# Patient Record
Sex: FEMALE | Race: WHITE | NOT HISPANIC OR LATINO | Employment: OTHER | URBAN - METROPOLITAN AREA
[De-identification: names, ages, dates, MRNs, and addresses within clinical notes are randomized per-mention and may not be internally consistent; named-entity substitution may affect disease eponyms.]

---

## 2018-03-15 ENCOUNTER — LAB REQUISITION (OUTPATIENT)
Dept: LAB | Facility: HOSPITAL | Age: 63
End: 2018-03-15
Attending: INTERNAL MEDICINE
Payer: COMMERCIAL

## 2018-03-15 ENCOUNTER — TRANSCRIBE ORDERS (OUTPATIENT)
Dept: SCHEDULING | Age: 63
End: 2018-03-15

## 2018-03-15 DIAGNOSIS — D50.8 OTHER IRON DEFICIENCY ANEMIAS (CODE): ICD-10-CM

## 2018-03-15 DIAGNOSIS — D50.8 IRON DEFICIENCY ANEMIA SECONDARY TO INADEQUATE DIETARY IRON INTAKE: Primary | ICD-10-CM

## 2018-03-15 LAB
BASOPHILS # BLD: 0.03 K/UL (ref 0.01–0.1)
BASOPHILS NFR BLD: 0.6 %
EOSINOPHIL # BLD: 0.2 K/UL (ref 0.04–0.36)
EOSINOPHIL NFR BLD: 3.8 %
ERYTHROCYTE [DISTWIDTH] IN BLOOD BY AUTOMATED COUNT: 18.7 % (ref 11.7–14.4)
HCT VFR BLDCO AUTO: 38 % (ref 35–45)
HGB BLD-MCNC: 12.4 G/DL (ref 11.8–15.7)
IMM GRANULOCYTES # BLD AUTO: 0.03 K/UL (ref 0–0.08)
IMM GRANULOCYTES NFR BLD AUTO: 0.6 %
LYMPHOCYTES # BLD: 1.14 K/UL (ref 1.2–3.5)
LYMPHOCYTES NFR BLD: 21.7 %
MCH RBC QN AUTO: 25 PG (ref 28–33.2)
MCHC RBC AUTO-ENTMCNC: 32.6 G/DL (ref 32.2–35.5)
MCV RBC AUTO: 76.6 FL (ref 83–98)
MONOCYTES # BLD: 0.41 K/UL (ref 0.28–0.8)
MONOCYTES NFR BLD: 7.8 %
NEUTROPHILS # BLD: 3.45 K/UL (ref 1.7–7)
NEUTS SEG NFR BLD: 65.5 %
NRBC BLD-RTO: 0 %
PDW BLD AUTO: 9.7 FL (ref 9.4–12.3)
PLATELET # BLD AUTO: 316 K/UL (ref 150–369)
RBC # BLD AUTO: 4.96 M/UL (ref 3.93–5.22)
WBC # BLD AUTO: 5.26 K/UL (ref 3.8–10.5)

## 2018-03-15 PROCEDURE — 85025 COMPLETE CBC W/AUTO DIFF WBC: CPT | Performed by: INTERNAL MEDICINE

## 2018-03-20 ENCOUNTER — HOSPITAL ENCOUNTER (OUTPATIENT)
Dept: RADIOLOGY | Age: 63
Discharge: HOME | End: 2018-03-20
Attending: INTERNAL MEDICINE
Payer: COMMERCIAL

## 2018-03-20 DIAGNOSIS — D50.8 IRON DEFICIENCY ANEMIA SECONDARY TO INADEQUATE DIETARY IRON INTAKE: ICD-10-CM

## 2018-03-20 PROCEDURE — 76700 US EXAM ABDOM COMPLETE: CPT

## 2018-05-25 ENCOUNTER — LAB REQUISITION (OUTPATIENT)
Dept: LAB | Facility: HOSPITAL | Age: 63
End: 2018-05-25
Attending: INTERNAL MEDICINE
Payer: COMMERCIAL

## 2018-05-25 DIAGNOSIS — D50.9 IRON DEFICIENCY ANEMIA: ICD-10-CM

## 2018-05-25 LAB
BASOPHILS # BLD: 0.03 K/UL (ref 0.01–0.1)
BASOPHILS NFR BLD: 0.4 %
DIFFERENTIAL METHOD BLD: ABNORMAL
EOSINOPHIL # BLD: 0.25 K/UL (ref 0.04–0.36)
EOSINOPHIL NFR BLD: 3.5 %
ERYTHROCYTE [DISTWIDTH] IN BLOOD BY AUTOMATED COUNT: 14.6 % (ref 11.7–14.4)
HCT VFR BLDCO AUTO: 39.3 % (ref 35–45)
HGB BLD-MCNC: 13.1 G/DL (ref 11.8–15.7)
IMM GRANULOCYTES # BLD AUTO: 0.05 K/UL (ref 0–0.08)
IMM GRANULOCYTES NFR BLD AUTO: 0.7 %
LYMPHOCYTES # BLD: 1.07 K/UL (ref 1.2–3.5)
LYMPHOCYTES NFR BLD: 15 %
MCH RBC QN AUTO: 25.5 PG (ref 28–33.2)
MCHC RBC AUTO-ENTMCNC: 33.3 G/DL (ref 32.2–35.5)
MCV RBC AUTO: 76.6 FL (ref 83–98)
MONOCYTES # BLD: 0.46 K/UL (ref 0.28–0.8)
MONOCYTES NFR BLD: 6.5 %
NEUTROPHILS # BLD: 5.26 K/UL (ref 1.7–7)
NEUTS SEG NFR BLD: 73.9 %
NRBC BLD-RTO: 0 %
PDW BLD AUTO: 9.5 FL (ref 9.4–12.3)
PLATELET # BLD AUTO: 388 K/UL (ref 150–369)
RBC # BLD AUTO: 5.13 M/UL (ref 3.93–5.22)
WBC # BLD AUTO: 7.12 K/UL (ref 3.8–10.5)

## 2018-05-25 PROCEDURE — 85025 COMPLETE CBC W/AUTO DIFF WBC: CPT | Performed by: INTERNAL MEDICINE

## 2018-06-20 ENCOUNTER — HOSPITAL ENCOUNTER (EMERGENCY)
Facility: HOSPITAL | Age: 63
Discharge: HOME | End: 2018-06-20
Attending: EMERGENCY MEDICINE
Payer: COMMERCIAL

## 2018-06-20 VITALS
DIASTOLIC BLOOD PRESSURE: 86 MMHG | OXYGEN SATURATION: 98 % | SYSTOLIC BLOOD PRESSURE: 117 MMHG | RESPIRATION RATE: 16 BRPM | HEART RATE: 58 BPM | TEMPERATURE: 98.5 F

## 2018-06-20 DIAGNOSIS — S05.02XA ABRASION OF LEFT CORNEA, INITIAL ENCOUNTER: Primary | ICD-10-CM

## 2018-06-20 PROCEDURE — 63700000 HC SELF-ADMINISTRABLE DRUG: Performed by: PHYSICIAN ASSISTANT

## 2018-06-20 PROCEDURE — 99281 EMR DPT VST MAYX REQ PHY/QHP: CPT

## 2018-06-20 RX ORDER — POLYMYXIN B SULFATE AND TRIMETHOPRIM 1; 10000 MG/ML; [USP'U]/ML
1 SOLUTION OPHTHALMIC
Qty: 10 ML | Refills: 0 | Status: SHIPPED | OUTPATIENT
Start: 2018-06-20 | End: 2019-06-25

## 2018-06-20 RX ORDER — POTASSIUM CHLORIDE 20 MEQ/1
20 TABLET, EXTENDED RELEASE ORAL
COMMUNITY
Start: 2017-11-24 | End: 2019-03-28

## 2018-06-20 RX ORDER — VIT C/E/ZN/COPPR/LUTEIN/ZEAXAN 250MG-90MG
1000 CAPSULE ORAL DAILY
COMMUNITY
End: 2025-02-25

## 2018-06-20 RX ORDER — POLYMYXIN B SULFATE AND TRIMETHOPRIM 1; 10000 MG/ML; [USP'U]/ML
1 SOLUTION OPHTHALMIC ONCE
Status: DISCONTINUED | OUTPATIENT
Start: 2018-06-20 | End: 2018-06-20 | Stop reason: HOSPADM

## 2018-06-20 RX ORDER — PROPARACAINE HYDROCHLORIDE 5 MG/ML
1 SOLUTION/ DROPS OPHTHALMIC ONCE
Status: COMPLETED | OUTPATIENT
Start: 2018-06-20 | End: 2018-06-20

## 2018-06-20 RX ORDER — AMLODIPINE BESYLATE 5 MG/1
5 TABLET ORAL DAILY
COMMUNITY
Start: 2017-03-01 | End: 2021-05-11 | Stop reason: HOSPADM

## 2018-06-20 RX ORDER — HYDROCHLOROTHIAZIDE 25 MG/1
25 TABLET ORAL DAILY
Refills: 3 | COMMUNITY
Start: 2018-05-24 | End: 2022-02-22 | Stop reason: SINTOL

## 2018-06-20 RX ADMIN — PROPARACAINE HYDROCHLORIDE 1 DROP: 5 SOLUTION/ DROPS OPHTHALMIC at 17:50

## 2018-06-20 ASSESSMENT — ENCOUNTER SYMPTOMS
DOUBLE VISION: 0
EYE DISCHARGE: 0
BLURRED VISION: 1
PERI-ORBITAL EDEMA: 0
EYE ITCHING: 0
PHOTOPHOBIA: 1
HEADACHES: 0
CRUSTING: 0
EYE REDNESS: 1

## 2018-06-20 NOTE — ED PROVIDER NOTES
HPI     Chief Complaint   Patient presents with   • Eye Pain       61yo female presenting for evaluation with left eye pain after waking up from colonoscopy earlier today.  Patient reports she was lying on her left side for the colonoscopy when she was put under anesthesia.  She has multiple bracelets on her left wrist. When she awoke, she felt her left eye was incredibly irritable and painful as well as red.  Patient reports that she had a corneal abrasion years ago when she used to wear contacts and the symptoms are identical.  She has noticed increased tearing and had a little bit of blurred vision, which has improved.  She also noticed that in the right eye, she had some redness as well.  She believes she may have scratched her left eye with the jewelry on her wrist.        History provided by:  Patient   used: No    Eye Problem   Location:  Left eye  Quality:  Burning and tearing  Severity:  Moderate  Onset quality:  Sudden  Timing:  Constant  Chronicity:  New  Context: scratch (presumed)    Relieved by:  None tried  Worsened by:  Bright light  Ineffective treatments:  Closing eye  Associated symptoms: blurred vision (now resolved), photophobia and redness    Associated symptoms: no crusting, no decreased vision, no discharge, no double vision, no headaches, no itching and no swelling    Risk factors: no recent herpes zoster         Patient History     Past Medical History:   Diagnosis Date   • Hypertension    • Systemic mastocytosis        Past Surgical History:   Procedure Laterality Date   • COLONOSCOPY     • REDUCTION MAMMAPLASTY         History reviewed. No pertinent family history.    Social History   Substance Use Topics   • Smoking status: Former Smoker   • Smokeless tobacco: Never Used   • Alcohol use No       Systems Reviewed from Nursing Triage:          Review of Systems     Review of Systems   Eyes: Positive for blurred vision (now resolved), photophobia and redness. Negative  for double vision, discharge and itching.   Neurological: Negative for headaches.        Physical Exam     ED Triage Vitals [06/20/18 1518]   Temp Heart Rate Resp BP SpO2   36.9 °C (98.5 °F) (!) 58 16 117/86 98 %      Temp Source Heart Rate Source Patient Position BP Location FiO2 (%) (Set)   Tympanic -- -- -- --                     Patient Vitals for the past 24 hrs:   BP Temp Temp src Pulse Resp SpO2   06/20/18 1518 117/86 36.9 °C (98.5 °F) Tympanic (!) 58 16 98 %           Physical Exam   Constitutional: She is oriented to person, place, and time. She appears well-developed and well-nourished.   Eyes: EOM and lids are normal. Pupils are equal, round, and reactive to light. Lids are everted and swept, no foreign bodies found. Right conjunctiva is not injected. Left conjunctiva is injected.   Slit lamp exam:       The right eye shows no corneal abrasion and no fluorescein uptake.        The left eye shows corneal abrasion and fluorescein uptake.       Pt with photophobia and redness of left eye   Neurological: She is alert and oriented to person, place, and time.   Skin: Skin is warm and dry.   Psychiatric: She has a normal mood and affect.   Nursing note and vitals reviewed.           Procedures    ED Course & MDM     Labs Reviewed - No data to display    No orders to display           MDM  Number of Diagnoses or Management Options  Abrasion of left cornea, initial encounter:      Amount and/or Complexity of Data Reviewed  Discuss the patient with other providers: yes    Patient Progress  Patient progress: improved           ED Course as of Jun 20 1937 Wed Jun 20, 2018   1727 Pt seen and examined. Will give proparacaine and do slit lamp exam.   [KL]   1747 Pt reports feeling relief in left eye after application of proparacaine drop. Pt + corneal abrasion to right eye. ED Attending to evaluate.   [KL]   1811 Pt with allergy to clarithromycin ointment so will prescribe polymixin/trimethoprim drops. Pt stable for  d/c home with Ozzy f/u.   [KL]      ED Course User Index  [KL] DOYLE Kapadia         Clinical Impressions as of Jun 20 1937   Abrasion of left cornea, initial encounter     Disposition:  Discharge     DOYLE Kapadia  06/20/18 1937

## 2018-06-20 NOTE — ED ATTESTATION NOTE
I have personally seen and examined the patient.  I reviewed and agree with physician assistant / nurse practitioner’s assessment and plan of care, with the following exceptions: None  My examination, assessment, and plan of care of Ritu Ramirez is as follows:     Exam:  I examined the pt with the slit lamp.  Left eye: corneal abrasion to central part of cornea.       Mark Moser MD  06/20/18 1810

## 2018-06-20 NOTE — DISCHARGE INSTRUCTIONS
Apply the antibiotic drop as directed and follow up with Dr. Preciado. Return for any worsening or worrisome symptoms.     Return to the ED for worsening of symptoms or any problems or concerns.  It is very important to follow up with your healthcare provider for re-evaluation.

## 2018-06-21 ENCOUNTER — TELEPHONE (OUTPATIENT)
Dept: INTERNAL MEDICINE | Facility: CLINIC | Age: 63
End: 2018-06-21

## 2018-06-22 ENCOUNTER — TELEPHONE (OUTPATIENT)
Dept: INTERNAL MEDICINE | Facility: CLINIC | Age: 63
End: 2018-06-22

## 2018-06-22 NOTE — TELEPHONE ENCOUNTER
I called the patient back to tell her she can have a TDAP per Dr. Grimm and the patient stated she will do this at a [facility].  I also informed the patient that Dr. Grimm would like to see her in six months from her last appt which was January 2018 so I scheduled the patient for a visit in July.

## 2018-06-22 NOTE — TELEPHONE ENCOUNTER
The patient called today asking if she can get a TDAP injection today as she has a new grandchild and was told she needs to have this. Can she have this and if so, can you please place an order for this today so she can come in this morning?  Also, the patient said she doesn't have anything pressing right now but was last here 1/23/18 and wanted to know when you want to see her again so she can make an appt ahead of time.

## 2018-06-25 ENCOUNTER — LAB REQUISITION (OUTPATIENT)
Dept: LAB | Facility: HOSPITAL | Age: 63
End: 2018-06-25
Attending: INTERNAL MEDICINE
Payer: COMMERCIAL

## 2018-06-25 DIAGNOSIS — D50.8 OTHER IRON DEFICIENCY ANEMIAS (CODE): ICD-10-CM

## 2018-06-25 LAB
BASOPHILS # BLD: 0.03 K/UL (ref 0.01–0.1)
BASOPHILS NFR BLD: 0.4 %
DIFFERENTIAL METHOD BLD: NORMAL
EOSINOPHIL # BLD: 0.22 K/UL (ref 0.04–0.36)
EOSINOPHIL NFR BLD: 2.8 %
ERYTHROCYTE [DISTWIDTH] IN BLOOD BY AUTOMATED COUNT: 14.7 % (ref 11.7–14.4)
HCT VFR BLDCO AUTO: 42.4 % (ref 35–45)
HGB BLD-MCNC: 14.3 G/DL (ref 11.8–15.7)
IMM GRANULOCYTES # BLD AUTO: 0.04 K/UL (ref 0–0.08)
IMM GRANULOCYTES NFR BLD AUTO: 0.5 %
LYMPHOCYTES # BLD: 1.45 K/UL (ref 1.2–3.5)
LYMPHOCYTES NFR BLD: 18.8 %
MCH RBC QN AUTO: 25.9 PG (ref 28–33.2)
MCHC RBC AUTO-ENTMCNC: 33.7 G/DL (ref 32.2–35.5)
MCV RBC AUTO: 76.7 FL (ref 83–98)
MONOCYTES # BLD: 0.69 K/UL (ref 0.28–0.8)
MONOCYTES NFR BLD: 8.9 %
NEUTROPHILS # BLD: 5.3 K/UL (ref 1.7–7)
NEUTS SEG NFR BLD: 68.6 %
NRBC BLD-RTO: 0 %
PDW BLD AUTO: 9.7 FL (ref 9.4–12.3)
PLATELET # BLD AUTO: 327 K/UL (ref 150–369)
RBC # BLD AUTO: 5.53 M/UL (ref 3.93–5.22)
WBC # BLD AUTO: 7.73 K/UL (ref 3.8–10.5)

## 2018-06-25 PROCEDURE — 85025 COMPLETE CBC W/AUTO DIFF WBC: CPT | Performed by: INTERNAL MEDICINE

## 2018-06-25 PROCEDURE — 36415 COLL VENOUS BLD VENIPUNCTURE: CPT | Performed by: INTERNAL MEDICINE

## 2018-08-01 ENCOUNTER — LAB REQUISITION (OUTPATIENT)
Dept: LAB | Facility: HOSPITAL | Age: 63
End: 2018-08-01
Attending: INTERNAL MEDICINE
Payer: COMMERCIAL

## 2018-08-01 DIAGNOSIS — D50.8 OTHER IRON DEFICIENCY ANEMIAS (CODE): ICD-10-CM

## 2018-08-01 LAB
BASOPHILS # BLD: 0.03 K/UL (ref 0.01–0.1)
BASOPHILS NFR BLD: 0.4 %
DIFFERENTIAL METHOD BLD: NORMAL
EOSINOPHIL # BLD: 0.27 K/UL (ref 0.04–0.36)
EOSINOPHIL NFR BLD: 3.3 %
ERYTHROCYTE [DISTWIDTH] IN BLOOD BY AUTOMATED COUNT: 15 % (ref 11.7–14.4)
HCT VFR BLDCO AUTO: 36.3 % (ref 35–45)
HGB BLD-MCNC: 11.9 G/DL (ref 11.8–15.7)
IMM GRANULOCYTES # BLD AUTO: 0.08 K/UL (ref 0–0.08)
IMM GRANULOCYTES NFR BLD AUTO: 1 %
LYMPHOCYTES # BLD: 1.3 K/UL (ref 1.2–3.5)
LYMPHOCYTES NFR BLD: 16 %
MCH RBC QN AUTO: 26.3 PG (ref 28–33.2)
MCHC RBC AUTO-ENTMCNC: 32.8 G/DL (ref 32.2–35.5)
MCV RBC AUTO: 80.3 FL (ref 83–98)
MONOCYTES # BLD: 0.61 K/UL (ref 0.28–0.8)
MONOCYTES NFR BLD: 7.5 %
NEUTROPHILS # BLD: 5.85 K/UL (ref 1.7–7)
NEUTS SEG NFR BLD: 71.8 %
NRBC BLD-RTO: 0 %
PDW BLD AUTO: 9.2 FL (ref 9.4–12.3)
PLATELET # BLD AUTO: 355 K/UL (ref 150–369)
RBC # BLD AUTO: 4.52 M/UL (ref 3.93–5.22)
WBC # BLD AUTO: 8.14 K/UL (ref 3.8–10.5)

## 2018-08-01 PROCEDURE — 36415 COLL VENOUS BLD VENIPUNCTURE: CPT | Performed by: INTERNAL MEDICINE

## 2018-08-01 PROCEDURE — 85025 COMPLETE CBC W/AUTO DIFF WBC: CPT | Performed by: INTERNAL MEDICINE

## 2018-09-27 RX ORDER — SERTRALINE HYDROCHLORIDE 25 MG/1
TABLET, FILM COATED ORAL
Qty: 90 TABLET | Refills: 1 | Status: SHIPPED | OUTPATIENT
Start: 2018-09-27 | End: 2019-02-12 | Stop reason: SDUPTHER

## 2018-10-22 ENCOUNTER — LAB REQUISITION (OUTPATIENT)
Dept: LAB | Facility: HOSPITAL | Age: 63
End: 2018-10-22
Payer: COMMERCIAL

## 2018-10-22 ENCOUNTER — LAB REQUISITION (OUTPATIENT)
Dept: LAB | Facility: HOSPITAL | Age: 63
End: 2018-10-22
Attending: INTERNAL MEDICINE
Payer: COMMERCIAL

## 2018-10-22 DIAGNOSIS — D50.8 OTHER IRON DEFICIENCY ANEMIAS: ICD-10-CM

## 2018-10-22 LAB
BASOPHILS # BLD: 0.03 K/UL (ref 0.01–0.1)
BASOPHILS NFR BLD: 0.4 %
DIFFERENTIAL METHOD BLD: ABNORMAL
EOSINOPHIL # BLD: 0.34 K/UL (ref 0.04–0.36)
EOSINOPHIL NFR BLD: 4.6 %
ERYTHROCYTE [DISTWIDTH] IN BLOOD BY AUTOMATED COUNT: 13.2 % (ref 11.7–14.4)
HCT VFR BLDCO AUTO: 36.2 % (ref 35–45)
HGB BLD-MCNC: 11.4 G/DL (ref 11.8–15.7)
IMM GRANULOCYTES # BLD AUTO: 0.03 K/UL (ref 0–0.08)
IMM GRANULOCYTES NFR BLD AUTO: 0.4 %
LYMPHOCYTES # BLD: 0.94 K/UL (ref 1.2–3.5)
LYMPHOCYTES NFR BLD: 12.7 %
MCH RBC QN AUTO: 24.2 PG (ref 28–33.2)
MCHC RBC AUTO-ENTMCNC: 31.5 G/DL (ref 32.2–35.5)
MCV RBC AUTO: 76.9 FL (ref 83–98)
MONOCYTES # BLD: 0.55 K/UL (ref 0.28–0.8)
MONOCYTES NFR BLD: 7.5 %
NEUTROPHILS # BLD: 5.49 K/UL (ref 1.7–7)
NEUTS SEG NFR BLD: 74.4 %
NRBC BLD-RTO: 0 %
PDW BLD AUTO: 9.7 FL (ref 9.4–12.3)
PLATELET # BLD AUTO: 384 K/UL (ref 150–369)
RBC # BLD AUTO: 4.71 M/UL (ref 3.93–5.22)
WBC # BLD AUTO: 7.38 K/UL (ref 3.8–10.5)

## 2018-10-22 PROCEDURE — 36415 COLL VENOUS BLD VENIPUNCTURE: CPT | Performed by: INTERNAL MEDICINE

## 2018-10-22 PROCEDURE — 30099997 SPECIMEN PROCESSING: Performed by: INTERNAL MEDICINE

## 2018-10-22 PROCEDURE — 85025 COMPLETE CBC W/AUTO DIFF WBC: CPT | Performed by: INTERNAL MEDICINE

## 2018-10-30 ENCOUNTER — LAB REQUISITION (OUTPATIENT)
Dept: LAB | Facility: HOSPITAL | Age: 63
End: 2018-10-30
Payer: COMMERCIAL

## 2018-10-30 DIAGNOSIS — D50.9 IRON DEFICIENCY ANEMIA: ICD-10-CM

## 2018-10-30 LAB
BASOPHILS # BLD: 0.03 K/UL (ref 0.01–0.1)
BASOPHILS NFR BLD: 0.4 %
DIFFERENTIAL METHOD BLD: NORMAL
EOSINOPHIL # BLD: 0.36 K/UL (ref 0.04–0.36)
EOSINOPHIL NFR BLD: 5.1 %
ERYTHROCYTE [DISTWIDTH] IN BLOOD BY AUTOMATED COUNT: 13.5 % (ref 11.7–14.4)
HCT VFR BLDCO AUTO: 37.3 % (ref 35–45)
HGB BLD-MCNC: 11.8 G/DL (ref 11.8–15.7)
IMM GRANULOCYTES # BLD AUTO: 0.04 K/UL (ref 0–0.08)
IMM GRANULOCYTES NFR BLD AUTO: 0.6 %
LYMPHOCYTES # BLD: 1.2 K/UL (ref 1.2–3.5)
LYMPHOCYTES NFR BLD: 17.1 %
MCH RBC QN AUTO: 23.8 PG (ref 28–33.2)
MCHC RBC AUTO-ENTMCNC: 31.6 G/DL (ref 32.2–35.5)
MCV RBC AUTO: 75.4 FL (ref 83–98)
MONOCYTES # BLD: 0.53 K/UL (ref 0.28–0.8)
MONOCYTES NFR BLD: 7.5 %
NEUTROPHILS # BLD: 4.87 K/UL (ref 1.7–7)
NEUTS SEG NFR BLD: 69.3 %
NRBC BLD-RTO: 0 %
PDW BLD AUTO: 9.5 FL (ref 9.4–12.3)
PLATELET # BLD AUTO: 459 K/UL (ref 150–369)
RBC # BLD AUTO: 4.95 M/UL (ref 3.93–5.22)
WBC # BLD AUTO: 7.03 K/UL (ref 3.8–10.5)

## 2018-10-30 PROCEDURE — 85025 COMPLETE CBC W/AUTO DIFF WBC: CPT | Performed by: INTERNAL MEDICINE

## 2018-10-30 PROCEDURE — 30099997 SPECIMEN PROCESSING: Performed by: INTERNAL MEDICINE

## 2018-11-20 ENCOUNTER — APPOINTMENT (RX ONLY)
Dept: URBAN - METROPOLITAN AREA CLINIC 26 | Facility: CLINIC | Age: 63
Setting detail: DERMATOLOGY
End: 2018-11-20

## 2018-11-20 DIAGNOSIS — D18.0 HEMANGIOMA: ICD-10-CM

## 2018-11-20 DIAGNOSIS — D22 MELANOCYTIC NEVI: ICD-10-CM

## 2018-11-20 DIAGNOSIS — L85.3 XEROSIS CUTIS: ICD-10-CM

## 2018-11-20 DIAGNOSIS — Z85.828 PERSONAL HISTORY OF OTHER MALIGNANT NEOPLASM OF SKIN: ICD-10-CM

## 2018-11-20 DIAGNOSIS — L81.4 OTHER MELANIN HYPERPIGMENTATION: ICD-10-CM

## 2018-11-20 PROBLEM — D56.9 THALASSEMIA, UNSPECIFIED: Status: ACTIVE | Noted: 2018-11-20

## 2018-11-20 PROBLEM — D22.72 MELANOCYTIC NEVI OF LEFT LOWER LIMB, INCLUDING HIP: Status: ACTIVE | Noted: 2018-11-20

## 2018-11-20 PROBLEM — D22.5 MELANOCYTIC NEVI OF TRUNK: Status: ACTIVE | Noted: 2018-11-20

## 2018-11-20 PROBLEM — D48.5 NEOPLASM OF UNCERTAIN BEHAVIOR OF SKIN: Status: ACTIVE | Noted: 2018-11-20

## 2018-11-20 PROBLEM — D18.01 HEMANGIOMA OF SKIN AND SUBCUTANEOUS TISSUE: Status: ACTIVE | Noted: 2018-11-20

## 2018-11-20 PROBLEM — D89.9 DISORDER INVOLVING THE IMMUNE MECHANISM, UNSPECIFIED: Status: ACTIVE | Noted: 2018-11-20

## 2018-11-20 PROBLEM — T41.0X5S ADVERSE EFFECT OF INHALED ANESTHETICS, SEQUELA: Status: ACTIVE | Noted: 2018-11-20

## 2018-11-20 PROBLEM — I10 ESSENTIAL (PRIMARY) HYPERTENSION: Status: ACTIVE | Noted: 2018-11-20

## 2018-11-20 PROBLEM — L23.7 ALLERGIC CONTACT DERMATITIS DUE TO PLANTS, EXCEPT FOOD: Status: ACTIVE | Noted: 2018-11-20

## 2018-11-20 PROBLEM — D47.02 SYSTEMIC MASTOCYTOSIS: Status: ACTIVE | Noted: 2018-11-20

## 2018-11-20 PROCEDURE — 99203 OFFICE O/P NEW LOW 30 MIN: CPT

## 2018-11-20 PROCEDURE — ? ADDITIONAL NOTES

## 2018-11-20 PROCEDURE — ? OBSERVATION AND MEASURE

## 2018-11-20 PROCEDURE — ? PATIENT SPECIFIC COUNSELING

## 2018-11-20 PROCEDURE — ? DEFER

## 2018-11-20 PROCEDURE — ? COUNSELING

## 2018-11-20 PROCEDURE — ? SUNSCREEN RECOMMENDATIONS

## 2018-11-20 PROCEDURE — ? OBSERVATION

## 2018-11-20 ASSESSMENT — LOCATION DETAILED DESCRIPTION DERM
LOCATION DETAILED: LEFT MEDIAL KNEE
LOCATION DETAILED: LEFT DISTAL POSTERIOR THIGH
LOCATION DETAILED: PERIUMBILICAL SKIN
LOCATION DETAILED: RIGHT ANTERIOR PROXIMAL UPPER ARM
LOCATION DETAILED: EPIGASTRIC SKIN
LOCATION DETAILED: LEFT BUTTOCK

## 2018-11-20 ASSESSMENT — LOCATION ZONE DERM
LOCATION ZONE: ARM
LOCATION ZONE: LEG
LOCATION ZONE: TRUNK

## 2018-11-20 ASSESSMENT — LOCATION SIMPLE DESCRIPTION DERM
LOCATION SIMPLE: LEFT BUTTOCK
LOCATION SIMPLE: ABDOMEN
LOCATION SIMPLE: LEFT KNEE
LOCATION SIMPLE: LEFT POSTERIOR THIGH
LOCATION SIMPLE: RIGHT UPPER ARM

## 2018-11-20 NOTE — PROCEDURE: PATIENT SPECIFIC COUNSELING
Advised patient she should always carry an EpiPen. Patient reports she has (2) EpiPens on her at all times
Detail Level: Zone

## 2018-11-20 NOTE — PROCEDURE: OBSERVATION
Detail Level: Detailed
Size Of Lesion: 4mm
Size Of Lesion In Cm (Optional): 0.3
X Size Of Lesion In Cm (Optional): 0
Size Of Lesion In Cm (Optional): 0.4
Detail Level: Zone

## 2018-11-20 NOTE — PROCEDURE: DEFER
Instructions (Optional): Procedure to be re-evaluated for biopsy at next visit
Procedure To Be Performed At Next Visit: Biopsy by shave method
Detail Level: Detailed

## 2018-11-20 NOTE — PROCEDURE: ADDITIONAL NOTES
Additional Notes: Patient reports diagnosis confirmed by bone marrow biopsy, and she is closely monitored by Dr.Hirsh Simental ([facility]) \\nFax: [phone]\\nAddress: [address] [address]\\nPatient denies taking medications for this condition at this time Additional Notes: Patient reports diagnosis confirmed by bone marrow biopsy, and she is closely monitored by Dr.Hirsh Simental ([facility]) \\nFax: [phone]\\nAddress: [address] [address]\\nPatient denies taking medications for this condition at this time

## 2018-11-29 ENCOUNTER — TELEPHONE (OUTPATIENT)
Dept: INTERNAL MEDICINE | Facility: CLINIC | Age: 63
End: 2018-11-29

## 2018-11-29 ENCOUNTER — OFFICE VISIT (OUTPATIENT)
Dept: INTERNAL MEDICINE | Facility: CLINIC | Age: 63
End: 2018-11-29
Payer: COMMERCIAL

## 2018-11-29 VITALS
DIASTOLIC BLOOD PRESSURE: 68 MMHG | HEART RATE: 66 BPM | HEIGHT: 60 IN | RESPIRATION RATE: 18 BRPM | SYSTOLIC BLOOD PRESSURE: 110 MMHG | WEIGHT: 122.2 LBS | TEMPERATURE: 98.3 F | OXYGEN SATURATION: 99 % | BODY MASS INDEX: 23.99 KG/M2

## 2018-11-29 DIAGNOSIS — E87.6 HYPOKALEMIA: Primary | ICD-10-CM

## 2018-11-29 DIAGNOSIS — Z12.31 SCREENING MAMMOGRAM, ENCOUNTER FOR: ICD-10-CM

## 2018-11-29 DIAGNOSIS — I10 ESSENTIAL HYPERTENSION: ICD-10-CM

## 2018-11-29 DIAGNOSIS — L98.9 SKIN LESION: ICD-10-CM

## 2018-11-29 DIAGNOSIS — D47.02 SYSTEMIC MASTOCYTOSIS: ICD-10-CM

## 2018-11-29 DIAGNOSIS — M47.816 LUMBAR SPONDYLOSIS: ICD-10-CM

## 2018-11-29 PROCEDURE — 99214 OFFICE O/P EST MOD 30 MIN: CPT | Performed by: INTERNAL MEDICINE

## 2018-11-29 RX ORDER — LORAZEPAM 0.5 MG/1
0.5 TABLET ORAL EVERY 6 HOURS PRN
Qty: 90 TABLET | Refills: 0 | Status: SHIPPED | OUTPATIENT
Start: 2018-11-29 | End: 2019-03-28 | Stop reason: ALTCHOICE

## 2018-11-29 ASSESSMENT — ENCOUNTER SYMPTOMS
PALPITATIONS: 0
CHEST TIGHTNESS: 0
SHORTNESS OF BREATH: 0
ABDOMINAL PAIN: 0
BACK PAIN: 1
FATIGUE: 0
DIZZINESS: 0
COUGH: 0
HEADACHES: 0

## 2018-11-29 NOTE — TELEPHONE ENCOUNTER
Can you please call Dr. Nato Rhodes office at [facility] for the most recent office note and mRI.

## 2018-11-29 NOTE — PROGRESS NOTES
Subjective      Patient ID: Ritu Ramirez is a 63 y.o. female.  1955      She is here for follow up of her mastocytosis and hypertension - went to [facility] in October - Has had recurring low back pain secondary to spondylolithesis and DJD see Dr. Nato Rhodes and has a medical [employer] card - has not seen much benefit yet. Also will be seeing Dr. Eric Arias to consder epidural or PT - Will be enrolling in a trial of interleukin 6  For her systemic mastocytosis..  Also has had a recent skin evaluation with dermatology and has a lesion on her left buttock which the dermatologist feels may be melanoma and she will have this removed.        The following have been reviewed and updated as appropriate in this visit:  Problems       Review of Systems   Constitutional: Negative for fatigue.   Respiratory: Negative for cough, chest tightness and shortness of breath.    Cardiovascular: Negative for chest pain, palpitations and leg swelling.   Gastrointestinal: Negative for abdominal pain.   Endocrine: Negative for cold intolerance and heat intolerance.   Musculoskeletal: Positive for back pain.   Neurological: Negative for dizziness and headaches.       Objective     Vitals:    11/29/18 1422   BP: 110/68   BP Location: Left upper arm   Patient Position: Sitting   Pulse: 66   Resp: 18   Temp: 36.8 °C (98.3 °F)   SpO2: 99%   Weight: 55.4 kg (122 lb 3.2 oz)   Height: 1.524 m (5')     Body mass index is 23.87 kg/m².    Physical Exam   Constitutional: She is oriented to person, place, and time. She appears well-developed and well-nourished.   HENT:   Head: Normocephalic and atraumatic.   Neck: Normal range of motion. Neck supple. No thyromegaly present.   Cardiovascular: Normal rate, regular rhythm and normal heart sounds.    No murmur heard.  Pulmonary/Chest: Effort normal and breath sounds normal.   Abdominal: Soft. Bowel sounds are normal. She exhibits no mass. There is no tenderness.   Musculoskeletal: She exhibits no edema.    Neg SLR  No weakness or sensory deficit   Neurological: She is alert and oriented to person, place, and time.   Skin: Skin is warm and dry.   Round 0.5 cm brown macule with variation in color and irregular borders on her left buttock   Psychiatric: She has a normal mood and affect. Her behavior is normal. Judgment and thought content normal.   Nursing note and vitals reviewed.      Assessment/Plan   Problem List Items Addressed This Visit     Systemic mastocytosis     No recent flares - will review her work up at the [facility] and she will let me know if she is enrolled in a study         Hypokalemia - Primary     Will recheck today         Relevant Orders    Basic metabolic panel    Essential hypertension     Remains stable on amlodipine and HCTZ           Lumbar spondylosis     She cannot take NSAIDS - will continue with medical marijuana and follow with Dr. Eric Arias         Skin lesion     This is suspicious looking - will proceed with removal           Other Visit Diagnoses     Screening mammogram, encounter for        Relevant Orders    BI SCREENING MAMMOGRAM BILATERAL

## 2018-11-30 NOTE — ASSESSMENT & PLAN NOTE
No recent flares - will review her work up at the [facility] and she will let me know if she is enrolled in a study

## 2018-12-12 ENCOUNTER — TRANSCRIBE ORDERS (OUTPATIENT)
Dept: SCHEDULING | Age: 63
End: 2018-12-12

## 2018-12-12 DIAGNOSIS — Z13.820 ENCOUNTER FOR SCREENING FOR OSTEOPOROSIS: Primary | ICD-10-CM

## 2018-12-18 ENCOUNTER — APPOINTMENT (OUTPATIENT)
Dept: LAB | Facility: HOSPITAL | Age: 63
End: 2018-12-18
Attending: INTERNAL MEDICINE
Payer: COMMERCIAL

## 2018-12-18 ENCOUNTER — LAB REQUISITION (OUTPATIENT)
Dept: LAB | Facility: HOSPITAL | Age: 63
End: 2018-12-18
Payer: COMMERCIAL

## 2018-12-18 ENCOUNTER — TRANSCRIBE ORDERS (OUTPATIENT)
Dept: LAB | Facility: HOSPITAL | Age: 63
End: 2018-12-18

## 2018-12-18 DIAGNOSIS — D50.8 OTHER IRON DEFICIENCY ANEMIAS: ICD-10-CM

## 2018-12-18 DIAGNOSIS — D50.8 OTHER IRON DEFICIENCY ANEMIAS: Primary | ICD-10-CM

## 2018-12-18 LAB
BASOPHILS # BLD: 0.02 K/UL (ref 0.01–0.1)
BASOPHILS NFR BLD: 0.3 %
DIFFERENTIAL METHOD BLD: ABNORMAL
EOSINOPHIL # BLD: 0.26 K/UL (ref 0.04–0.36)
EOSINOPHIL NFR BLD: 4 %
ERYTHROCYTE [DISTWIDTH] IN BLOOD BY AUTOMATED COUNT: 17.5 % (ref 11.7–14.4)
HCT VFR BLDCO AUTO: 37.6 %
HGB BLD-MCNC: 11.9 G/DL
IMM GRANULOCYTES # BLD AUTO: 0.04 K/UL (ref 0–0.08)
IMM GRANULOCYTES NFR BLD AUTO: 0.6 %
LYMPHOCYTES # BLD: 1 K/UL (ref 1.2–3.5)
LYMPHOCYTES NFR BLD: 15.4 %
MCH RBC QN AUTO: 24.9 PG (ref 28–33.2)
MCHC RBC AUTO-ENTMCNC: 31.6 G/DL (ref 32.2–35.5)
MCV RBC AUTO: 78.7 FL (ref 83–98)
MONOCYTES # BLD: 0.55 K/UL (ref 0.28–0.8)
MONOCYTES NFR BLD: 8.5 %
NEUTROPHILS # BLD: 4.63 K/UL (ref 1.7–7)
NEUTS SEG NFR BLD: 71.2 %
NRBC BLD-RTO: 0 %
PDW BLD AUTO: 9.8 FL (ref 9.4–12.3)
PLATELET # BLD AUTO: 363 K/UL
RBC # BLD AUTO: 4.78 M/UL (ref 3.93–5.22)
WBC # BLD AUTO: 6.5 K/UL

## 2018-12-18 PROCEDURE — 36415 COLL VENOUS BLD VENIPUNCTURE: CPT

## 2018-12-18 PROCEDURE — 85025 COMPLETE CBC W/AUTO DIFF WBC: CPT

## 2018-12-26 ENCOUNTER — HOSPITAL ENCOUNTER (OUTPATIENT)
Dept: RADIOLOGY | Age: 63
Discharge: HOME | End: 2018-12-26
Attending: INTERNAL MEDICINE
Payer: COMMERCIAL

## 2018-12-26 DIAGNOSIS — Z12.31 SCREENING MAMMOGRAM, ENCOUNTER FOR: ICD-10-CM

## 2018-12-26 PROCEDURE — 77067 SCR MAMMO BI INCL CAD: CPT

## 2019-02-12 RX ORDER — SERTRALINE HYDROCHLORIDE 25 MG/1
25 TABLET, FILM COATED ORAL
Qty: 90 TABLET | Refills: 1 | Status: SHIPPED | OUTPATIENT
Start: 2019-02-12 | End: 2019-02-15 | Stop reason: SDUPTHER

## 2019-02-15 ENCOUNTER — TELEPHONE (OUTPATIENT)
Dept: INTERNAL MEDICINE | Facility: CLINIC | Age: 64
End: 2019-02-15

## 2019-02-15 RX ORDER — SERTRALINE HYDROCHLORIDE 25 MG/1
TABLET, FILM COATED ORAL
Qty: 135 TABLET | Refills: 3 | Status: SHIPPED | OUTPATIENT
Start: 2019-02-15 | End: 2019-03-28

## 2019-02-15 NOTE — TELEPHONE ENCOUNTER
Prescription for sertraline 25 mg was sent to the pharmacy on 2/12 ;  Pt states the instructions are incorrect;  Pt states she was increased on this medication to 1 & 1/2 pills daily;  Please change and resent to pharmacy if this is what you want pt to have;

## 2019-03-07 ENCOUNTER — APPOINTMENT (OUTPATIENT)
Dept: LAB | Facility: HOSPITAL | Age: 64
End: 2019-03-07
Attending: INTERNAL MEDICINE
Payer: COMMERCIAL

## 2019-03-07 ENCOUNTER — TRANSCRIBE ORDERS (OUTPATIENT)
Dept: LAB | Facility: HOSPITAL | Age: 64
End: 2019-03-07

## 2019-03-07 DIAGNOSIS — D50.8 OTHER IRON DEFICIENCY ANEMIAS: Primary | ICD-10-CM

## 2019-03-07 DIAGNOSIS — D50.8 OTHER IRON DEFICIENCY ANEMIAS: ICD-10-CM

## 2019-03-07 LAB
ALBUMIN SERPL-MCNC: 4.3 G/DL (ref 3.4–5)
ALP SERPL-CCNC: 100 IU/L (ref 35–126)
ALT SERPL-CCNC: 20 IU/L (ref 11–54)
ANION GAP SERPL CALC-SCNC: 15 MEQ/L (ref 3–15)
AST SERPL-CCNC: 20 IU/L (ref 15–41)
BASOPHILS # BLD: 0.03 K/UL (ref 0.01–0.1)
BASOPHILS NFR BLD: 0.4 %
BILIRUB SERPL-MCNC: 0.6 MG/DL (ref 0.3–1.2)
BUN SERPL-MCNC: 16 MG/DL (ref 8–20)
CALCIUM SERPL-MCNC: 9.8 MG/DL (ref 8.9–10.3)
CHLORIDE SERPL-SCNC: 96 MEQ/L (ref 98–109)
CO2 SERPL-SCNC: 28 MEQ/L (ref 22–32)
CREAT SERPL-MCNC: 0.6 MG/DL
DIFFERENTIAL METHOD BLD: NORMAL
EOSINOPHIL # BLD: 0.31 K/UL (ref 0.04–0.36)
EOSINOPHIL NFR BLD: 4.5 %
ERYTHROCYTE [DISTWIDTH] IN BLOOD BY AUTOMATED COUNT: 14.1 % (ref 11.7–14.4)
GFR SERPL CREATININE-BSD FRML MDRD: >60 ML/MIN/1.73M*2
GLUCOSE SERPL-MCNC: 132 MG/DL (ref 70–99)
HCT VFR BLDCO AUTO: 38.4 %
HGB BLD-MCNC: 11.8 G/DL
IMM GRANULOCYTES # BLD AUTO: 0.05 K/UL (ref 0–0.08)
IMM GRANULOCYTES NFR BLD AUTO: 0.7 %
LYMPHOCYTES # BLD: 1.37 K/UL (ref 1.2–3.5)
LYMPHOCYTES NFR BLD: 19.9 %
MCH RBC QN AUTO: 23 PG (ref 28–33.2)
MCHC RBC AUTO-ENTMCNC: 30.7 G/DL (ref 32.2–35.5)
MCV RBC AUTO: 74.9 FL (ref 83–98)
MONOCYTES # BLD: 0.52 K/UL (ref 0.28–0.8)
MONOCYTES NFR BLD: 7.5 %
NEUTROPHILS # BLD: 4.62 K/UL (ref 1.7–7)
NEUTS SEG NFR BLD: 67 %
NRBC BLD-RTO: 0 %
PDW BLD AUTO: 9.6 FL (ref 9.4–12.3)
PLATELET # BLD AUTO: 394 K/UL
POTASSIUM SERPL-SCNC: 2.8 MEQ/L (ref 3.6–5.1)
PROT SERPL-MCNC: 6.4 G/DL (ref 6–8.2)
RBC # BLD AUTO: 5.13 M/UL (ref 3.93–5.22)
SODIUM SERPL-SCNC: 139 MEQ/L (ref 136–144)
WBC # BLD AUTO: 6.9 K/UL

## 2019-03-07 PROCEDURE — 36415 COLL VENOUS BLD VENIPUNCTURE: CPT

## 2019-03-07 PROCEDURE — 85025 COMPLETE CBC W/AUTO DIFF WBC: CPT

## 2019-03-07 PROCEDURE — 80053 COMPREHEN METABOLIC PANEL: CPT

## 2019-03-14 ENCOUNTER — LAB REQUISITION (OUTPATIENT)
Dept: LAB | Facility: HOSPITAL | Age: 64
End: 2019-03-14
Payer: COMMERCIAL

## 2019-03-14 DIAGNOSIS — D50.8 OTHER IRON DEFICIENCY ANEMIAS: ICD-10-CM

## 2019-03-14 LAB
BASOPHILS # BLD: 0.03 K/UL (ref 0.01–0.1)
BASOPHILS NFR BLD: 0.4 %
DIFFERENTIAL METHOD BLD: NORMAL
EOSINOPHIL # BLD: 0.32 K/UL (ref 0.04–0.36)
EOSINOPHIL NFR BLD: 3.9 %
ERYTHROCYTE [DISTWIDTH] IN BLOOD BY AUTOMATED COUNT: 14.4 % (ref 11.7–14.4)
HCT VFR BLDCO AUTO: 35.6 %
HGB BLD-MCNC: 10.9 G/DL
IMM GRANULOCYTES # BLD AUTO: 0.05 K/UL (ref 0–0.08)
IMM GRANULOCYTES NFR BLD AUTO: 0.6 %
LYMPHOCYTES # BLD: 1.42 K/UL (ref 1.2–3.5)
LYMPHOCYTES NFR BLD: 17.4 %
MCH RBC QN AUTO: 22.8 PG (ref 28–33.2)
MCHC RBC AUTO-ENTMCNC: 30.6 G/DL (ref 32.2–35.5)
MCV RBC AUTO: 74.3 FL (ref 83–98)
MONOCYTES # BLD: 0.59 K/UL (ref 0.28–0.8)
MONOCYTES NFR BLD: 7.2 %
NEUTROPHILS # BLD: 5.75 K/UL (ref 1.7–7)
NEUTS SEG NFR BLD: 70.5 %
NRBC BLD-RTO: 0 %
PDW BLD AUTO: 9.3 FL (ref 9.4–12.3)
PLATELET # BLD AUTO: 378 K/UL
RBC # BLD AUTO: 4.79 M/UL (ref 3.93–5.22)
WBC # BLD AUTO: 8.16 K/UL

## 2019-03-14 PROCEDURE — 85025 COMPLETE CBC W/AUTO DIFF WBC: CPT | Performed by: INTERNAL MEDICINE

## 2019-03-28 ENCOUNTER — TELEPHONE (OUTPATIENT)
Dept: INTERNAL MEDICINE | Facility: CLINIC | Age: 64
End: 2019-03-28

## 2019-03-28 ENCOUNTER — OFFICE VISIT (OUTPATIENT)
Dept: INTERNAL MEDICINE | Facility: CLINIC | Age: 64
End: 2019-03-28
Payer: COMMERCIAL

## 2019-03-28 VITALS
HEIGHT: 60 IN | HEART RATE: 67 BPM | BODY MASS INDEX: 23.16 KG/M2 | WEIGHT: 118 LBS | OXYGEN SATURATION: 98 % | DIASTOLIC BLOOD PRESSURE: 78 MMHG | TEMPERATURE: 98.5 F | SYSTOLIC BLOOD PRESSURE: 118 MMHG

## 2019-03-28 DIAGNOSIS — I10 ESSENTIAL HYPERTENSION: Primary | ICD-10-CM

## 2019-03-28 DIAGNOSIS — R79.0 LOW FERRITIN: ICD-10-CM

## 2019-03-28 DIAGNOSIS — M81.8 AGE-RELATED OSTEOPOROSIS WITHOUT FRACTURE: ICD-10-CM

## 2019-03-28 DIAGNOSIS — E78.5 HYPERLIPIDEMIA, UNSPECIFIED HYPERLIPIDEMIA TYPE: ICD-10-CM

## 2019-03-28 DIAGNOSIS — F41.9 ANXIETY: ICD-10-CM

## 2019-03-28 DIAGNOSIS — D47.09 MASTOCYTOSIS: ICD-10-CM

## 2019-03-28 PROBLEM — D12.3 ADENOMATOUS POLYP OF TRANSVERSE COLON: Status: ACTIVE | Noted: 2019-03-28

## 2019-03-28 PROCEDURE — 99214 OFFICE O/P EST MOD 30 MIN: CPT | Performed by: INTERNAL MEDICINE

## 2019-03-28 RX ORDER — SERTRALINE HYDROCHLORIDE 50 MG/1
50 TABLET, FILM COATED ORAL DAILY
Qty: 90 TABLET | Refills: 3 | Status: SHIPPED | OUTPATIENT
Start: 2019-03-28 | End: 2019-12-19

## 2019-03-28 RX ORDER — ALPRAZOLAM 0.25 MG/1
0.25 TABLET ORAL NIGHTLY PRN
Qty: 30 TABLET | Refills: 1 | Status: SHIPPED | OUTPATIENT
Start: 2019-03-28 | End: 2019-06-10 | Stop reason: SDUPTHER

## 2019-03-28 RX ORDER — POTASSIUM CHLORIDE 600 MG/1
8 TABLET, FILM COATED, EXTENDED RELEASE ORAL 2 TIMES DAILY
Qty: 180 TABLET | Refills: 3 | Status: SHIPPED | OUTPATIENT
Start: 2019-03-28 | End: 2019-06-25

## 2019-03-28 ASSESSMENT — ENCOUNTER SYMPTOMS
CARDIOVASCULAR NEGATIVE: 1
APPETITE CHANGE: 0
SHORTNESS OF BREATH: 0
ENDOCRINE NEGATIVE: 1
DIZZINESS: 0
HEMATOLOGIC/LYMPHATIC NEGATIVE: 1
BACK PAIN: 0
JOINT SWELLING: 0
FATIGUE: 0
HEADACHES: 0
DIARRHEA: 0
MYALGIAS: 0
CONSTIPATION: 0
CHEST TIGHTNESS: 0
SLEEP DISTURBANCE: 0
UNEXPECTED WEIGHT CHANGE: 0
ABDOMINAL PAIN: 0
NERVOUS/ANXIOUS: 0
NECK PAIN: 0
VOICE CHANGE: 0
BLOOD IN STOOL: 0
WEAKNESS: 0
ARTHRALGIAS: 0
WHEEZING: 0

## 2019-03-28 NOTE — ASSESSMENT & PLAN NOTE
Be due for bone density this year.  I have given her a requisition for this.  She is debating whether to do this at the [facility] or in the [place] area.  I have asked her to discuss with the [facility] possible treatment with bisphosphonates depending upon her bone density results.  Advised vitamin D3 2000 thousand units daily.

## 2019-03-28 NOTE — ASSESSMENT & PLAN NOTE
Pain stable overall.  Will await input from the [facility] regarding genetic testing and further inclusion in any studies

## 2019-03-28 NOTE — ASSESSMENT & PLAN NOTE
Pressure remains stable on amlodipine and hydrochlorothiazide.  Potassium recently has been low.  She has trouble taking Klor-Con 20 mEq.  We will change this to Slow-K 8 mEq 2 daily.  Hold on low-sodium diet.

## 2019-03-28 NOTE — ASSESSMENT & PLAN NOTE
We discussed her sertraline.  She is deriving benefit from this.  Will increase sertraline to 50 mg daily.  Have given her a small dose nasal amp to use on a as needed basis only.  The PDMP queried.  No issues of abuse.

## 2019-03-28 NOTE — TELEPHONE ENCOUNTER
Please let Ms Ramirez know that I would like her to have her cholesterol and thyroid blood studies check  Forgot to give her those slips yesterday - I have put the orders in - we can mail to her

## 2019-03-28 NOTE — PROGRESS NOTES
Subjective      Patient ID: Ritu Ramirez is a 63 y.o. female.    She is here for follow up - has not here for follow up of her mastocytosis - is awaiting gene testing the [facility] and is considering a trial of placebo versus IL-6. Is feeling well overall.  No chest pain , palpitations, shortness of breath or lightheadedness.Blood pressure has been well controlled.  10 used to follow with Dr. Griffith's Aneta for her chronic iron deficiency anemia.  Recently received IV iron infusion.  Counts have been stable.  The iron deficiency was felt secondary to hemorrhoidal bleeding or possibly AVMs.  She follows with Dr. Wolfe of gastroenterology -colonoscopy in June 2018 with a transverse colon polyp.  Denies any melena or hematochezia.  She is concerned about any cardiovascular risk as it relates to her systemic mastocytosis.  She has found some literature relating this continues into increased coronary artery disease.  Although she is asymptomatic she would like to have cardiac evaluation and I agree.  Previous stress echo in 2017 was negative.  Has been very stressed recently dealing with her mother who lives in [place] with multiple medical issues.  Has been using Zoloft 25 mg daily and did increase to 37.5 which did help.  She requests a small dose of alprazolam to use on a as needed basis.        The following have been reviewed and updated as appropriate in this visit:       Past Medical History:   Diagnosis Date   • Hypertension    • Systemic mastocytosis      Past Surgical History:   Procedure Laterality Date   • COLONOSCOPY     • REDUCTION MAMMAPLASTY       History reviewed. No pertinent family history.  Social History     Social History   • Marital status:      Spouse name: N/A   • Number of children: N/A   • Years of education: N/A     Occupational History   • Not on file.     Social History Main Topics   • Smoking status: Former Smoker   • Smokeless tobacco: Never Used   • Alcohol use No   • Drug use: No   •  Sexual activity: Not on file     Other Topics Concern   • Not on file     Social History Narrative   • No narrative on file       Review of Systems   Constitutional: Negative for appetite change, fatigue and unexpected weight change.   HENT: Negative for hearing loss and voice change.    Eyes: Negative for visual disturbance.   Respiratory: Negative for chest tightness, shortness of breath and wheezing.    Cardiovascular: Negative.    Gastrointestinal: Negative for abdominal pain, blood in stool, constipation and diarrhea.   Endocrine: Negative.    Genitourinary: Negative for vaginal pain.   Musculoskeletal: Negative for arthralgias, back pain, gait problem, joint swelling, myalgias and neck pain.   Neurological: Negative for dizziness, weakness and headaches.   Hematological: Negative.    Psychiatric/Behavioral: Negative for behavioral problems and sleep disturbance. The patient is not nervous/anxious.        Allergies   Allergen Reactions   • Anesthetics - Amide Type      Other reaction(s): Anaphylaxis   • Clarithromycin      Other reaction(s): Rash   • Erythromycin Base      Other reaction(s): Anaphylaxis   • Iodinated Contrast- Oral And Iv Dye      Other reaction(s): Anaphylaxis   • Nsaids (Non-Steroidal Anti-Inflammatory Drug)      Other reaction(s): Anaphylaxis   • Penicillins      Current Outpatient Prescriptions   Medication Sig Dispense Refill   • amLODIPine (NORVASC) 5 mg tablet 5 mg.     • cholecalciferol, vitamin D3, 1,000 unit capsule take 1 capsule by oral route  every day     • hydrochlorothiazide (HYDRODIURIL) 25 mg tablet Take 25 mg by mouth once daily.  3   • ALPRAZolam (XANAX) 0.25 mg tablet Take 1 tablet (0.25 mg total) by mouth nightly as needed for anxiety. 30 tablet 1   • potassium chloride (KLOR-CON) 8 mEq CR tablet Take 1 tablet (8 mEq total) by mouth 2 (two) times a day. 180 tablet 3   • sertraline (ZOLOFT) 50 mg tablet Take 1 tablet (50 mg total) by mouth daily. 90 tablet 3     No current  facility-administered medications for this visit.        Objective   Vitals:    03/28/19 1421   BP: 118/78   Pulse: 67   Temp: 36.9 °C (98.5 °F)   SpO2: 98%   Weight: 53.5 kg (118 lb)   Height: 1.524 m (5')       Physical Exam   Constitutional: She is oriented to person, place, and time. She appears well-developed and well-nourished.   HENT:   Head: Normocephalic and atraumatic.   Neck: Normal range of motion. Neck supple. No thyromegaly present.   Cardiovascular: Normal rate, regular rhythm and normal heart sounds.    No murmur heard.  Pulmonary/Chest: Effort normal and breath sounds normal.   Abdominal: Soft. Bowel sounds are normal. She exhibits no mass. There is no tenderness.   Neurological: She is alert and oriented to person, place, and time.   Skin: Skin is warm and dry.   Tattered erythematous to violaceous small skin lesions most prominent on her trunk secondary to her systemic mastocytosis   Psychiatric: She has a normal mood and affect. Her behavior is normal. Judgment and thought content normal.   Nursing note and vitals reviewed.      Assessment/Plan   Problem List Items Addressed This Visit     Mastocytosis     Pain stable overall.  Will await input from the [facility] regarding genetic testing and further inclusion in any studies         Essential hypertension - Primary     Pressure remains stable on amlodipine and hydrochlorothiazide.  Potassium recently has been low.  She has trouble taking Klor-Con 20 mEq.  We will change this to Slow-K 8 mEq 2 daily.  Hold on low-sodium diet.         Low ferritin     Iron and hemoglobin are improved following iron infusion.  She is to follow otology next week.  Colonoscopy is current.         Age-related osteoporosis without fracture     Be due for bone density this year.  I have given her a requisition for this.  She is debating whether to do this at the [facility] or in the [place] area.  I have asked her to discuss with the [facility] possible treatment with bisphosphonates  depending upon her bone density results.  Advised vitamin D3 2000 thousand units daily.         Relevant Orders    DEXA BONE DENSITY    Anxiety     We discussed her sertraline.  She is deriving benefit from this.  Will increase sertraline to 50 mg daily.  Have given her a small dose nasal amp to use on a as needed basis only.  The PDMP queried.  No issues of abuse.           Other Visit Diagnoses     Hyperlipidemia, unspecified hyperlipidemia type        Relevant Orders    Lipid panel    TSH 3rd Generation          Ashley Omalley MD    3/28/2019

## 2019-03-28 NOTE — ASSESSMENT & PLAN NOTE
Iron and hemoglobin are improved following iron infusion.  She is to follow otology next week.  Colonoscopy is current.

## 2019-04-03 ENCOUNTER — LAB REQUISITION (OUTPATIENT)
Dept: LAB | Facility: HOSPITAL | Age: 64
End: 2019-04-03
Payer: COMMERCIAL

## 2019-04-03 DIAGNOSIS — D50.8 OTHER IRON DEFICIENCY ANEMIAS: ICD-10-CM

## 2019-04-03 LAB
BASOPHILS # BLD: 0.03 K/UL (ref 0.01–0.1)
BASOPHILS NFR BLD: 0.4 %
DIFFERENTIAL METHOD BLD: ABNORMAL
EOSINOPHIL # BLD: 0.4 K/UL (ref 0.04–0.36)
EOSINOPHIL NFR BLD: 4.8 %
ERYTHROCYTE [DISTWIDTH] IN BLOOD BY AUTOMATED COUNT: 19 % (ref 11.7–14.4)
HCT VFR BLDCO AUTO: 38.1 %
HGB BLD-MCNC: 12 G/DL
IMM GRANULOCYTES # BLD AUTO: 0.05 K/UL (ref 0–0.08)
IMM GRANULOCYTES NFR BLD AUTO: 0.6 %
LYMPHOCYTES # BLD: 1.58 K/UL (ref 1.2–3.5)
LYMPHOCYTES NFR BLD: 18.8 %
MCH RBC QN AUTO: 24 PG (ref 28–33.2)
MCHC RBC AUTO-ENTMCNC: 31.5 G/DL (ref 32.2–35.5)
MCV RBC AUTO: 76.2 FL (ref 83–98)
MONOCYTES # BLD: 0.79 K/UL (ref 0.28–0.8)
MONOCYTES NFR BLD: 9.4 %
NEUTROPHILS # BLD: 5.57 K/UL (ref 1.7–7)
NEUTS SEG NFR BLD: 66 %
NRBC BLD-RTO: 0 %
PDW BLD AUTO: 9.6 FL (ref 9.4–12.3)
PLATELET # BLD AUTO: 438 K/UL
RBC # BLD AUTO: 5 M/UL (ref 3.93–5.22)
WBC # BLD AUTO: 8.42 K/UL

## 2019-04-03 PROCEDURE — 85025 COMPLETE CBC W/AUTO DIFF WBC: CPT | Performed by: INTERNAL MEDICINE

## 2019-05-06 NOTE — TELEPHONE ENCOUNTER
I LVM for patient to perform ED telephone f/u call.  The patient presented to the ED at [facility] for eye pain and was diagnosed with abrasion to left cornea.  I asked the patient to call our office to give update on status and to let us know if she needs anything from us at this time.   
The patient called back and stated that she had a colonoscopy and when she was in recovery after the procedure scratched her eye with the [facility].  The patient stated she followed up with the ophthalmologist as was recommended and she does not need any f/u with Dr. Grimm at this time.    
Non family member

## 2019-05-21 ENCOUNTER — TELEPHONE (OUTPATIENT)
Dept: SCHEDULING | Facility: CLINIC | Age: 64
End: 2019-05-21

## 2019-05-21 NOTE — TELEPHONE ENCOUNTER
Pt requesting NP ov with Dr. Selby. Referred by Dr. Sirisha Ellis (in chart) for Mastocytosis.    No recent hosp/or recent cardiologist.  There are no avail appts.    Pt can be reached at [phone]

## 2019-06-07 ENCOUNTER — LAB REQUISITION (OUTPATIENT)
Dept: LAB | Facility: HOSPITAL | Age: 64
End: 2019-06-07
Payer: COMMERCIAL

## 2019-06-07 DIAGNOSIS — D50.8 OTHER IRON DEFICIENCY ANEMIAS: ICD-10-CM

## 2019-06-07 LAB
BASOPHILS # BLD: 0.03 K/UL (ref 0.01–0.1)
BASOPHILS NFR BLD: 0.4 %
DIFFERENTIAL METHOD BLD: ABNORMAL
EOSINOPHIL # BLD: 0.36 K/UL (ref 0.04–0.36)
EOSINOPHIL NFR BLD: 5 %
ERYTHROCYTE [DISTWIDTH] IN BLOOD BY AUTOMATED COUNT: 15.2 % (ref 11.7–14.4)
HCT VFR BLDCO AUTO: 38.9 %
HGB BLD-MCNC: 12.4 G/DL
IMM GRANULOCYTES # BLD AUTO: 0.04 K/UL (ref 0–0.08)
IMM GRANULOCYTES NFR BLD AUTO: 0.6 %
LYMPHOCYTES # BLD: 1.17 K/UL (ref 1.2–3.5)
LYMPHOCYTES NFR BLD: 16.3 %
MCH RBC QN AUTO: 25.6 PG (ref 28–33.2)
MCHC RBC AUTO-ENTMCNC: 31.9 G/DL (ref 32.2–35.5)
MCV RBC AUTO: 80.2 FL (ref 83–98)
MONOCYTES # BLD: 0.59 K/UL (ref 0.28–0.8)
MONOCYTES NFR BLD: 8.2 %
NEUTROPHILS # BLD: 5.01 K/UL (ref 1.7–7)
NEUTS SEG NFR BLD: 69.5 %
NRBC BLD-RTO: 0 %
PDW BLD AUTO: 9.8 FL (ref 9.4–12.3)
PLATELET # BLD AUTO: 389 K/UL
RBC # BLD AUTO: 4.85 M/UL (ref 3.93–5.22)
WBC # BLD AUTO: 7.2 K/UL

## 2019-06-07 PROCEDURE — 85025 COMPLETE CBC W/AUTO DIFF WBC: CPT | Performed by: INTERNAL MEDICINE

## 2019-06-10 RX ORDER — ALPRAZOLAM 0.25 MG/1
0.25 TABLET ORAL NIGHTLY PRN
Qty: 30 TABLET | Refills: 1 | Status: SHIPPED | OUTPATIENT
Start: 2019-06-10 | End: 2019-06-25

## 2019-06-10 NOTE — TELEPHONE ENCOUNTER
Medicine Refill Request    Last Office Visit: 3/28/2019  Next Office Visit: 6/26/2019        Current Outpatient Prescriptions:   •  amLODIPine (NORVASC) 5 mg tablet, 5 mg., Disp: , Rfl:   •  cholecalciferol, vitamin D3, 1,000 unit capsule, take 1 capsule by oral route  every day, Disp: , Rfl:   •  hydrochlorothiazide (HYDRODIURIL) 25 mg tablet, Take 25 mg by mouth once daily., Disp: , Rfl: 3  •  potassium chloride (KLOR-CON) 8 mEq CR tablet, Take 1 tablet (8 mEq total) by mouth 2 (two) times a day., Disp: 180 tablet, Rfl: 3  •  sertraline (ZOLOFT) 50 mg tablet, Take 1 tablet (50 mg total) by mouth daily., Disp: 90 tablet, Rfl: 3      BP Readings from Last 3 Encounters:   03/28/19 118/78   11/29/18 110/68   06/20/18 117/86       Recent Lab results:  Lab Results   Component Value Date    CHOL 252 (H) 07/17/2017   ,   Lab Results   Component Value Date    HDL 77 07/17/2017   ,   Lab Results   Component Value Date    LDLCALC 144 (H) 07/17/2017   ,   Lab Results   Component Value Date    TRIG 153 (H) 07/17/2017        Lab Results   Component Value Date    GLUCOSE 132 (H) 03/07/2019   ,   Lab Results   Component Value Date    HGBA1C 5.0 06/05/2015         Lab Results   Component Value Date    CREATININE 0.6 03/07/2019       Lab Results   Component Value Date    TSH 2.17 02/08/2017

## 2019-06-25 ENCOUNTER — OFFICE VISIT (OUTPATIENT)
Dept: CARDIOLOGY | Facility: CLINIC | Age: 64
End: 2019-06-25
Payer: COMMERCIAL

## 2019-06-25 VITALS
HEART RATE: 50 BPM | BODY MASS INDEX: 23.36 KG/M2 | HEIGHT: 60 IN | DIASTOLIC BLOOD PRESSURE: 70 MMHG | WEIGHT: 119 LBS | SYSTOLIC BLOOD PRESSURE: 100 MMHG | RESPIRATION RATE: 16 BRPM

## 2019-06-25 DIAGNOSIS — R00.1 SINUS BRADYCARDIA: ICD-10-CM

## 2019-06-25 DIAGNOSIS — D47.09 MASTOCYTOSIS: ICD-10-CM

## 2019-06-25 DIAGNOSIS — E78.5 DYSLIPIDEMIA: ICD-10-CM

## 2019-06-25 DIAGNOSIS — E87.6 HYPOKALEMIA: ICD-10-CM

## 2019-06-25 DIAGNOSIS — I10 ESSENTIAL HYPERTENSION: Primary | ICD-10-CM

## 2019-06-25 PROCEDURE — 93000 ELECTROCARDIOGRAM COMPLETE: CPT | Performed by: INTERNAL MEDICINE

## 2019-06-25 PROCEDURE — 99244 OFF/OP CNSLTJ NEW/EST MOD 40: CPT | Performed by: INTERNAL MEDICINE

## 2019-06-25 RX ORDER — POTASSIUM CHLORIDE 20 MEQ/1
20 TABLET, EXTENDED RELEASE ORAL DAILY
COMMUNITY
End: 2020-11-13 | Stop reason: ENTERED-IN-ERROR

## 2019-06-25 ASSESSMENT — ENCOUNTER SYMPTOMS
ORTHOPNEA: 0
ALTERED MENTAL STATUS: 0
SYNCOPE: 0
COLOR CHANGE: 0
SHORTNESS OF BREATH: 0
BLURRED VISION: 0
COUGH: 0
HEARTBURN: 0
MUSCLE CRAMPS: 0
WEIGHT GAIN: 0
DIAPHORESIS: 0
PALPITATIONS: 0
CLAUDICATION: 0
ADENOPATHY: 0
WEIGHT LOSS: 0
DYSPNEA ON EXERTION: 0
LIGHT-HEADEDNESS: 0

## 2019-06-25 NOTE — ASSESSMENT & PLAN NOTE
Her blood pressure appears to be under reasonable control on amlodipine and hydrochlorothiazide.  I will recheck her potassium.  If her potassium remains low I will likely change her hydrochlorothiazide to Spironolactone.

## 2019-06-25 NOTE — ASSESSMENT & PLAN NOTE
I will recheck her potassium.  If her potassium remains low I will likely stop her hydrochlorothiazide and consider substituting this with Spironolactone.  I will contact her with the results of her blood work.

## 2019-06-25 NOTE — PROGRESS NOTES
Cardiology Consult     Reason for visit:   Chief Complaint   Patient presents with   • Initial Evaluation       HPI     Ritu Ramirez is a 63 y.o. female who presents for cardiovascular evaluation.    Patient has a medical history of systemic mastocytosis.  This is been manifest by anemia and some skin changes.  She is also had 2 episodes of anaphylactic shock.  One occurred after IV contrast the other after some surgery.  She is followed at the [facility] for this.    She has history of heart treated hypertension.  She has a dyslipidemia but not has not been treated before as her lipid profile 2017 is notable for total cholesterol 252, triglycerides 153, HDL 77 and .  She has no history of diabetes mellitus.  She has no significant family history of coronary disease as her mother had mitral valve surgery and her father may have had aortic stenosis.  Neither had coronary disease.  She quit cigarettes in her 30s.    At this time she is very active.  She works at 5 times a week.  She does have occasional dyspnea.  She has chest pain in the last seconds.  This is unrelated to exertion because when she works out she does fine.  She has had intermittent pain behind her left knee.  She has had no lower extremity edema or orthopnea.  Her diet is reasonable.  Her weight has been stable.    She went a cardiovascular evaluation.  She did have an echocardiogram 2017 with a normal structural heart.    She did have a friend who has mastocytosis and apparently developed a myocardial infarction during allergic reaction which is known as KOUNIS event.  This is presumably due to coronary vasospasm from a profound allergic reaction.    Past Medical History:   Diagnosis Date   • Hypertension    • Systemic mastocytosis      Past Surgical History:   Procedure Laterality Date   • COLONOSCOPY     • REDUCTION MAMMAPLASTY       Anesthetics - amide type; Clarithromycin; Erythromycin base; Iodinated contrast- oral and iv dye; Nsaids  (non-steroidal anti-inflammatory drug); and Penicillins  Current Outpatient Prescriptions   Medication Sig Dispense Refill   • amLODIPine (NORVASC) 5 mg tablet Take 5 mg by mouth daily.       • cholecalciferol, vitamin D3, 1,000 unit capsule take 1 capsule by oral route  every day     • hydrochlorothiazide (HYDRODIURIL) 25 mg tablet Take 25 mg by mouth once daily.  3   • potassium chloride (KLOR-CON) 20 mEq CR tablet Take 20 mEq by mouth daily.     • sertraline (ZOLOFT) 50 mg tablet Take 1 tablet (50 mg total) by mouth daily. 90 tablet 3     No current facility-administered medications for this visit.      Social History     Social History   • Marital status:      Spouse name: N/A   • Number of children: N/A   • Years of education: N/A     Social History Main Topics   • Smoking status: Former Smoker     Packs/day: 0.25     Quit date: 1990   • Smokeless tobacco: Never Used   • Alcohol use Yes      Comment: Social   • Drug use: No   • Sexual activity: Not Asked     Other Topics Concern   • None     Social History Narrative   • None     History reviewed. No pertinent family history.     Review of Systems   Constitution: Negative for diaphoresis, malaise/fatigue, weight gain and weight loss.   HENT: Negative for nosebleeds.    Eyes: Negative for blurred vision.   Cardiovascular: Positive for chest pain. Negative for claudication, dyspnea on exertion, leg swelling, orthopnea, palpitations and syncope.   Respiratory: Negative for cough and shortness of breath.    Hematologic/Lymphatic: Negative for adenopathy.   Skin: Negative for color change.   Musculoskeletal: Negative for muscle cramps.        Behind the knee pain   Gastrointestinal: Negative for heartburn.   Genitourinary: Negative for nocturia.   Neurological: Negative for light-headedness.   Psychiatric/Behavioral: Negative for altered mental status.      Objective   Vitals:    06/25/19 1520   BP: 100/70   BP Location: Left upper arm   Patient Position:  Sitting   Pulse: (!) 50   Resp: 16   Weight: 54 kg (119 lb)   Height: 1.524 m (5')      Weight: 54 kg (119 lb)     Physical Exam   General Appearance:  Alert, no distress   Head:  Normocephalic, without obvious abnormality, atraumatic   Eyes:  Conjunctiva/corneas clear, EOM's intact   Neck: No thyroid enlargement. No JVD. No bruits    Lungs:   Clear to auscultation bilaterally, respirations unlabored, no rales, no wheezing   Heart:  Regular rhythm, S1 and S2 normal, no murmur, rub or gallop   Abdomen:   Soft, non-tender, no masses, no organomegaly   Vascular: Pulses 2+ and symmetric all extremities, no carotid bruit or jugular vein distention   Musculoskeletal:  Skin: No injury or deformity  Skin color, texture, turgor normal, no rashes or lesions, no cyanosis or edema   Extremities: Extremities normal, atraumatic, pulses normal   Behavior/Emotional: Appropriate, cooperative         Labs   Lab Results   Component Value Date    WBC 7.20 06/07/2019    HGB 12.4 06/07/2019    HCT 38.9 06/07/2019     (H) 06/07/2019    CHOL 252 (H) 07/17/2017    TRIG 153 (H) 07/17/2017    HDL 77 07/17/2017    LDLCALC 144 (H) 07/17/2017    ALT 20 03/07/2019    AST 20 03/07/2019     03/07/2019    K 2.8 (LL) 03/07/2019    CL 96 (L) 03/07/2019    CREATININE 0.6 03/07/2019    BUN 16 03/07/2019    CO2 28 03/07/2019    TSH 2.17 02/08/2017    INR 0.9 11/02/2017    HGBA1C 5.0 06/05/2015     ECG   sinus bradycardia  LAFB     ASSESSMENT/PLAN    Problem List Items Addressed This Visit        High    Essential hypertension - Primary    Overview     2017 echocardiogram: Normal structural heart         Current Assessment & Plan     Her blood pressure appears to be under reasonable control on amlodipine and hydrochlorothiazide.  I will recheck her potassium.  If her potassium remains low I will likely change her hydrochlorothiazide to Spironolactone.         Relevant Orders    ECG 12 lead (Completed)       Medium    Mastocytosis    Current  Assessment & Plan     She follows up at [email or web address] with Dr. Calhoun.         Hypokalemia    Current Assessment & Plan     I will recheck her potassium.  If her potassium remains low I will likely stop her hydrochlorothiazide and consider substituting this with Spironolactone.  I will contact her with the results of her blood work.         Relevant Orders    Basic metabolic panel    Dyslipidemia    Current Assessment & Plan     I will check a lipid profile.  I will check a CT scan for coronary calcium.  I will contact her with those results and then make further decisions about whether or not statin therapy would be recommended.         Relevant Orders    Lipid panel    CT HEART CORONARY CALCIUM SCORE       Low    Sinus bradycardia    Current Assessment & Plan     Her heart rate may be low because she is in good physiologic shape.  We will avoid negative chronotropic agents.  We will follow her clinically.                Return in about 3 months (around 9/25/2019).      Bright Selby MD  6/25/2019

## 2019-06-25 NOTE — ASSESSMENT & PLAN NOTE
Her heart rate may be low because she is in good physiologic shape.  We will avoid negative chronotropic agents.  We will follow her clinically.

## 2019-06-25 NOTE — ASSESSMENT & PLAN NOTE
I will check a lipid profile.  I will check a CT scan for coronary calcium.  I will contact her with those results and then make further decisions about whether or not statin therapy would be recommended.

## 2019-06-25 NOTE — LETTER
June 25, 2019     Ashley Omalley MD  [address] [facility] [address]    Patient: Ritu Ramirez  YOB: 1955  Date of Visit: 6/25/2019      Dear Dr. Sirisha Omalley:    Thank you for referring Ritu Ramirez to me for evaluation. Below are my notes for this consultation.    If you have questions, please do not hesitate to call me. I look forward to following your patient along with you.         Sincerely,        Bright Selby MD        CC: No Recipients  Bright Selby MD  6/25/2019  4:36 PM  Sign at close encounter      Cardiology Consult     Reason for visit:   Chief Complaint   Patient presents with   • Initial Evaluation       HPI     Ritu Ramirez is a 63 y.o. female who presents for cardiovascular evaluation.    Patient has a medical history of systemic mastocytosis.  This is been manifest by anemia and some skin changes.  She is also had 2 episodes of anaphylactic shock.  One occurred after IV contrast the other after some surgery.  She is followed at the [facility] for this.    She has history of heart treated hypertension.  She has a dyslipidemia but not has not been treated before as her lipid profile 2017 is notable for total cholesterol 252, triglycerides 153, HDL 77 and .  She has no history of diabetes mellitus.  She has no significant family history of coronary disease as her mother had mitral valve surgery and her father may have had aortic stenosis.  Neither had coronary disease.  She quit cigarettes in her 30s.    At this time she is very active.  She works at 5 times a week.  She does have occasional dyspnea.  She has chest pain in the last seconds.  This is unrelated to exertion because when she works out she does fine.  She has had intermittent pain behind her left knee.  She has had no lower extremity edema or orthopnea.  Her diet is reasonable.  Her weight has been stable.    She went a cardiovascular evaluation.  She did have an echocardiogram 2017 with a  normal structural heart.    She did have a friend who has mastocytosis and apparently developed a myocardial infarction during allergic reaction which is known as KOUNIS event.  This is presumably due to coronary vasospasm from a profound allergic reaction.    Past Medical History:   Diagnosis Date   • Hypertension    • Systemic mastocytosis      Past Surgical History:   Procedure Laterality Date   • COLONOSCOPY     • REDUCTION MAMMAPLASTY       Anesthetics - amide type; Clarithromycin; Erythromycin base; Iodinated contrast- oral and iv dye; Nsaids (non-steroidal anti-inflammatory drug); and Penicillins  Current Outpatient Prescriptions   Medication Sig Dispense Refill   • amLODIPine (NORVASC) 5 mg tablet Take 5 mg by mouth daily.       • cholecalciferol, vitamin D3, 1,000 unit capsule take 1 capsule by oral route  every day     • hydrochlorothiazide (HYDRODIURIL) 25 mg tablet Take 25 mg by mouth once daily.  3   • potassium chloride (KLOR-CON) 20 mEq CR tablet Take 20 mEq by mouth daily.     • sertraline (ZOLOFT) 50 mg tablet Take 1 tablet (50 mg total) by mouth daily. 90 tablet 3     No current facility-administered medications for this visit.      Social History     Social History   • Marital status:      Spouse name: N/A   • Number of children: N/A   • Years of education: N/A     Social History Main Topics   • Smoking status: Former Smoker     Packs/day: 0.25     Quit date: 1990   • Smokeless tobacco: Never Used   • Alcohol use Yes      Comment: Social   • Drug use: No   • Sexual activity: Not Asked     Other Topics Concern   • None     Social History Narrative   • None     History reviewed. No pertinent family history.     Review of Systems   Constitution: Negative for diaphoresis, malaise/fatigue, weight gain and weight loss.   HENT: Negative for nosebleeds.    Eyes: Negative for blurred vision.   Cardiovascular: Positive for chest pain. Negative for claudication, dyspnea on exertion, leg swelling,  orthopnea, palpitations and syncope.   Respiratory: Negative for cough and shortness of breath.    Hematologic/Lymphatic: Negative for adenopathy.   Skin: Negative for color change.   Musculoskeletal: Negative for muscle cramps.        Behind the knee pain   Gastrointestinal: Negative for heartburn.   Genitourinary: Negative for nocturia.   Neurological: Negative for light-headedness.   Psychiatric/Behavioral: Negative for altered mental status.      Objective   Vitals:    06/25/19 1520   BP: 100/70   BP Location: Left upper arm   Patient Position: Sitting   Pulse: (!) 50   Resp: 16   Weight: 54 kg (119 lb)   Height: 1.524 m (5')      Weight: 54 kg (119 lb)     Physical Exam   General Appearance:  Alert, no distress   Head:  Normocephalic, without obvious abnormality, atraumatic   Eyes:  Conjunctiva/corneas clear, EOM's intact   Neck: No thyroid enlargement. No JVD. No bruits    Lungs:   Clear to auscultation bilaterally, respirations unlabored, no rales, no wheezing   Heart:  Regular rhythm, S1 and S2 normal, no murmur, rub or gallop   Abdomen:   Soft, non-tender, no masses, no organomegaly   Vascular: Pulses 2+ and symmetric all extremities, no carotid bruit or jugular vein distention   Musculoskeletal:  Skin: No injury or deformity  Skin color, texture, turgor normal, no rashes or lesions, no cyanosis or edema   Extremities: Extremities normal, atraumatic, pulses normal   Behavior/Emotional: Appropriate, cooperative         Labs   Lab Results   Component Value Date    WBC 7.20 06/07/2019    HGB 12.4 06/07/2019    HCT 38.9 06/07/2019     (H) 06/07/2019    CHOL 252 (H) 07/17/2017    TRIG 153 (H) 07/17/2017    HDL 77 07/17/2017    LDLCALC 144 (H) 07/17/2017    ALT 20 03/07/2019    AST 20 03/07/2019     03/07/2019    K 2.8 (LL) 03/07/2019    CL 96 (L) 03/07/2019    CREATININE 0.6 03/07/2019    BUN 16 03/07/2019    CO2 28 03/07/2019    TSH 2.17 02/08/2017    INR 0.9 11/02/2017    HGBA1C 5.0 06/05/2015      ECG   sinus bradycardia  LAFB     ASSESSMENT/PLAN    Problem List Items Addressed This Visit        High    Essential hypertension - Primary    Overview     2017 echocardiogram: Normal structural heart         Current Assessment & Plan     Her blood pressure appears to be under reasonable control on amlodipine and hydrochlorothiazide.  I will recheck her potassium.  If her potassium remains low I will likely change her hydrochlorothiazide to Spironolactone.         Relevant Orders    ECG 12 lead (Completed)       Medium    Mastocytosis    Current Assessment & Plan     She follows up at [facility] with Dr. Calhoun.         Hypokalemia    Current Assessment & Plan     I will recheck her potassium.  If her potassium remains low I will likely stop her hydrochlorothiazide and consider substituting this with Spironolactone.  I will contact her with the results of her blood work.         Relevant Orders    Basic metabolic panel    Dyslipidemia    Current Assessment & Plan     I will check a lipid profile.  I will check a CT scan for coronary calcium.  I will contact her with those results and then make further decisions about whether or not statin therapy would be recommended.         Relevant Orders    Lipid panel    CT HEART CORONARY CALCIUM SCORE       Low    Sinus bradycardia    Current Assessment & Plan     Her heart rate may be low because she is in good physiologic shape.  We will avoid negative chronotropic agents.  We will follow her clinically.                Return in about 3 months (around 9/25/2019).      Bright Selby MD  6/25/2019

## 2019-06-26 ENCOUNTER — OFFICE VISIT (OUTPATIENT)
Dept: INTERNAL MEDICINE | Facility: CLINIC | Age: 64
End: 2019-06-26
Payer: COMMERCIAL

## 2019-06-26 VITALS
OXYGEN SATURATION: 98 % | TEMPERATURE: 98.8 F | HEART RATE: 70 BPM | DIASTOLIC BLOOD PRESSURE: 74 MMHG | BODY MASS INDEX: 23.56 KG/M2 | SYSTOLIC BLOOD PRESSURE: 122 MMHG | HEIGHT: 60 IN | WEIGHT: 120 LBS

## 2019-06-26 DIAGNOSIS — I63.9 ACUTE CEREBROVASCULAR ACCIDENT (CVA) (CMS/HCC): ICD-10-CM

## 2019-06-26 DIAGNOSIS — I10 ESSENTIAL HYPERTENSION: ICD-10-CM

## 2019-06-26 DIAGNOSIS — R41.3 MEMORY LOSS: Primary | ICD-10-CM

## 2019-06-26 DIAGNOSIS — D47.09 MASTOCYTOSIS: ICD-10-CM

## 2019-06-26 DIAGNOSIS — E53.8 VITAMIN B12 DEFICIENCY: ICD-10-CM

## 2019-06-26 LAB
BUN SERPL-MCNC: 12 MG/DL (ref 8–27)
BUN/CREAT SERPL: 18 (ref 12–28)
CALCIUM SERPL-MCNC: 9.7 MG/DL (ref 8.7–10.3)
CHLORIDE SERPL-SCNC: 99 MMOL/L (ref 96–106)
CHOLEST SERPL-MCNC: 206 MG/DL (ref 100–199)
CO2 SERPL-SCNC: 26 MMOL/L (ref 20–29)
CREAT SERPL-MCNC: 0.65 MG/DL (ref 0.57–1)
GLUCOSE SERPL-MCNC: 87 MG/DL (ref 65–99)
HDLC SERPL-MCNC: 73 MG/DL
LAB CORP EGFR IF AFRICN AM: 109 ML/MIN/1.73
LAB CORP EGFR IF NONAFRICN AM: 95 ML/MIN/1.73
LDLC SERPL CALC-MCNC: 103 MG/DL (ref 0–99)
POTASSIUM SERPL-SCNC: 3.4 MMOL/L (ref 3.5–5.2)
SODIUM SERPL-SCNC: 141 MMOL/L (ref 134–144)
TRIGL SERPL-MCNC: 151 MG/DL (ref 0–149)
VLDLC SERPL CALC-MCNC: 30 MG/DL (ref 5–40)

## 2019-06-26 PROCEDURE — 99214 OFFICE O/P EST MOD 30 MIN: CPT | Performed by: INTERNAL MEDICINE

## 2019-06-26 ASSESSMENT — ENCOUNTER SYMPTOMS
WEAKNESS: 0
COUGH: 0
LIGHT-HEADEDNESS: 0
HEADACHES: 0
SEIZURES: 0
DIZZINESS: 0
SHORTNESS OF BREATH: 0
ABDOMINAL PAIN: 0
TREMORS: 0
FACIAL ASYMMETRY: 0
CHEST TIGHTNESS: 0
NUMBNESS: 0
PALPITATIONS: 0
SPEECH DIFFICULTY: 0

## 2019-06-26 NOTE — ASSESSMENT & PLAN NOTE
Of concern given her recent behavioral changes as well.  May be related to stress and lack of sleep however will check B12 folate YOANNA RPR and sed rate.  Also check MRI of the head and have her pursue neurologic evaluation. Referred her to Dr. Marko Copeland of neurology.  Consider sleep evaluation as well.

## 2019-06-26 NOTE — PROGRESS NOTES
Subjective      Patient ID: iRtu Ramirez is a 63 y.o. female.    She has been having episodes of forgetfulness and temper outbursts - left her car running, put salad in the freezer - denies headaches, visual changes , weakness , numbness - has been under more stress recently. Mother has dementia at age 88.  No hx of head trauma recently - hit her head on ice a year ago.  Has not had any changes in her medication for increasing sertraline from 25 to 50 mg.  Noticed that she has not been sleeping well is up several times a night due to stressors.  Her mastocytosis has not been flaring much recently.  She is followed closely at the [facility].        The following have been reviewed and updated as appropriate in this visit:  Problems       Past Medical History:   Diagnosis Date   • Hypertension    • Systemic mastocytosis      Past Surgical History:   Procedure Laterality Date   • COLONOSCOPY     • REDUCTION MAMMAPLASTY       History reviewed. No pertinent family history.  Social History     Social History   • Marital status:      Spouse name: N/A   • Number of children: N/A   • Years of education: N/A     Occupational History   • Not on file.     Social History Main Topics   • Smoking status: Former Smoker     Packs/day: 0.25     Quit date: 1990   • Smokeless tobacco: Never Used   • Alcohol use Yes      Comment: Social   • Drug use: No   • Sexual activity: Not on file     Other Topics Concern   • Not on file     Social History Narrative   • No narrative on file       Review of Systems   Eyes: Negative for visual disturbance.   Respiratory: Negative for cough, chest tightness and shortness of breath.    Cardiovascular: Negative for chest pain, palpitations and leg swelling.   Gastrointestinal: Negative for abdominal pain.   Endocrine: Negative for cold intolerance and heat intolerance.   Neurological: Negative for dizziness, tremors, seizures, syncope, facial asymmetry, speech difficulty, weakness, light-headedness,  numbness and headaches.       Allergies   Allergen Reactions   • Anesthetics - Amide Type      Other reaction(s): Anaphylaxis   • Clarithromycin      Other reaction(s): Rash   • Erythromycin Base      Other reaction(s): Anaphylaxis   • Iodinated Contrast- Oral And Iv Dye      Other reaction(s): Anaphylaxis   • Nsaids (Non-Steroidal Anti-Inflammatory Drug)      Other reaction(s): Anaphylaxis   • Penicillins      Current Outpatient Prescriptions   Medication Sig Dispense Refill   • amLODIPine (NORVASC) 5 mg tablet Take 5 mg by mouth daily.       • cholecalciferol, vitamin D3, 1,000 unit capsule take 1 capsule by oral route  every day     • hydrochlorothiazide (HYDRODIURIL) 25 mg tablet Take 25 mg by mouth once daily.  3   • potassium chloride (KLOR-CON) 20 mEq CR tablet Take 20 mEq by mouth daily.     • sertraline (ZOLOFT) 50 mg tablet Take 1 tablet (50 mg total) by mouth daily. 90 tablet 3     No current facility-administered medications for this visit.        Objective   Vitals:    06/26/19 1342 06/26/19 1435   BP: 108/72 122/74   Pulse: 70    Temp: 37.1 °C (98.8 °F)    SpO2: 98%    Weight: 54.4 kg (120 lb)    Height: 1.524 m (5')        Physical Exam   Constitutional: She is oriented to person, place, and time. She appears well-developed and well-nourished.   HENT:   Head: Normocephalic and atraumatic.   Eyes: Pupils are equal, round, and reactive to light. EOM are normal.   Disks are flat   Neck: Normal range of motion. Neck supple. No thyromegaly present.   Cardiovascular: Normal rate, regular rhythm and normal heart sounds.    No murmur heard.  Pulmonary/Chest: Effort normal and breath sounds normal.   Abdominal: Soft. Bowel sounds are normal. She exhibits no mass. There is no tenderness.   Musculoskeletal: She exhibits no edema.   Neurological: She is alert and oriented to person, place, and time. She displays normal reflexes. No cranial nerve deficit or sensory deficit. She exhibits normal muscle tone.  Coordination normal.   Skin: Skin is warm and dry.   Psychiatric: She has a normal mood and affect. Her behavior is normal. Judgment and thought content normal.   Nursing note and vitals reviewed.      Assessment/Plan   Problem List Items Addressed This Visit     Mastocytosis     Stable at present  Followed at [facility] and with Dr. Ani Calhoun         Essential hypertension     Blood pressures under good control with amlodipine and hydrochlorothiazide.  Counseled on low-sodium diet.  Is due for repeat potassium.  Dr. Vazquez yesterday.  Potassium is low we will consider spironolactone         Memory loss - Primary     Of concern given her recent behavioral changes as well.  May be related to stress and lack of sleep however will check B12 folate YOANNA RPR and sed rate.  Also check MRI of the head and have her pursue neurologic evaluation. Referred her to Dr. Marko Copeland of neurology.  Consider sleep evaluation as well.         Relevant Orders    Comprehensive metabolic panel    Vitamin B12    TSH 3rd Generation    Lyme EIA reflex WB    Sedimentation rate, automated    YOANNA    RPR    MRI BRAIN WITHOUT CONTRAST      Other Visit Diagnoses     Vitamin B12 deficiency        Relevant Orders    Comprehensive metabolic panel    Acute cerebrovascular accident (CVA) (CMS/HCC)        Relevant Orders    MRI BRAIN WITHOUT CONTRAST          Ashley Omalley MD    6/26/2019

## 2019-06-26 NOTE — ASSESSMENT & PLAN NOTE
Blood pressures under good control with amlodipine and hydrochlorothiazide.  Counseled on low-sodium diet.  Is due for repeat potassium.  Dr. Vazquez yesterday.  Potassium is low we will consider spironolactone

## 2019-06-26 NOTE — PATIENT INSTRUCTIONS
follow with Dr. Marko Copeland of neurology  Obtain blood work and MRI - we will call a pre cert numbness

## 2019-06-29 ENCOUNTER — TELEPHONE (OUTPATIENT)
Dept: CARDIOLOGY | Facility: HOSPITAL | Age: 64
End: 2019-06-29

## 2019-06-29 NOTE — TELEPHONE ENCOUNTER
I spoke with her about her lab results. I will speak with her after her CT calcium scan which is scheduled on 7/2.

## 2019-07-02 ENCOUNTER — TELEPHONE (OUTPATIENT)
Dept: CARDIOLOGY | Facility: HOSPITAL | Age: 64
End: 2019-07-02

## 2019-07-02 ENCOUNTER — TELEPHONE (OUTPATIENT)
Dept: INTERNAL MEDICINE | Facility: CLINIC | Age: 64
End: 2019-07-02

## 2019-07-02 ENCOUNTER — HOSPITAL ENCOUNTER (OUTPATIENT)
Dept: RADIOLOGY | Facility: HOSPITAL | Age: 64
Discharge: HOME | End: 2019-07-02
Attending: INTERNAL MEDICINE

## 2019-07-02 DIAGNOSIS — E78.5 DYSLIPIDEMIA: ICD-10-CM

## 2019-07-02 DIAGNOSIS — E78.5 DYSLIPIDEMIA: Primary | ICD-10-CM

## 2019-07-02 PROBLEM — R93.1 ELEVATED CORONARY ARTERY CALCIUM SCORE: Status: ACTIVE | Noted: 2019-07-02

## 2019-07-02 PROCEDURE — 75571 CT HRT W/O DYE W/CA TEST: CPT

## 2019-07-02 RX ORDER — ROSUVASTATIN CALCIUM 10 MG/1
10 TABLET, COATED ORAL DAILY
Qty: 90 TABLET | Refills: 3 | Status: SHIPPED | OUTPATIENT
Start: 2019-07-02 | End: 2019-12-19

## 2019-07-02 NOTE — TELEPHONE ENCOUNTER
Pt was sent to dr leslie and had a coronary calcium score    Dr leslie call pt with results which showed she had blockages  Pt would like you to call her to discuss

## 2019-07-02 NOTE — TELEPHONE ENCOUNTER
Talk with the patient I agree with Dr. Selby's recommendation of beginning rosuvastatin for her coronary calcium score of 128 which puts her in the 90th to the 95th percentile.  She is asymptomatic.  Advised if she has any symptoms of chest pain or shortness of breath she is to call myself or Dr. Selby .

## 2019-07-02 NOTE — TELEPHONE ENCOUNTER
I spoke with the patient about her coronary calcium score.  She is in the 90th percentile.  I recommended she start rosuvastatin 10 mg daily.  I called in a prescription.  I will send her lab slips to have blood work done in 2 months.  I alerted her to the possible side effects of statin therapy.  
Thank you  
sent  
[phone]

## 2019-07-11 LAB
ALBUMIN SERPL-MCNC: 4.6 G/DL (ref 3.6–4.8)
ALBUMIN/GLOB SERPL: 2.6 {RATIO} (ref 1.2–2.2)
ALP SERPL-CCNC: 85 IU/L (ref 39–117)
ALT SERPL-CCNC: 24 IU/L (ref 0–32)
ANA TITR SER IF: NEGATIVE {TITER}
AST SERPL-CCNC: 21 IU/L (ref 0–40)
B BURGDOR IGG+IGM SER-ACNC: <0.91 ISR (ref 0–0.9)
B BURGDOR IGM SER IA-ACNC: <0.8 INDEX (ref 0–0.79)
BILIRUB SERPL-MCNC: <0.2 MG/DL (ref 0–1.2)
BUN SERPL-MCNC: 24 MG/DL (ref 8–27)
BUN/CREAT SERPL: 29 (ref 12–28)
CALCIUM SERPL-MCNC: 9.3 MG/DL (ref 8.7–10.3)
CHLORIDE SERPL-SCNC: 101 MMOL/L (ref 96–106)
CO2 SERPL-SCNC: 22 MMOL/L (ref 20–29)
CREAT SERPL-MCNC: 0.83 MG/DL (ref 0.57–1)
ERYTHROCYTE [SEDIMENTATION RATE] IN BLOOD BY WESTERGREN METHOD: 2 MM/HR (ref 0–40)
GLOBULIN SER CALC-MCNC: 1.8 G/DL (ref 1.5–4.5)
GLUCOSE SERPL-MCNC: 95 MG/DL (ref 65–99)
LAB CORP EGFR IF AFRICN AM: 87 ML/MIN/1.73
LAB CORP EGFR IF NONAFRICN AM: 75 ML/MIN/1.73
LAB CORP PLEASE NOTE:: NORMAL
POTASSIUM SERPL-SCNC: 3.3 MMOL/L (ref 3.5–5.2)
PROT SERPL-MCNC: 6.4 G/DL (ref 6–8.5)
RPR SER QL: NON REACTIVE
SODIUM SERPL-SCNC: 141 MMOL/L (ref 134–144)
TSH SERPL DL<=0.005 MIU/L-ACNC: 3.32 UIU/ML (ref 0.45–4.5)
VIT B12 SERPL-MCNC: 1723 PG/ML (ref 232–1245)

## 2019-07-13 ENCOUNTER — HOSPITAL ENCOUNTER (OUTPATIENT)
Dept: RADIOLOGY | Age: 64
Discharge: HOME | End: 2019-07-13
Attending: INTERNAL MEDICINE
Payer: COMMERCIAL

## 2019-07-13 DIAGNOSIS — R41.3 MEMORY LOSS: ICD-10-CM

## 2019-07-13 DIAGNOSIS — I63.9 ACUTE CEREBROVASCULAR ACCIDENT (CVA) (CMS/HCC): ICD-10-CM

## 2019-07-16 ENCOUNTER — LAB REQUISITION (OUTPATIENT)
Dept: LAB | Facility: HOSPITAL | Age: 64
End: 2019-07-16
Payer: COMMERCIAL

## 2019-07-16 DIAGNOSIS — D50.8 OTHER IRON DEFICIENCY ANEMIAS: ICD-10-CM

## 2019-07-16 LAB
BASOPHILS # BLD: 0.02 K/UL (ref 0.01–0.1)
BASOPHILS NFR BLD: 0.2 %
DIFFERENTIAL METHOD BLD: ABNORMAL
EOSINOPHIL # BLD: 0.27 K/UL (ref 0.04–0.36)
EOSINOPHIL NFR BLD: 3.2 %
ERYTHROCYTE [DISTWIDTH] IN BLOOD BY AUTOMATED COUNT: 13.8 % (ref 11.7–14.4)
HCT VFR BLDCO AUTO: 34.9 %
HGB BLD-MCNC: 10.9 G/DL
IMM GRANULOCYTES # BLD AUTO: 0.04 K/UL (ref 0–0.08)
IMM GRANULOCYTES NFR BLD AUTO: 0.5 %
LYMPHOCYTES # BLD: 1.11 K/UL (ref 1.2–3.5)
LYMPHOCYTES NFR BLD: 13.2 %
MCH RBC QN AUTO: 25.2 PG (ref 28–33.2)
MCHC RBC AUTO-ENTMCNC: 31.2 G/DL (ref 32.2–35.5)
MCV RBC AUTO: 80.8 FL (ref 83–98)
MONOCYTES # BLD: 0.51 K/UL (ref 0.28–0.8)
MONOCYTES NFR BLD: 6.1 %
NEUTROPHILS # BLD: 6.44 K/UL (ref 1.7–7)
NEUTS SEG NFR BLD: 76.8 %
NRBC BLD-RTO: 0 %
PDW BLD AUTO: 9.5 FL (ref 9.4–12.3)
PLATELET # BLD AUTO: 390 K/UL
RBC # BLD AUTO: 4.32 M/UL (ref 3.93–5.22)
WBC # BLD AUTO: 8.39 K/UL

## 2019-07-16 PROCEDURE — 85025 COMPLETE CBC W/AUTO DIFF WBC: CPT | Performed by: INTERNAL MEDICINE

## 2019-08-12 ENCOUNTER — LAB REQUISITION (OUTPATIENT)
Dept: LAB | Facility: HOSPITAL | Age: 64
End: 2019-08-12
Payer: COMMERCIAL

## 2019-08-12 DIAGNOSIS — D50.8 OTHER IRON DEFICIENCY ANEMIAS: ICD-10-CM

## 2019-08-12 LAB
BASOPHILS # BLD: 0.01 K/UL (ref 0.01–0.1)
BASOPHILS NFR BLD: 0.2 %
DIFFERENTIAL METHOD BLD: ABNORMAL
EOSINOPHIL # BLD: 0.29 K/UL (ref 0.04–0.36)
EOSINOPHIL NFR BLD: 5.1 %
ERYTHROCYTE [DISTWIDTH] IN BLOOD BY AUTOMATED COUNT: 14.1 % (ref 11.7–14.4)
HCT VFR BLDCO AUTO: 31.2 %
HGB BLD-MCNC: 9.5 G/DL
IMM GRANULOCYTES # BLD AUTO: 0.03 K/UL (ref 0–0.08)
IMM GRANULOCYTES NFR BLD AUTO: 0.5 %
LYMPHOCYTES # BLD: 1.16 K/UL (ref 1.2–3.5)
LYMPHOCYTES NFR BLD: 20.4 %
MCH RBC QN AUTO: 24.1 PG (ref 28–33.2)
MCHC RBC AUTO-ENTMCNC: 30.4 G/DL (ref 32.2–35.5)
MCV RBC AUTO: 79.2 FL (ref 83–98)
MONOCYTES # BLD: 0.5 K/UL (ref 0.28–0.8)
MONOCYTES NFR BLD: 8.8 %
NEUTROPHILS # BLD: 3.69 K/UL (ref 1.7–7)
NEUTS SEG NFR BLD: 65 %
NRBC BLD-RTO: 0 %
PDW BLD AUTO: 9.6 FL (ref 9.4–12.3)
PLATELET # BLD AUTO: 354 K/UL
RBC # BLD AUTO: 3.94 M/UL (ref 3.93–5.22)
WBC # BLD AUTO: 5.68 K/UL

## 2019-08-12 PROCEDURE — 85025 COMPLETE CBC W/AUTO DIFF WBC: CPT | Performed by: INTERNAL MEDICINE

## 2019-08-13 ENCOUNTER — TELEPHONE (OUTPATIENT)
Dept: INTERNAL MEDICINE | Facility: CLINIC | Age: 64
End: 2019-08-13

## 2019-08-13 NOTE — TELEPHONE ENCOUNTER
It really would be better - if she can't I will try to call her when I have more time in the early morning or firday - let me know what is best for her

## 2019-08-13 NOTE — TELEPHONE ENCOUNTER
Pt requesting a call back ; [phone]  Pt states she would like to discuss test results  Cardiac; lab work; neurology  This is tests she had over the last 2 months  Do you want her to make an appointment?

## 2019-08-27 ENCOUNTER — OFFICE VISIT (OUTPATIENT)
Dept: INTERNAL MEDICINE | Facility: CLINIC | Age: 64
End: 2019-08-27
Payer: COMMERCIAL

## 2019-08-27 VITALS
BODY MASS INDEX: 23.95 KG/M2 | WEIGHT: 122 LBS | HEART RATE: 54 BPM | DIASTOLIC BLOOD PRESSURE: 76 MMHG | OXYGEN SATURATION: 98 % | HEIGHT: 60 IN | TEMPERATURE: 98.8 F | SYSTOLIC BLOOD PRESSURE: 118 MMHG

## 2019-08-27 DIAGNOSIS — E78.5 DYSLIPIDEMIA: ICD-10-CM

## 2019-08-27 DIAGNOSIS — E55.9 VITAMIN D DEFICIENCY: ICD-10-CM

## 2019-08-27 DIAGNOSIS — R41.840 DIFFICULTY CONCENTRATING: ICD-10-CM

## 2019-08-27 DIAGNOSIS — I10 ESSENTIAL HYPERTENSION: ICD-10-CM

## 2019-08-27 DIAGNOSIS — D47.09 MASTOCYTOSIS: ICD-10-CM

## 2019-08-27 DIAGNOSIS — E78.5 HYPERLIPIDEMIA, UNSPECIFIED HYPERLIPIDEMIA TYPE: Primary | ICD-10-CM

## 2019-08-27 PROCEDURE — 99213 OFFICE O/P EST LOW 20 MIN: CPT | Performed by: INTERNAL MEDICINE

## 2019-08-27 RX ORDER — ESCITALOPRAM OXALATE 10 MG/1
10 TABLET ORAL DAILY
Qty: 90 TABLET | Refills: 3 | Status: SHIPPED | OUTPATIENT
Start: 2019-08-27 | End: 2020-07-24

## 2019-08-27 RX ORDER — EPINEPHRINE 0.3 MG/.3ML
1 INJECTION SUBCUTANEOUS AS NEEDED
Qty: 1 EACH | Refills: 1 | Status: SHIPPED | OUTPATIENT
Start: 2019-08-27 | End: 2020-01-30

## 2019-08-27 ASSESSMENT — ENCOUNTER SYMPTOMS
CHEST TIGHTNESS: 0
SHORTNESS OF BREATH: 0
PALPITATIONS: 0
HEADACHES: 0
WHEEZING: 0
DIZZINESS: 0
FATIGUE: 0
DECREASED CONCENTRATION: 1
ABDOMINAL PAIN: 0

## 2019-08-27 NOTE — PROGRESS NOTES
Subjective      Patient ID: Ritu Ramirez is a 63 y.o. female.    Here to discuss her recent blood studies and MRI of the brain.  MRI shows microvascular disease but no acute changes.  Blood studies are unremarkable.  She still feels that she has difficulty with short-term memory.  Along with this she notices lack of motivation of feeling of want to to stay in her house most of the time.  Has been taking Zoloft for anxiety and depression but is not really sure if this is helping her.  She has concerns due to her mother having been diagnosed with Alzheimer's disease who is now early 90s.  Had any recent flares of her systemic mastocytosis.  Continues compliant with her blood pressure medication.  Chest pain palpitation shortness of breath or lightheadedness.        The following have been reviewed and updated as appropriate in this visit:       Past Medical History:   Diagnosis Date   • Hypertension    • Systemic mastocytosis      Past Surgical History:   Procedure Laterality Date   • COLONOSCOPY     • REDUCTION MAMMAPLASTY       History reviewed. No pertinent family history.  Social History     Social History   • Marital status:      Spouse name: N/A   • Number of children: N/A   • Years of education: N/A     Occupational History   • Not on file.     Social History Main Topics   • Smoking status: Former Smoker     Packs/day: 0.25     Quit date: 1990   • Smokeless tobacco: Never Used   • Alcohol use Yes      Comment: Social   • Drug use: No   • Sexual activity: Not on file     Other Topics Concern   • Not on file     Social History Narrative   • No narrative on file       Review of Systems   Constitutional: Negative for fatigue.   Respiratory: Negative for chest tightness, shortness of breath and wheezing.    Cardiovascular: Negative for chest pain, palpitations and leg swelling.   Gastrointestinal: Negative for abdominal pain.   Endocrine: Negative for cold intolerance and heat intolerance.   Neurological:  Negative for dizziness and headaches.   Psychiatric/Behavioral: Positive for decreased concentration.       Allergies   Allergen Reactions   • Anesthetics - Amide Type      Other reaction(s): Anaphylaxis   • Clarithromycin      Other reaction(s): Rash   • Erythromycin Base      Other reaction(s): Anaphylaxis   • Iodinated Contrast Media      Other reaction(s): Anaphylaxis   • Nsaids (Non-Steroidal Anti-Inflammatory Drug)      Other reaction(s): Anaphylaxis   • Penicillins      Current Outpatient Prescriptions   Medication Sig Dispense Refill   • amLODIPine (NORVASC) 5 mg tablet Take 5 mg by mouth daily.       • cholecalciferol, vitamin D3, 1,000 unit capsule take 1 capsule by oral route  every day     • hydrochlorothiazide (HYDRODIURIL) 25 mg tablet Take 25 mg by mouth once daily.  3   • potassium chloride (KLOR-CON) 20 mEq CR tablet Take 20 mEq by mouth daily.     • rosuvastatin (CRESTOR) 10 mg tablet Take 1 tablet (10 mg total) by mouth daily. 90 tablet 3   • sertraline (ZOLOFT) 50 mg tablet Take 1 tablet (50 mg total) by mouth daily. 90 tablet 3   • EPINEPHrine (EPIPEN 2-ZARINA) 0.3 mg/0.3 mL injection syringe Inject 0.3 mL (0.3 mg total) into the thigh as needed for anaphylaxis. 1 each 1   • escitalopram (LEXAPRO) 10 mg tablet Take 1 tablet (10 mg total) by mouth daily. 90 tablet 3     No current facility-administered medications for this visit.        Objective   Vitals:    08/27/19 1011   BP: 118/76   Pulse: (!) 54   Temp: 37.1 °C (98.8 °F)   SpO2: 98%   Weight: 55.3 kg (122 lb)   Height: 1.524 m (5')       Physical Exam   Constitutional: She is oriented to person, place, and time. She appears well-developed and well-nourished.   HENT:   Head: Normocephalic and atraumatic.   Neck: Normal range of motion. Neck supple. No thyromegaly present.   Cardiovascular: Normal rate, regular rhythm, normal heart sounds and intact distal pulses.    No murmur heard.  Pulmonary/Chest: Effort normal and breath sounds normal. She  has no wheezes. She has no rales.   Abdominal: Soft. Bowel sounds are normal. She exhibits no mass. There is no tenderness.   Musculoskeletal: She exhibits no edema.   Neurological: She is alert and oriented to person, place, and time.   Skin: Skin is warm and dry.   Psychiatric: She has a normal mood and affect. Her behavior is normal. Judgment and thought content normal.   Nursing note and vitals reviewed.      Assessment/Plan   Problem List Items Addressed This Visit     Mastocytosis     He is to follow with Dr. Lloyd all and periodically at [facility]         Essential hypertension     Blood pressure is well controlled today -new amlodipine and hydrochlorothiazide.         Dyslipidemia    Difficulty concentrating     Discussion regarding this.  Basic blood studies and MRI are unremarkable.  May well be related to ongoing anxiety depression.  Will wean off Zoloft and try Lexapro.  Could consider further neurologic evaluation and neuropsychologic testing if symptoms do not resolve.  Let me know how she feels in the next 6 to 8 weeks thyroid function is normal.           Other Visit Diagnoses     Hyperlipidemia, unspecified hyperlipidemia type    -  Primary    Relevant Orders    Lipid panel    Vitamin D deficiency        Relevant Orders    Vitamin D 25 hydroxy          Ashley Omalley MD    8/27/2019

## 2019-08-27 NOTE — ASSESSMENT & PLAN NOTE
Discussion regarding this.  Basic blood studies and MRI are unremarkable.  May well be related to ongoing anxiety depression.  Will wean off Zoloft and try Lexapro.  Could consider further neurologic evaluation and neuropsychologic testing if symptoms do not resolve.  Let me know how she feels in the next 6 to 8 weeks thyroid function is normal.

## 2019-08-27 NOTE — ASSESSMENT & PLAN NOTE
He is to follow with Dr. Lloyd all and periodically at [facility]

## 2019-09-04 ENCOUNTER — TELEPHONE (OUTPATIENT)
Dept: SCHEDULING | Facility: CLINIC | Age: 64
End: 2019-09-04

## 2019-09-05 ENCOUNTER — LAB REQUISITION (OUTPATIENT)
Dept: LAB | Facility: HOSPITAL | Age: 64
End: 2019-09-05
Payer: COMMERCIAL

## 2019-09-05 DIAGNOSIS — D50.8 OTHER IRON DEFICIENCY ANEMIAS: ICD-10-CM

## 2019-09-05 LAB
BASOPHILS # BLD: 0.04 K/UL (ref 0.01–0.1)
BASOPHILS NFR BLD: 0.5 %
DIFFERENTIAL METHOD BLD: ABNORMAL
EOSINOPHIL # BLD: 0.49 K/UL (ref 0.04–0.36)
EOSINOPHIL NFR BLD: 5.8 %
ERYTHROCYTE [DISTWIDTH] IN BLOOD BY AUTOMATED COUNT: 15 % (ref 11.7–14.4)
HCT VFR BLDCO AUTO: 35.9 %
HGB BLD-MCNC: 10.6 G/DL
IMM GRANULOCYTES # BLD AUTO: 0.04 K/UL (ref 0–0.08)
IMM GRANULOCYTES NFR BLD AUTO: 0.5 %
LYMPHOCYTES # BLD: 1.29 K/UL (ref 1.2–3.5)
LYMPHOCYTES NFR BLD: 15.3 %
MCH RBC QN AUTO: 22.2 PG (ref 28–33.2)
MCHC RBC AUTO-ENTMCNC: 29.5 G/DL (ref 32.2–35.5)
MCV RBC AUTO: 75.1 FL (ref 83–98)
MONOCYTES # BLD: 0.73 K/UL (ref 0.28–0.8)
MONOCYTES NFR BLD: 8.7 %
NEUTROPHILS # BLD: 5.84 K/UL (ref 1.7–7)
NEUTS SEG NFR BLD: 69.2 %
NRBC BLD-RTO: 0 %
PDW BLD AUTO: 9.5 FL (ref 9.4–12.3)
PLATELET # BLD AUTO: 428 K/UL
RBC # BLD AUTO: 4.78 M/UL (ref 3.93–5.22)
WBC # BLD AUTO: 8.43 K/UL

## 2019-09-05 PROCEDURE — 85025 COMPLETE CBC W/AUTO DIFF WBC: CPT | Performed by: INTERNAL MEDICINE

## 2019-09-19 ENCOUNTER — LAB REQUISITION (OUTPATIENT)
Dept: LAB | Facility: HOSPITAL | Age: 64
End: 2019-09-19
Payer: COMMERCIAL

## 2019-09-19 DIAGNOSIS — D50.8 OTHER IRON DEFICIENCY ANEMIAS: ICD-10-CM

## 2019-09-19 LAB
BASOPHILS # BLD: 0.03 K/UL (ref 0.01–0.1)
BASOPHILS NFR BLD: 0.4 %
DIFFERENTIAL METHOD BLD: ABNORMAL
EOSINOPHIL # BLD: 0.38 K/UL (ref 0.04–0.36)
EOSINOPHIL NFR BLD: 4.5 %
ERYTHROCYTE [DISTWIDTH] IN BLOOD BY AUTOMATED COUNT: 19.7 % (ref 11.7–14.4)
HCT VFR BLDCO AUTO: 34.8 %
HGB BLD-MCNC: 10.9 G/DL
IMM GRANULOCYTES # BLD AUTO: 0.06 K/UL (ref 0–0.08)
IMM GRANULOCYTES NFR BLD AUTO: 0.7 %
LYMPHOCYTES # BLD: 1.3 K/UL (ref 1.2–3.5)
LYMPHOCYTES NFR BLD: 15.3 %
MCH RBC QN AUTO: 23.7 PG (ref 28–33.2)
MCHC RBC AUTO-ENTMCNC: 31.3 G/DL (ref 32.2–35.5)
MCV RBC AUTO: 75.7 FL (ref 83–98)
MONOCYTES # BLD: 0.64 K/UL (ref 0.28–0.8)
MONOCYTES NFR BLD: 7.5 %
NEUTROPHILS # BLD: 6.1 K/UL (ref 1.7–7)
NEUTS SEG NFR BLD: 71.6 %
NRBC BLD-RTO: 0 %
PDW BLD AUTO: 10.1 FL (ref 9.4–12.3)
PLATELET # BLD AUTO: 335 K/UL
RBC # BLD AUTO: 4.6 M/UL (ref 3.93–5.22)
WBC # BLD AUTO: 8.51 K/UL

## 2019-09-19 PROCEDURE — 85025 COMPLETE CBC W/AUTO DIFF WBC: CPT | Performed by: INTERNAL MEDICINE

## 2019-09-20 RX ORDER — ALPRAZOLAM 0.25 MG/1
TABLET ORAL
Qty: 30 TABLET | Refills: 1 | Status: SHIPPED | OUTPATIENT
Start: 2019-09-20 | End: 2019-12-19

## 2019-09-20 NOTE — TELEPHONE ENCOUNTER
Medicine Refill Request    Last Office Visit: 8/27/2019  Next Office Visit: Visit date not found        Current Outpatient Prescriptions:   •  amLODIPine (NORVASC) 5 mg tablet, Take 5 mg by mouth daily.  , Disp: , Rfl:   •  cholecalciferol, vitamin D3, 1,000 unit capsule, take 1 capsule by oral route  every day, Disp: , Rfl:   •  EPINEPHrine (EPIPEN 2-ZARINA) 0.3 mg/0.3 mL injection syringe, Inject 0.3 mL (0.3 mg total) into the thigh as needed for anaphylaxis., Disp: 1 each, Rfl: 1  •  escitalopram (LEXAPRO) 10 mg tablet, Take 1 tablet (10 mg total) by mouth daily., Disp: 90 tablet, Rfl: 3  •  hydrochlorothiazide (HYDRODIURIL) 25 mg tablet, Take 25 mg by mouth once daily., Disp: , Rfl: 3  •  potassium chloride (KLOR-CON) 20 mEq CR tablet, Take 20 mEq by mouth daily., Disp: , Rfl:   •  rosuvastatin (CRESTOR) 10 mg tablet, Take 1 tablet (10 mg total) by mouth daily., Disp: 90 tablet, Rfl: 3  •  sertraline (ZOLOFT) 50 mg tablet, Take 1 tablet (50 mg total) by mouth daily., Disp: 90 tablet, Rfl: 3      BP Readings from Last 3 Encounters:   08/27/19 118/76   06/26/19 122/74   06/25/19 100/70       Recent Lab results:  Lab Results   Component Value Date    CHOL 206 (H) 06/25/2019   ,   Lab Results   Component Value Date    HDL 73 06/25/2019   ,   Lab Results   Component Value Date    LDLCALC 103 (H) 06/25/2019   ,   Lab Results   Component Value Date    TRIG 151 (H) 06/25/2019        Lab Results   Component Value Date    GLUCOSE 95 07/10/2019   ,   Lab Results   Component Value Date    HGBA1C 5.0 06/05/2015         Lab Results   Component Value Date    CREATININE 0.83 07/10/2019       Lab Results   Component Value Date    TSH 3.320 07/10/2019

## 2019-10-11 ENCOUNTER — TRANSCRIBE ORDERS (OUTPATIENT)
Dept: SCHEDULING | Age: 64
End: 2019-10-11

## 2019-10-11 DIAGNOSIS — M85.9 DISORDER OF BONE DENSITY AND STRUCTURE, UNSPECIFIED: Primary | ICD-10-CM

## 2019-10-22 ENCOUNTER — LAB REQUISITION (OUTPATIENT)
Dept: LAB | Facility: HOSPITAL | Age: 64
End: 2019-10-22
Payer: COMMERCIAL

## 2019-10-22 DIAGNOSIS — D50.8 OTHER IRON DEFICIENCY ANEMIAS: ICD-10-CM

## 2019-10-22 LAB
BASOPHILS # BLD: 0.04 K/UL (ref 0.01–0.1)
BASOPHILS NFR BLD: 0.6 %
DIFFERENTIAL METHOD BLD: NORMAL
EOSINOPHIL # BLD: 0.32 K/UL (ref 0.04–0.36)
EOSINOPHIL NFR BLD: 5 %
ERYTHROCYTE [DISTWIDTH] IN BLOOD BY AUTOMATED COUNT: 21.3 % (ref 11.7–14.4)
HCT VFR BLDCO AUTO: 39.2 %
HGB BLD-MCNC: 12.4 G/DL (ref 11.8–15.7)
IMM GRANULOCYTES # BLD AUTO: 0.04 K/UL (ref 0–0.08)
IMM GRANULOCYTES NFR BLD AUTO: 0.6 %
LYMPHOCYTES # BLD: 1.32 K/UL (ref 1.2–3.5)
LYMPHOCYTES NFR BLD: 20.7 %
MCH RBC QN AUTO: 24.7 PG (ref 28–33.2)
MCHC RBC AUTO-ENTMCNC: 31.6 G/DL (ref 32.2–35.5)
MCV RBC AUTO: 78.1 FL (ref 83–98)
MONOCYTES # BLD: 0.54 K/UL (ref 0.28–0.8)
MONOCYTES NFR BLD: 8.5 %
NEUTROPHILS # BLD: 4.13 K/UL (ref 1.7–7)
NEUTS SEG NFR BLD: 64.6 %
NRBC BLD-RTO: 0 %
PDW BLD AUTO: 9.5 FL (ref 9.4–12.3)
PLATELET # BLD AUTO: 382 K/UL
RBC # BLD AUTO: 5.02 M/UL (ref 3.93–5.22)
WBC # BLD AUTO: 6.39 K/UL

## 2019-10-22 PROCEDURE — 85025 COMPLETE CBC W/AUTO DIFF WBC: CPT | Performed by: INTERNAL MEDICINE

## 2019-10-25 ENCOUNTER — HOSPITAL ENCOUNTER (OUTPATIENT)
Dept: RADIOLOGY | Facility: HOSPITAL | Age: 64
Setting detail: NUCLEAR MEDICINE
Discharge: HOME | End: 2019-10-25
Attending: INTERNAL MEDICINE
Payer: COMMERCIAL

## 2019-10-25 DIAGNOSIS — M85.9 DISORDER OF BONE DENSITY AND STRUCTURE, UNSPECIFIED: ICD-10-CM

## 2019-10-25 PROCEDURE — A9503 TC99M MEDRONATE: HCPCS | Performed by: INTERNAL MEDICINE

## 2019-10-25 PROCEDURE — 78306 BONE IMAGING WHOLE BODY: CPT

## 2019-10-25 RX ADMIN — TECHNETIUM TC 99M MEDRONATE 24 MILLICURIE: 25 INJECTION, POWDER, FOR SOLUTION INTRAVENOUS at 11:42

## 2019-11-20 ENCOUNTER — TELEPHONE (OUTPATIENT)
Dept: INTERNAL MEDICINE | Facility: CLINIC | Age: 64
End: 2019-11-20

## 2019-11-20 NOTE — TELEPHONE ENCOUNTER
Ms James is scheduled in Dr Grimm's earliest apt (Jan 30,20) but would like to be seen sooner if the Dr can fit her in.  She needs to review recent labs and imaging results.

## 2019-12-08 LAB
CHOLEST SERPL-MCNC: 223 MG/DL (ref 100–199)
HDLC SERPL-MCNC: 88 MG/DL
LDLC SERPL CALC-MCNC: 116 MG/DL (ref 0–99)
TRIGL SERPL-MCNC: 95 MG/DL (ref 0–149)
VLDLC SERPL CALC-MCNC: 19 MG/DL (ref 5–40)

## 2019-12-09 LAB — 25(OH)D3+25(OH)D2 SERPL-MCNC: 29.5 NG/ML (ref 30–100)

## 2019-12-17 ENCOUNTER — TRANSCRIBE ORDERS (OUTPATIENT)
Dept: SCHEDULING | Age: 64
End: 2019-12-17

## 2019-12-17 DIAGNOSIS — Z12.31 ENCOUNTER FOR SCREENING MAMMOGRAM FOR MALIGNANT NEOPLASM OF BREAST: Primary | ICD-10-CM

## 2019-12-17 DIAGNOSIS — M85.80 OTHER SPECIFIED DISORDERS OF BONE DENSITY AND STRUCTURE, UNSPECIFIED SITE: ICD-10-CM

## 2019-12-19 ENCOUNTER — OFFICE VISIT (OUTPATIENT)
Dept: CARDIOLOGY | Facility: CLINIC | Age: 64
End: 2019-12-19
Payer: COMMERCIAL

## 2019-12-19 VITALS
WEIGHT: 122 LBS | SYSTOLIC BLOOD PRESSURE: 112 MMHG | HEIGHT: 60 IN | DIASTOLIC BLOOD PRESSURE: 64 MMHG | BODY MASS INDEX: 23.95 KG/M2

## 2019-12-19 DIAGNOSIS — R93.1 ELEVATED CORONARY ARTERY CALCIUM SCORE: Primary | ICD-10-CM

## 2019-12-19 DIAGNOSIS — D47.09 MASTOCYTOSIS: ICD-10-CM

## 2019-12-19 DIAGNOSIS — I10 ESSENTIAL HYPERTENSION: ICD-10-CM

## 2019-12-19 DIAGNOSIS — E78.5 DYSLIPIDEMIA: ICD-10-CM

## 2019-12-19 DIAGNOSIS — E87.6 HYPOKALEMIA: ICD-10-CM

## 2019-12-19 DIAGNOSIS — R00.1 SINUS BRADYCARDIA: ICD-10-CM

## 2019-12-19 PROCEDURE — 99214 OFFICE O/P EST MOD 30 MIN: CPT | Performed by: INTERNAL MEDICINE

## 2019-12-19 PROCEDURE — 93000 ELECTROCARDIOGRAM COMPLETE: CPT | Performed by: INTERNAL MEDICINE

## 2019-12-19 RX ORDER — ATORVASTATIN CALCIUM 20 MG/1
20 TABLET, FILM COATED ORAL DAILY
Qty: 90 TABLET | Refills: 3 | Status: SHIPPED | OUTPATIENT
Start: 2019-12-19 | End: 2020-06-25

## 2019-12-19 ASSESSMENT — ENCOUNTER SYMPTOMS
DIAPHORESIS: 0
HEARTBURN: 0
LIGHT-HEADEDNESS: 0
ADENOPATHY: 0
COUGH: 0
PALPITATIONS: 0
BLURRED VISION: 0
ALTERED MENTAL STATUS: 0
CLAUDICATION: 0
WEIGHT LOSS: 0
SHORTNESS OF BREATH: 0
COLOR CHANGE: 0
MYALGIAS: 1
DYSPNEA ON EXERTION: 0
SYNCOPE: 0
WEIGHT GAIN: 0
MUSCLE CRAMPS: 0
ORTHOPNEA: 0

## 2019-12-19 NOTE — ASSESSMENT & PLAN NOTE
Her blood pressure is excellent on her current medications.    She does run a low potassium.  I told her at some point we can consider substituting Spironolactone for hydrochlorothiazide.  This may help her stay off potassium pills and still control her blood pressure.  I have elected not to do that today.

## 2019-12-19 NOTE — LETTER
December 19, 2019     Ashley Omalley MD  [address] [facility] [address]    Patient: Ritu Ramirez  YOB: 1955  Date of Visit: 12/19/2019      Dear Dr. Sirisha Omalley:    Thank you for referring Ritu Ramirez to me for evaluation. Below are my notes for this consultation.    If you have questions, please do not hesitate to call me. I look forward to following your patient along with you.         Sincerely,        Bright Selby MD        CC: No Recipients  Bright Selby MD  12/19/2019  1:18 PM  Signed     Cardiology Note       Reason for visit:   Chief Complaint   Patient presents with   • Hypertension      HPI   Ritu Ramirez is a 64 y.o. female who presents for scheduled cardiovascular follow-up in the office.    I last saw her in June.  She has had no significant changes in her health.  She exercises regularly at a gym class.  She denies cardiovascular symptoms such as chest pain, shortness breath, palpitations, lightheadedness or syncope.    She did develop some leg discomfort and stopped her rosuvastatin.  Her leg discomfort ultimately did improved and resolved but she is not sure whether this was truly caused by the rosuvastatin.    She tries to eat a heart healthy diet.  Her weight is stable.      Past Medical History:   Diagnosis Date   • Hypertension    • Systemic mastocytosis      Past Surgical History:   Procedure Laterality Date   • COLONOSCOPY     • REDUCTION MAMMAPLASTY       Social History     Socioeconomic History   • Marital status:      Spouse name: None   • Number of children: None   • Years of education: None   • Highest education level: None   Occupational History   • None   Social Needs   • Financial resource strain: None   • Food insecurity:     Worry: None     Inability: None   • Transportation needs:     Medical: None     Non-medical: None   Tobacco Use   • Smoking status: Former Smoker     Packs/day: 0.25     Last attempt to quit: 1990      Years since quittin.9   • Smokeless tobacco: Never Used   Substance and Sexual Activity   • Alcohol use: Yes     Comment: Social   • Drug use: No   • Sexual activity: Defer   Lifestyle   • Physical activity:     Days per week: None     Minutes per session: None   • Stress: None   Relationships   • Social connections:     Talks on phone: None     Gets together: None     Attends Zoroastrianism service: None     Active member of club or organization: None     Attends meetings of clubs or organizations: None     Relationship status: None   • Intimate partner violence:     Fear of current or ex partner: None     Emotionally abused: None     Physically abused: None     Forced sexual activity: None   Other Topics Concern   • None   Social History Narrative   • None     History reviewed. No pertinent family history.  Anesthetics - amide type; Clarithromycin; Erythromycin base; Iodinated contrast media; Nsaids (non-steroidal anti-inflammatory drug); and Penicillins  Current Outpatient Medications   Medication Sig Dispense Refill   • amLODIPine (NORVASC) 5 mg tablet Take 5 mg by mouth daily.       • cholecalciferol, vitamin D3, 1,000 unit capsule take 1 capsule by oral route  every day     • escitalopram (LEXAPRO) 10 mg tablet Take 1 tablet (10 mg total) by mouth daily. 90 tablet 3   • hydrochlorothiazide (HYDRODIURIL) 25 mg tablet Take 25 mg by mouth once daily.  3   • potassium chloride (KLOR-CON) 20 mEq CR tablet Take 20 mEq by mouth daily.       No current facility-administered medications for this visit.        Review of Systems   Constitution: Negative for diaphoresis, malaise/fatigue, weight gain and weight loss.   HENT: Positive for nosebleeds.    Eyes: Negative for blurred vision.   Cardiovascular: Negative for chest pain, claudication, dyspnea on exertion, leg swelling, orthopnea, palpitations and syncope.   Respiratory: Negative for cough and shortness of breath.    Hematologic/Lymphatic: Negative for adenopathy.    Skin: Negative for color change.   Musculoskeletal: Positive for myalgias. Negative for muscle cramps.   Gastrointestinal: Negative for heartburn.   Genitourinary: Negative for nocturia.   Neurological: Negative for light-headedness.   Psychiatric/Behavioral: Negative for altered mental status.     Objective   Vitals:    12/19/19 1244   BP: 112/64   BP Location: Left upper arm   Patient Position: Sitting   Weight: 55.3 kg (122 lb)   Height: 1.524 m (5')     Weight: 55.3 kg (122 lb)     Physical Exam:    General Appearance:  Alert, no distress   Head:  Normocephalic, without obvious abnormality, atraumatic   Eyes:  Conjunctiva/corneas clear, EOM's intact   Neck: No thyroid enlargement. No JVD. No bruits    Lungs:   Clear to auscultation bilaterally, respirations unlabored, no rales, no wheezing   Heart:  Regular rhythm, S1 and S2 normal, no murmur, rub or gallop   Abdomen:   Soft, non-tender, no masses, no organomegaly   Vascular: Pulses 2+ and symmetric all extremities, no carotid bruit or jugular vein distention   Musculoskeletal:  Skin: No injury or deformity  Skin color, texture, turgor normal, no rashes or lesions, no cyanosis or edema   Extremities: Extremities normal, atraumatic, pulses normal   Behavior/Emotional: Appropriate, cooperative       Lab Results   Component Value Date    WBC 6.39 10/22/2019    HGB 12.4 10/22/2019     (H) 10/22/2019    CHOL 223 (H) 12/07/2019    TRIG 95 12/07/2019    HDL 88 12/07/2019    LDLCALC 116 (H) 12/07/2019    ALT 24 07/10/2019    AST 21 07/10/2019     07/10/2019    K 3.3 (L) 07/10/2019    CREATININE 0.83 07/10/2019    TSH 3.320 07/10/2019    INR 0.9 11/02/2017    HGBA1C 5.0 06/05/2015          ECG   sinus bradycardia, occasional premature atrial beats  LAFB with RV conduction delay     ASSESSMENT/PLAN    Problem List Items Addressed This Visit        High    Essential hypertension    Overview     2017 echocardiogram: Normal structural heart         Current  Assessment & Plan     Her blood pressure is excellent on her current medications.    She does run a low potassium.  I told her at some point we can consider substituting Spironolactone for hydrochlorothiazide.  This may help her stay off potassium pills and still control her blood pressure.  I have elected not to do that today.         Relevant Orders    ECG 12 LEAD-OFFICE PERFORMED (Completed)    Elevated coronary artery calcium score - Primary    Overview     6/2019 score: 128 (90th%)         Current Assessment & Plan     She is active and without angina.  She is not on aspirin as she has epistaxis.  She will contact me if she has any symptoms of concern.         Relevant Medications    atorvastatin (LIPITOR) 20 mg tablet    Other Relevant Orders    ECG 12 LEAD-OFFICE PERFORMED (Completed)       Medium    Mastocytosis    Relevant Orders    ECG 12 LEAD-OFFICE PERFORMED (Completed)    Hypokalemia    Relevant Orders    ECG 12 LEAD-OFFICE PERFORMED (Completed)    Dyslipidemia    Overview     Rosuvastatin may have caused muscle aches         Current Assessment & Plan     Her lipid profile was reviewed and is suboptimal given her elevated coronary calcium score.  She will try atorvastatin 20 mg daily.  She will try this with coenzyme Q 10 200 mg daily.  If she can tolerate this agent we can uptitrated as needed.  If she cannot we can consider a PCSK9 inhibitor.         Relevant Orders    ECG 12 LEAD-OFFICE PERFORMED (Completed)       Low    Sinus bradycardia    Current Assessment & Plan     She has good exercise tolerance and therefore is asymptomatic.  She is not on any beta-blockers.         Relevant Medications    atorvastatin (LIPITOR) 20 mg tablet    Other Relevant Orders    ECG 12 LEAD-OFFICE PERFORMED (Completed)           Return in about 6 months (around 6/19/2020).    Bright Selby MD  12/19/2019

## 2019-12-19 NOTE — ASSESSMENT & PLAN NOTE
She is active and without angina.  She is not on aspirin as she has epistaxis.  She will contact me if she has any symptoms of concern.

## 2019-12-19 NOTE — PROGRESS NOTES
Cardiology Note       Reason for visit:   Chief Complaint   Patient presents with   • Hypertension      HPI   Ritu Ramirez is a 64 y.o. female who presents for scheduled cardiovascular follow-up in the office.    I last saw her in .  She has had no significant changes in her health.  She exercises regularly at a gym class.  She denies cardiovascular symptoms such as chest pain, shortness breath, palpitations, lightheadedness or syncope.    She did develop some leg discomfort and stopped her rosuvastatin.  Her leg discomfort ultimately did improved and resolved but she is not sure whether this was truly caused by the rosuvastatin.    She tries to eat a heart healthy diet.  Her weight is stable.      Past Medical History:   Diagnosis Date   • Hypertension    • Systemic mastocytosis      Past Surgical History:   Procedure Laterality Date   • COLONOSCOPY     • REDUCTION MAMMAPLASTY       Social History     Socioeconomic History   • Marital status:      Spouse name: None   • Number of children: None   • Years of education: None   • Highest education level: None   Occupational History   • None   Social Needs   • Financial resource strain: None   • Food insecurity:     Worry: None     Inability: None   • Transportation needs:     Medical: None     Non-medical: None   Tobacco Use   • Smoking status: Former Smoker     Packs/day: 0.25     Last attempt to quit:      Years since quittin.9   • Smokeless tobacco: Never Used   Substance and Sexual Activity   • Alcohol use: Yes     Comment: Social   • Drug use: No   • Sexual activity: Defer   Lifestyle   • Physical activity:     Days per week: None     Minutes per session: None   • Stress: None   Relationships   • Social connections:     Talks on phone: None     Gets together: None     Attends Druze service: None     Active member of club or organization: None     Attends meetings of clubs or organizations: None     Relationship status: None   • Intimate  partner violence:     Fear of current or ex partner: None     Emotionally abused: None     Physically abused: None     Forced sexual activity: None   Other Topics Concern   • None   Social History Narrative   • None     History reviewed. No pertinent family history.  Anesthetics - amide type; Clarithromycin; Erythromycin base; Iodinated contrast media; Nsaids (non-steroidal anti-inflammatory drug); and Penicillins  Current Outpatient Medications   Medication Sig Dispense Refill   • amLODIPine (NORVASC) 5 mg tablet Take 5 mg by mouth daily.       • cholecalciferol, vitamin D3, 1,000 unit capsule take 1 capsule by oral route  every day     • escitalopram (LEXAPRO) 10 mg tablet Take 1 tablet (10 mg total) by mouth daily. 90 tablet 3   • hydrochlorothiazide (HYDRODIURIL) 25 mg tablet Take 25 mg by mouth once daily.  3   • potassium chloride (KLOR-CON) 20 mEq CR tablet Take 20 mEq by mouth daily.       No current facility-administered medications for this visit.        Review of Systems   Constitution: Negative for diaphoresis, malaise/fatigue, weight gain and weight loss.   HENT: Positive for nosebleeds.    Eyes: Negative for blurred vision.   Cardiovascular: Negative for chest pain, claudication, dyspnea on exertion, leg swelling, orthopnea, palpitations and syncope.   Respiratory: Negative for cough and shortness of breath.    Hematologic/Lymphatic: Negative for adenopathy.   Skin: Negative for color change.   Musculoskeletal: Positive for myalgias. Negative for muscle cramps.   Gastrointestinal: Negative for heartburn.   Genitourinary: Negative for nocturia.   Neurological: Negative for light-headedness.   Psychiatric/Behavioral: Negative for altered mental status.     Objective   Vitals:    12/19/19 1244   BP: 112/64   BP Location: Left upper arm   Patient Position: Sitting   Weight: 55.3 kg (122 lb)   Height: 1.524 m (5')     Weight: 55.3 kg (122 lb)     Physical Exam:    General Appearance:  Alert, no distress    Head:  Normocephalic, without obvious abnormality, atraumatic   Eyes:  Conjunctiva/corneas clear, EOM's intact   Neck: No thyroid enlargement. No JVD. No bruits    Lungs:   Clear to auscultation bilaterally, respirations unlabored, no rales, no wheezing   Heart:  Regular rhythm, S1 and S2 normal, no murmur, rub or gallop   Abdomen:   Soft, non-tender, no masses, no organomegaly   Vascular: Pulses 2+ and symmetric all extremities, no carotid bruit or jugular vein distention   Musculoskeletal:  Skin: No injury or deformity  Skin color, texture, turgor normal, no rashes or lesions, no cyanosis or edema   Extremities: Extremities normal, atraumatic, pulses normal   Behavior/Emotional: Appropriate, cooperative       Lab Results   Component Value Date    WBC 6.39 10/22/2019    HGB 12.4 10/22/2019     (H) 10/22/2019    CHOL 223 (H) 12/07/2019    TRIG 95 12/07/2019    HDL 88 12/07/2019    LDLCALC 116 (H) 12/07/2019    ALT 24 07/10/2019    AST 21 07/10/2019     07/10/2019    K 3.3 (L) 07/10/2019    CREATININE 0.83 07/10/2019    TSH 3.320 07/10/2019    INR 0.9 11/02/2017    HGBA1C 5.0 06/05/2015          ECG   sinus bradycardia, occasional premature atrial beats  LAFB with RV conduction delay     ASSESSMENT/PLAN    Problem List Items Addressed This Visit        High    Essential hypertension    Overview     2017 echocardiogram: Normal structural heart         Current Assessment & Plan     Her blood pressure is excellent on her current medications.    She does run a low potassium.  I told her at some point we can consider substituting Spironolactone for hydrochlorothiazide.  This may help her stay off potassium pills and still control her blood pressure.  I have elected not to do that today.         Relevant Orders    ECG 12 LEAD-OFFICE PERFORMED (Completed)    Elevated coronary artery calcium score - Primary    Overview     6/2019 score: 128 (90th%)         Current Assessment & Plan     She is active and  without angina.  She is not on aspirin as she has epistaxis.  She will contact me if she has any symptoms of concern.         Relevant Medications    atorvastatin (LIPITOR) 20 mg tablet    Other Relevant Orders    ECG 12 LEAD-OFFICE PERFORMED (Completed)       Medium    Mastocytosis    Relevant Orders    ECG 12 LEAD-OFFICE PERFORMED (Completed)    Hypokalemia    Relevant Orders    ECG 12 LEAD-OFFICE PERFORMED (Completed)    Dyslipidemia    Overview     Rosuvastatin may have caused muscle aches         Current Assessment & Plan     Her lipid profile was reviewed and is suboptimal given her elevated coronary calcium score.  She will try atorvastatin 20 mg daily.  She will try this with coenzyme Q 10 200 mg daily.  If she can tolerate this agent we can uptitrated as needed.  If she cannot we can consider a PCSK9 inhibitor.         Relevant Orders    ECG 12 LEAD-OFFICE PERFORMED (Completed)       Low    Sinus bradycardia    Current Assessment & Plan     She has good exercise tolerance and therefore is asymptomatic.  She is not on any beta-blockers.         Relevant Medications    atorvastatin (LIPITOR) 20 mg tablet    Other Relevant Orders    ECG 12 LEAD-OFFICE PERFORMED (Completed)           Return in about 6 months (around 6/19/2020).    Bright Selby MD  12/19/2019

## 2019-12-19 NOTE — ASSESSMENT & PLAN NOTE
Her lipid profile was reviewed and is suboptimal given her elevated coronary calcium score.  She will try atorvastatin 20 mg daily.  She will try this with coenzyme Q 10 200 mg daily.  If she can tolerate this agent we can uptitrated as needed.  If she cannot we can consider a PCSK9 inhibitor.

## 2019-12-20 ENCOUNTER — LAB REQUISITION (OUTPATIENT)
Dept: LAB | Facility: HOSPITAL | Age: 64
End: 2019-12-20
Payer: COMMERCIAL

## 2019-12-20 DIAGNOSIS — D50.8 OTHER IRON DEFICIENCY ANEMIAS: ICD-10-CM

## 2019-12-20 LAB
BASOPHILS # BLD: 0.03 K/UL (ref 0.01–0.1)
BASOPHILS NFR BLD: 0.5 %
DIFFERENTIAL METHOD BLD: ABNORMAL
EOSINOPHIL # BLD: 0.32 K/UL (ref 0.04–0.36)
EOSINOPHIL NFR BLD: 5.2 %
ERYTHROCYTE [DISTWIDTH] IN BLOOD BY AUTOMATED COUNT: 13.7 % (ref 11.7–14.4)
HCT VFR BLDCO AUTO: 38.3 %
HGB BLD-MCNC: 12 G/DL (ref 11.8–15.7)
IMM GRANULOCYTES # BLD AUTO: 0.04 K/UL (ref 0–0.08)
IMM GRANULOCYTES NFR BLD AUTO: 0.6 %
LYMPHOCYTES # BLD: 1.18 K/UL (ref 1.2–3.5)
LYMPHOCYTES NFR BLD: 19.1 %
MCH RBC QN AUTO: 25.6 PG (ref 28–33.2)
MCHC RBC AUTO-ENTMCNC: 31.3 G/DL (ref 32.2–35.5)
MCV RBC AUTO: 81.7 FL (ref 83–98)
MONOCYTES # BLD: 0.54 K/UL (ref 0.28–0.8)
MONOCYTES NFR BLD: 8.7 %
NEUTROPHILS # BLD: 4.07 K/UL (ref 1.7–7)
NEUTS SEG NFR BLD: 65.9 %
NRBC BLD-RTO: 0 %
PDW BLD AUTO: 9.5 FL (ref 9.4–12.3)
PLATELET # BLD AUTO: 369 K/UL
RBC # BLD AUTO: 4.69 M/UL (ref 3.93–5.22)
WBC # BLD AUTO: 6.18 K/UL

## 2019-12-20 PROCEDURE — 85025 COMPLETE CBC W/AUTO DIFF WBC: CPT | Performed by: INTERNAL MEDICINE

## 2020-01-08 ENCOUNTER — HOSPITAL ENCOUNTER (OUTPATIENT)
Dept: RADIOLOGY | Age: 65
Discharge: HOME | End: 2020-01-08
Attending: OBSTETRICS & GYNECOLOGY
Payer: COMMERCIAL

## 2020-01-08 DIAGNOSIS — M85.80 OTHER SPECIFIED DISORDERS OF BONE DENSITY AND STRUCTURE, UNSPECIFIED SITE: ICD-10-CM

## 2020-01-08 DIAGNOSIS — Z12.31 ENCOUNTER FOR SCREENING MAMMOGRAM FOR MALIGNANT NEOPLASM OF BREAST: ICD-10-CM

## 2020-01-08 PROCEDURE — 77080 DXA BONE DENSITY AXIAL: CPT

## 2020-01-08 PROCEDURE — 77067 SCR MAMMO BI INCL CAD: CPT

## 2020-01-15 ENCOUNTER — TRANSCRIBE ORDERS (OUTPATIENT)
Dept: LAB | Facility: HOSPITAL | Age: 65
End: 2020-01-15

## 2020-01-15 ENCOUNTER — APPOINTMENT (OUTPATIENT)
Dept: LAB | Facility: HOSPITAL | Age: 65
End: 2020-01-15
Attending: INTERNAL MEDICINE
Payer: COMMERCIAL

## 2020-01-15 DIAGNOSIS — D50.8 OTHER IRON DEFICIENCY ANEMIAS: Primary | ICD-10-CM

## 2020-01-15 DIAGNOSIS — B50.8: Primary | ICD-10-CM

## 2020-01-15 DIAGNOSIS — D50.8 OTHER IRON DEFICIENCY ANEMIAS: ICD-10-CM

## 2020-01-15 LAB
BASOPHILS # BLD: 0.02 K/UL (ref 0.01–0.1)
BASOPHILS NFR BLD: 0.3 %
DIFFERENTIAL METHOD BLD: ABNORMAL
EOSINOPHIL # BLD: 0.3 K/UL (ref 0.04–0.36)
EOSINOPHIL NFR BLD: 4.5 %
ERYTHROCYTE [DISTWIDTH] IN BLOOD BY AUTOMATED COUNT: 14.1 % (ref 11.7–14.4)
HCT VFR BLDCO AUTO: 31.4 %
HGB BLD-MCNC: 9.7 G/DL (ref 11.8–15.7)
IMM GRANULOCYTES # BLD AUTO: 0.05 K/UL (ref 0–0.08)
IMM GRANULOCYTES NFR BLD AUTO: 0.8 %
LYMPHOCYTES # BLD: 0.97 K/UL (ref 1.2–3.5)
LYMPHOCYTES NFR BLD: 14.7 %
MCH RBC QN AUTO: 25.1 PG (ref 28–33.2)
MCHC RBC AUTO-ENTMCNC: 30.9 G/DL (ref 32.2–35.5)
MCV RBC AUTO: 81.3 FL (ref 83–98)
MONOCYTES # BLD: 0.53 K/UL (ref 0.28–0.8)
MONOCYTES NFR BLD: 8 %
NEUTROPHILS # BLD: 4.73 K/UL (ref 1.7–7)
NEUTS SEG NFR BLD: 71.7 %
NRBC BLD-RTO: 0 %
PDW BLD AUTO: 9.3 FL (ref 9.4–12.3)
PLATELET # BLD AUTO: 338 K/UL
RBC # BLD AUTO: 3.86 M/UL (ref 3.93–5.22)
WBC # BLD AUTO: 6.6 K/UL

## 2020-01-15 PROCEDURE — 36415 COLL VENOUS BLD VENIPUNCTURE: CPT

## 2020-01-15 PROCEDURE — 85025 COMPLETE CBC W/AUTO DIFF WBC: CPT

## 2020-01-28 ENCOUNTER — LAB REQUISITION (OUTPATIENT)
Dept: LAB | Facility: HOSPITAL | Age: 65
End: 2020-01-28
Payer: COMMERCIAL

## 2020-01-28 DIAGNOSIS — D50.8 OTHER IRON DEFICIENCY ANEMIAS: ICD-10-CM

## 2020-01-28 LAB
BASOPHILS # BLD: 0.03 K/UL (ref 0.01–0.1)
BASOPHILS NFR BLD: 0.5 %
DIFFERENTIAL METHOD BLD: ABNORMAL
EOSINOPHIL # BLD: 0.4 K/UL (ref 0.04–0.36)
EOSINOPHIL NFR BLD: 6.4 %
ERYTHROCYTE [DISTWIDTH] IN BLOOD BY AUTOMATED COUNT: 13.9 % (ref 11.7–14.4)
HCT VFR BLDCO AUTO: 34.4 %
HGB BLD-MCNC: 10.5 G/DL (ref 11.8–15.7)
IMM GRANULOCYTES # BLD AUTO: 0.02 K/UL (ref 0–0.08)
IMM GRANULOCYTES NFR BLD AUTO: 0.3 %
LYMPHOCYTES # BLD: 1.09 K/UL (ref 1.2–3.5)
LYMPHOCYTES NFR BLD: 17.3 %
MCH RBC QN AUTO: 24.2 PG (ref 28–33.2)
MCHC RBC AUTO-ENTMCNC: 30.5 G/DL (ref 32.2–35.5)
MCV RBC AUTO: 79.4 FL (ref 83–98)
MONOCYTES # BLD: 0.56 K/UL (ref 0.28–0.8)
MONOCYTES NFR BLD: 8.9 %
NEUTROPHILS # BLD: 4.19 K/UL (ref 1.7–7)
NEUTS SEG NFR BLD: 66.6 %
NRBC BLD-RTO: 0 %
PDW BLD AUTO: 9.4 FL (ref 9.4–12.3)
PLATELET # BLD AUTO: 435 K/UL
RBC # BLD AUTO: 4.33 M/UL (ref 3.93–5.22)
WBC # BLD AUTO: 6.29 K/UL

## 2020-01-28 PROCEDURE — 85025 COMPLETE CBC W/AUTO DIFF WBC: CPT | Performed by: INTERNAL MEDICINE

## 2020-01-30 ENCOUNTER — OFFICE VISIT (OUTPATIENT)
Dept: INTERNAL MEDICINE | Facility: CLINIC | Age: 65
End: 2020-01-30
Payer: COMMERCIAL

## 2020-01-30 VITALS
HEIGHT: 60 IN | HEART RATE: 61 BPM | TEMPERATURE: 98 F | DIASTOLIC BLOOD PRESSURE: 72 MMHG | SYSTOLIC BLOOD PRESSURE: 124 MMHG | WEIGHT: 120 LBS | OXYGEN SATURATION: 97 % | BODY MASS INDEX: 23.56 KG/M2

## 2020-01-30 DIAGNOSIS — D47.02 SYSTEMIC MASTOCYTOSIS: ICD-10-CM

## 2020-01-30 DIAGNOSIS — R06.00 DYSPNEA, UNSPECIFIED TYPE: ICD-10-CM

## 2020-01-30 DIAGNOSIS — E78.5 HYPERLIPIDEMIA, UNSPECIFIED HYPERLIPIDEMIA TYPE: ICD-10-CM

## 2020-01-30 DIAGNOSIS — I10 ESSENTIAL HYPERTENSION: Primary | ICD-10-CM

## 2020-01-30 PROCEDURE — 99213 OFFICE O/P EST LOW 20 MIN: CPT | Performed by: INTERNAL MEDICINE

## 2020-01-30 RX ORDER — ALPRAZOLAM 0.25 MG/1
0.25 TABLET ORAL DAILY PRN
Qty: 30 TABLET | Refills: 1 | Status: SHIPPED | OUTPATIENT
Start: 2020-01-30 | End: 2020-04-14

## 2020-01-30 ASSESSMENT — ENCOUNTER SYMPTOMS
ABDOMINAL PAIN: 0
BLOOD IN STOOL: 0
DIZZINESS: 0
FREQUENCY: 0
WEAKNESS: 0
ENDOCRINE NEGATIVE: 1
POLYPHAGIA: 0
CHEST TIGHTNESS: 0
DIARRHEA: 0
POLYDIPSIA: 0
VOICE CHANGE: 0
WHEEZING: 0
ARTHRALGIAS: 0
APPETITE CHANGE: 0
DYSURIA: 0
HEMATOLOGIC/LYMPHATIC NEGATIVE: 1
HEADACHES: 0
NERVOUS/ANXIOUS: 0
NECK PAIN: 0
PALPITATIONS: 0
SHORTNESS OF BREATH: 1
CONSTIPATION: 0
JOINT SWELLING: 0
FATIGUE: 0
UNEXPECTED WEIGHT CHANGE: 0
COLOR CHANGE: 0
SLEEP DISTURBANCE: 0
MYALGIAS: 0
BACK PAIN: 0
CARDIOVASCULAR NEGATIVE: 1

## 2020-01-30 NOTE — PROGRESS NOTES
Subjective      Patient ID: Ritu Ramirez is a 64 y.o. female.    HPI  She is here for follow up of her HTN. She continues on amlodipine. She has not had skin flares or leg pain. She follows with [facility] for her systemic mastocytosis. Reviewed her 2019 lab work. Cholesterol 223. . She is now on Lipitor. Her coronary calcium in 2019 was 128 in RCA. She continues to exercise, some SOB with exertion. No CP. Continues on Lexapro.     No HA. Due for flu shot.     The following have been reviewed and updated as appropriate in this visit:    Allergies  Meds  Problems         Past Medical History:   Diagnosis Date   • Hypertension    • Systemic mastocytosis      Past Surgical History:   Procedure Laterality Date   • COLONOSCOPY     • REDUCTION MAMMAPLASTY       History reviewed. No pertinent family history.  Social History     Socioeconomic History   • Marital status:      Spouse name: None   • Number of children: None   • Years of education: None   • Highest education level: None   Occupational History   • None   Social Needs   • Financial resource strain: None   • Food insecurity:     Worry: None     Inability: None   • Transportation needs:     Medical: None     Non-medical: None   Tobacco Use   • Smoking status: Former Smoker     Packs/day: 0.25     Last attempt to quit:      Years since quittin.0   • Smokeless tobacco: Never Used   Substance and Sexual Activity   • Alcohol use: Yes     Comment: Social   • Drug use: No   • Sexual activity: Defer   Lifestyle   • Physical activity:     Days per week: None     Minutes per session: None   • Stress: None   Relationships   • Social connections:     Talks on phone: None     Gets together: None     Attends Yazidi service: None     Active member of club or organization: None     Attends meetings of clubs or organizations: None     Relationship status: None   • Intimate partner violence:     Fear of current or ex partner: None     Emotionally abused:  None     Physically abused: None     Forced sexual activity: None   Other Topics Concern   • None   Social History Narrative   • None       Review of Systems   Constitutional: Negative for appetite change, fatigue and unexpected weight change.   HENT: Negative for hearing loss and voice change.    Eyes: Negative for visual disturbance.   Respiratory: Positive for shortness of breath (with exertion). Negative for chest tightness and wheezing.    Cardiovascular: Negative.  Negative for chest pain, palpitations and leg swelling.   Gastrointestinal: Negative for abdominal pain, blood in stool, constipation and diarrhea.   Endocrine: Negative.  Negative for cold intolerance, heat intolerance, polydipsia, polyphagia and polyuria.   Genitourinary: Negative for dysuria, frequency, pelvic pain, urgency and vaginal pain.   Musculoskeletal: Negative for arthralgias, back pain, gait problem, joint swelling, myalgias and neck pain.   Skin: Negative for color change, pallor and rash.   Neurological: Negative for dizziness, weakness and headaches.   Hematological: Negative.    Psychiatric/Behavioral: Negative for behavioral problems and sleep disturbance. The patient is not nervous/anxious.        Allergies   Allergen Reactions   • Anesthetics - Amide Type      Other reaction(s): Anaphylaxis   • Clarithromycin      Other reaction(s): Rash   • Erythromycin Base      Other reaction(s): Anaphylaxis   • Iodinated Contrast Media      Other reaction(s): Anaphylaxis   • Nsaids (Non-Steroidal Anti-Inflammatory Drug)      Other reaction(s): Anaphylaxis   • Penicillins      Current Outpatient Medications   Medication Sig Dispense Refill   • amLODIPine (NORVASC) 5 mg tablet Take 5 mg by mouth daily.       • atorvastatin (LIPITOR) 20 mg tablet Take 1 tablet (20 mg total) by mouth daily. 90 tablet 3   • cholecalciferol, vitamin D3, 1,000 unit capsule take 1 capsule by oral route  every day     • EPINEPHrine (EPIPEN 2-ZARINA) 0.3 mg/0.3 mL  injection syringe Inject 0.3 mL (0.3 mg total) into the thigh as needed for anaphylaxis. 1 each 1   • escitalopram (LEXAPRO) 10 mg tablet Take 1 tablet (10 mg total) by mouth daily. 90 tablet 3   • hydrochlorothiazide (HYDRODIURIL) 25 mg tablet Take 25 mg by mouth once daily.  3   • potassium chloride (KLOR-CON) 20 mEq CR tablet Take 20 mEq by mouth daily.     • ALPRAZolam (XANAX) 0.25 mg tablet Take 1 tablet (0.25 mg total) by mouth daily as needed for anxiety. 30 tablet 1     No current facility-administered medications for this visit.        Objective   Vitals:    01/30/20 1526 01/30/20 1604   BP: 118/74 124/72   BP Location:  Right upper arm   Patient Position:  Sitting   Pulse: 61    Temp: 36.7 °C (98 °F)    SpO2: 97%    Weight: 54.4 kg (120 lb)    Height: 1.524 m (5')        Physical Exam   Constitutional: She is oriented to person, place, and time. She appears well-developed and well-nourished.   HENT:   Head: Normocephalic and atraumatic.   Cardiovascular: Normal rate, regular rhythm and normal heart sounds.   No murmur heard.  Pulmonary/Chest: Effort normal. She has no wheezes. She has no rhonchi. She has no rales.   Musculoskeletal: Normal range of motion. She exhibits no edema.   Neurological: She is alert and oriented to person, place, and time.   Skin: Skin is warm and dry.   Psychiatric: She has a normal mood and affect. Her behavior is normal. Judgment and thought content normal.   Nursing note and vitals reviewed.      Assessment/Plan   Problem List Items Addressed This Visit        Respiratory    Dyspnea     Possible deconditioning however will follow with cardiology for an ischemic assessment - BP is stable            Circulatory    Essential hypertension - Primary     Well controlled on amlodipine and HCTZ - maintain low sodium diet            Endocrine/Metabolic    Hyperlipidemia       Continue with atorvastatin and diet control   - will follow with cardiology            Hematologic     Systemic mastocytosis     Stable at present - will follow with the [facility] in April               By signing my name below, I, Tracy Velazquez, attest that this documentation has been prepared under the direction in the presence of Dr. Ashley Omalley MD.    Electronically Signed: Rosendo Gibbs. 1/30/2020.     I, Ashley Omalley MD, personally performed the services described in this documentation. All medical record entries made by the scribe were at my direction and in my presence. I have reviewed the chart and discharge instructions  and agree that the record reflects my personal performance and is accurate and complete. Ashley Omalley MD.     1/30/2020

## 2020-01-31 NOTE — ASSESSMENT & PLAN NOTE
Possible deconditioning however will follow with cardiology for an ischemic assessment - BP is stable

## 2020-02-04 ENCOUNTER — LAB REQUISITION (OUTPATIENT)
Dept: LAB | Facility: HOSPITAL | Age: 65
End: 2020-02-04
Payer: COMMERCIAL

## 2020-02-04 DIAGNOSIS — D50.8 OTHER IRON DEFICIENCY ANEMIAS: ICD-10-CM

## 2020-02-04 LAB
BASOPHILS # BLD: 0.03 K/UL (ref 0.01–0.1)
BASOPHILS NFR BLD: 0.4 %
DIFFERENTIAL METHOD BLD: ABNORMAL
EOSINOPHIL # BLD: 0.44 K/UL (ref 0.04–0.36)
EOSINOPHIL NFR BLD: 5.5 %
ERYTHROCYTE [DISTWIDTH] IN BLOOD BY AUTOMATED COUNT: 14.5 % (ref 11.7–14.4)
HCT VFR BLDCO AUTO: 32.8 %
HGB BLD-MCNC: 10 G/DL (ref 11.8–15.7)
IMM GRANULOCYTES # BLD AUTO: 0.03 K/UL (ref 0–0.08)
IMM GRANULOCYTES NFR BLD AUTO: 0.4 %
LYMPHOCYTES # BLD: 1.17 K/UL (ref 1.2–3.5)
LYMPHOCYTES NFR BLD: 14.6 %
MCH RBC QN AUTO: 24 PG (ref 28–33.2)
MCHC RBC AUTO-ENTMCNC: 30.5 G/DL (ref 32.2–35.5)
MCV RBC AUTO: 78.7 FL (ref 83–98)
MONOCYTES # BLD: 0.54 K/UL (ref 0.28–0.8)
MONOCYTES NFR BLD: 6.7 %
NEUTROPHILS # BLD: 5.82 K/UL (ref 1.7–7)
NEUTS SEG NFR BLD: 72.4 %
NRBC BLD-RTO: 0 %
PDW BLD AUTO: 9.7 FL (ref 9.4–12.3)
PLATELET # BLD AUTO: 456 K/UL
RBC # BLD AUTO: 4.17 M/UL (ref 3.93–5.22)
WBC # BLD AUTO: 8.03 K/UL

## 2020-02-04 PROCEDURE — 85025 COMPLETE CBC W/AUTO DIFF WBC: CPT | Performed by: INTERNAL MEDICINE

## 2020-02-07 ENCOUNTER — TELEPHONE (OUTPATIENT)
Dept: SCHEDULING | Facility: CLINIC | Age: 65
End: 2020-02-07

## 2020-02-07 DIAGNOSIS — R93.1 ELEVATED CORONARY ARTERY CALCIUM SCORE: ICD-10-CM

## 2020-02-07 DIAGNOSIS — R06.02 SOB (SHORTNESS OF BREATH): ICD-10-CM

## 2020-02-07 DIAGNOSIS — I20.9 ANGINA PECTORIS (CMS/HCC): Primary | ICD-10-CM

## 2020-02-07 NOTE — TELEPHONE ENCOUNTER
Lucero---I reviewed the below message with TAS and he wants the pt to get a stress echo.  Can you call the pt and set it up, please.  I called the pt to let her know but she didn't answer so I LMOM.  TY

## 2020-02-07 NOTE — TELEPHONE ENCOUNTER
Dr Sola avendano saw pcp and  stated when exercising she gets winded with CP. Dr Sirisha Garibay is suggesting a stress test. Will Dr Selby order this? Linsey was seen 12/2019. Please call patient at [phone] to advise.

## 2020-02-07 NOTE — TELEPHONE ENCOUNTER
Pt seen in PCP MD Omalley office on 1/20/20 for routine f/u.     Pt reports to discussing recent onset of a 'dull pain' to her chest while at the gym performing her routine exercises.   Pt reports associated sx of feeling more winded and that her heart is racing w/ the need to stop and rest. She reports that she is able to continue her exercise routine, but voices only at a lesser pace so sx don't re-occur    She also voices to LOPEZ w/ flight of stairs if rushed    Onset of these sx is approx 2 wks ago     She denies any presence of sx at rest or at this current time    Last HGB = 10.0 on 2/4/20    Wt is stable 118-120    BP is stable 120s/70-80s    Pt is compliant w/ prescribed medications  ---amlodipine, HCTZ, atorvastatin, KCL    Pt denies any recent changes to lifestyle to include excessive ETOH/caffeine/sodium to diet, use of OTC meds/supplements or new stressors.    Pt inquiring if cardiac w/u is needed    Pt can be reached at [phone]

## 2020-02-11 ENCOUNTER — TELEPHONE (OUTPATIENT)
Dept: CARDIOLOGY | Facility: HOSPITAL | Age: 65
End: 2020-02-11

## 2020-02-12 ENCOUNTER — HOSPITAL ENCOUNTER (OUTPATIENT)
Dept: CARDIOLOGY | Facility: HOSPITAL | Age: 65
Discharge: HOME | End: 2020-02-12
Attending: INTERNAL MEDICINE
Payer: COMMERCIAL

## 2020-02-12 VITALS
BODY MASS INDEX: 23.56 KG/M2 | WEIGHT: 120 LBS | DIASTOLIC BLOOD PRESSURE: 64 MMHG | RESPIRATION RATE: 16 BRPM | SYSTOLIC BLOOD PRESSURE: 120 MMHG | HEART RATE: 69 BPM | HEIGHT: 60 IN

## 2020-02-12 DIAGNOSIS — R06.02 SOB (SHORTNESS OF BREATH): ICD-10-CM

## 2020-02-12 DIAGNOSIS — I20.9 ANGINA PECTORIS (CMS/HCC): ICD-10-CM

## 2020-02-12 DIAGNOSIS — R93.1 ELEVATED CORONARY ARTERY CALCIUM SCORE: ICD-10-CM

## 2020-02-12 LAB
AORTIC VALVE MEAN VELOCITY: 0.95 M/S
AORTIC VALVE VELOCITY TIME INTEGRAL: 27.5 CM
AV MEAN GRADIENT: 4 MMHG
AV PEAK GRADIENT: 7 MMHG
AV PEAK VELOCITY-S: 1.33 M/S
BSA FOR ECHO PROCEDURE: 1.52 M2
E WAVE DECELERATION TIME: 208 MS
E/A RATIO: 1.2
E/E' RATIO: 12.1
E/LAT E' RATIO: 12.3
EDV (BP): 70 CM3
EF (A4C): 68 %
EF A2C: 68 %
EJECTION FRACTION: 66 %
ESV (BP): 24 CM3
LA ESV (BP): 27 CM3
LA ESV INDEX (A2C): 14.47 CM3/M2
LA ESV INDEX (BP): 17.76 CM3/M2
LAAS-AP2: 10.9 CM2
LAAS-AP4: 12.5 CM2
LALD A4C: 3.94 CM
LALD A4C: 4.4 CM
LAV-S: 22 CM3
LEFT ATRIUM VOLUME INDEX: 20.39 CM3/M2
LEFT ATRIUM VOLUME: 31 CM3
LEFT VENTRICLE DIASTOLIC VOLUME INDEX: 49.34 CM3/M2
LEFT VENTRICLE DIASTOLIC VOLUME: 75 CM3
LEFT VENTRICLE SYSTOLIC VOLUME INDEX: 15.79 CM3/M2
LEFT VENTRICLE SYSTOLIC VOLUME: 24 CM3
LV DIASTOLIC VOLUME: 65 CM3
LV ESV (APICAL 2 CHAMBER): 21 CM3
LVAD-AP2: 24 CM2
LVAD-AP4: 25.6 CM2
LVAS-AP2: 12.5 CM2
LVAS-AP4: 12.9 CM2
LVEDVI(A2C): 42.76 CM3/M2
LVEDVI(BP): 46.05 CM3/M2
LVESVI(A2C): 13.82 CM3/M2
LVESVI(BP): 15.79 CM3/M2
LVLD-AP2: 7.2 CM
LVLD-AP4: 7.23 CM
LVLS-AP2: 6.1 CM
LVLS-AP4: 5.6 CM
LVOT MG: 3 MMHG
LVOT MV: 0.8 M/S
LVOT PEAK VELOCITY: 1.25 M/S
LVOT VTI: 22.5 CM
MV E'TISSUE VEL-LAT: 0.07 M/S
MV E'TISSUE VEL-MED: 0.07 M/S
MV PEAK A VEL: 0.73 M/S
MV PEAK E VEL: 0.85 M/S
PULM VEIN S/D RATIO: 1.2
PV PEAK D VEL: 0.62 M/S
PV PEAK S VEL: 0.71 M/S
STRESS ANGINA INDEX: 0
STRESS BASELINE BP: NORMAL MMHG
STRESS BASELINE HR: 64 BPM
STRESS PERCENT HR: 92 %
STRESS POST ESTIMATED WORKLOAD: 13.4 METS
STRESS POST EXERCISE DUR MIN: 11 MIN
STRESS POST EXERCISE DUR SEC: 16 SEC
STRESS POST PEAK BP: NORMAL MMHG
STRESS POST PEAK HR: 144 BPM
STRESS TARGET HR: 133 BPM
TR MAX PG: 17 MMHG
TV REST PULMONARY ARTERY PRESSURE: 20 MMHG

## 2020-02-12 PROCEDURE — 93350 STRESS TTE ONLY: CPT | Mod: 26 | Performed by: INTERNAL MEDICINE

## 2020-02-12 PROCEDURE — 93018 CV STRESS TEST I&R ONLY: CPT | Performed by: INTERNAL MEDICINE

## 2020-02-12 PROCEDURE — C8930 TTE W OR W/O CONTR, CONT ECG: HCPCS

## 2020-02-12 PROCEDURE — 93016 CV STRESS TEST SUPVJ ONLY: CPT | Performed by: INTERNAL MEDICINE

## 2020-02-12 PROCEDURE — 25000000 HC PHARMACY GENERAL: Performed by: INTERNAL MEDICINE

## 2020-02-12 PROCEDURE — 25500000 HC DRUGS/INCIDENT RAD: Performed by: INTERNAL MEDICINE

## 2020-02-12 RX ADMIN — PERFLUTREN: 6.52 INJECTION, SUSPENSION INTRAVENOUS at 15:46

## 2020-02-13 ENCOUNTER — LAB REQUISITION (OUTPATIENT)
Dept: LAB | Facility: HOSPITAL | Age: 65
End: 2020-02-13
Payer: COMMERCIAL

## 2020-02-13 ENCOUNTER — TELEPHONE (OUTPATIENT)
Dept: CARDIOLOGY | Facility: CLINIC | Age: 65
End: 2020-02-13

## 2020-02-13 DIAGNOSIS — D50.8 OTHER IRON DEFICIENCY ANEMIAS: ICD-10-CM

## 2020-02-13 LAB
BASOPHILS # BLD: 0.04 K/UL (ref 0.01–0.1)
BASOPHILS NFR BLD: 0.7 %
DIFFERENTIAL METHOD BLD: ABNORMAL
EOSINOPHIL # BLD: 0.41 K/UL (ref 0.04–0.36)
EOSINOPHIL NFR BLD: 6.8 %
ERYTHROCYTE [DISTWIDTH] IN BLOOD BY AUTOMATED COUNT: 15.9 % (ref 11.7–14.4)
HCT VFR BLDCO AUTO: 31.3 % (ref 35–45)
HGB BLD-MCNC: 9.6 G/DL (ref 11.8–15.7)
IMM GRANULOCYTES # BLD AUTO: 0.06 K/UL (ref 0–0.08)
IMM GRANULOCYTES NFR BLD AUTO: 1 %
LYMPHOCYTES # BLD: 1.2 K/UL (ref 1.2–3.5)
LYMPHOCYTES NFR BLD: 20 %
MCH RBC QN AUTO: 24.2 PG (ref 28–33.2)
MCHC RBC AUTO-ENTMCNC: 30.7 G/DL (ref 32.2–35.5)
MCV RBC AUTO: 79 FL (ref 83–98)
MONOCYTES # BLD: 0.46 K/UL (ref 0.28–0.8)
MONOCYTES NFR BLD: 7.7 %
NEUTROPHILS # BLD: 3.83 K/UL (ref 1.7–7)
NEUTS SEG NFR BLD: 63.8 %
NRBC BLD-RTO: 0 %
PDW BLD AUTO: 9.3 FL (ref 9.4–12.3)
PLATELET # BLD AUTO: 431 K/UL (ref 150–369)
RBC # BLD AUTO: 3.96 M/UL (ref 3.93–5.22)
WBC # BLD AUTO: 6 K/UL (ref 3.8–10.5)

## 2020-02-13 PROCEDURE — 85025 COMPLETE CBC W/AUTO DIFF WBC: CPT | Performed by: INTERNAL MEDICINE

## 2020-02-13 NOTE — TELEPHONE ENCOUNTER
----- Message from Bright Selby MD sent at 2/12/2020  5:19 PM EST -----      ----- Message -----  From: Roderick Pineda MD  Sent: 2/12/2020   5:07 PM EST  To: Bright Selby MD

## 2020-02-13 NOTE — TELEPHONE ENCOUNTER
I spoke to the patient and reviewed her stress test.  No obvious ischemia with excellent exercise tolerance.  She verbalized understanding.

## 2020-04-03 NOTE — TELEPHONE ENCOUNTER
Medicine Refill Request    Last Office Visit: 1/30/2020  Next Office Visit: 7/29/2020        Current Outpatient Medications:   •  amLODIPine (NORVASC) 5 mg tablet, Take 5 mg by mouth daily.  , Disp: , Rfl:   •  atorvastatin (LIPITOR) 20 mg tablet, Take 1 tablet (20 mg total) by mouth daily., Disp: 90 tablet, Rfl: 3  •  cholecalciferol, vitamin D3, 1,000 unit capsule, take 1 capsule by oral route  every day, Disp: , Rfl:   •  escitalopram (LEXAPRO) 10 mg tablet, Take 1 tablet (10 mg total) by mouth daily., Disp: 90 tablet, Rfl: 3  •  hydrochlorothiazide (HYDRODIURIL) 25 mg tablet, Take 25 mg by mouth once daily., Disp: , Rfl: 3  •  potassium chloride (KLOR-CON) 20 mEq CR tablet, Take 20 mEq by mouth daily., Disp: , Rfl:       BP Readings from Last 3 Encounters:   02/12/20 120/64   01/30/20 124/72   12/19/19 112/64       Recent Lab results:  Lab Results   Component Value Date    CHOL 223 (H) 12/07/2019   ,   Lab Results   Component Value Date    HDL 88 12/07/2019   ,   Lab Results   Component Value Date    LDLCALC 116 (H) 12/07/2019   ,   Lab Results   Component Value Date    TRIG 95 12/07/2019        Lab Results   Component Value Date    GLUCOSE 95 07/10/2019   ,   Lab Results   Component Value Date    HGBA1C 5.0 06/05/2015         Lab Results   Component Value Date    CREATININE 0.83 07/10/2019       Lab Results   Component Value Date    TSH 3.320 07/10/2019

## 2020-04-06 RX ORDER — POTASSIUM CHLORIDE 600 MG/1
8 TABLET, FILM COATED, EXTENDED RELEASE ORAL 2 TIMES DAILY
Qty: 60 TABLET | Refills: 11 | Status: SHIPPED | OUTPATIENT
Start: 2020-04-06 | End: 2020-06-25

## 2020-04-14 RX ORDER — ALPRAZOLAM 0.25 MG/1
TABLET ORAL
Qty: 30 TABLET | Refills: 1 | Status: SHIPPED | OUTPATIENT
Start: 2020-04-14 | End: 2020-10-13 | Stop reason: SDUPTHER

## 2020-04-14 NOTE — TELEPHONE ENCOUNTER
Medicine Refill Request    Last Office Visit: 1/30/2020  Next Office Visit: 7/29/2020        Current Outpatient Medications:   •  amLODIPine (NORVASC) 5 mg tablet, Take 5 mg by mouth daily.  , Disp: , Rfl:   •  atorvastatin (LIPITOR) 20 mg tablet, Take 1 tablet (20 mg total) by mouth daily., Disp: 90 tablet, Rfl: 3  •  cholecalciferol, vitamin D3, 1,000 unit capsule, take 1 capsule by oral route  every day, Disp: , Rfl:   •  escitalopram (LEXAPRO) 10 mg tablet, Take 1 tablet (10 mg total) by mouth daily., Disp: 90 tablet, Rfl: 3  •  hydrochlorothiazide (HYDRODIURIL) 25 mg tablet, Take 25 mg by mouth once daily., Disp: , Rfl: 3  •  potassium chloride (KLOR-CON) 20 mEq CR tablet, Take 20 mEq by mouth daily., Disp: , Rfl:   •  potassium chloride (KLOR-CON) 8 mEq CR tablet, TAKE 1 TABLET (8 MEQ TOTAL) BY MOUTH 2 (TWO) TIMES A DAY., Disp: 60 tablet, Rfl: 11      BP Readings from Last 3 Encounters:   02/12/20 120/64   01/30/20 124/72   12/19/19 112/64       Recent Lab results:  Lab Results   Component Value Date    CHOL 223 (H) 12/07/2019   ,   Lab Results   Component Value Date    HDL 88 12/07/2019   ,   Lab Results   Component Value Date    LDLCALC 116 (H) 12/07/2019   ,   Lab Results   Component Value Date    TRIG 95 12/07/2019        Lab Results   Component Value Date    GLUCOSE 95 07/10/2019   ,   Lab Results   Component Value Date    HGBA1C 5.0 06/05/2015         Lab Results   Component Value Date    CREATININE 0.83 07/10/2019       Lab Results   Component Value Date    TSH 3.320 07/10/2019

## 2020-05-06 ENCOUNTER — TELEMEDICINE (OUTPATIENT)
Dept: INTERNAL MEDICINE | Facility: CLINIC | Age: 65
End: 2020-05-06
Payer: COMMERCIAL

## 2020-05-06 ENCOUNTER — TELEPHONE (OUTPATIENT)
Dept: INTERNAL MEDICINE | Facility: CLINIC | Age: 65
End: 2020-05-06

## 2020-05-06 DIAGNOSIS — M79.605 LEFT LEG PAIN: Primary | ICD-10-CM

## 2020-05-06 DIAGNOSIS — D47.02 SYSTEMIC MASTOCYTOSIS: ICD-10-CM

## 2020-05-06 DIAGNOSIS — M48.062 SPINAL STENOSIS OF LUMBAR REGION WITH NEUROGENIC CLAUDICATION: ICD-10-CM

## 2020-05-06 DIAGNOSIS — I10 ESSENTIAL HYPERTENSION: ICD-10-CM

## 2020-05-06 DIAGNOSIS — M54.16 LUMBAR RADICULOPATHY: ICD-10-CM

## 2020-05-06 PROCEDURE — 99214 OFFICE O/P EST MOD 30 MIN: CPT | Mod: 95 | Performed by: INTERNAL MEDICINE

## 2020-05-06 RX ORDER — OXYCODONE AND ACETAMINOPHEN 5; 325 MG/1; MG/1
1 TABLET ORAL EVERY 4 HOURS PRN
Qty: 21 TABLET | Refills: 0 | Status: SHIPPED | OUTPATIENT
Start: 2020-05-06 | End: 2020-06-25

## 2020-05-06 NOTE — TELEPHONE ENCOUNTER
I placed an order for MRI of the lumbar spine -leg pain   Resistant to PT r/o spinal stenosis or herniated disc will need precert

## 2020-05-06 NOTE — PROGRESS NOTES
Verification of Patient Location:  The patient affirms they are currently located in the following state: [place]    Request for Consent:   Audio Only Encounter   You and I are about to have a telemedicine check-in or visit. This is allowed because you have requested it. This telemedicine visit will be billed to your health insurance or you, if you are self-insured. You understand you will be responsible for any copayments or coinsurances that apply to your telemedicine visit. Before starting our telemedicine visit, I am required to get your consent for this virtual check-in or visit by telemedicine. Do you consent?    Patient Response to Request for Consent:  Yes      Visit Documentation:  Subjective     Patient ID: Ritu Ramirez is a 64 y.o. female.  1955      HPI This is a virtual audio visit.  For the past several days she has been experiencing severe pain in her left leg worse with standing and walking.  Better when she lays in bed at night.  Worse when she gets up to use the bathroom.  Began with pain behind the knee but now has pain in the left buttock and radiates down her whole leg.  There is no associated weakness numbness or tingling.  She has had similar pain approximately 2 years ago and also several months ago.  Has seen Dr. aNto Rhodes who had plan to do a corticosteroid injection in the lumbar spine.  This was canceled by the patient as she spontaneously improved.  She has a telemedicine with him again next week.  She is requesting an MRI evaluation of her lumbar spine due to her continued episodes of discomfort.  She has failed physical therapy and has had a prior x-ray.  Remained stable in terms of her hypertension and compliant with her antihypertensive medications.  She saw Dr. Ani Calhoun yesterday for her mastocytosis which is been under good control.  Tryptase level is stable.  The following have been reviewed and updated as appropriate in this visit:       Review of  Systems  Positive for leg pain negative weakness numbness tingling.  Positive for low back pain left buttock pain.  No bowel or bladder dysfunction.  Negative chest pain palpitation shortness of breath or lightheadedness.  Negative for dysuria urgency or frequency.  Negative abdominal pain nausea vomiting change in bowel habits.  Negative for rash.     Assessment/Plan   Diagnoses and all orders for this visit:    Left leg pain (Primary)  Assessment & Plan:  Her pain does sound radicular - need to evaluate for herniated disc and or spinal /foraminal stenosis - will pursue MRI as she has failed PT     Orders:  -     MRI LUMBAR SPINE WITHOUT CONTRAST; Future    Lumbar radiculopathy  -     MRI LUMBAR SPINE WITHOUT CONTRAST; Future    Spinal stenosis of lumbar region with neurogenic claudication  -     MRI LUMBAR SPINE WITHOUT CONTRAST; Future    Essential hypertension  Assessment & Plan:  Continue amlodipine and low sodium diet      Systemic mastocytosis  Assessment & Plan:  Stable at present      Other orders  -     oxyCODONE-acetaminophen (PERCOCET) 5-325 mg per tablet; Take 1 tablet by mouth every 4 (four) hours as needed for moderate pain for up to 10 days.      Time Spent in Medical Discussion During This Encounter:      20 minutes

## 2020-05-06 NOTE — ASSESSMENT & PLAN NOTE
Her pain does sound radicular - need to evaluate for herniated disc and or spinal /foraminal stenosis - will pursue MRI as she has failed PT

## 2020-05-08 ENCOUNTER — TELEPHONE (OUTPATIENT)
Dept: INTERNAL MEDICINE | Facility: CLINIC | Age: 65
End: 2020-05-08

## 2020-05-08 ENCOUNTER — HOSPITAL ENCOUNTER (OUTPATIENT)
Dept: RADIOLOGY | Age: 65
Discharge: HOME | End: 2020-05-08
Attending: INTERNAL MEDICINE
Payer: COMMERCIAL

## 2020-05-08 DIAGNOSIS — M79.605 LEFT LEG PAIN: ICD-10-CM

## 2020-05-08 DIAGNOSIS — M48.062 SPINAL STENOSIS OF LUMBAR REGION WITH NEUROGENIC CLAUDICATION: ICD-10-CM

## 2020-05-08 DIAGNOSIS — M54.16 LUMBAR RADICULOPATHY: ICD-10-CM

## 2020-05-11 ENCOUNTER — TELEPHONE (OUTPATIENT)
Dept: INTERNAL MEDICINE | Facility: CLINIC | Age: 65
End: 2020-05-11

## 2020-05-11 ENCOUNTER — TELEMEDICINE (OUTPATIENT)
Dept: INTERNAL MEDICINE | Facility: CLINIC | Age: 65
End: 2020-05-11
Payer: COMMERCIAL

## 2020-05-11 DIAGNOSIS — D25.0 SUBMUCOUS LEIOMYOMA OF UTERUS: ICD-10-CM

## 2020-05-11 DIAGNOSIS — M48.062 SPINAL STENOSIS OF LUMBAR REGION WITH NEUROGENIC CLAUDICATION: Primary | ICD-10-CM

## 2020-05-11 PROCEDURE — 99213 OFFICE O/P EST LOW 20 MIN: CPT | Mod: 95 | Performed by: INTERNAL MEDICINE

## 2020-05-11 NOTE — ASSESSMENT & PLAN NOTE
We reviewed her MRI in detail.  We will mail a copy to her.  She is to follow-up with  at [facility] as a tele-med set up for him for today.  Reviewed back exercises with her in detail.  May need to consider epidural steroid injection.  She will have them send me a report.

## 2020-05-11 NOTE — ASSESSMENT & PLAN NOTE
She is unaware of a uterine fibroid.  Will discuss this with her gynecologist.  Should have a pelvic ultrasound for follow-up.  Will speak with GYN regarding this. Dr. Medina

## 2020-05-11 NOTE — PROGRESS NOTES
Verification of Patient Location:  The patient affirms they are currently located in the following state: [place]    Request for Consent:   Audio Only Encounter   You and I are about to have a telemedicine check-in or visit. This is allowed because you have requested it. This telemedicine visit will be billed to your health insurance or you, if you are self-insured. You understand you will be responsible for any copayments or coinsurances that apply to your telemedicine visit. Before starting our telemedicine visit, I am required to get your consent for this virtual check-in or visit by telemedicine. Do you consent?    Patient Response to Request for Consent:  Yes      Visit Documentation:  Subjective     Patient ID: Ritu Ramirez is a 64 y.o. female.  1955      HPI   This is an audio visit - pt continue with pain down her left leg to her foot - no leg weakness or numbness however the pain is severe at times.  No bowel or bladder problems.  I reveiwed her MRI which reveals lumbar DJD, bulging discs and foraminal narrowing most severe at L45 resulting in spinal stenosis . Also grade I anterolisthesis.  Incidentally noted was a presumed uterine fibroid.  She denies any abdominal pain, pressure or vaginal discharge.  The following have been reviewed and updated as appropriate in this visit:  Tobacco  Allergies  Meds  Problems  Med Hx  Surg Hx  Fam Hx  Soc Hx        Review of Systems  Positive for low back pain , left leg pain  Negative weakness numbness bowel or bladder dysfuction   Neg paaresthesias.  No vaginal discharge or bleeding  No hot flashes     Assessment/Plan   Diagnoses and all orders for this visit:    Spinal stenosis of lumbar region with neurogenic claudication (Primary)  Assessment & Plan:  We reviewed her MRI in detail.  We will mail a copy to her.  She is to follow-up with  at [facility] as a tele-med set up for him for today.  Reviewed back exercises with her in detail.  May  need to consider epidural steroid injection.  She will have them send me a report.      Submucous leiomyoma of uterus  Assessment & Plan:  She is unaware of a uterine fibroid.  Will discuss this with her gynecologist.  Should have a pelvic ultrasound for follow-up.  Will speak with GYN regarding this. Dr. Medina        Time Spent in Medical Discussion During This Encounter:      13 minutes

## 2020-05-21 ENCOUNTER — LAB REQUISITION (OUTPATIENT)
Dept: LAB | Facility: HOSPITAL | Age: 65
End: 2020-05-21
Payer: COMMERCIAL

## 2020-05-21 DIAGNOSIS — D50.8 OTHER IRON DEFICIENCY ANEMIAS: ICD-10-CM

## 2020-05-21 LAB
ALBUMIN SERPL-MCNC: 4.1 G/DL (ref 3.4–5)
ALP SERPL-CCNC: 63 IU/L (ref 35–126)
ALT SERPL-CCNC: 21 IU/L (ref 11–54)
ANION GAP SERPL CALC-SCNC: 10 MEQ/L (ref 3–15)
AST SERPL-CCNC: 21 IU/L (ref 15–41)
BASOPHILS # BLD: 0.03 K/UL (ref 0.01–0.1)
BASOPHILS NFR BLD: 0.5 %
BILIRUB SERPL-MCNC: 0.7 MG/DL (ref 0.3–1.2)
BUN SERPL-MCNC: 27 MG/DL (ref 8–20)
CALCIUM SERPL-MCNC: 8.8 MG/DL (ref 8.9–10.3)
CHLORIDE SERPL-SCNC: 101 MEQ/L (ref 98–109)
CO2 SERPL-SCNC: 29 MEQ/L (ref 22–32)
CREAT SERPL-MCNC: 0.7 MG/DL (ref 0.6–1.1)
DIFFERENTIAL METHOD BLD: ABNORMAL
EOSINOPHIL # BLD: 0.43 K/UL (ref 0.04–0.36)
EOSINOPHIL NFR BLD: 6.5 %
ERYTHROCYTE [DISTWIDTH] IN BLOOD BY AUTOMATED COUNT: 20.1 % (ref 11.7–14.4)
GFR SERPL CREATININE-BSD FRML MDRD: >60 ML/MIN/1.73M*2
GLUCOSE SERPL-MCNC: 95 MG/DL (ref 70–99)
HCT VFR BLDCO AUTO: 30.6 % (ref 35–45)
HGB BLD-MCNC: 8.9 G/DL (ref 11.8–15.7)
IMM GRANULOCYTES # BLD AUTO: 0.05 K/UL (ref 0–0.08)
IMM GRANULOCYTES NFR BLD AUTO: 0.8 %
LYMPHOCYTES # BLD: 0.75 K/UL (ref 1.2–3.5)
LYMPHOCYTES NFR BLD: 11.4 %
MAGNESIUM SERPL-MCNC: 2 MG/DL (ref 1.8–2.5)
MCH RBC QN AUTO: 21 PG (ref 28–33.2)
MCHC RBC AUTO-ENTMCNC: 29.1 G/DL (ref 32.2–35.5)
MCV RBC AUTO: 72.2 FL (ref 83–98)
MONOCYTES # BLD: 0.59 K/UL (ref 0.28–0.8)
MONOCYTES NFR BLD: 9 %
NEUTROPHILS # BLD: 4.74 K/UL (ref 1.7–7)
NEUTS SEG NFR BLD: 71.8 %
NRBC BLD-RTO: 0 %
PDW BLD AUTO: 9.8 FL (ref 9.4–12.3)
PLATELET # BLD AUTO: 373 K/UL (ref 150–369)
POTASSIUM SERPL-SCNC: 3.8 MEQ/L (ref 3.6–5.1)
PROT SERPL-MCNC: 6.3 G/DL (ref 6–8.2)
RBC # BLD AUTO: 4.24 M/UL (ref 3.93–5.22)
SODIUM SERPL-SCNC: 140 MEQ/L (ref 136–144)
WBC # BLD AUTO: 6.59 K/UL (ref 3.8–10.5)

## 2020-05-21 PROCEDURE — 85025 COMPLETE CBC W/AUTO DIFF WBC: CPT | Performed by: INTERNAL MEDICINE

## 2020-05-21 PROCEDURE — 80053 COMPREHEN METABOLIC PANEL: CPT | Performed by: INTERNAL MEDICINE

## 2020-05-21 PROCEDURE — 83735 ASSAY OF MAGNESIUM: CPT | Performed by: INTERNAL MEDICINE

## 2020-05-28 ENCOUNTER — LAB REQUISITION (OUTPATIENT)
Dept: LAB | Facility: HOSPITAL | Age: 65
End: 2020-05-28
Payer: COMMERCIAL

## 2020-05-28 DIAGNOSIS — D50.8 OTHER IRON DEFICIENCY ANEMIAS: ICD-10-CM

## 2020-05-28 LAB
BASOPHILS # BLD: 0.04 K/UL (ref 0.01–0.1)
BASOPHILS NFR BLD: 0.7 %
DIFFERENTIAL METHOD BLD: ABNORMAL
EOSINOPHIL # BLD: 0.24 K/UL (ref 0.04–0.36)
EOSINOPHIL NFR BLD: 4.2 %
ERYTHROCYTE [DISTWIDTH] IN BLOOD BY AUTOMATED COUNT: 22.6 % (ref 11.7–14.4)
HCT VFR BLDCO AUTO: 30.3 % (ref 35–45)
HGB BLD-MCNC: 9 G/DL (ref 11.8–15.7)
IMM GRANULOCYTES # BLD AUTO: 0.04 K/UL (ref 0–0.08)
IMM GRANULOCYTES NFR BLD AUTO: 0.7 %
LYMPHOCYTES # BLD: 0.72 K/UL (ref 1.2–3.5)
LYMPHOCYTES NFR BLD: 12.7 %
MCH RBC QN AUTO: 21.5 PG (ref 28–33.2)
MCHC RBC AUTO-ENTMCNC: 29.7 G/DL (ref 32.2–35.5)
MCV RBC AUTO: 72.5 FL (ref 83–98)
MONOCYTES # BLD: 0.58 K/UL (ref 0.28–0.8)
MONOCYTES NFR BLD: 10.2 %
NEUTROPHILS # BLD: 4.06 K/UL (ref 1.7–7)
NEUTS SEG NFR BLD: 71.5 %
NRBC BLD-RTO: 0 %
PDW BLD AUTO: 9.6 FL (ref 9.4–12.3)
PLATELET # BLD AUTO: 407 K/UL (ref 150–369)
RBC # BLD AUTO: 4.18 M/UL (ref 3.93–5.22)
WBC # BLD AUTO: 5.68 K/UL (ref 3.8–10.5)

## 2020-05-28 PROCEDURE — 85025 COMPLETE CBC W/AUTO DIFF WBC: CPT | Performed by: INTERNAL MEDICINE

## 2020-06-12 ENCOUNTER — LAB REQUISITION (OUTPATIENT)
Dept: LAB | Facility: HOSPITAL | Age: 65
End: 2020-06-12
Payer: COMMERCIAL

## 2020-06-12 DIAGNOSIS — D50.8 OTHER IRON DEFICIENCY ANEMIAS: ICD-10-CM

## 2020-06-12 LAB
ANISOCYTOSIS BLD QL SMEAR: ABNORMAL
BASOPHILS # BLD: 0.03 K/UL (ref 0.01–0.1)
BASOPHILS NFR BLD: 0.3 %
DIFFERENTIAL METHOD BLD: ABNORMAL
EOSINOPHIL # BLD: 0.3 K/UL (ref 0.04–0.36)
EOSINOPHIL NFR BLD: 3 %
ERYTHROCYTE [DISTWIDTH] IN BLOOD BY AUTOMATED COUNT: ABNORMAL %
HCT VFR BLDCO AUTO: 32.4 % (ref 35–45)
HGB BLD-MCNC: 9.8 G/DL (ref 11.8–15.7)
HYPOCHROMIA BLD QL SMEAR: ABNORMAL
IMM GRANULOCYTES # BLD AUTO: 0.11 K/UL (ref 0–0.08)
IMM GRANULOCYTES NFR BLD AUTO: 1.1 %
LYMPHOCYTES # BLD: 1.16 K/UL (ref 1.2–3.5)
LYMPHOCYTES NFR BLD: 11.6 %
MCH RBC QN AUTO: 24.5 PG (ref 28–33.2)
MCHC RBC AUTO-ENTMCNC: 30.2 G/DL (ref 32.2–35.5)
MCV RBC AUTO: 81 FL (ref 83–98)
MONOCYTES # BLD: 0.83 K/UL (ref 0.28–0.8)
MONOCYTES NFR BLD: 8.3 %
NEUTROPHILS # BLD: 7.58 K/UL (ref 1.7–7)
NEUTROPHILS # BLD: 7.58 K/UL (ref 1.7–7)
NEUTS SEG NFR BLD: 75.7 %
NRBC BLD-RTO: 0 %
OVALOCYTES BLD QL SMEAR: ABNORMAL
PDW BLD AUTO: 9.6 FL (ref 9.4–12.3)
PLAT MORPH BLD: NORMAL
PLATELET # BLD AUTO: 443 K/UL (ref 150–369)
PLATELET # BLD EST: ABNORMAL 10*3/UL
RBC # BLD AUTO: 4 M/UL (ref 3.93–5.22)
WBC # BLD AUTO: 10.01 K/UL (ref 3.8–10.5)

## 2020-06-12 PROCEDURE — 85025 COMPLETE CBC W/AUTO DIFF WBC: CPT | Performed by: INTERNAL MEDICINE

## 2020-06-21 PROBLEM — E78.5 HYPERLIPIDEMIA: Status: RESOLVED | Noted: 2020-01-30 | Resolved: 2020-06-21

## 2020-06-24 RX ORDER — ROSUVASTATIN CALCIUM 10 MG/1
10 TABLET, COATED ORAL DAILY
COMMUNITY
End: 2020-07-28

## 2020-06-25 ENCOUNTER — OFFICE VISIT (OUTPATIENT)
Dept: CARDIOLOGY | Facility: CLINIC | Age: 65
End: 2020-06-25
Payer: COMMERCIAL

## 2020-06-25 VITALS
DIASTOLIC BLOOD PRESSURE: 78 MMHG | HEIGHT: 60 IN | WEIGHT: 119 LBS | SYSTOLIC BLOOD PRESSURE: 122 MMHG | BODY MASS INDEX: 23.36 KG/M2 | HEART RATE: 68 BPM

## 2020-06-25 DIAGNOSIS — D47.02 SYSTEMIC MASTOCYTOSIS: ICD-10-CM

## 2020-06-25 DIAGNOSIS — E87.6 HYPOKALEMIA: ICD-10-CM

## 2020-06-25 DIAGNOSIS — E78.5 DYSLIPIDEMIA: ICD-10-CM

## 2020-06-25 DIAGNOSIS — I10 ESSENTIAL HYPERTENSION: ICD-10-CM

## 2020-06-25 DIAGNOSIS — R93.1 ELEVATED CORONARY ARTERY CALCIUM SCORE: Primary | ICD-10-CM

## 2020-06-25 DIAGNOSIS — R00.1 SINUS BRADYCARDIA: ICD-10-CM

## 2020-06-25 PROCEDURE — 99213 OFFICE O/P EST LOW 20 MIN: CPT | Performed by: INTERNAL MEDICINE

## 2020-06-25 PROCEDURE — 93000 ELECTROCARDIOGRAM COMPLETE: CPT | Performed by: INTERNAL MEDICINE

## 2020-06-25 ASSESSMENT — ENCOUNTER SYMPTOMS
WEIGHT GAIN: 0
CLAUDICATION: 0
LIGHT-HEADEDNESS: 0
ORTHOPNEA: 0
SHORTNESS OF BREATH: 0
DYSPNEA ON EXERTION: 1
BLURRED VISION: 0
WEIGHT LOSS: 0
PALPITATIONS: 0
DIAPHORESIS: 0
HEARTBURN: 0
ADENOPATHY: 0
COUGH: 0
SYNCOPE: 0
MUSCLE CRAMPS: 0
ALTERED MENTAL STATUS: 0
COLOR CHANGE: 0

## 2020-06-25 NOTE — PROGRESS NOTES
Cardiology Note       Reason for visit:   Chief Complaint   Patient presents with   • Hypertension      HPI   Ritu Ramirez is a 64 y.o. female who presents for scheduled cardiovascular follow-up in the office.    I saw her in December.  She had a stress test in February that was negative.  She had issues with sciatica but this improved with an injection.  She does do some walking.  She eats a healthy diet and her weight is stable.  At this time she denies chest pain, shortness breath, palpitations, lightheadedness or syncope.  She tolerates rosuvastatin without apparent side effects.      Past Medical History:   Diagnosis Date   • Hypertension    • Systemic mastocytosis      Past Surgical History:   Procedure Laterality Date   • COLONOSCOPY     • REDUCTION MAMMAPLASTY       Social History     Socioeconomic History   • Marital status:      Spouse name: None   • Number of children: None   • Years of education: None   • Highest education level: None   Occupational History   • None   Social Needs   • Financial resource strain: None   • Food insecurity:     Worry: None     Inability: None   • Transportation needs:     Medical: None     Non-medical: None   Tobacco Use   • Smoking status: Former Smoker     Packs/day: 0.25     Last attempt to quit:      Years since quittin.5   • Smokeless tobacco: Never Used   Substance and Sexual Activity   • Alcohol use: Yes     Comment: Social   • Drug use: No   • Sexual activity: Defer   Lifestyle   • Physical activity:     Days per week: None     Minutes per session: None   • Stress: None   Relationships   • Social connections:     Talks on phone: None     Gets together: None     Attends Christianity service: None     Active member of club or organization: None     Attends meetings of clubs or organizations: None     Relationship status: None   • Intimate partner violence:     Fear of current or ex partner: None     Emotionally abused: None     Physically abused: None      Forced sexual activity: None   Other Topics Concern   • None   Social History Narrative   • None     No family history on file.  Anesthetics - amide type; Clarithromycin; Erythromycin base; Iodinated contrast media; Nsaids (non-steroidal anti-inflammatory drug); and Penicillins  Current Outpatient Medications   Medication Sig Dispense Refill   • ALPRAZolam (XANAX) 0.25 mg tablet TAKE 1 TABLET BY MOUTH DAILY AS NEEDED FOR ANXIETY 30 tablet 1   • amLODIPine (NORVASC) 5 mg tablet Take 5 mg by mouth daily.       • cholecalciferol, vitamin D3, 1,000 unit capsule take 1 capsule by oral route  every day     • escitalopram (LEXAPRO) 10 mg tablet Take 1 tablet (10 mg total) by mouth daily. 90 tablet 3   • hydrochlorothiazide (HYDRODIURIL) 25 mg tablet Take 25 mg by mouth once daily.  3   • potassium chloride (KLOR-CON) 20 mEq CR tablet Take 20 mEq by mouth daily.     • rosuvastatin (CRESTOR) 10 mg tablet Take 10 mg by mouth daily.       No current facility-administered medications for this visit.        Review of Systems   Constitution: Negative for diaphoresis, malaise/fatigue, weight gain and weight loss.   HENT: Negative for nosebleeds.    Eyes: Negative for blurred vision.   Cardiovascular: Positive for dyspnea on exertion. Negative for chest pain, claudication, leg swelling, orthopnea, palpitations and syncope.   Respiratory: Negative for cough and shortness of breath.    Hematologic/Lymphatic: Negative for adenopathy.   Skin: Negative for color change.   Musculoskeletal: Negative for muscle cramps.   Gastrointestinal: Negative for heartburn.   Genitourinary: Negative for nocturia.   Neurological: Negative for light-headedness.   Psychiatric/Behavioral: Negative for altered mental status.     Objective   Vitals:    06/24/20 1337   BP: 122/78   BP Location: Left upper arm   Patient Position: Sitting   Pulse: 68   Weight: 54 kg (119 lb)   Height: 1.524 m (5')     Weight: 54 kg (119 lb)     Physical Exam:    General  Appearance:  Alert, no distress   Head:  Normocephalic, without obvious abnormality, atraumatic   Eyes:  Conjunctiva/corneas clear, EOM's intact   Neck: No thyroid enlargement. No JVD. No bruits    Lungs:   Clear to auscultation bilaterally, respirations unlabored, no rales, no wheezing   Heart:  Regular rhythm, S1 and S2 normal, no murmur, rub or gallop   Abdomen:   Soft, non-tender, no masses, no organomegaly   Vascular: Pulses 2+ and symmetric all extremities, no carotid bruit or jugular vein distention   Musculoskeletal:  Skin: No injury or deformity  Skin color, texture, turgor normal, no rashes or lesions, no cyanosis or edema   Extremities: Extremities normal, atraumatic, pulses normal   Behavior/Emotional: Appropriate, cooperative       Lab Results   Component Value Date    WBC 10.01 06/12/2020    HGB 9.8 (L) 06/12/2020     (H) 06/12/2020    CHOL 223 (H) 12/07/2019    TRIG 95 12/07/2019    HDL 88 12/07/2019    LDLCALC 116 (H) 12/07/2019    ALT 21 05/21/2020    AST 21 05/21/2020     05/21/2020    K 3.8 05/21/2020    CREATININE 0.7 05/21/2020    TSH 3.320 07/10/2019    INR 0.9 11/02/2017    HGBA1C 5.0 06/05/2015          ECG   sinus rhythm with sinus arrhythmia  PCWP LAD   ASSESSMENT/PLAN    Problem List Items Addressed This Visit        High    Essential hypertension    Overview     2017 echocardiogram: Normal structural heart         Current Assessment & Plan     Her blood pressure is excellent today on a combination of hydrochlorothiazide and amlodipine.         Relevant Orders    ECG 12 LEAD-OFFICE PERFORMED (Completed)    Elevated coronary artery calcium score - Primary    Overview     6/2019 score: 128 (90th%)  2/12/2020 SE--11:16, 92%mPHR, negative         Current Assessment & Plan     Her stress test was low risk.  We will continue to treat her with statin therapy.  She will continue to try to walk if possible.  Hopefully her back will allow this.  She knows to contact me with any symptoms  of concern.         Relevant Medications    rosuvastatin (CRESTOR) 10 mg tablet    Other Relevant Orders    ECG 12 LEAD-OFFICE PERFORMED (Completed)       Medium    Systemic mastocytosis    Current Assessment & Plan     This is under treatment.         Relevant Orders    ECG 12 LEAD-OFFICE PERFORMED (Completed)    Hypokalemia    Current Assessment & Plan     We will monitor her potassium.  At present she does take a supplement.         Relevant Orders    ECG 12 LEAD-OFFICE PERFORMED (Completed)    Dyslipidemia    Current Assessment & Plan     She will need a lipid profile on rosuvastatin.  I gave her a lab slip.  I will contact her with the results.         Relevant Orders    Lipid panel       Low    Sinus bradycardia    Current Assessment & Plan     Her heart rate is acceptable off beta-blocker therapy.         Relevant Medications    rosuvastatin (CRESTOR) 10 mg tablet    Other Relevant Orders    ECG 12 LEAD-OFFICE PERFORMED (Completed)           Return in about 7 months (around 1/25/2021).    Bright Selby MD  6/25/2020

## 2020-06-25 NOTE — ASSESSMENT & PLAN NOTE
Her stress test was low risk.  We will continue to treat her with statin therapy.  She will continue to try to walk if possible.  Hopefully her back will allow this.  She knows to contact me with any symptoms of concern.

## 2020-06-25 NOTE — ASSESSMENT & PLAN NOTE
She will need a lipid profile on rosuvastatin.  I gave her a lab slip.  I will contact her with the results.

## 2020-07-13 ENCOUNTER — LAB REQUISITION (OUTPATIENT)
Dept: LAB | Facility: HOSPITAL | Age: 65
End: 2020-07-13
Payer: COMMERCIAL

## 2020-07-13 DIAGNOSIS — D50.8 OTHER IRON DEFICIENCY ANEMIAS: ICD-10-CM

## 2020-07-13 LAB
BASOPHILS # BLD: 0.04 K/UL (ref 0.01–0.1)
BASOPHILS NFR BLD: 0.5 %
DIFFERENTIAL METHOD BLD: ABNORMAL
EOSINOPHIL # BLD: 0.35 K/UL (ref 0.04–0.36)
EOSINOPHIL NFR BLD: 4.5 %
ERYTHROCYTE [DISTWIDTH] IN BLOOD BY AUTOMATED COUNT: 18.1 % (ref 11.7–14.4)
HCT VFR BLDCO AUTO: 33.9 % (ref 35–45)
HGB BLD-MCNC: 10.8 G/DL (ref 11.8–15.7)
IMM GRANULOCYTES # BLD AUTO: 0.06 K/UL (ref 0–0.08)
IMM GRANULOCYTES NFR BLD AUTO: 0.8 %
LYMPHOCYTES # BLD: 1.25 K/UL (ref 1.2–3.5)
LYMPHOCYTES NFR BLD: 16.1 %
MCH RBC QN AUTO: 26.6 PG (ref 28–33.2)
MCHC RBC AUTO-ENTMCNC: 31.9 G/DL (ref 32.2–35.5)
MCV RBC AUTO: 83.5 FL (ref 83–98)
MONOCYTES # BLD: 0.64 K/UL (ref 0.28–0.8)
MONOCYTES NFR BLD: 8.2 %
NEUTROPHILS # BLD: 5.43 K/UL (ref 1.7–7)
NEUTROPHILS # BLD: 5.43 K/UL (ref 1.7–7)
NEUTS SEG NFR BLD: 69.9 %
NRBC BLD-RTO: 0 %
PDW BLD AUTO: 10.1 FL (ref 9.4–12.3)
PLATELET # BLD AUTO: 342 K/UL (ref 150–369)
RBC # BLD AUTO: 4.06 M/UL (ref 3.93–5.22)
WBC # BLD AUTO: 7.77 K/UL (ref 3.8–10.5)

## 2020-07-13 PROCEDURE — 85025 COMPLETE CBC W/AUTO DIFF WBC: CPT | Performed by: INTERNAL MEDICINE

## 2020-07-21 ENCOUNTER — TELEMEDICINE (OUTPATIENT)
Dept: INTERNAL MEDICINE | Facility: CLINIC | Age: 65
End: 2020-07-21
Payer: COMMERCIAL

## 2020-07-21 ENCOUNTER — TELEPHONE (OUTPATIENT)
Dept: INTERNAL MEDICINE | Facility: CLINIC | Age: 65
End: 2020-07-21

## 2020-07-21 ENCOUNTER — TRANSCRIBE ORDERS (OUTPATIENT)
Dept: SCHEDULING | Age: 65
End: 2020-07-21

## 2020-07-21 DIAGNOSIS — Z71.89 ADVICE GIVEN ABOUT COVID-19 VIRUS INFECTION: ICD-10-CM

## 2020-07-21 DIAGNOSIS — M48.062 SPINAL STENOSIS OF LUMBAR REGION WITH NEUROGENIC CLAUDICATION: Primary | ICD-10-CM

## 2020-07-21 DIAGNOSIS — D25.9 LEIOMYOMA OF UTERUS, UNSPECIFIED: Primary | ICD-10-CM

## 2020-07-21 DIAGNOSIS — I10 ESSENTIAL HYPERTENSION: ICD-10-CM

## 2020-07-21 DIAGNOSIS — Q82.2 CONGENITAL CUTANEOUS MASTOCYTOSIS: Primary | ICD-10-CM

## 2020-07-21 PROCEDURE — 99213 OFFICE O/P EST LOW 20 MIN: CPT | Mod: 95 | Performed by: INTERNAL MEDICINE

## 2020-07-21 NOTE — TELEPHONE ENCOUNTER
Pt has spinal stenosis and requested a referral for PT but wants to do this at home - I have put an order in for PT - can you please contact [facility] home care for pt to go out and evaluated - she has mastocytosis and is at increased risk if she were to go to a PT facility

## 2020-07-21 NOTE — PROGRESS NOTES
Verification of Patient Location:  The patient affirms they are currently located in the following state: [place]    Request for Consent:    Audio Only Encounter   You and I are about to have a telemedicine check-in or visit. This is allowed because you have requested it. This telemedicine visit will be billed to your health insurance or you, if you are self-insured. You understand you will be responsible for any copayments or coinsurances that apply to your telemedicine visit. Before starting our telemedicine visit, I am required to get your consent for this virtual check-in or visit by telemedicine. Do you consent?    Patient Response to Request for Consent:  Yes      Visit Documentation:  Subjective     Patient ID: Ritu Ramirez is a 64 y.o. female.  1955      HPI   This is an audio visit - she is improving in terms of her leg pain Having had an epidural steroid injection.  The orthopedic doctor Dr. Lim would like her to do physical therapy.  Even that she has mastocytosis and is at increased risk for infection she is very reluctant to go to physical therapy.  She is requesting whether we could arrange for physical therapy at home.  During the pandemic I think that is reasonable and I will put that referral in to have her evaluated for home PT.  She is otherwise feeling well.  Remains compliant with her antihypertensives.  Denies any chest pain palpitation shortness of breath or lightheadedness.  Her only other concern is that her significant other developed diarrhea and chills.  They cohabitate.  He went today for coronavirus testing will know by tomorrow . She will need to be tested if he is positive - for now is self quarantining.     The following have been reviewed and updated as appropriate in this visit:  Allergies  Meds  Problems       Review of Systems  Negative for chest pain palpitation shortness of breath or lightheadedness, negative wheezing, negative cough, negative abdominal pain  nausea vomiting diarrhea, negative fever chills myalgias, negative headache negative loss of taste or smell positive low back pain negative leg weakness or numbness    Assessment/Plan   Diagnoses and all orders for this visit:    Spinal stenosis of lumbar region with neurogenic claudication (Primary)  Assessment & Plan:  She is doing better following epidural steroid injection.  Would like her to begin a physical therapy program.  We will arrange for home PT for evaluation.  Given that she has mastocytosis she will be at increased risk going to facility for PT.      Essential hypertension  Assessment & Plan:  Continues stable on hydrochlorothiazide and amlodipine      Advice given about COVID-19 virus infection  Assessment & Plan:  Will await the results of her significant other's Cobin testing.  Advised her to call me if she develops any symptoms or if he is positive.  In either situation she would need to be tested.        Time Spent in Medical Discussion During This Encounter:    10 minutes

## 2020-07-21 NOTE — ASSESSMENT & PLAN NOTE
She is doing better following epidural steroid injection.  Would like her to begin a physical therapy program.  We will arrange for home PT for evaluation.  Given that she has mastocytosis she will be at increased risk going to facility for PT.

## 2020-07-21 NOTE — ASSESSMENT & PLAN NOTE
Will await the results of her significant other's Cobin testing.  Advised her to call me if she develops any symptoms or if he is positive.  In either situation she would need to be tested.

## 2020-07-24 RX ORDER — ESCITALOPRAM OXALATE 10 MG/1
TABLET ORAL
Qty: 30 TABLET | Refills: 11 | Status: SHIPPED | OUTPATIENT
Start: 2020-07-24 | End: 2021-08-16

## 2020-07-24 NOTE — TELEPHONE ENCOUNTER
Medicine Refill Request    Last Office Visit: 1/30/2020  Last Telemedicine Visit: 7/21/2020 Ashley Nazario MD    Next Office Visit: 7/29/2020  Next Telemedicine Visit: Visit date not found         Current Outpatient Medications:   •  ALPRAZolam (XANAX) 0.25 mg tablet, TAKE 1 TABLET BY MOUTH DAILY AS NEEDED FOR ANXIETY, Disp: 30 tablet, Rfl: 1  •  amLODIPine (NORVASC) 5 mg tablet, Take 5 mg by mouth daily.  , Disp: , Rfl:   •  cholecalciferol, vitamin D3, 1,000 unit capsule, take 1 capsule by oral route  every day, Disp: , Rfl:   •  escitalopram (LEXAPRO) 10 mg tablet, Take 1 tablet (10 mg total) by mouth daily., Disp: 90 tablet, Rfl: 3  •  hydrochlorothiazide (HYDRODIURIL) 25 mg tablet, Take 25 mg by mouth once daily., Disp: , Rfl: 3  •  potassium chloride (KLOR-CON) 20 mEq CR tablet, Take 20 mEq by mouth daily., Disp: , Rfl:   •  rosuvastatin (CRESTOR) 10 mg tablet, Take 10 mg by mouth daily., Disp: , Rfl:       BP Readings from Last 3 Encounters:   06/24/20 122/78   02/12/20 120/64   01/30/20 124/72       Recent Lab results:  Lab Results   Component Value Date    CHOL 223 (H) 12/07/2019   ,   Lab Results   Component Value Date    HDL 88 12/07/2019   ,   Lab Results   Component Value Date    LDLCALC 116 (H) 12/07/2019   ,   Lab Results   Component Value Date    TRIG 95 12/07/2019        Lab Results   Component Value Date    GLUCOSE 95 05/21/2020   ,   Lab Results   Component Value Date    HGBA1C 5.0 06/05/2015         Lab Results   Component Value Date    CREATININE 0.7 05/21/2020       Lab Results   Component Value Date    TSH 3.320 07/10/2019

## 2020-07-27 ENCOUNTER — OFFICE VISIT (OUTPATIENT)
Dept: SURGERY | Facility: CLINIC | Age: 65
End: 2020-07-27
Payer: COMMERCIAL

## 2020-07-27 VITALS
DIASTOLIC BLOOD PRESSURE: 82 MMHG | SYSTOLIC BLOOD PRESSURE: 130 MMHG | WEIGHT: 118 LBS | BODY MASS INDEX: 23.16 KG/M2 | HEIGHT: 60 IN | TEMPERATURE: 99 F

## 2020-07-27 DIAGNOSIS — K64.8 INTERNAL HEMORRHOID, BLEEDING: Primary | ICD-10-CM

## 2020-07-27 PROCEDURE — 46500 INJECTION INTO HEMORRHOID(S): CPT | Performed by: SURGERY

## 2020-07-27 PROCEDURE — 99204 OFFICE O/P NEW MOD 45 MIN: CPT | Mod: 25 | Performed by: SURGERY

## 2020-07-27 PROCEDURE — G0180 MD CERTIFICATION HHA PATIENT: HCPCS | Performed by: INTERNAL MEDICINE

## 2020-07-27 NOTE — PROGRESS NOTES
Chief Complaint:   Chief Complaint   Patient presents with   • Hemorrhoids   .    HPI     Patient is a 64 y.o. female who presents with the following:  Pt has had many many injections in the past - she was anemic for years    She has been now dx with systemic mastocytosis whicih is rare and partially explains her past anemia - she is still followed by Dr. Calhoun    Her last injection which must have been 4y ago or so, seems to have worked until last mo when she started bleeding again - in the bowl  -      Medical History:   Past Medical History:   Diagnosis Date   • Hypertension    • Systemic mastocytosis        Surgical History:   Past Surgical History:   Procedure Laterality Date   • COLONOSCOPY     • REDUCTION MAMMAPLASTY         Social History:   Social History     Socioeconomic History   • Marital status:      Spouse name: Not on file   • Number of children: Not on file   • Years of education: Not on file   • Highest education level: Not on file   Occupational History   • Not on file   Social Needs   • Financial resource strain: Not on file   • Food insecurity:     Worry: Not on file     Inability: Not on file   • Transportation needs:     Medical: Not on file     Non-medical: Not on file   Tobacco Use   • Smoking status: Former Smoker     Packs/day: 0.25     Last attempt to quit:      Years since quittin.5   • Smokeless tobacco: Never Used   Substance and Sexual Activity   • Alcohol use: Yes     Comment: Social   • Drug use: No   • Sexual activity: Defer   Lifestyle   • Physical activity:     Days per week: Not on file     Minutes per session: Not on file   • Stress: Not on file   Relationships   • Social connections:     Talks on phone: Not on file     Gets together: Not on file     Attends [employer] service: Not on file     Active member of club or organization: Not on file     Attends meetings of clubs or organizations: Not on file     Relationship status: Not on file   • Intimate  partner violence:     Fear of current or ex partner: Not on file     Emotionally abused: Not on file     Physically abused: Not on file     Forced sexual activity: Not on file   Other Topics Concern   • Not on file   Social History Narrative   • Not on file       Family History:   History reviewed. No pertinent family history.    Allergies:   Anesthetics - amide type; Clarithromycin; Erythromycin base; Iodinated contrast media; Nsaids (non-steroidal anti-inflammatory drug); and Penicillins    Current Medications:      Current Outpatient Medications:   •  ALPRAZolam (XANAX) 0.25 mg tablet, TAKE 1 TABLET BY MOUTH DAILY AS NEEDED FOR ANXIETY, Disp: 30 tablet, Rfl: 1  •  amLODIPine (NORVASC) 5 mg tablet, Take 5 mg by mouth daily.  , Disp: , Rfl:   •  cholecalciferol, vitamin D3, 1,000 unit capsule, take 1 capsule by oral route  every day, Disp: , Rfl:   •  escitalopram (LEXAPRO) 10 mg tablet, TAKE 1 TABLET BY MOUTH EVERY DAY, Disp: 30 tablet, Rfl: 11  •  hydrochlorothiazide (HYDRODIURIL) 25 mg tablet, Take 25 mg by mouth once daily., Disp: , Rfl: 3  •  potassium chloride (KLOR-CON) 20 mEq CR tablet, Take 20 mEq by mouth daily., Disp: , Rfl:   •  rosuvastatin (CRESTOR) 10 mg tablet, Take 10 mg by mouth daily., Disp: , Rfl:     Review of Systems  All 14 systems reviewed and the findings are not pertinent to the current problem.    Objective     Vital Signs  Vitals:    07/27/20 1614   BP: 130/82   Temp: 37.2 °C (99 °F)     Body mass index is 23.05 kg/m².      Physicial Exam  General Appearance:  Well developed.  Well nourished.  In no acute distress. Patient was not obese.  Head:  Posture of the head was normal.  Neck:  External features of the neck was normal.  Trachea:  Showed no abnormalities.  Trachea midline.  Extraocular Movements:  Normal.  Pupils:  Reactive to light.   Not deformed.  Oral Cavity:  Mixed dentition was not observed.  Tongue was midline.  Peripheral edema:  Not present.  Genitalia:   Normal.    Anorectal Examination:    No pilonidal sinus was observed.   No pilonidal cyst was observed.   Perianal skin was not erythematous.  No perianal wart was observed.  No anal fissure was observed.   Anus did not have a fistula.   Anal sphincter tone was normal.    The patient had large, friable, grade three internal hemorrhoids.  Specifics:  Tissue inside very injected and angry appearing.    No external anal skin tags were observed.   Anus had no mass.  Rectum:  No rectal abscess was observed.   Rectal prolapse was not observed.   No rectal fistula was observed.   Rectal exam was not tender.   No rectal fluctuance was observed.  Rectal exam showed no mass.   Normal richard-anal skin with no lesions or dermatitis    Other Exam:  Musculoskeletal System:  No gross defecits or defects of the upper or lower extremities.  No cyanosis of the fingers.  Lumbar / Lumbosacral Spine:  Lumbar/lumbosacral spine exhibited no abnormalities.  Bilateral thighs:   Posterior aspect was not swollen.   Posterior aspect showed no induration.   Posterior aspect was not erythematous.   Posterior aspect was not warm.   No mass on the posterior aspect.  Functional Exam:   General/bilateral:  Mobility was not limited.  Neurological:   No confusion was observed.   No disorientation was observed.  Speech:  Normal.  Motor:  No tremor was seen.  Balance:  Normal.  Gait And Stance:  Normal.  Psychiatric:  Mood:  Euthymic.  Affect:  Normal.  Skin:  No clubbing of the fingernails.  Normal upper and lower extremity skin exam.            Problem List Items Addressed This Visit     Internal hemorrhoid, bleeding - Primary     The internal hemorrhoids were again injected with 5% phenol mixed in olive oil as a sclerotherapy.  The patient will return on a PRN basis for further injections.  If these treatments do not relieve their symptoms then surgery may be considered.                     Javier Corcoran MD 7/27/2020 5:33 PM

## 2020-07-27 NOTE — PROCEDURES
Procedures  The patient had large friable internal hemorrhoids that required office based therapy.  Verbal consent was obtained.  No prep was necessary.  First a digital rectal exam and then anoscopy was performed.  Injection sclerotherapy was then performed using 5% phenol in olive oil through an 18 gauge spinal needle.  The patient tolerated it well and was given instructions to return as needed.     Subjective:      Karlene Walters is a 80 y.o. female who presents with Rectal Cancer ([facility])      HPI   Mrs. Walters presents for evaluation prior to cycle 100 single agent 5-FU for well-differentiated rectal cancer with liver mets: KRAS mutated. She is unaccompanied for today's visit.     Patient was initially diagnosed 2012 secondary to bleeding.  She underwent laparoscopic resection with primary anastomosis per Dr. Montgomery with pathology indicating well differentiated T2N0 adenocarcinoma of the rectum.  Postoperative sequelae including anastomosis breakdown and rectovaginal fistula.  Patient required diverting loop ostomy with subsequent associated surgeries.  Patient was noted liver mass in June 2015 for which he underwent ablative therapy and was initiated on XELOX.  She experienced allergic reaction to oxaliplatin and continued with Xeloda.  She experienced diarrhea and transitioned to single agent 5-FU in February 2016.  She continues with 20% dose reduction since cycle 21 (12/2016) and discontinuation of bolus 5-FU/LV at cycle 39, due to neutropenia and poor tolerance. PET scan completed 5/2017 showing small pulmonary nodules and isolated hepatic lesion. She completed Y90 radio embolization of liver lesion on 6/15/2017, without sequelae. S/p overlapping liver ablations x 3 per Dr. ROJAS on 11/15/18 for localized recurrence per PET completed 10/25/18 (following short break in treatment). CEA is improved and stable since ablation in November 2018. She continues with single agent 5FU for disease control and transitioned from q 14 days dosing to q 21 days in January 2020 to help with fatigue and tolerance and to every 28 days in April 2020. CT completed 5/7/2020 showed new lesion at right liver lobe. PET scan completed 5/14/20 showed increase uptake consisted with metastatic disease.    She underwent ablation to R liver per Dr. ROJAS on 5/28/20 and notes some increased RUQ discomfort that is slowly improving and  "overall fatigue that correlates with treatment. There was no resection of tumor given proximity to large vessels.  She is experiencing intermittent dry heaves that are self-limiting.  She is noting some mild neuropathy changes at fingertips that affects buttoning her shirt but overall she continues at her baseline.  Patient is otherwise asymptomatic.        Allergies   Allergen Reactions   • Oxaliplatin Anaphylaxis   • Codeine      \"gets drunk\"   • Pcn [Penicillins] Itching     itching   • Sulfa Drugs Itching     itching   • Tape Rash     PAPER TAPE OK           Current Outpatient Medications on File Prior to Visit   Medication Sig Dispense Refill   • Tiotropium Bromide-Olodaterol (STIOLTO RESPIMAT) 2.5-2.5 MCG/ACT Aero Soln Take 2 Puffs by mouth every day. 1 Inhaler 11   • albuterol 108 (90 Base) MCG/ACT Aero Soln inhalation aerosol Inhale 2 Puffs by mouth every 6 hours as needed for Shortness of Breath. 8.5 g 11   • potassium chloride ER (KLOR-CON) 10 MEQ tablet Take 1 Tab by mouth every day. 100 Tab 3   • rivaroxaban (XARELTO) 10 MG Tab tablet Take 1 Tab by mouth every day. (Patient taking differently: Take 10 mg by mouth every day at 6 PM.) 90 Tab 1   • lidocaine-prilocaine (EMLA) 2.5-2.5 % Cream APPLY A THICK FILM OVER THE PORT ACCESS SITE PRIOR TO ACCESS 30 g 3   • ondansetron (ZOFRAN) 4 MG Tab tablet Take 1 Tab by mouth as needed. 30 Tab 3   • metronidazole (METROCREAM) 0.75 % cream Apply 1 Each to affected area(s) 2 times a day.     • cyanocobalamin (VITAMIN B-12) 500 MCG Tab Take 1,000 mcg by mouth every day.     • Multiple Vitamins-Minerals (CENTRUM SILVER PO) Take 1 Tab by mouth every day.     • Cholecalciferol (VITAMIN D3) 400 UNITS CAPS Take 1 Cap by mouth every day.     • loratadine (CLARITIN) 10 MG TABS Take 10 mg by mouth every day. Indications: Hayfever       No current facility-administered medications on file prior to visit.            Review of Systems   Constitutional: Positive for " "malaise/fatigue (some following procedure but feeling better now). Negative for chills, fever and weight loss.   Respiratory: Positive for shortness of breath (stbale and consistent with baseline; uses inhalers consistently; next PFT in 12/2020). Negative for cough and wheezing.    Cardiovascular: Positive for leg swelling (trace BLE). Negative for chest pain and palpitations.   Gastrointestinal: Positive for abdominal pain (s/p ablation x10 to R liver lobe per Dr. ROJAS, no resection due to proximity to vessels). Negative for constipation, diarrhea, nausea and vomiting (dry heaves - a bit new; random and self limiting).        RLQ ostomy   Genitourinary: Negative for dysuria.   Musculoskeletal: Negative for joint pain and myalgias.   Neurological: Positive for sensory change (n/t and mild changes in ability to button shirt at times). Negative for dizziness, tingling and headaches.   Psychiatric/Behavioral: The patient does not have insomnia.           Objective:     /68 (BP Location: Right arm, Patient Position: Sitting, BP Cuff Size: Adult)   Pulse (!) 101   Temp 36.6 °C (97.8 °F) (Temporal)   Resp 16   Ht 1.623 m (5' 3.9\")   Wt 73.2 kg (161 lb 7.8 oz)   LMP  (LMP Unknown)   SpO2 97%   BMI 27.81 kg/m²      Physical Exam  Vitals signs reviewed.   Constitutional:       General: She is not in acute distress.     Appearance: She is well-developed. She is not diaphoretic.   HENT:      Head: Normocephalic and atraumatic.      Mouth/Throat:      Pharynx: No oropharyngeal exudate.   Eyes:      General: No scleral icterus.        Right eye: No discharge.         Left eye: No discharge.      Conjunctiva/sclera: Conjunctivae normal.      Pupils: Pupils are equal, round, and reactive to light.   Neck:      Musculoskeletal: Normal range of motion and neck supple.   Cardiovascular:      Rate and Rhythm: Normal rate and regular rhythm.      Heart sounds: Normal heart sounds. No murmur. No friction rub. No gallop.  "   Pulmonary:      Effort: Pulmonary effort is normal. No respiratory distress.      Breath sounds: Normal breath sounds. No wheezing.   Abdominal:      General: Bowel sounds are normal. There is no distension.      Palpations: Abdomen is soft.      Tenderness: There is no abdominal tenderness.   Musculoskeletal: Normal range of motion.   Skin:     General: Skin is warm and dry.      Coloration: Skin is not pale.      Findings: No erythema or rash.   Neurological:      Mental Status: She is alert and oriented to person, place, and time.   Psychiatric:         Behavior: Behavior normal.         [facility] on 07/14/2020   Component Date Value Ref Range Status   • WBC 07/14/2020 4.7* 4.8 - 10.8 K/uL Final   • RBC 07/14/2020 4.76  4.20 - 5.40 M/uL Final   • Hemoglobin 07/14/2020 12.7  12.0 - 16.0 g/dL Final   • Hematocrit 07/14/2020 40.6  37.0 - 47.0 % Final   • MCV 07/14/2020 85.3  81.4 - 97.8 fL Final   • MCH 07/14/2020 26.7* 27.0 - 33.0 pg Final   • MCHC 07/14/2020 31.3* 33.6 - 35.0 g/dL Final   • RDW 07/14/2020 60.4* 35.9 - 50.0 fL Final   • Platelet Count 07/14/2020 234  164 - 446 K/uL Final   • MPV 07/14/2020 10.0  9.0 - 12.9 fL Final   • Neutrophils-Polys 07/14/2020 49.30  44.00 - 72.00 % Final   • Lymphocytes 07/14/2020 32.30  22.00 - 41.00 % Final   • Monocytes 07/14/2020 12.10  0.00 - 13.40 % Final   • Eosinophils 07/14/2020 5.30  0.00 - 6.90 % Final   • Basophils 07/14/2020 0.80  0.00 - 1.80 % Final   • Immature Granulocytes 07/14/2020 0.20  0.00 - 0.90 % Final   • Nucleated RBC 07/14/2020 0.00  /100 WBC Final   • Neutrophils (Absolute) 07/14/2020 2.32  2.00 - 7.15 K/uL Final    Includes immature neutrophils, if present.   • Lymphs (Absolute) 07/14/2020 1.52  1.00 - 4.80 K/uL Final   • Monos (Absolute) 07/14/2020 0.57  0.00 - 0.85 K/uL Final   • Eos (Absolute) 07/14/2020 0.25  0.00 - 0.51 K/uL Final   • Baso (Absolute) 07/14/2020 0.04  0.00 - 0.12 K/uL Final   • Immature Granulocytes (abs)  07/14/2020 0.01  0.00 - 0.11 K/uL Final   • NRBC (Absolute) 07/14/2020 0.00  K/uL Final   • Sodium 07/14/2020 140  135 - 145 mmol/L Final   • Potassium 07/14/2020 3.8  3.6 - 5.5 mmol/L Final   • Chloride 07/14/2020 103  96 - 112 mmol/L Final   • Co2 07/14/2020 21  20 - 33 mmol/L Final   • Anion Gap 07/14/2020 16.0  7.0 - 16.0 Final   • Glucose 07/14/2020 134* 65 - 99 mg/dL Final   • Bun 07/14/2020 18  8 - 22 mg/dL Final   • Creatinine 07/14/2020 1.20  0.50 - 1.40 mg/dL Final   • Calcium 07/14/2020 9.5  8.5 - 10.5 mg/dL Final   • AST(SGOT) 07/14/2020 23  12 - 45 U/L Final   • ALT(SGPT) 07/14/2020 20  2 - 50 U/L Final   • Alkaline Phosphatase 07/14/2020 130* 30 - 99 U/L Final   • Total Bilirubin 07/14/2020 0.7  0.1 - 1.5 mg/dL Final   • Albumin 07/14/2020 3.8  3.2 - 4.9 g/dL Final   • Total Protein 07/14/2020 6.7  6.0 - 8.2 g/dL Final   • Globulin 07/14/2020 2.9  1.9 - 3.5 g/dL Final   • A-G Ratio 07/14/2020 1.3  g/dL Final   • Carcinoembryonic Antigen 07/14/2020 1.5  0.0 - 3.0 ng/mL Final    Comment: Effective 3/18/2020, this assay is performed using Roche ashleigh e immunoassay  analyzer. CEA values determined on patient samples by different testing  procedures cannot be directly compared with one another and could be the  cause of erroneous medical interpretations. If there is a change in the CEA  assay procedure used while monitoring therapy, then new baselines may need  to be established when comparing previous results with results received  after 3/17/2020.     • GFR If  07/14/2020 52* >60 mL/min/1.73 m 2 Final   • GFR If Non  07/14/2020 43* >60 mL/min/1.73 m 2 Final           CT Abd  6/29/2020 9:30 AM  HISTORY/REASON FOR EXAM:  History of metastatic colon cancer with liver metastases, follow-up.     TECHNIQUE/EXAM DESCRIPTION:  Quadphase CT scan of the abdomen without and with contrast.     Initial precontrast images were obtained from the diaphragmatic domes through the iliac  crests using helical technique.  Following the administration of nonionic contrast in an intravenous bolus fashion, and postcontrast, thin-section helical scanning   obtained from the diaphragmatic domes through the iliac crests.  Imaging was obtained in the arterial, venous, and 5 minute delayed phase of contrast enhancement.     100 mL of Omnipaque 350 nonionic contrast was administered.     Low dose optimization technique was utilized for this CT exam including automated exposure control and adjustment of the mA and/or kV according to patient size.     COMPARISON: 5/7/2020 CT, 5/14/2020 PET/CT.     FINDINGS:  Liver: Post ablation changes in the right hepatic dome, with ablation zone measuring approximately 8 x 6 cm. It contains small amount of air. Although no hyperenhancement is seen in this region,  the original tumor was hypoenhancing. Persistent   calcifications along the superior margin of the ablation cavity, in the region of the previously described tumor.     Post ablation changes in the inferior posterior right hepatic lobe. The ablation cavity communicates with the more superior ablation cavity. A small hyperdense nodule in the medial aspect of the ablation cavity measures 1.1 cm. No enhancing areas are   identified.     No new hepatic lesions.     Hypertrophy of the left hepatic lobe relative to the right.     Hepatic arterial vasculature: Hepatic artery is patent and appears normal.     Portal vein: The portal vein enhances normally and is patent.     Portal vein diameter: 15 mm     Splenic vein: The splenic vein is patent.     Biliary system: Cholecystectomy. No intrahepatic or extrahepatic biliary dilatation. CBD measures 6 mm, and there is a slightly hyperdense 4.5 mm structure in the distal CBD.     Relative extrahepatic findings: None.     Other organs:  Splenic length: 12 cm     Pancreas: Pancreas is unremarkable.     Kidneys: Kidneys are unremarkable.  Adrenals: Adrenal glands are  unremarkable.     Lymph nodes: Enlarged right pericardiophrenic node measures 1.5 x 1.2 cm.     Bowel: Tiny hiatal hernia. No bowel obstruction. Partially visualized right lower quadrant ostomy.     Pelvis: Not scanned.     Lower chest: Lung bases are unremarkable. Trace right pleural effusion.  Bones: Unremarkable.     Atherosclerosis.     IMPRESSION:  1. Post ablation changes in the right hepatic lobe. While no hyperenhancement is identified, the original tumors were hypoenhancing. Close follow-up with CT and/or PET imaging is advised.  2. No new liver lesions. No new metastases in the abdomen.  3. A 4.5 mm slightly hyperdense structure in the distal CBD may represent a small, nonobstructing distal CBD stone. No extrahepatic or intrahepatic biliary dilatation.           PET  5/14/2020 12:20 PM  HISTORY/REASON FOR EXAM:  follow up metastatic rectal carcinoma with new liver lesion noted on CT; follow up metastatic rectal carcinoma with new liver lesion noted on CT     TECHNIQUE/EXAM DESCRIPTION AND NUMBER OF VIEWS:  PET body imaging.     Initially, 15.5 mCi F-18 FDG was administered intravenously under standardized conditions. Approximately 45 minutes after FDG administration, the patient was placed in the supine position on the PET CT table. Blood glucose level was 77 mg/dL.     Low dose spiral CT imaging was performed from the skull base to the mid thighs.     PET imaging was then performed from the skull base to the mid thighs. CT images, PET images, and PET/CT fused images were reviewed on a PACS 3D workstation.     The limited non-contrast CT data are used primarily for attenuation correction and anatomic correlation.  Evaluation of solid organs and bowel are especially limited utilizing this technique.        A low dose CT was obtained of the same area without IV contrast for attenuation correction and coregistration, not for a diagnostic scan.     COMPARISON: CT 5/7/2020.     FINDINGS:  VISUALIZED BRAIN:  Normal metabolic activity.     HEAD AND NECK: Normal physiologic uptake.     CHEST: Background blood pool activity shows average SUV of 1.8.     Lungs show no hypermetabolic pulmonary nodules.     There is no hypermetabolic hilar, mediastinal, or axillary lymphadenopathy.     Activity in the tubing anterior to the patient lower chest.     ABDOMEN AND PELVIS: Background liver activity shows average SUV of 3.2.     There is a necrotic mass in the right hepatic dome (SUV max 9.6).     Additional mass in the inferior right hepatic lobe also demonstrate increased uptake (SUV 10.1)     There is normal, uniform metabolic activity in the spleen, adrenal glands, kidneys.     There is no hypermetabolic mesenteric, retroperitoneal, iliac, or inguinal lymphadenopathy.     Nonspecific physiologic activity in the colon and intestine is noted.     Right upper quadrant ostomy with physiologic activity.     VISUALIZED MUSCULOSKELETAL SYSTEM: No hypermetabolic activity to suggest osteolytic metastases or sclerosis to suggest osteoblastic metastases.     IMPRESSION:   1. Necrotic mass in the right hepatic dome with increased uptake, in keeping with viable metastasis.     2. Additional hypermetabolic mass in the inferior right hepatic lobe is consistent with metastasis as well.     3. No other increased uptake identified.         Assessment/Plan:       1. Malignant neoplasm of rectum (HCC)     2. Secondary malignant neoplasm of liver (HCC)     3. Encounter for long-term current use of high risk medication         1.  Liver Mets: She is status post Y90 radio embolization of liver lesion on 6/15/2017, without sequelae. S/p overlapping liver ablations x 3 per Dr. ROJAS on 11/15/18 for localized recurrence per PET completed 10/25/18 (following short break in treatment). PET completed 5/14/20 showed increased uptake in R liver lobe. She completed right liver ablation x 10 sites per Dr. ROJAS on 5/28/20. Repeat CT in 3 months (10/2/20) per   BOB.    2.  Renal insufficiency: Decreased GFR stable for patient given loop ileostomy, will continue to monitor.     3.  Rectal cancer: Patient initially diagnosed in 2012. She has been undergoing treatment with single agent 5-FU since February 2016.  She has intermittently undergone ablative procedures as well as Y90 for isolated hepatic lesions, most recently in 5/2020. CEA remains stable since 10/2018.  Dosing was re-adjusted from q. 21 days back to q. 14 days with cycle 98. CBC, CMP, CEA have been evaluated and found to be within acceptable limits, except where addressed above.  Patient will proceed with cycle 100 5-FU and return in 2 weeks for evaluation prior to cycle 101, sooner as needed.            The patient verbalized agreement and understanding of current plan. All questions and concerns were addressed at time of visit.    Please note that this dictation was created using voice recognition software. I have made every reasonable attempt to correct obvious errors, but I expect that there are errors of grammar and possibly content that I did not discover before finalizing the note.

## 2020-07-27 NOTE — LETTER
2020     Ashley Omalley MD  [address] [facility] [id number]    Patient: Ritu Ramirez  YOB: 1955  Date of Visit: 2020      Dear Dr. Sirisha Omalley:    Thank you for referring Ritu Ramirez to me for evaluation. Below are my notes for this consultation.    If you have questions, please do not hesitate to call me. I look forward to following your patient along with you.         Sincerely,        Javier Corcoran MD        CC: MD Nilo Nicole Philip Y, MD  2020  5:34 PM  Sign at close encounter      Chief Complaint:   Chief Complaint   Patient presents with   • Hemorrhoids   .    HPI     Patient is a 64 y.o. female who presents with the following:  Pt has had many many injections in the past - she was anemic for years    She has been now dx with systemic mastocytosis whicih is rare and partially explains her past anemia - she is still followed by Dr. Calhoun    Her last injection which must have been 4y ago or so, seems to have worked until last mo when she started bleeding again - in the bowl  -      Medical History:   Past Medical History:   Diagnosis Date   • Hypertension    • Systemic mastocytosis        Surgical History:   Past Surgical History:   Procedure Laterality Date   • COLONOSCOPY     • REDUCTION MAMMAPLASTY         Social History:   Social History     Socioeconomic History   • Marital status:      Spouse name: Not on file   • Number of children: Not on file   • Years of education: Not on file   • Highest education level: Not on file   Occupational History   • Not on file   Social Needs   • Financial resource strain: Not on file   • Food insecurity:     Worry: Not on file     Inability: Not on file   • Transportation needs:     Medical: Not on file     Non-medical: Not on file   Tobacco Use   • Smoking status: Former Smoker     Packs/day: 0.25     Last attempt to quit:      Years since quittin.5   • Smokeless tobacco:  Never Used   Substance and Sexual Activity   • Alcohol use: Yes     Comment: Social   • Drug use: No   • Sexual activity: Defer   Lifestyle   • Physical activity:     Days per week: Not on file     Minutes per session: Not on file   • Stress: Not on file   Relationships   • Social connections:     Talks on phone: Not on file     Gets together: Not on file     Attends Hindu service: Not on file     Active member of club or organization: Not on file     Attends meetings of clubs or organizations: Not on file     Relationship status: Not on file   • Intimate partner violence:     Fear of current or ex partner: Not on file     Emotionally abused: Not on file     Physically abused: Not on file     Forced sexual activity: Not on file   Other Topics Concern   • Not on file   Social History Narrative   • Not on file       Family History:   History reviewed. No pertinent family history.    Allergies:   Anesthetics - amide type; Clarithromycin; Erythromycin base; Iodinated contrast media; Nsaids (non-steroidal anti-inflammatory drug); and Penicillins    Current Medications:      Current Outpatient Medications:   •  ALPRAZolam (XANAX) 0.25 mg tablet, TAKE 1 TABLET BY MOUTH DAILY AS NEEDED FOR ANXIETY, Disp: 30 tablet, Rfl: 1  •  amLODIPine (NORVASC) 5 mg tablet, Take 5 mg by mouth daily.  , Disp: , Rfl:   •  cholecalciferol, vitamin D3, 1,000 unit capsule, take 1 capsule by oral route  every day, Disp: , Rfl:   •  escitalopram (LEXAPRO) 10 mg tablet, TAKE 1 TABLET BY MOUTH EVERY DAY, Disp: 30 tablet, Rfl: 11  •  hydrochlorothiazide (HYDRODIURIL) 25 mg tablet, Take 25 mg by mouth once daily., Disp: , Rfl: 3  •  potassium chloride (KLOR-CON) 20 mEq CR tablet, Take 20 mEq by mouth daily., Disp: , Rfl:   •  rosuvastatin (CRESTOR) 10 mg tablet, Take 10 mg by mouth daily., Disp: , Rfl:     Review of Systems  All 14 systems reviewed and the findings are not pertinent to the current problem.    Objective     Vital Signs  Vitals:     07/27/20 1614   BP: 130/82   Temp: 37.2 °C (99 °F)     Body mass index is 23.05 kg/m².      Physicial Exam  General Appearance:  Well developed.  Well nourished.  In no acute distress. Patient was not obese.  Head:  Posture of the head was normal.  Neck:  External features of the neck was normal.  Trachea:  Showed no abnormalities.  Trachea midline.  Extraocular Movements:  Normal.  Pupils:  Reactive to light.   Not deformed.  Oral Cavity:  Mixed dentition was not observed.  Tongue was midline.  Peripheral edema:  Not present.  Genitalia:  Normal.    Anorectal Examination:    No pilonidal sinus was observed.   No pilonidal cyst was observed.   Perianal skin was not erythematous.  No perianal wart was observed.  No anal fissure was observed.   Anus did not have a fistula.   Anal sphincter tone was normal.    The patient had large, friable, grade three internal hemorrhoids.  Specifics:  Tissue inside very injected and angry appearing.    No external anal skin tags were observed.   Anus had no mass.  Rectum:  No rectal abscess was observed.   Rectal prolapse was not observed.   No rectal fistula was observed.   Rectal exam was not tender.   No rectal fluctuance was observed.  Rectal exam showed no mass.   Normal richard-anal skin with no lesions or dermatitis    Other Exam:  Musculoskeletal System:  No gross defecits or defects of the upper or lower extremities.  No cyanosis of the fingers.  Lumbar / Lumbosacral Spine:  Lumbar/lumbosacral spine exhibited no abnormalities.  Bilateral thighs:   Posterior aspect was not swollen.   Posterior aspect showed no induration.   Posterior aspect was not erythematous.   Posterior aspect was not warm.   No mass on the posterior aspect.  Functional Exam:   General/bilateral:  Mobility was not limited.  Neurological:   No confusion was observed.   No disorientation was observed.  Speech:  Normal.  Motor:  No tremor was seen.  Balance:  Normal.  Gait And Stance:   Normal.  Psychiatric:  Mood:  Euthymic.  Affect:  Normal.  Skin:  No clubbing of the fingernails.  Normal upper and lower extremity skin exam.            Problem List Items Addressed This Visit     Internal hemorrhoid, bleeding - Primary     The internal hemorrhoids were again injected with 5% phenol mixed in olive oil as a sclerotherapy.  The patient will return on a PRN basis for further injections.  If these treatments do not relieve their symptoms then surgery may be considered.                     Javier Corcoran MD 7/27/2020 5:33 PM           Javier Corcoran MD  7/27/2020  5:34 PM  Sign at close encounter  Procedures  The patient had large friable internal hemorrhoids that required office based therapy.  Verbal consent was obtained.  No prep was necessary.  First a digital rectal exam and then anoscopy was performed.  Injection sclerotherapy was then performed using 5% phenol in olive oil through an 18 gauge spinal needle.  The patient tolerated it well and was given instructions to return as needed.

## 2020-07-27 NOTE — ASSESSMENT & PLAN NOTE
The internal hemorrhoids were again injected with 5% phenol mixed in olive oil as a sclerotherapy.  The patient will return on a PRN basis for further injections.  If these treatments do not relieve their symptoms then surgery may be considered.

## 2020-07-29 ENCOUNTER — OFFICE VISIT (OUTPATIENT)
Dept: INTERNAL MEDICINE | Facility: CLINIC | Age: 65
End: 2020-07-29
Payer: COMMERCIAL

## 2020-07-29 ENCOUNTER — TELEPHONE (OUTPATIENT)
Dept: INTERNAL MEDICINE | Facility: CLINIC | Age: 65
End: 2020-07-29

## 2020-07-29 DIAGNOSIS — R73.01 ELEVATED FASTING GLUCOSE: ICD-10-CM

## 2020-07-29 DIAGNOSIS — E53.8 VITAMIN B12 DEFICIENCY: ICD-10-CM

## 2020-07-29 DIAGNOSIS — I10 ESSENTIAL HYPERTENSION: ICD-10-CM

## 2020-07-29 DIAGNOSIS — E78.5 HYPERLIPIDEMIA, UNSPECIFIED HYPERLIPIDEMIA TYPE: ICD-10-CM

## 2020-07-29 DIAGNOSIS — E55.9 VITAMIN D DEFICIENCY: ICD-10-CM

## 2020-07-29 DIAGNOSIS — J02.9 SORE THROAT: Primary | ICD-10-CM

## 2020-07-29 DIAGNOSIS — D47.02 SYSTEMIC MASTOCYTOSIS: ICD-10-CM

## 2020-07-29 PROCEDURE — 99212 OFFICE O/P EST SF 10 MIN: CPT | Performed by: INTERNAL MEDICINE

## 2020-07-29 NOTE — ASSESSMENT & PLAN NOTE
Resolved at this point.  May have been a mild viral syndrome she does not wish to be tested for coronavirus.  She had no systemic symptoms.  Does have seasonal allergies and occasionally gets some scratchiness of her throat.  Will call if she develops any other symptoms.  Advised she must get tested if she develops any of the symptoms of coronavirus reviewed these with her in detail.

## 2020-07-29 NOTE — PROGRESS NOTES
Verification of Patient Location:  The patient affirms they are currently located in the following state: [place]    Request for Consent:    Audio Only Encounter   You and I are about to have a telemedicine check-in or visit. This is allowed because you have requested it. This telemedicine visit will be billed to your health insurance or you, if you are self-insured. You understand you will be responsible for any copayments or coinsurances that apply to your telemedicine visit. Before starting our telemedicine visit, I am required to get your consent for this virtual check-in or visit by telemedicine. Do you consent?    Patient Response to Request for Consent:  Yes      Visit Documentation:  Subjective     Patient ID: Ritu Ramirez is a 64 y.o. female.  1955      HPI  This is a telemd audio visit - she has had a mild sore throat for the last several days - no fever or chills. Is actually been feeling better overall.  Denies any fever or chills, cough or sinus congestion. No diarrhea , headache , nausea or vomiting. Her significant other had a viral syndrome last week with some diarrhea and sore throat which resolved.  He tested negative for coronavirus.  She feels completely fine now.  Really only wanted to discuss that she needed a slip for blood work at this point.  And had any problems from her mastocytosis.  Remains compliant with her antihypertensive medication    The following have been reviewed and updated as appropriate in this visit:  Tobacco  Allergies  Meds  Med Hx  Surg Hx  Fam Hx  Soc Hx      Review of Systems  Negative for fever chills cough shortness of breath headache sinus congestion or sore throat negative chest pain palpitations shortness of breath or lightheadedness negative abdominal pain diarrhea nausea vomiting could have loss of taste or smell    Assessment/Plan   Diagnoses and all orders for this visit:    Sore throat (Primary)  Assessment & Plan:  Resolved at this point.  May  have been a mild viral syndrome she does not wish to be tested for coronavirus.  She had no systemic symptoms.  Does have seasonal allergies and occasionally gets some scratchiness of her throat.  Will call if she develops any other symptoms.  Advised she must get tested if she develops any of the symptoms of coronavirus reviewed these with her in detail.      Vitamin D deficiency  -     Vitamin D 25 hydroxy; Future    Essential hypertension  Assessment & Plan:  Stable on her current regimen    Orders:  -     CBC; Future  -     Comprehensive metabolic panel; Future    Hyperlipidemia, unspecified hyperlipidemia type  -     CBC; Future  -     TSH 3rd Generation; Future  -     Lipid panel; Future    Elevated fasting glucose  -     Hemoglobin A1c; Future    Vitamin B12 deficiency  -     Vitamin B12; Future    Systemic mastocytosis  Assessment & Plan:  No recent flares.  Follows with Dr. Smith hematology.  Tryptase level normal.        Time Spent in Medical Discussion During This Encounter:    10 minutes

## 2020-07-29 NOTE — TELEPHONE ENCOUNTER
Patient asked to change her appt to telemed visit because she has a sore throat and doesn't want to risk going out.

## 2020-08-05 ENCOUNTER — HOSPITAL ENCOUNTER (OUTPATIENT)
Dept: RADIOLOGY | Age: 65
Discharge: HOME | End: 2020-08-05
Attending: INTERNAL MEDICINE
Payer: COMMERCIAL

## 2020-08-05 ENCOUNTER — HOSPITAL ENCOUNTER (OUTPATIENT)
Dept: RADIOLOGY | Age: 65
Discharge: HOME | End: 2020-08-05
Attending: OBSTETRICS & GYNECOLOGY
Payer: COMMERCIAL

## 2020-08-05 DIAGNOSIS — Q82.2 CONGENITAL CUTANEOUS MASTOCYTOSIS: ICD-10-CM

## 2020-08-05 DIAGNOSIS — D25.9 LEIOMYOMA OF UTERUS, UNSPECIFIED: ICD-10-CM

## 2020-08-05 PROCEDURE — 76856 US EXAM PELVIC COMPLETE: CPT

## 2020-08-05 PROCEDURE — 76830 TRANSVAGINAL US NON-OB: CPT

## 2020-08-05 PROCEDURE — 76700 US EXAM ABDOM COMPLETE: CPT

## 2020-08-12 LAB
25(OH)D3+25(OH)D2 SERPL-MCNC: 25.3 NG/ML (ref 30–100)
25(OH)D3+25(OH)D2 SERPL-MCNC: 25.4 NG/ML (ref 30–100)
ALBUMIN SERPL-MCNC: 4.6 G/DL (ref 3.8–4.8)
ALBUMIN/GLOB SERPL: 2.6 {RATIO} (ref 1.2–2.2)
ALP SERPL-CCNC: 65 IU/L (ref 39–117)
ALT SERPL-CCNC: 23 IU/L (ref 0–32)
AST SERPL-CCNC: 21 IU/L (ref 0–40)
BILIRUB SERPL-MCNC: 0.2 MG/DL (ref 0–1.2)
BUN SERPL-MCNC: 19 MG/DL (ref 8–27)
BUN/CREAT SERPL: 28 (ref 12–28)
CALCIUM SERPL-MCNC: 9.7 MG/DL (ref 8.7–10.3)
CHLORIDE SERPL-SCNC: 96 MMOL/L (ref 96–106)
CHOLEST SERPL-MCNC: 148 MG/DL (ref 100–199)
CO2 SERPL-SCNC: 30 MMOL/L (ref 20–29)
CREAT SERPL-MCNC: 0.68 MG/DL (ref 0.57–1)
ERYTHROCYTE [DISTWIDTH] IN BLOOD BY AUTOMATED COUNT: 14.1 % (ref 11.7–15.4)
GLOBULIN SER CALC-MCNC: 1.8 G/DL (ref 1.5–4.5)
GLUCOSE SERPL-MCNC: 119 MG/DL (ref 65–99)
HBA1C MFR BLD: 4.7 % (ref 4.8–5.6)
HCT VFR BLD AUTO: 32.5 % (ref 34–46.6)
HDLC SERPL-MCNC: 79 MG/DL
HGB BLD-MCNC: 9.4 G/DL (ref 11.1–15.9)
LAB CORP EGFR IF AFRICN AM: 107 ML/MIN/1.73
LAB CORP EGFR IF NONAFRICN AM: 93 ML/MIN/1.73
LDLC SERPL CALC-MCNC: 50 MG/DL (ref 0–99)
MCH RBC QN AUTO: 24.4 PG (ref 26.6–33)
MCHC RBC AUTO-ENTMCNC: 28.9 G/DL (ref 31.5–35.7)
MCV RBC AUTO: 84 FL (ref 79–97)
PLATELET # BLD AUTO: 380 X10E3/UL (ref 150–450)
POTASSIUM SERPL-SCNC: 3.5 MMOL/L (ref 3.5–5.2)
PROT SERPL-MCNC: 6.4 G/DL (ref 6–8.5)
RBC # BLD AUTO: 3.86 X10E6/UL (ref 3.77–5.28)
SODIUM SERPL-SCNC: 141 MMOL/L (ref 134–144)
TRIGL SERPL-MCNC: 94 MG/DL (ref 0–149)
TSH SERPL DL<=0.005 MIU/L-ACNC: 3.05 UIU/ML (ref 0.45–4.5)
VLDLC SERPL CALC-MCNC: 19 MG/DL (ref 5–40)
WBC # BLD AUTO: 4.9 X10E3/UL (ref 3.4–10.8)

## 2020-08-17 ENCOUNTER — TELEMEDICINE (OUTPATIENT)
Dept: INTERNAL MEDICINE | Facility: CLINIC | Age: 65
End: 2020-08-17
Payer: COMMERCIAL

## 2020-08-17 DIAGNOSIS — E55.9 VITAMIN D DEFICIENCY: Primary | ICD-10-CM

## 2020-08-17 DIAGNOSIS — K82.4 GALLBLADDER POLYP: ICD-10-CM

## 2020-08-17 DIAGNOSIS — D64.9 ANEMIA, UNSPECIFIED TYPE: ICD-10-CM

## 2020-08-17 PROCEDURE — 99213 OFFICE O/P EST LOW 20 MIN: CPT | Mod: 95 | Performed by: NURSE PRACTITIONER

## 2020-08-17 ASSESSMENT — ENCOUNTER SYMPTOMS
WEAKNESS: 0
FATIGUE: 0
FREQUENCY: 0
MYALGIAS: 0
SINUS PAIN: 0
LIGHT-HEADEDNESS: 0
NAUSEA: 0
SINUS PRESSURE: 0
PALPITATIONS: 0
SHORTNESS OF BREATH: 0
ABDOMINAL PAIN: 0
ADENOPATHY: 0
VOMITING: 0
COUGH: 0
CHEST TIGHTNESS: 0
DIARRHEA: 0
FEVER: 0
DYSURIA: 0
EYE REDNESS: 0
EYE PAIN: 0
CHILLS: 0
TROUBLE SWALLOWING: 0
DIZZINESS: 0
SORE THROAT: 0
ARTHRALGIAS: 0
HEADACHES: 0
WHEEZING: 0

## 2020-08-17 NOTE — PROGRESS NOTES
Verification of Patient Location:  The patient affirms they are currently located in the following state: [place]    Request for Consent:    Audio Only Encounter   You and I are about to have a telemedicine check-in or visit. This is allowed because you have requested it. This telemedicine visit will be billed to your health insurance or you, if you are self-insured. You understand you will be responsible for any copayments or coinsurances that apply to your telemedicine visit. Before starting our telemedicine visit, I am required to get your consent for this virtual check-in or visit by telemedicine. Do you consent?    Patient Response to Request for Consent:  Yes      Visit Documentation:  Subjective     Patient ID: Ritu Ramirez is a 64 y.o. female.  1955      64-year-old female with hyperlipidemia, systemic mastocytosis presents for telemedicine visit today to review her recent lab work with Dr. Raffy MD.  She would also like to discuss her recent ultrasound that was done by Dr. Calhoun, hematology.  Otherwise, she offers no complaints presently.      The following have been reviewed and updated as appropriate in this visit:       Review of Systems   Constitutional: Negative for chills, fatigue and fever.   HENT: Negative for congestion, sinus pressure, sinus pain, sore throat and trouble swallowing.    Eyes: Negative for pain and redness.   Respiratory: Negative for cough, chest tightness, shortness of breath and wheezing.    Cardiovascular: Negative for chest pain and palpitations.   Gastrointestinal: Negative for abdominal pain, diarrhea, nausea and vomiting.   Genitourinary: Negative for dysuria, frequency and urgency.   Musculoskeletal: Negative for arthralgias and myalgias.   Skin: Negative for rash.   Neurological: Negative for dizziness, weakness, light-headedness and headaches.   Hematological: Negative for adenopathy.         Assessment/Plan   Diagnoses and all orders for this  visit:    Vitamin D deficiency (Primary)  Assessment & Plan:  Patient advised to take 2000 international units of vitamin D daily.      Anemia, unspecified type  Assessment & Plan:  Hemoglobin appears stable from previous labs.  No evidence of overt bleeding.  Patient made aware of reading.      Gallbladder polyp  Assessment & Plan:  Recently noted on abdominal ultrasound.  Wanted Dr. Raffy MD advised that it was present.  Dr. Suki Calhoun discussed with the patient as well and will continue to monitor.          Time Spent in Medical Discussion During This Encounter:    15 minutes

## 2020-08-17 NOTE — ASSESSMENT & PLAN NOTE
Recently noted on abdominal ultrasound.  Wanted Dr. Raffy MD advised that it was present.  Dr. Suki Calhoun discussed with the patient as well and will continue to monitor.

## 2020-08-17 NOTE — ASSESSMENT & PLAN NOTE
Hemoglobin appears stable from previous labs.  No evidence of overt bleeding.  Patient made aware of reading.

## 2020-09-22 ENCOUNTER — TELEPHONE (OUTPATIENT)
Dept: SURGERY | Facility: CLINIC | Age: 65
End: 2020-09-22

## 2020-10-13 ENCOUNTER — TELEMEDICINE (OUTPATIENT)
Dept: INTERNAL MEDICINE | Facility: CLINIC | Age: 65
End: 2020-10-13
Payer: MEDICARE

## 2020-10-13 DIAGNOSIS — D47.09 MASTOCYTOSIS: Primary | ICD-10-CM

## 2020-10-13 DIAGNOSIS — E78.5 DYSLIPIDEMIA: ICD-10-CM

## 2020-10-13 DIAGNOSIS — I10 ESSENTIAL HYPERTENSION: Primary | ICD-10-CM

## 2020-10-13 DIAGNOSIS — D47.02 SYSTEMIC MASTOCYTOSIS: ICD-10-CM

## 2020-10-13 DIAGNOSIS — D12.3 ADENOMATOUS POLYP OF TRANSVERSE COLON: ICD-10-CM

## 2020-10-13 PROCEDURE — 99442 PR PHYS/QHP TELEPHONE EVALUATION 11-20 MIN: CPT | Mod: 95 | Performed by: INTERNAL MEDICINE

## 2020-10-13 RX ORDER — ALPRAZOLAM 0.25 MG/1
0.25 TABLET ORAL DAILY PRN
Qty: 30 TABLET | Refills: 1 | Status: SHIPPED | OUTPATIENT
Start: 2020-10-13 | End: 2020-11-13

## 2020-10-13 NOTE — PROGRESS NOTES
Verification of Patient Location:  The patient affirms they are currently located in the following state:[place]     Request for Consent:  You and I are about to have a telemedicine check-in or visit. This is allowed because you are already my patient, and you have requested it.  This telemedicine visit will be billed to your health insurance or you, if you are self-insured.  You understand you will be responsible for any copayments or coinsurances that apply to your telemedicine visit.  Before starting our telemedicine visit, I am required to get your consent for this virtual check-in or visit by telemedicine. Do you consent?      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 She has been feeling well overall in terms of this condition.  Occasionally gets some gastrointestinal bloating but nothing that is persistent.  The [facility] does want her to obtain an upper endoscopy and colonoscopy prior to going there and she will arrange that with Dr. Андрей Wolfe.  She has not had any flushing or increased skin rash pruritus, fever chills or weight loss.  She is due to have a tryptase level checked and I have put that order in for her today.  She also requested a reading renewal on her Xanax which she uses very sparingly but does take this prior to her visits at the [facility].  I reviewed her labs again with her from August her lipid profile is quite good on statin therapy her sugar was 119 but her hemoglobin A1c was quite good at 4.7.  Hemoglobin remained stable at 9.4.  She continues on amlodipine and hydrochlorothiazide with potassium replacement for her blood pressure.  Denies any headaches or dizziness.  No chest pain palpitation shortness of breath or lightheadedness        Time Spent in Medical Discussion During This Encounter:  18 minutes

## 2020-11-09 ENCOUNTER — TRANSCRIBE ORDERS (OUTPATIENT)
Dept: SCHEDULING | Age: 65
End: 2020-11-09

## 2020-11-09 ENCOUNTER — HOSPITAL ENCOUNTER (OUTPATIENT)
Dept: RADIOLOGY | Facility: HOSPITAL | Age: 65
Discharge: HOME | End: 2020-11-09
Attending: INTERNAL MEDICINE
Payer: MEDICARE

## 2020-11-09 DIAGNOSIS — R14.0 ABDOMINAL DISTENSION (GASEOUS): ICD-10-CM

## 2020-11-09 DIAGNOSIS — R14.0 ABDOMINAL DISTENSION (GASEOUS): Primary | ICD-10-CM

## 2020-11-09 PROCEDURE — 74022 RADEX COMPL AQT ABD SERIES: CPT

## 2020-11-13 ENCOUNTER — HOSPITAL ENCOUNTER (INPATIENT)
Facility: HOSPITAL | Age: 65
LOS: 2 days | Discharge: HOME | DRG: 812 | End: 2020-11-15
Attending: EMERGENCY MEDICINE | Admitting: HOSPITALIST
Payer: MEDICARE

## 2020-11-13 ENCOUNTER — APPOINTMENT (EMERGENCY)
Dept: RADIOLOGY | Facility: HOSPITAL | Age: 65
DRG: 812 | End: 2020-11-13
Attending: EMERGENCY MEDICINE
Payer: MEDICARE

## 2020-11-13 ENCOUNTER — LAB REQUISITION (OUTPATIENT)
Dept: LAB | Facility: HOSPITAL | Age: 65
DRG: 812 | End: 2020-11-13
Payer: MEDICARE

## 2020-11-13 DIAGNOSIS — D50.8 OTHER IRON DEFICIENCY ANEMIAS: ICD-10-CM

## 2020-11-13 DIAGNOSIS — D64.9 ANEMIA, UNSPECIFIED TYPE: Primary | ICD-10-CM

## 2020-11-13 PROBLEM — K59.00 CONSTIPATION: Status: ACTIVE | Noted: 2020-11-13

## 2020-11-13 PROBLEM — D50.9 IRON DEFICIENCY ANEMIA: Status: ACTIVE | Noted: 2020-08-17

## 2020-11-13 LAB
ABO + RH BLD: NORMAL
ALBUMIN SERPL-MCNC: 4.6 G/DL (ref 3.4–5)
ALP SERPL-CCNC: 56 IU/L (ref 35–126)
ALT SERPL-CCNC: 19 IU/L (ref 11–54)
ANION GAP SERPL CALC-SCNC: 12 MEQ/L (ref 3–15)
ANION GAP SERPL CALC-SCNC: 13 MEQ/L (ref 3–15)
ANISOCYTOSIS BLD QL SMEAR: ABNORMAL
ANISOCYTOSIS BLD QL SMEAR: ABNORMAL
APTT PPP: 31 SEC (ref 23–35)
AST SERPL-CCNC: 19 IU/L (ref 15–41)
BASOPHILS # BLD: 0.09 K/UL (ref 0.01–0.1)
BASOPHILS # BLD: 0.1 K/UL (ref 0.01–0.1)
BASOPHILS NFR BLD: 2 %
BASOPHILS NFR BLD: 2 %
BILIRUB SERPL-MCNC: 0.8 MG/DL (ref 0.3–1.2)
BLD GP AB SCN SERPL QL: NEGATIVE
BUN SERPL-MCNC: 19 MG/DL (ref 8–20)
BUN SERPL-MCNC: 20 MG/DL (ref 8–20)
CALCIUM SERPL-MCNC: 9.4 MG/DL (ref 8.9–10.3)
CALCIUM SERPL-MCNC: 9.7 MG/DL (ref 8.9–10.3)
CHLORIDE SERPL-SCNC: 101 MEQ/L (ref 98–109)
CHLORIDE SERPL-SCNC: 102 MEQ/L (ref 98–109)
CO2 SERPL-SCNC: 22 MEQ/L (ref 22–32)
CO2 SERPL-SCNC: 24 MEQ/L (ref 22–32)
CREAT SERPL-MCNC: 0.7 MG/DL (ref 0.6–1.1)
CREAT SERPL-MCNC: 0.7 MG/DL (ref 0.6–1.1)
D AG BLD QL: POSITIVE
DIFFERENTIAL METHOD BLD: ABNORMAL
DIFFERENTIAL METHOD BLD: ABNORMAL
EOSINOPHIL # BLD: 0.15 K/UL (ref 0.04–0.36)
EOSINOPHIL # BLD: 0.35 K/UL (ref 0.04–0.36)
EOSINOPHIL NFR BLD: 3 %
EOSINOPHIL NFR BLD: 8 %
ERYTHROCYTE [DISTWIDTH] IN BLOOD BY AUTOMATED COUNT: 19.7 % (ref 11.7–14.4)
ERYTHROCYTE [DISTWIDTH] IN BLOOD BY AUTOMATED COUNT: 19.9 % (ref 11.7–14.4)
FERRITIN SERPL-MCNC: 3 NG/ML (ref 11–250)
GFR SERPL CREATININE-BSD FRML MDRD: >60 ML/MIN/1.73M*2
GFR SERPL CREATININE-BSD FRML MDRD: >60 ML/MIN/1.73M*2
GIANT PLATELETS BLD QL SMEAR: ABNORMAL
GLUCOSE SERPL-MCNC: 104 MG/DL (ref 70–99)
GLUCOSE SERPL-MCNC: 94 MG/DL (ref 70–99)
HCT VFR BLDCO AUTO: 19.2 % (ref 35–45)
HCT VFR BLDCO AUTO: 20.7 % (ref 35–45)
HGB BLD-MCNC: 5.1 G/DL (ref 11.8–15.7)
HGB BLD-MCNC: 5.5 G/DL (ref 11.8–15.7)
HYPOCHROMIA BLD QL SMEAR: ABNORMAL
HYPOCHROMIA BLD QL SMEAR: ABNORMAL
INR PPP: 1 INR
IRON SATN MFR SERPL: 2 % (ref 15–45)
IRON SERPL-MCNC: 10 UG/DL (ref 35–150)
LABORATORY COMMENT REPORT: NORMAL
LYMPHOCYTES # BLD: 0.75 K/UL (ref 1.2–3.5)
LYMPHOCYTES # BLD: 0.85 K/UL (ref 1.2–3.5)
LYMPHOCYTES NFR BLD: 17 %
LYMPHOCYTES NFR BLD: 17 %
MAGNESIUM SERPL-MCNC: 2.2 MG/DL (ref 1.8–2.5)
MCH RBC QN AUTO: 15.4 PG (ref 28–33.2)
MCH RBC QN AUTO: 15.5 PG (ref 28–33.2)
MCHC RBC AUTO-ENTMCNC: 26.6 G/DL (ref 32.2–35.5)
MCHC RBC AUTO-ENTMCNC: 26.6 G/DL (ref 32.2–35.5)
MCV RBC AUTO: 57.8 FL (ref 83–98)
MCV RBC AUTO: 58.5 FL (ref 83–98)
METAMYELOCYTES # BLD MANUAL: 0.05 K/UL
METAMYELOCYTES NFR BLD MANUAL: 1 %
MICROCYTES BLD QL SMEAR: ABNORMAL
MONOCYTES # BLD: 0.49 K/UL (ref 0.28–0.8)
MONOCYTES # BLD: 0.6 K/UL (ref 0.28–0.8)
MONOCYTES NFR BLD: 11 %
MONOCYTES NFR BLD: 12 %
NEUTROPHILS # BLD: 2.51 K/UL (ref 1.7–7)
NEUTS BAND # BLD: 2.75 K/UL (ref 1.7–7)
NEUTS BAND # BLD: 3.24 K/UL (ref 1.7–7)
NEUTS SEG NFR BLD: 62 %
NEUTS SEG NFR BLD: 65 %
NRBC BLD MANUAL-RTO: 1 /100 WBC
PATH REV BLD -IMP: NORMAL
PDW BLD AUTO: 9.2 FL (ref 9.4–12.3)
PDW BLD AUTO: 9.6 FL (ref 9.4–12.3)
PLAT MORPH BLD: NORMAL
PLATELET # BLD AUTO: 627 K/UL (ref 150–369)
PLATELET # BLD AUTO: 641 K/UL (ref 150–369)
PLATELET # BLD EST: ABNORMAL 10*3/UL
PLATELET # BLD EST: ABNORMAL 10*3/UL
POIKILOCYTOSIS BLD QL SMEAR: ABNORMAL
POIKILOCYTOSIS BLD QL SMEAR: ABNORMAL
POLYCHROMASIA BLD QL SMEAR: ABNORMAL
POLYCHROMASIA BLD QL SMEAR: ABNORMAL
POTASSIUM SERPL-SCNC: 2.9 MEQ/L (ref 3.6–5.1)
POTASSIUM SERPL-SCNC: 3.4 MEQ/L (ref 3.6–5.1)
PROT SERPL-MCNC: 7.2 G/DL (ref 6–8.2)
PROTHROMBIN TIME: 13.4 SEC (ref 12.2–14.5)
RBC # BLD AUTO: 3.32 M/UL (ref 3.93–5.22)
RBC # BLD AUTO: 3.54 M/UL (ref 3.93–5.22)
SARS-COV-2 RNA RESP QL NAA+PROBE: NEGATIVE
SODIUM SERPL-SCNC: 136 MEQ/L (ref 136–144)
SODIUM SERPL-SCNC: 138 MEQ/L (ref 136–144)
TIBC SERPL-MCNC: 545 UG/DL (ref 270–460)
TROPONIN I SERPL-MCNC: <0.02 NG/ML
UIBC SERPL-MCNC: 535 UG/DL (ref 180–360)
WBC # BLD AUTO: 4.43 K/UL (ref 3.8–10.5)
WBC # BLD AUTO: 4.98 K/UL (ref 3.8–10.5)

## 2020-11-13 PROCEDURE — 63700000 HC SELF-ADMINISTRABLE DRUG: Performed by: EMERGENCY MEDICINE

## 2020-11-13 PROCEDURE — 99223 1ST HOSP IP/OBS HIGH 75: CPT | Performed by: HOSPITALIST

## 2020-11-13 PROCEDURE — 85025 COMPLETE CBC W/AUTO DIFF WBC: CPT | Performed by: PHYSICIAN ASSISTANT

## 2020-11-13 PROCEDURE — 63700000 HC SELF-ADMINISTRABLE DRUG: Performed by: HOSPITALIST

## 2020-11-13 PROCEDURE — 85610 PROTHROMBIN TIME: CPT | Performed by: PHYSICIAN ASSISTANT

## 2020-11-13 PROCEDURE — 36415 COLL VENOUS BLD VENIPUNCTURE: CPT | Performed by: PHYSICIAN ASSISTANT

## 2020-11-13 PROCEDURE — 93005 ELECTROCARDIOGRAM TRACING: CPT | Performed by: PHYSICIAN ASSISTANT

## 2020-11-13 PROCEDURE — 85025 COMPLETE CBC W/AUTO DIFF WBC: CPT | Performed by: INTERNAL MEDICINE

## 2020-11-13 PROCEDURE — 80053 COMPREHEN METABOLIC PANEL: CPT | Performed by: INTERNAL MEDICINE

## 2020-11-13 PROCEDURE — 99285 EMERGENCY DEPT VISIT HI MDM: CPT | Mod: 25

## 2020-11-13 PROCEDURE — 83520 IMMUNOASSAY QUANT NOS NONAB: CPT | Performed by: INTERNAL MEDICINE

## 2020-11-13 PROCEDURE — 25800000 HC PHARMACY IV SOLUTIONS: Performed by: HOSPITALIST

## 2020-11-13 PROCEDURE — 83735 ASSAY OF MAGNESIUM: CPT | Performed by: PHYSICIAN ASSISTANT

## 2020-11-13 PROCEDURE — 0DJD8ZZ INSPECTION OF LOWER INTESTINAL TRACT, VIA NATURAL OR ARTIFICIAL OPENING ENDOSCOPIC: ICD-10-PCS | Performed by: SURGERY

## 2020-11-13 PROCEDURE — 84484 ASSAY OF TROPONIN QUANT: CPT | Performed by: PHYSICIAN ASSISTANT

## 2020-11-13 PROCEDURE — 83540 ASSAY OF IRON: CPT | Performed by: INTERNAL MEDICINE

## 2020-11-13 PROCEDURE — 85730 THROMBOPLASTIN TIME PARTIAL: CPT | Performed by: PHYSICIAN ASSISTANT

## 2020-11-13 PROCEDURE — 71045 X-RAY EXAM CHEST 1 VIEW: CPT

## 2020-11-13 PROCEDURE — 86920 COMPATIBILITY TEST SPIN: CPT

## 2020-11-13 PROCEDURE — 83088 ASSAY OF HISTAMINE: CPT | Performed by: INTERNAL MEDICINE

## 2020-11-13 PROCEDURE — U0002 COVID-19 LAB TEST NON-CDC: HCPCS | Performed by: PHYSICIAN ASSISTANT

## 2020-11-13 PROCEDURE — 86901 BLOOD TYPING SEROLOGIC RH(D): CPT

## 2020-11-13 PROCEDURE — 82728 ASSAY OF FERRITIN: CPT | Performed by: INTERNAL MEDICINE

## 2020-11-13 PROCEDURE — 12000000 HC ROOM AND CARE MED/SURG

## 2020-11-13 PROCEDURE — 63600000 HC DRUGS/DETAIL CODE: Performed by: HOSPITALIST

## 2020-11-13 PROCEDURE — 80048 BASIC METABOLIC PNL TOTAL CA: CPT | Performed by: PHYSICIAN ASSISTANT

## 2020-11-13 RX ORDER — ALPRAZOLAM 0.25 MG/1
0.25 TABLET ORAL NIGHTLY PRN
Status: DISCONTINUED | OUTPATIENT
Start: 2020-11-13 | End: 2020-11-15 | Stop reason: HOSPADM

## 2020-11-13 RX ORDER — POTASSIUM CHLORIDE 750 MG/1
40 TABLET, FILM COATED, EXTENDED RELEASE ORAL ONCE
Status: COMPLETED | OUTPATIENT
Start: 2020-11-13 | End: 2020-11-13

## 2020-11-13 RX ORDER — POTASSIUM CHLORIDE 750 MG/1
15 TABLET, FILM COATED, EXTENDED RELEASE ORAL DAILY
Status: DISCONTINUED | OUTPATIENT
Start: 2020-11-14 | End: 2020-11-15 | Stop reason: HOSPADM

## 2020-11-13 RX ORDER — IBUPROFEN 200 MG
16-32 TABLET ORAL AS NEEDED
Status: DISCONTINUED | OUTPATIENT
Start: 2020-11-13 | End: 2020-11-15 | Stop reason: HOSPADM

## 2020-11-13 RX ORDER — ESCITALOPRAM OXALATE 10 MG/1
10 TABLET ORAL DAILY
Status: DISCONTINUED | OUTPATIENT
Start: 2020-11-14 | End: 2020-11-15 | Stop reason: HOSPADM

## 2020-11-13 RX ORDER — ALPRAZOLAM 0.25 MG/1
0.25 TABLET ORAL NIGHTLY PRN
COMMUNITY
End: 2020-12-01 | Stop reason: SDUPTHER

## 2020-11-13 RX ORDER — AMLODIPINE BESYLATE 5 MG/1
5 TABLET ORAL DAILY
Status: DISCONTINUED | OUTPATIENT
Start: 2020-11-14 | End: 2020-11-15 | Stop reason: HOSPADM

## 2020-11-13 RX ORDER — DEXTROSE 50 % IN WATER (D50W) INTRAVENOUS SYRINGE
25 AS NEEDED
Status: DISCONTINUED | OUTPATIENT
Start: 2020-11-13 | End: 2020-11-15 | Stop reason: HOSPADM

## 2020-11-13 RX ORDER — SODIUM CHLORIDE 9 MG/ML
5 INJECTION, SOLUTION INTRAVENOUS AS NEEDED
Status: DISCONTINUED | OUTPATIENT
Start: 2020-11-13 | End: 2020-11-14

## 2020-11-13 RX ORDER — POTASSIUM CHLORIDE 600 MG/1
16 TABLET, FILM COATED, EXTENDED RELEASE ORAL DAILY
COMMUNITY
End: 2021-05-17

## 2020-11-13 RX ORDER — CHOLECALCIFEROL (VITAMIN D3) 25 MCG
1000 TABLET ORAL DAILY
Status: DISCONTINUED | OUTPATIENT
Start: 2020-11-14 | End: 2020-11-15 | Stop reason: HOSPADM

## 2020-11-13 RX ORDER — ROSUVASTATIN CALCIUM 10 MG/1
10 TABLET, COATED ORAL DAILY
Status: DISCONTINUED | OUTPATIENT
Start: 2020-11-14 | End: 2020-11-15 | Stop reason: HOSPADM

## 2020-11-13 RX ORDER — DEXTROSE 40 %
15-30 GEL (GRAM) ORAL AS NEEDED
Status: DISCONTINUED | OUTPATIENT
Start: 2020-11-13 | End: 2020-11-15 | Stop reason: HOSPADM

## 2020-11-13 RX ADMIN — POTASSIUM CHLORIDE 40 MEQ: 750 TABLET, FILM COATED, EXTENDED RELEASE ORAL at 19:28

## 2020-11-13 RX ADMIN — POTASSIUM CHLORIDE 40 MEQ: 149 INJECTION, SOLUTION, CONCENTRATE INTRAVENOUS at 19:28

## 2020-11-13 RX ADMIN — POTASSIUM CHLORIDE 40 MEQ: 750 TABLET, FILM COATED, EXTENDED RELEASE ORAL at 18:20

## 2020-11-13 ASSESSMENT — ENCOUNTER SYMPTOMS
ABDOMINAL PAIN: 0
DIARRHEA: 0
BLOOD IN STOOL: 1
VOMITING: 0
WEAKNESS: 1
FATIGUE: 1
HEMATURIA: 0
DIZZINESS: 1
FEVER: 0
SHORTNESS OF BREATH: 1
LIGHT-HEADEDNESS: 1

## 2020-11-13 NOTE — ED PROVIDER NOTES
HPI     Chief Complaint   Patient presents with   • Anemia       64 y/o F with PMH systemic mastocytosis, hypertension referred today for abnormal labs. Pt reports x 1 month has had progressively worsening fatigue and exertional dyspnea. Pt also reports had endoscopy/colonoscopy with multiple biopsy's and removal of polyps 1 week ago. Pt saw Dr. Calhoun today for routine labs, found hemoglobin of 5.5 and referred to ED for blood transfusion. Pt denies melena but admits to occasional bright right blood with bowel movements 2/2 internal hemorrhoids, last episode 3 days ago.       History provided by:  Patient       Patient History     Past Medical History:   Diagnosis Date   • Hypertension    • Systemic mastocytosis        Past Surgical History:   Procedure Laterality Date   • COLONOSCOPY     • REDUCTION MAMMAPLASTY         No family history on file.    Social History     Tobacco Use   • Smoking status: Former Smoker     Packs/day: 0.25     Quit date:      Years since quittin.8   • Smokeless tobacco: Never Used   Substance Use Topics   • Alcohol use: Yes     Comment: Social   • Drug use: No       Systems Reviewed from Nursing Triage:  Meds          Review of Systems     Review of Systems   Constitutional: Positive for fatigue. Negative for fever.   Respiratory: Positive for shortness of breath.    Cardiovascular: Negative for chest pain.   Gastrointestinal: Positive for blood in stool. Negative for abdominal pain, diarrhea and vomiting.   Genitourinary: Negative for hematuria.   Neurological: Positive for dizziness, weakness and light-headedness. Negative for syncope.        Physical Exam     ED Triage Vitals   Temp Heart Rate Resp BP SpO2   20 1457 20 1457 20 1457 20 1457 20 1457   36.9 °C (98.4 °F) 61 18 136/74 100 %      Temp Source Heart Rate Source Patient Position BP Location FiO2 (%) (Set)   20 1738 -- -- 20 1738 --   Tympanic   Right upper arm        Pulse Ox  %: 100 % (11/13/20 1514)  Pulse Ox Interpretation: Normal (11/13/20 1514)  Heart Rate: 60 (11/13/20 1525)  Rhythm Strip Interpretation: Normal Sinus Rhythm (11/13/20 1525)    Patient Vitals for the past 24 hrs:   BP Temp Temp src Pulse Resp SpO2   11/13/20 1747 134/81 37.3 °C (99.2 °F) Tympanic 66 16 100 %   11/13/20 1738 139/75 37.2 °C (99 °F) Tympanic 65 16 98 %   11/13/20 1635 (!) 124/58 -- -- 64 18 98 %   11/13/20 1457 136/74 36.9 °C (98.4 °F) -- 61 18 100 %                                          Physical Exam  Vitals signs and nursing note reviewed. Exam conducted with a chaperone present.   Constitutional:       General: She is not in acute distress.  Neck:      Musculoskeletal: Normal range of motion and neck supple.   Cardiovascular:      Rate and Rhythm: Normal rate and regular rhythm.   Pulmonary:      Effort: Pulmonary effort is normal.      Breath sounds: Normal breath sounds.   Abdominal:      Palpations: Abdomen is soft.      Tenderness: There is no abdominal tenderness. There is no guarding or rebound.   Genitourinary:     Comments: No stool in rectal vault  Internal hemorrhoid, non-bleeding  Skin:     General: Skin is warm and dry.      Coloration: Skin is pale.   Neurological:      Mental Status: She is alert and oriented to person, place, and time.      Cranial Nerves: No cranial nerve deficit.              Procedures    Labs Reviewed   CBC AND DIFF - Abnormal       Result Value    WBC 4.98      RBC 3.32 (*)     Hemoglobin 5.1 (*)     Hematocrit 19.2 (*)     MCV 57.8 (*)     MCH 15.4 (*)     MCHC 26.6 (*)     RDW 19.9 (*)     Platelets 641 (*)     MPV 9.6      Differential Type Manu      Neutrophils 65      Lymphocytes 17      Monocytes 12      Eosinophils 3      Basophils 2      Metamyelocytes 1      nRBC/100 Cells 1 (*)     Neutrophils, Absolute 3.24      Lymphocytes, Absolute 0.85 (*)     Monocytes, Absolute 0.60      Eosinophils, Absolute 0.15      Basophils, Absolute 0.10       Metamyelocytes, Absolute 0.05 (*)     PLT Morphology Normal      Platelet Estimate Increased (>400,000)      Anisocytosis 2+      Hypochromia 2+      Microcytes 1+      Poikilocytes 2+      Polychromasia Occasional     BASIC METABOLIC PANEL - Abnormal    Sodium 136      Potassium 2.9 (*)     Chloride 101      CO2 22      BUN 19      Creatinine 0.7      Glucose 104 (*)     Calcium 9.4      eGFR >60.0      Anion Gap 13     PROTIME-INR - Normal    PT 13.4      INR 1.0     PTT - Normal    PTT 31     TROPONIN I - Normal    Troponin I <0.02     MAGNESIUM - Normal    Magnesium 2.2     SARS-COV-2 (COVID 19), PCR    Narrative:     The following orders were created for panel order SARS-CoV-2 (COVID-19), PCR Nasopharynx.  Procedure                               Abnormality         Status                     ---------                               -----------         ------                     SARS-CoV-2 (COVID-19), P...[[id number]]                      In process                   Please view results for these tests on the individual orders.   SARS-COV-2 (COVID 19), PCR (PERF)   TYPE AND SCREEN    Antibody Screen Negative      ABO O      Rh Factor Positive      History Check Previous type on file     PREPARE RBC    Product Code W4115Z63      Unit ID [id number]      Unit ABO O      Unit RH Positive      Crossmatch Compatible      Product Status XM      Unit Expiration Date/Time 202012042359      ISBT Code 5100      Unit Volume 325      Product Code Q0684D03      Unit ID [id number]      Unit ABO O      Unit RH Positive      Crossmatch Compatible      Product Status IS      Unit Expiration Date/Time 202012092359      ISBT Code 5100      Unit Volume 325         X-RAY CHEST 1 VIEW   ED Interpretation   Mildly obscured left heart border not significantly changed from previous  As interpreted by Brian beaver M.D.  Please refer to final result as interpreted by radiologist for complete detailed description/findings of the  study.        Final Result   IMPRESSION:   No definite acute disease.      COMMENT:      Comparison: Chest x-ray dated 11/9/2020.      Erect portable AP view of the chest.  No evidence of pneumothorax or pleural   effusion.  The lungs appear clear noting increased attenuation overlying the   lateral mid to lower lung zones likely due to overlying breast shadows.  Stable   cardiomediastinal contours.  No upper abdominal findings of concern.  No gross   bone findings of concern      ECG 12 lead   ED Interpretation   Normal sinus rhythm @ 60  Normal axis and intervals  No ST elevation or depression  Unremarkable T-waves  No significant changes from previous                            ED Course & [facility]         ED Course as of Nov 13 1753   Fri Nov 13, 2020   1514 Pt referred for outpatient hemoglobin of 5.5. Pt hemodynamically stable. Consented patient for blood transfusion    [NH]   1537 Hemoglobin(!!): 5.1 [HL]   1621 Paged [facility]    [NH]   1752 Critical care time 30 minutes    [HL]      ED Course User Index  [HL] Brian Zepeda MD  [NH] Iram Sahu PA C         Clinical Impressions as of Nov 13 1753   Anemia, unspecified type        Iram Sahu PA C  11/13/20 1753

## 2020-11-13 NOTE — ASSESSMENT & PLAN NOTE
"Follows with [facility] and locally with Dr Calhoun  Supposed to be driving to [place] this Sunday for [facility] appointment on Monday, supposed to have repeat BMBx and \"a full work up\" done there and she is hopeful to be discharged before Sunday   "

## 2020-11-13 NOTE — ASSESSMENT & PLAN NOTE
Hgb 5.1, severe iron deficiency with ferritin 3, sat 2  Receives frequent IV iron infusions with Dr Calhoun  Receiving 2u pRBC ordered by the ED  Will repeat H/H this evening and transfuse prn for goal of 7-8  Known to heme Dr Calhoun who has been consulted  Recent EGD/ colonoscopy at [facility] with Dr Wolfe on 11/6 - normal EGD (report reviewed in EPIC), unable to find CLN report however per pt polyps removed. Will hold off on GI consult until d/w hematology. She is known to lose small amounts of blood via her chronic hemorrhoids but unlikely to be the cause of her severe anemia.

## 2020-11-13 NOTE — ASSESSMENT & PLAN NOTE
Will give 40 meq IV and PO now   Repeat labs in the morning  She takes Kcl supplements daily with her HCTZ

## 2020-11-13 NOTE — PLAN OF CARE
MSW met with Pt at bedside.  MSW verified Pt's name and .  MSW reviewed Care Coordination's role with Pt.  Pt did not identify any needs and did not report any barriers to treatment or to social needs.  Care Coordination to follow for discharge planning.

## 2020-11-13 NOTE — ED ATTESTATION NOTE
I have personally seen, evaluated,and participated in the management of Ritu Ramirez.  I reviewed and agree with the PA/NP's assessment and plan of care with the following exceptions: None    My examination, assessment, and plan of care for Ritu Ramirez:  65-year-old female with history of mastocytosis hypertension also history of hemorrhoids presented with symptomatic anemia with hemoglobin checked earlier today to be under 6.  Patient has had history of transfusions but none in the last few years.  Denies any bright red blood per rectum hematuria.  Has been feeling fatigued with progressive dyspnea on exertion and generalized malaise for the past few weeks.  Referred to the emergency department for transfusion by her hematologist  Exam:   Vitals:    11/13/20 1457   BP: 136/74   Pulse: 61   Resp: 18   Temp: 36.9 °C (98.4 °F)   SpO2: 100%   Mildly pale complexion with moderately pale conjunctiva.  No respiratory distress soft nontender nondistended abdomen    Plan:  Consent obtained for blood transfusion     Brian Zepeda MD  11/13/20 3952

## 2020-11-13 NOTE — PROGRESS NOTES
20 4324   Care Coordination Screen   80 Years or older no   Readmission within 30 days no   PCP yes   Insurance coverage verified yes   Living Arrangements house   Employment/   Employment Status employed full time   Current or Previous Occupation other (see comments)   Employment/ Comments MH therapist   Anticipated Needs   Met with patient. Provided education and contact information for Care Coordination services. yes   Care Coordination Screening Status   Screening complete yes   MSW met with Pt at bedside.  MSW verified Pt’s name and .  Pt lives in a 2 story house with SO.  There are 0 KANDY.  Pt’s bedroom is on the 1st floor .  They have access to a full bathroom with a  walk-in shower.  Pt did not report any report difficulty getting in or out of the shower.  Pt does use DME including:.  Pt reports being independent with ambulation and the completion of their ADLs.  Pt provides their own transportation.   Pt confirmed their PCP is Dr Perkins.  They last saw them couple months ago via tele-med.  Pt reported no difficulties obtaining or paying for medication.  Pt does not have a history with SNFs.  Pt does have an advance directive, living will and/or POA completed and copies can be provided to [facility] if needed. Pt did not identify any needs or barriers to treatment.   Their supports include: SO, friends and family .  They expect to be discharged to their home following their hospital stay and SO will provide transportation.  Care Coordination to follow for discharge planning.

## 2020-11-14 LAB
ANION GAP SERPL CALC-SCNC: 7 MEQ/L (ref 3–15)
ATRIAL RATE: 60
BUN SERPL-MCNC: 18 MG/DL (ref 8–20)
CALCIUM SERPL-MCNC: 8.9 MG/DL (ref 8.9–10.3)
CHLORIDE SERPL-SCNC: 108 MEQ/L (ref 98–109)
CO2 SERPL-SCNC: 22 MEQ/L (ref 22–32)
CREAT SERPL-MCNC: 0.8 MG/DL (ref 0.6–1.1)
ERYTHROCYTE [DISTWIDTH] IN BLOOD BY AUTOMATED COUNT: 26.5 % (ref 11.7–14.4)
GFR SERPL CREATININE-BSD FRML MDRD: >60 ML/MIN/1.73M*2
GLUCOSE SERPL-MCNC: 131 MG/DL (ref 70–99)
HCT VFR BLDCO AUTO: 26.2 % (ref 35–45)
HGB BLD-MCNC: 7.6 G/DL (ref 11.8–15.7)
MAGNESIUM SERPL-MCNC: 2 MG/DL (ref 1.8–2.5)
MCH RBC QN AUTO: 18.9 PG (ref 28–33.2)
MCHC RBC AUTO-ENTMCNC: 29 G/DL (ref 32.2–35.5)
MCV RBC AUTO: 65.2 FL (ref 83–98)
P AXIS: 44
PDW BLD AUTO: 9 FL (ref 9.4–12.3)
PLATELET # BLD AUTO: 542 K/UL (ref 150–369)
POTASSIUM SERPL-SCNC: 4.4 MEQ/L (ref 3.6–5.1)
PR INTERVAL: 156
QRS DURATION: 100
QT INTERVAL: 444
QTC CALCULATION(BAZETT): 444
R AXIS: -19
RBC # BLD AUTO: 4.02 M/UL (ref 3.93–5.22)
SODIUM SERPL-SCNC: 137 MEQ/L (ref 136–144)
T WAVE AXIS: 15
VENTRICULAR RATE: 60
WBC # BLD AUTO: 5.19 K/UL (ref 3.8–10.5)

## 2020-11-14 PROCEDURE — 12000000 HC ROOM AND CARE MED/SURG

## 2020-11-14 PROCEDURE — 85027 COMPLETE CBC AUTOMATED: CPT | Performed by: HOSPITALIST

## 2020-11-14 PROCEDURE — 36430 TRANSFUSION BLD/BLD COMPNT: CPT

## 2020-11-14 PROCEDURE — 83735 ASSAY OF MAGNESIUM: CPT | Performed by: HOSPITALIST

## 2020-11-14 PROCEDURE — 63700000 HC SELF-ADMINISTRABLE DRUG: Performed by: INTERNAL MEDICINE

## 2020-11-14 PROCEDURE — 80048 BASIC METABOLIC PNL TOTAL CA: CPT | Performed by: HOSPITALIST

## 2020-11-14 PROCEDURE — 63700000 HC SELF-ADMINISTRABLE DRUG: Performed by: HOSPITALIST

## 2020-11-14 PROCEDURE — 99233 SBSQ HOSP IP/OBS HIGH 50: CPT | Performed by: INTERNAL MEDICINE

## 2020-11-14 PROCEDURE — 46500 INJECTION INTO HEMORRHOID(S): CPT | Performed by: SURGERY

## 2020-11-14 PROCEDURE — P9016 RBC LEUKOCYTES REDUCED: HCPCS

## 2020-11-14 PROCEDURE — 36415 COLL VENOUS BLD VENIPUNCTURE: CPT | Performed by: HOSPITALIST

## 2020-11-14 PROCEDURE — 99222 1ST HOSP IP/OBS MODERATE 55: CPT | Mod: 25 | Performed by: SURGERY

## 2020-11-14 PROCEDURE — 25800000 HC PHARMACY IV SOLUTIONS: Performed by: INTERNAL MEDICINE

## 2020-11-14 PROCEDURE — 63600000 HC DRUGS/DETAIL CODE: Performed by: INTERNAL MEDICINE

## 2020-11-14 RX ORDER — SODIUM CHLORIDE 9 MG/ML
5 INJECTION, SOLUTION INTRAVENOUS AS NEEDED
Status: DISCONTINUED | OUTPATIENT
Start: 2020-11-14 | End: 2020-11-14

## 2020-11-14 RX ORDER — POLYETHYLENE GLYCOL 3350 17 G/17G
17 POWDER, FOR SOLUTION ORAL DAILY
Status: DISCONTINUED | OUTPATIENT
Start: 2020-11-14 | End: 2020-11-15 | Stop reason: HOSPADM

## 2020-11-14 RX ADMIN — SODIUM CHLORIDE 125 MG: 9 INJECTION, SOLUTION INTRAVENOUS at 09:31

## 2020-11-14 RX ADMIN — ALPRAZOLAM 0.25 MG: 0.25 TABLET ORAL at 20:41

## 2020-11-14 RX ADMIN — ROSUVASTATIN CALCIUM 10 MG: 10 TABLET, FILM COATED ORAL at 09:20

## 2020-11-14 RX ADMIN — ESCITALOPRAM OXALATE 10 MG: 10 TABLET ORAL at 09:20

## 2020-11-14 RX ADMIN — Medication 1000 UNITS: at 09:20

## 2020-11-14 RX ADMIN — POTASSIUM CHLORIDE 15 MEQ: 750 TABLET, FILM COATED, EXTENDED RELEASE ORAL at 09:20

## 2020-11-14 RX ADMIN — AMLODIPINE BESYLATE 5 MG: 5 TABLET ORAL at 09:20

## 2020-11-14 RX ADMIN — POLYETHYLENE GLYCOL 3350 17 G: 17 POWDER, FOR SOLUTION ORAL at 20:40

## 2020-11-14 RX ADMIN — ALPRAZOLAM 0.25 MG: 0.25 TABLET ORAL at 02:08

## 2020-11-14 ASSESSMENT — ENCOUNTER SYMPTOMS
DIAPHORESIS: 0
RESPIRATORY NEGATIVE: 1
PSYCHIATRIC NEGATIVE: 1
FEVER: 0
HEMATOCHEZIA: 1
CARDIOVASCULAR NEGATIVE: 1

## 2020-11-14 NOTE — PROGRESS NOTES
Hospital Medicine Service -  Daily Progress Note       Patient Name: Ritu Ramirez  Patient MRN: [id number]  Date/Time: 11/14/20 6:09 PM  LOS: 1 day/days  Primary Care Physician: Ashley Nazario MD    SUBJECTIVE   Interval History:   Chart was reviewed. She had BRBPR and was seen by Dr. Corcoran and injected sclerotherpy. She is hemodynamically stable     MEDICATIONS     Infusion Medications:     Scheduled Medications:   • amLODIPine  5 mg oral Daily   • cholecalciferol (vitamin D3)  1,000 Units oral Daily   • escitalopram  10 mg oral Daily   • ferric gluconate (FERRLECIT) IVPB  125 mg intravenous Daily   • potassium chloride  15 mEq oral Daily   • rosuvastatin  10 mg oral Daily       PRN Medications: •  ALPRAZolam  •  glucose **OR** dextrose **OR** glucagon **OR** dextrose in water    REVIEW OF SYSTEMS   Review of Systems   Constitution: Negative for diaphoresis, fever and malaise/fatigue.   HENT: Negative.    Cardiovascular: Negative.    Respiratory: Negative.    Skin: Negative.    Gastrointestinal: Positive for hematochezia.   Genitourinary: Negative.    Psychiatric/Behavioral: Negative.    All other systems reviewed and are negative.         OBJECTIVE      Vitals:    11/14/20 1226 11/14/20 1245 11/14/20 1420 11/14/20 1457   BP:  128/68 (!) 155/71 (!) 118/56   BP Location:  Left upper arm Right upper arm Left upper arm   Patient Position:  Lying Lying    Pulse: 64 60 60 66   Resp: 18 18 20 18   Temp: 36.3 °C (97.4 °F) 36.2 °C (97.2 °F) 36.8 °C (98.3 °F) 37.1 °C (98.7 °F)   TempSrc: Temporal Temporal Oral Oral   SpO2: 97%  98% 96%     Temp:  [36.2 °C (97.2 °F)-37.2 °C (98.9 °F)] 37.1 °C (98.7 °F)  Heart Rate:  [52-94] 66  Resp:  [16-20] 18  BP: (118-155)/(56-84) 118/56    Intake/Output Summary (Last 24 hours) at 11/14/2020 1809  Last data filed at 11/14/2020 1457  Gross per 24 hour   Intake 1382.75 ml   Output 275 ml   Net 1107.75 ml     I/O last 3 completed shifts:  In: 1267.5 [P.O.:360;  Blood:907.5]  Out: 275 [Urine:275]  Weight change, 24 hours: Weight change:       PHYSICAL EXAMINATION      Physical Exam  Vitals signs reviewed.   Constitutional:       Appearance: Normal appearance.   HENT:      Head: Atraumatic.   Cardiovascular:      Rate and Rhythm: Regular rhythm.      Heart sounds: No murmur.   Pulmonary:      Effort: Pulmonary effort is normal.      Breath sounds: Normal breath sounds.   Abdominal:      General: Bowel sounds are normal.      Palpations: Abdomen is soft.   Musculoskeletal:         General: No swelling.   Skin:     General: Skin is warm.   Neurological:      Mental Status: She is alert and oriented to person, place, and time.   Psychiatric:         Mood and Affect: Mood normal.         Behavior: Behavior normal.         Thought Content: Thought content normal.        LINES, CATHETERS, DRAINS, AIRWAYS, AND WOUNDS   Lines, Drains, Airways, Wounds:  Peripheral IV 11/13/20 Left Antecubital (Active)   Number of days: 1       Peripheral IV 11/13/20 Right Forearm (Active)   Number of days: 1       Comments:      LABS / IMAGING / TELE        Laboratory Studies  Lab Results   Component Value Date    GLUCOSE 131 (H) 11/14/2020    CALCIUM 8.9 11/14/2020     11/14/2020    K 4.4 11/14/2020    CO2 22 11/14/2020     11/14/2020    BUN 18 11/14/2020    CREATININE 0.8 11/14/2020       Lab Results   Component Value Date    HGBA1C 4.7 (L) 08/11/2020       Lab Results   Component Value Date    TSH 3.050 08/11/2020         Lab Results   Component Value Date    WBC 5.19 11/14/2020    HGB 7.6 (L) 11/14/2020    HCT 26.2 (L) 11/14/2020    MCV 65.2 (L) 11/14/2020     (H) 11/14/2020       Microbiology Results     Procedure Component Value Units Date/Time    SARS-CoV-2 (COVID-19), PCR Nasopharynx [[id number]]  (Normal) Collected: 11/13/20 1512    Specimen: Nasopharyngeal Swab from Nasopharynx Updated: 11/13/20 2010    Narrative:      The following orders were created for panel order  SARS-CoV-2 (COVID-19), PCR Nasopharynx.  Procedure                               Abnormality         Status                     ---------                               -----------         ------                     SARS-CoV-2 (COVID-19), P...[[id number]]  Normal              Final result                 Please view results for these tests on the individual orders.    SARS-CoV-2 (COVID-19), PCR Nasopharynx [[id number]]  (Normal) Collected: 11/13/20 1512    Specimen: Nasopharyngeal Swab from Nasopharynx Updated: 11/13/20 2010     SARS-CoV-2 (COVID-19) Negative            Lab Results   Component Value Date    INR 1.0 11/13/2020    INR 0.9 11/02/2017    INR 0.9 10/15/2015       Imaging:    X-ray Obstruction Series (abdomen 2 Views With Chest 1 View)    Result Date: 11/9/2020  IMPRESSION: Nonspecific bowel gas pattern.. No active pulmonary disease.     X-ray Chest 1 View    Result Date: 11/13/2020  IMPRESSION: No definite acute disease. COMMENT: Comparison: Chest x-ray dated 11/9/2020. Erect portable AP view of the chest.  No evidence of pneumothorax or pleural effusion.  The lungs appear clear noting increased attenuation overlying the lateral mid to lower lung zones likely due to overlying breast shadows.  Stable cardiomediastinal contours.  No upper abdominal findings of concern.  No gross bone findings of concern               ASSESSMENT AND PLAN      Principal Problem:    Iron deficiency anemia  Active Problems:    Systemic mastocytosis    Hypokalemia    Essential hypertension    Anxiety    Vitamin D deficiency    Constipation        * Iron deficiency anemia  Assessment & Plan  Hgb 5.1, severe iron deficiency with ferritin 3, sat 2  Receives frequent IV iron infusions with Dr Calhoun  Total 3 U prbc      Discussed with Dr. Calhoun - per Dr. Calhoun recent scope was ok     Also with acute blood loss anemia from continuing hemorrhoidal bleed - seen by  Dr. Corcoran and injected at bedside        Systemic  mastocytosis  Assessment & Plan  Follows with [facility] and locally with Dr Calhoun       Constipation  Assessment & Plan  No enema with hemorrhoidal bleed     Vitamin D deficiency  Assessment & Plan  Cont supps    Anxiety  Assessment & Plan  Cont lexapro with prn xanax    Essential hypertension  Assessment & Plan  Cont norvasc          Hypokalemia  Assessment & Plan  Now normalized          VTE Assessment: Padua VTE Score: 0  VTE Prophylaxis Plan: ambulate   Code Status: Full Code  Estimated Discharge Date: 11/15/2020  Disposition Planning: home      Jenaro Jean-Baptiste MD  11/14/2020

## 2020-11-14 NOTE — PROCEDURES
The patient had large friable internal hemorrhoids that required office based therapy.  Verbal consent was obtained.  No prep was necessary.  First a digital rectal exam and then anoscopy was performed.  Injection sclerotherapy was then performed using 5% phenol in olive oil through an 18 gauge spinal needle.  The patient tolerated it well and was given instructions to return as needed.

## 2020-11-14 NOTE — CONSULTS
General Surgery Consult    Subjective     Ritu Ramirez is a 65 y.o. female who was admitted for Anemia, unspecified type [D64.9]. Patient was seen in consultation at the request of referring physician for management recommendations.     Patient is 65F w known hemorrhoidal dz who presented for anemia on whom we are consulted regarding the same as well as brbpr.     Patient has had extensive hemorrhoidal disease for a very long time. She also has systemic mastocytosis which is currently under investigation and chronic anemia requiring iv iron transfusions. She was lasted treated for her hemorrhoids in July of this year w sclerotherapy w Dr. Corcoran. Since then she has had occasional hemorrhoidal bleeding but has not required transfusions or anything of that nature. 8 days ago she underwent upper and lower endoscopy as part of her w/u for mastocytosis. 3 polyps were removed w cold snare and multiple biopsies were taken. Since then, she notes feeling bloated, tired, w increased sob w exertion. Also notes increase in frequency of bleeding w BMs.    Saw Dr. Calhoun in office yday. Hb was 5.5. Received IV iron and was sent to ED for blood transfusion. 2u prbcs, Hb went from 5.1 to 7.6. Feeling ok at present. Today she had a BM with a reportedly significant amount of blood in toilet but since then has also had a BM without any blood in toilet. Otherwise feeling well at present.     PMHx: systemic mastocytosis, chronic anemia, htn, hld, anxiety  Meds: alprazolam, amlodipine, escitalopram, hctz, rosuvastatin  All: amides, clarithromycin, erythromycin, contrast, NSAIDs, penicillins  PSHx: breast reduction, multiple sclerotherapy procedures for hemorrhoids    Medical History:   Past Medical History:   Diagnosis Date   • Anemia    • Anxiety    • Hypertension    • Iron deficiency    • Lipid disorder    • Systemic mastocytosis    • Vitamin D deficiency        Surgical History:   Past Surgical History:   Procedure Laterality Date   •  COLONOSCOPY     • REDUCTION MAMMAPLASTY         Social History:   Social History     Social History Narrative   • Not on file       Family History:   Family History   Problem Relation Age of Onset   • Heart disease Biological Mother    • Prostate cancer Biological Father        Allergies: Anesthetics - amide type - select amino amides, Clarithromycin, Erythromycin base, Iodinated contrast media, Nsaids (non-steroidal anti-inflammatory drug), and Penicillins    Home Medications:  •  ALPRAZolam, Take 0.25 mg by mouth nightly as needed for anxiety.  •  amLODIPine, Take 5 mg by mouth daily.    •  escitalopram, TAKE 1 TABLET BY MOUTH EVERY DAY (Patient taking differently: Take 10 mg by mouth daily.  )  •  hydrochlorothiazide, Take 25 mg by mouth once daily.  •  potassium chloride, Take 16 mEq by mouth daily.  •  rosuvastatin, TAKE 1 TABLET BY MOUTH EVERY DAY (Patient taking differently: Take 10 mg by mouth daily.  )  •  cholecalciferol (vitamin D3), Take 1,000 Units by mouth daily.      Current Medications:  •  ALPRAZolam, 0.25 mg, oral, Nightly PRN  •  amLODIPine, 5 mg, oral, Daily  •  cholecalciferol (vitamin D3), 1,000 Units, oral, Daily  •  glucose, 16-32 g of dextrose, oral, PRN **OR** dextrose, 15-30 g of dextrose, oral, PRN **OR** glucagon, 1 mg, intramuscular, PRN **OR** dextrose in water, 25 mL, intravenous, PRN  •  escitalopram, 10 mg, oral, Daily  •  ferric gluconate (FERRLECIT) IVPB, 125 mg, intravenous, Daily  •  potassium chloride, 15 mEq, oral, Daily  •  rosuvastatin, 10 mg, oral, Daily  •  sodium chloride 0.9 %, 5 mL/hr, intravenous, PRN  •  sodium chloride 0.9 %, 5 mL/hr, intravenous, PRN    Review of Systems  All other systems reviewed and negative except as noted in the HPI.    Objective     Physicial Exam  Visit Vitals  /74 (BP Location: Left upper arm, Patient Position: Lying)   Pulse 64   Temp 36.3 °C (97.4 °F) (Temporal)   Resp 18   SpO2 97%       Physical Exam  General: Resting comfortably  in bed, NAD  Head: Atraumatic, normocephalic  Eyes: EOMI, conjunctiva clear  Chest wall: atraumatic, nontender to palpation  Pulm: Speaking in full sentences, no respiratory distress  Abd: soft nt nd  Rectal: external rectal exam with excessive hemorrhoidal tissue, no bleeding  Extrem: atraumatic, moving all extremities  Neuro: A&Ox3      Labs  labs reviewed   CBC Results       11/14/20 11/13/20 11/13/20                    0204 1510 1330         WBC 5.19 4.98 4.43         RBC 4.02 3.32 3.54         HGB 7.6 5.1 5.5         HCT 26.2 19.2 20.7         MCV 65.2 57.8 58.5         MCH 18.9 15.4 15.5         MCHC 29.0 26.6 26.6          641 627         Comment for HGB at 0204 on 11/14/20: SPECIMEN CHECKED. PATIENT RECEIVED BLOOD.    Comment for HGB at 1510 on 11/13/20: ALL RESULTS HAVE BEEN CHECKED. This result has been called to DR CISSE by Flora Henley on 11 13 20 at 15:35, and has been read back.     Comment for HGB at 1330 on 11/13/20: ALL RESULTS HAVE BEEN CHECKED. This result has been called to AISHA SANCHEZ by Niraj Rose on 11 13 20 at 14:00, and has been read back.             Imaging  Not applicable    Assessment     65F w chronic anemia known hemorrhoidal disease presenting with Hb in 5s. She has responded appropriately to the 2u of prbcs. She recently underwent c scope w cold snare polypectomyx3 and multiple bx after which she noted in increase in her blood pr, though she had been having blood pr even prior. Her last bm was without blood. It is possible this is due to her hemorrhoidal disease but though it may be due to the biopsies during the scope.         Plan     Monitor vitals  Trend Hbs  Transfuse for goal Hb 7  Dr. Corcoran to evaluate    Corbin Ruiz MD     ATTENDING SURGEON:    I saw and evaluated the patient.  I discussed the case with the Resident and agree with the findings and plan as documented in the note except for my comments below or within the additional notes today.     Pt is  very well known to me  With this amount of bleeding we re-injected her hemorrhoids today at the bedside  See separate note    Please page with any questions or concerns.  Pager [phone]     Javier Corcoran MD

## 2020-11-14 NOTE — H&P
"   Hospital Medicine Service -  History & Physical        CHIEF COMPLAINT   Abnormal outpatient labs     HISTORY OF PRESENT ILLNESS      Ritu Ramirez is a 65 y.o. female with a past medical history of systemic mastocytosis, chronic anemia, hypertension, hyperlipidemia, anxiety who presents with low hemoglobin.  Patient has history of chronic anemia for which she receives frequent IV iron infusions with her hematologist Dr. Calhoun.  She also sees hematology/oncology at the [facility] for her diagnosis of systemic mastocytosis.  Patient reports that over the last week or so she has felt more short of breath when she climbs stairs in her home.  She is also felt very tired and felt like her blood count must be low.  She called Dr. Calhoun and made an appointment to be seen in the office today.  In the office today she was found to have a hemoglobin of 5.5 and was sent to the emergency department for blood transfusion.  Once in the emergency department her hemoglobin was tested and found to be 5.1.  She denies any chest pain, dizziness, nausea, vomiting.  She admits to bright red blood in her stool approximately once per week that is associated with hemorrhoids.  She reports feeling constipated and has not had a bowel movement in several days despite using prune juice, senna tea, and Dulcolax.  She feels like her belly is distended.  She denies any blood in her urine or any vaginal bleeding.  She does have a history of uterine fibroids.  She is supposed to go to the [facility] in [place] this Sunday for \"full work-up\" of her systemic mastocytosis.  She is also scheduled to have a repeat bone marrow biopsy done while she is down there.  She has not required a blood transfusion in several years.  She was ordered 2 units of packed red blood cells in the ED.  She will be admitted for further evaluation and treatment of her symptoms.    PAST MEDICAL AND SURGICAL HISTORY      Past Medical History:   Diagnosis Date   • Hypertension    • Lipid " disorder    • Systemic mastocytosis        Past Surgical History:   Procedure Laterality Date   • COLONOSCOPY     • REDUCTION MAMMAPLASTY         PCP: Ashley Nazario MD    MEDICATIONS      Prior to Admission medications    Medication Sig Start Date End Date Taking? Authorizing Provider   ALPRAZolam (XANAX) 0.25 mg tablet Take 0.25 mg by mouth nightly as needed for anxiety.   Yes Oli Grewal MD   amLODIPine (NORVASC) 5 mg tablet Take 5 mg by mouth daily.   3/1/17  Yes Oli Grewal MD   escitalopram (LEXAPRO) 10 mg tablet TAKE 1 TABLET BY MOUTH EVERY DAY  Patient taking differently: Take 10 mg by mouth daily.   7/24/20  Yes Simeon Santiago CRNP   hydrochlorothiazide (HYDRODIURIL) 25 mg tablet Take 25 mg by mouth once daily. 5/24/18  Yes Oli Grewal MD   potassium chloride (KLOR-CON 8) 8 mEq CR tablet Take 16 mEq by mouth daily.   Yes Oli Grewal MD   rosuvastatin (CRESTOR) 10 mg tablet TAKE 1 TABLET BY MOUTH EVERY DAY  Patient taking differently: Take 10 mg by mouth daily.   7/28/20  Yes Bright Selby MD   potassium chloride (KLOR-CON) 20 mEq CR tablet Take 20 mEq by mouth daily.  11/13/20 Yes Oli Grewal MD   cholecalciferol, vitamin D3, 1,000 unit capsule Take 1,000 Units by mouth daily.      Oli Grewal MD   ALPRAZolam (XANAX) 0.25 mg tablet Take 1 tablet (0.25 mg total) by mouth daily as needed for anxiety. for anxiety 10/13/20 11/13/20  Ashley Nazario MD       ALLERGIES      Anesthetics - amide type - select amino amides, Clarithromycin, Erythromycin base, Iodinated contrast media, Nsaids (non-steroidal anti-inflammatory drug), and Penicillins    FAMILY HISTORY      Family History   Problem Relation Age of Onset   • Heart disease Biological Mother    • Prostate cancer Biological Father        SOCIAL HISTORY      Social History     Socioeconomic History   • Marital status:      Spouse name: None   • Number of  children: None   • Years of education: None   • Highest education level: None   Occupational History   • None   Social Needs   • Financial resource strain: None   • Food insecurity     Worry: None     Inability: None   • Transportation needs     Medical: None     Non-medical: None   Tobacco Use   • Smoking status: Former Smoker     Packs/day: 0.25     Quit date:      Years since quittin.8   • Smokeless tobacco: Never Used   Substance and Sexual Activity   • Alcohol use: Yes     Comment: Social   • Drug use: No   • Sexual activity: Defer   Lifestyle   • Physical activity     Days per week: None     Minutes per session: None   • Stress: None   Relationships   • Social connections     Talks on phone: None     Gets together: None     Attends Restoration[employer] service: None     Active member of club or organization: None     Attends meetings of clubs or organizations: None     Relationship status: None   • Intimate partner violence     Fear of current or ex partner: None     Emotionally abused: None     Physically abused: None     Forced sexual activity: None   Other Topics Concern   • None   Social History Narrative   • None       REVIEW OF SYSTEMS      All other systems reviewed and negative except as noted in HPI    PHYSICAL EXAMINATION      Temp:  [36.9 °C (98.4 °F)-37.3 °C (99.2 °F)] 36.9 °C (98.5 °F)  Heart Rate:  [61-94] 72  Resp:  [16-18] 18  BP: (116-151)/(58-84) 138/84  There is no height or weight on file to calculate BMI.    Physical Exam  Vitals signs and nursing note reviewed.   Constitutional:       General: She is not in acute distress.     Appearance: Normal appearance.   HENT:      Head: Normocephalic and atraumatic.   Neck:      Musculoskeletal: Neck supple.   Cardiovascular:      Rate and Rhythm: Normal rate.      Heart sounds: Normal heart sounds.   Pulmonary:      Effort: Pulmonary effort is normal. No respiratory distress.   Abdominal:      General: Bowel sounds are normal. There is distension.       Palpations: Abdomen is soft.      Tenderness: There is no abdominal tenderness.   Skin:     General: Skin is warm and dry.      Coloration: Skin is pale.      Findings: No rash.   Neurological:      Mental Status: She is alert and oriented to person, place, and time. Mental status is at baseline.         LABS / IMAGING / EKG        Labs  I have reviewed the patient's pertinent labs.  Significant abnormals are Hemoglobin 5.1, potassium 2.9.    Imaging  I have independently reviewed the patient's pertinent imaging for this hospital visit. Pertinent Imaging findings are within normal limits.    ECG/Telemetry  No EKG was done    ASSESSMENT AND PLAN           * Iron deficiency anemia  Assessment & Plan  Hgb 5.1, severe iron deficiency with ferritin 3, sat 2  Receives frequent IV iron infusions with Dr Calhoun  Receiving 2u pRBC ordered by the ED  Will repeat H/H this evening and transfuse prn for goal of 7-8  Known to heme Dr Calhoun who has been consulted  Recent EGD/ colonoscopy at [facility] with Dr Wolfe on 11/6 - normal EGD (report reviewed in [facility]), unable to find CLN report however per pt polyps removed. Will hold off on GI consult until d/w hematology. She is known to lose small amounts of blood via her chronic hemorrhoids but unlikely to be the cause of her severe anemia.    Constipation  Assessment & Plan  Has tried prunes, senna, dulcolax and others  Willing to try an enema tonight for relief    Vitamin D deficiency  Assessment & Plan  Cont supps    Anxiety  Assessment & Plan  Cont lexapro with prn xanax    Essential hypertension  Assessment & Plan  Cont norvasc  Hold HCTZ until K is repleted  Monitor vitals    Hypokalemia  Assessment & Plan  Will give 40 meq IV and PO now   Repeat labs in the morning  She takes Kcl supplements daily with her HCTZ    Systemic mastocytosis  Assessment & Plan  Follows with [facility] and locally with Dr Calhoun  Supposed to be driving to [place] this Sunday for [facility] appointment on  "Monday, supposed to have repeat BMBx and \"a full work up\" done there and she is hopeful to be discharged before Sunday          VTE Assessment: Padua VTE Score: 0  VTE Prophylaxis Plan: SCDs  Code Status: Full Code  Palliative Care Screening Score: 0   Estimated Discharge Date: 11/15/2020  Disposition Planning: To home likely tomorrow or the next day pending clinical course     Constance Goldberg, DO  11/13/2020               "

## 2020-11-14 NOTE — NURSING NOTE
Pt resting quietly, Blood transfusions completed without incident, pt tolerated well. Lungs CTA no further c/o SOB, pt voiding spontaneously . H&H drawn and resulted. Will follow

## 2020-11-14 NOTE — ASSESSMENT & PLAN NOTE
Hgb 5.1, severe iron deficiency with ferritin 3, sat 2  Receives frequent IV iron infusions with Dr Calhoun  Total 3 U prbc      Discussed with Dr. Calhoun - per Dr. Calhoun recent scope was ok     Also with acute blood loss anemia from continuing hemorrhoidal bleed - seen by  Dr. Corcoran and injected at bedside

## 2020-11-14 NOTE — CONSULTS
The patient is a pleasant 65-year-old female who was referred to the ER yesterday.    The patient presented to our office complaining of generalized weakness and increasing shortness of breath on exertion.  She states her symptoms initially developed 10 days ago.  She noted on walking up stairs she was more winded.  Several days she got noted increasing fatigue and lethargy.       Further laboratory data in the office revealed a hemoglobin of approximately 5.5.  She did receive her dose of IV iron therapy.  When the hemoglobin returned the patient was then referred to the ER for evaluation    The patient has had a chronic anemia felt likely secondary to some malabsorption but most notably due to bleeding from her hemorrhoids.  In the past there was concern for AVMs as well.  She has required intermittent IV iron therapy.  Unfortunately she was last seen in the office in July 2020.    The patient states she has recently undergone an upper endoscopy and colonoscopy by Dr.Adam Swenson.  No worrisome changes were reported to the patient.    She was last seen by Dr. Corcoran to repair her hemorrhoids several months ago.    Recently she denies any increasing bright red blood per rectum.  She denies vomiting blood.  She denies any easy bruising.  She denies any gum bleeding or nosebleeding.  She denies coughing of blood.  She denies seeing any blood in her urine.    She denies any headaches lightheadedness or dizziness.  She denies any difficulty swallowing.  She denies any nausea or vomiting.  She denies any abdominal pain or cramping.  She denies any change in her chronic bloating.  She denies any chest pressure.  She denies any palpitations.  She feels her appetite and weight are stable.    The patient does have a history of mastocytosis.  She has been followed recently at the [facility].  She was to be evaluated there on Monday for possible entrance on a trial.  Graph due to the anemia, we are asked to evaluate the  patiient    PMH-hypertension, elevated lipids, hemorrhoids, systemic mastocytosis    PSH-status post breast reduction surgery status post multiple skin biopsies, status post hemorrhoid intervention    FH-Mother alive with a history of dementia.  Father  of prostate cancer. One sister  in her 20s of trauma/murder.  Three children alive and well    SH-The patient lives with her male significant other.  She smoked 10 cigarettes a day for up to 4 years during college but none in the last 40 years.  She drinks alcohol intermittently.  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  She noted increasing shortness of breath with less exertion.  She denied any headaches blurry vision or double vision.  She denied any change in her hearing.  She denied any fevers or chills.  She denied any night sweats.  She denied any cough or sputum production.  She denied coughing of blood.  She denied any difficulty swallowing.  She denied any nausea or vomiting.  She denied any abdominal pain or cramping.  She does have intermittent bright red blood per rectum attributed to her hemorrhoids.  She denied any difficulty urinating or burning on urination.  She denied seeing any blood in her urine She denied any loss of consciousness or seizure activity.  She denied any change in her chronic rash.  Otherwise unremarkable    Physical exam-can alert female  Skin: Warm dry.  Vague mildly pigmented rash on her trunk and proximal extremities no excoriation.  No petechiae.  No ecchymoses  HEENT: Sclera white conjunctiva pink.  Heart: Regular rate and rhythm  Lungs: Without rales rhonchi or wheeze  Abdomen: Soft.  Mild distention.  No tenderness.  No  rebound.  No gross hepatosplenomegaly  Extremities: Without cyanosis clubbing or edema  Lymphatic: Without enlargement in the axillary inguinal  or supraclavicular spaces.  Shotty cervical nodes breast: Without masses or discharge well-healed incisions  Back: Without CVA or spinal tenderness    Laboratory data-sodium 137 potassium 4.4 chloride 108 CO2 22 BUN 18 creatinine 0.8 glucose 131 calcium 8.9 magnesium 2.0  WBC 4.96 hemoglobin 5.1 hematocrit 19.2 MCV 58 platelets 641,000 65 segs 17 lymphs 12 mono 3 EO 2 basal  Status post 2 units of packed red blood cells-WBC 5.19 hemoglobin 7.6 hematocrit 26.2 MCV 65 platelets 542,000    Problem #1 anemia-this is a hypochromic microcytic process.  The last CBC in August 2020 revealed a hemoglobin of 9.4 with ahematocrit of 32.5 and MCV of 84.    Therefore, currently it is most likely that her anemia secondary to iron deficiency.  The source of her iron deficiency has been attributed to bleeding from hemorrhoids.  In the past there was some concern for AVMs.  There is no gross bleeding at the current time.    The patient did receive a dose of IV iron therapy on Friday in the office.  She has received 2 units of packed red blood cells since admission.  I would proceed with a third or fourth unit today.      We will attempt to obtain the results of the upper endoscopy and colonoscopy   recently performed.  A follow-up outpatient capsule study may also be necessary.  It is unclear if the iron deficiency is all secondary to bleeding or if there is any evidence of malabsorption.  I will check serum studies for sprue.  We will obtain the biopsies possibly taken from the recent endoscopic procedures.    Unfortunately, the patient has been noncompliant with follow-up visits to the office.    Problem #2 mastocytosis-current time has been no need for intervention.  She is now followed at the [facility].  She was to be seen by them on Monday for evaluation for possible entrance on a trial.   For now we will follow this expectantly.    Problem #3 cardiovascular-history of hypertension elevated lipids.  She remains on medical management with Norvasc and Crestor    Problem #4 hemorrhoids-these have been severe in the past she has undergone intervention by Dr. Corcoran.  I would minimize enemas or rectal interventions if possible.    Problem #5 level of care-the patient is full code

## 2020-11-14 NOTE — NURSING NOTE
Patient completed and tolerated 1 unit blood for Hgb 7.6 - 3rd unit since being admitted.  Patient had multiple episodes of watery bloody stool during start of shift  Dr Dougherty consulted Dr Corcoran - performed bedside procedure. Will continue to monitor

## 2020-11-15 VITALS
HEART RATE: 62 BPM | RESPIRATION RATE: 16 BRPM | SYSTOLIC BLOOD PRESSURE: 114 MMHG | OXYGEN SATURATION: 97 % | TEMPERATURE: 98.6 F | DIASTOLIC BLOOD PRESSURE: 54 MMHG

## 2020-11-15 LAB
ANION GAP SERPL CALC-SCNC: 6 MEQ/L (ref 3–15)
ANISOCYTOSIS BLD QL SMEAR: ABNORMAL
BASOPHILS # BLD: 0.05 K/UL (ref 0.01–0.1)
BASOPHILS NFR BLD: 1 %
BUN SERPL-MCNC: 11 MG/DL (ref 8–20)
CALCIUM SERPL-MCNC: 8.6 MG/DL (ref 8.9–10.3)
CHLORIDE SERPL-SCNC: 111 MEQ/L (ref 98–109)
CO2 SERPL-SCNC: 21 MEQ/L (ref 22–32)
CREAT SERPL-MCNC: 0.5 MG/DL (ref 0.6–1.1)
CROSSMATCH: NORMAL
CROSSMATCH: NORMAL
DIFFERENTIAL METHOD BLD: ABNORMAL
EOSINOPHIL # BLD: 0.35 K/UL (ref 0.04–0.36)
EOSINOPHIL NFR BLD: 7 %
ERYTHROCYTE [DISTWIDTH] IN BLOOD BY AUTOMATED COUNT: 28.6 % (ref 11.7–14.4)
GFR SERPL CREATININE-BSD FRML MDRD: >60 ML/MIN/1.73M*2
GLUCOSE SERPL-MCNC: 109 MG/DL (ref 70–99)
HCT VFR BLDCO AUTO: 29.5 % (ref 35–45)
HGB BLD-MCNC: 8.8 G/DL (ref 11.8–15.7)
HYPOCHROMIA BLD QL SMEAR: ABNORMAL
IMM GRANULOCYTES # BLD AUTO: 0.08 K/UL (ref 0–0.08)
IMM GRANULOCYTES NFR BLD AUTO: 1.6 %
ISBT CODE: 5100
ISBT CODE: 5100
LYMPHOCYTES # BLD: 0.75 K/UL (ref 1.2–3.5)
LYMPHOCYTES NFR BLD: 15.1 %
MCH RBC QN AUTO: 20.4 PG (ref 28–33.2)
MCHC RBC AUTO-ENTMCNC: 29.8 G/DL (ref 32.2–35.5)
MCV RBC AUTO: 68.3 FL (ref 83–98)
MICROCYTES BLD QL SMEAR: ABNORMAL
MONOCYTES # BLD: 0.63 K/UL (ref 0.28–0.8)
MONOCYTES NFR BLD: 12.7 %
NEUTROPHILS # BLD: 3.11 K/UL (ref 1.7–7)
NEUTS SEG NFR BLD: 62.6 %
NRBC BLD-RTO: 0.8 %
OVALOCYTES BLD QL SMEAR: ABNORMAL
PDW BLD AUTO: 9 FL (ref 9.4–12.3)
PLATELET # BLD AUTO: 479 K/UL (ref 150–369)
PLATELET # BLD EST: ABNORMAL 10*3/UL
POIKILOCYTOSIS BLD QL SMEAR: ABNORMAL
POLYCHROMASIA BLD QL SMEAR: ABNORMAL
POTASSIUM SERPL-SCNC: 4.4 MEQ/L (ref 3.6–5.1)
PRODUCT CODE: NORMAL
PRODUCT CODE: NORMAL
PRODUCT STATUS: NORMAL
PRODUCT STATUS: NORMAL
RBC # BLD AUTO: 4.32 M/UL (ref 3.93–5.22)
SODIUM SERPL-SCNC: 138 MEQ/L (ref 136–144)
SPECIMEN EXP DATE BLD: NORMAL
SPECIMEN EXP DATE BLD: NORMAL
UNIT ABO: NORMAL
UNIT ABO: NORMAL
UNIT ID: NORMAL
UNIT ID: NORMAL
UNIT RH: POSITIVE
UNIT RH: POSITIVE
UNIT VOLUME: 325 ML
UNIT VOLUME: 325 ML
WBC # BLD AUTO: 4.97 K/UL (ref 3.8–10.5)

## 2020-11-15 PROCEDURE — 99239 HOSP IP/OBS DSCHRG MGMT >30: CPT | Performed by: INTERNAL MEDICINE

## 2020-11-15 PROCEDURE — 63700000 HC SELF-ADMINISTRABLE DRUG: Performed by: FAMILY MEDICINE

## 2020-11-15 PROCEDURE — 200200 PR NO CHARGE: Performed by: SURGERY

## 2020-11-15 PROCEDURE — 85025 COMPLETE CBC W/AUTO DIFF WBC: CPT | Performed by: INTERNAL MEDICINE

## 2020-11-15 PROCEDURE — 63700000 HC SELF-ADMINISTRABLE DRUG: Performed by: HOSPITALIST

## 2020-11-15 PROCEDURE — 80048 BASIC METABOLIC PNL TOTAL CA: CPT | Performed by: INTERNAL MEDICINE

## 2020-11-15 PROCEDURE — 36415 COLL VENOUS BLD VENIPUNCTURE: CPT | Performed by: INTERNAL MEDICINE

## 2020-11-15 RX ORDER — IBUPROFEN/PSEUDOEPHEDRINE HCL 200MG-30MG
3 TABLET ORAL ONCE
Status: COMPLETED | OUTPATIENT
Start: 2020-11-15 | End: 2020-11-15

## 2020-11-15 RX ADMIN — AMLODIPINE BESYLATE 5 MG: 5 TABLET ORAL at 09:27

## 2020-11-15 RX ADMIN — Medication 1000 UNITS: at 09:27

## 2020-11-15 RX ADMIN — ROSUVASTATIN CALCIUM 10 MG: 10 TABLET, FILM COATED ORAL at 09:27

## 2020-11-15 RX ADMIN — Medication 3 MG: at 02:17

## 2020-11-15 RX ADMIN — ESCITALOPRAM OXALATE 10 MG: 10 TABLET ORAL at 09:27

## 2020-11-15 RX ADMIN — POTASSIUM CHLORIDE 15 MEQ: 750 TABLET, FILM COATED, EXTENDED RELEASE ORAL at 09:27

## 2020-11-15 NOTE — PLAN OF CARE
Plan of Care Review  Plan of Care Reviewed With: patient  Progress: improving  Outcome Summary: Pt denies pain or discomfort. Up ad harsha. Xanax administered x1 at hs.

## 2020-11-15 NOTE — PROGRESS NOTES
Daily Progress Note    Subjective    No events overnight. Unfortunately pt's rectal bleeding did not improve after our sclerotherapy yesterday. She states that she continues passing at least as much blood as she did before. Also notes abd bloating and that she has not had a true bm or passed flatus for over 24 h now.     Objective    Vital signs in last 24 hours:  Temp:  [36.2 °C (97.2 °F)-37.1 °C (98.7 °F)] 37.1 °C (98.7 °F)  Heart Rate:  [52-66] 65  Resp:  [16-20] 16  BP: (118-155)/(56-82) 140/82      Intake/Output Summary (Last 24 hours) at 11/15/2020 0640  Last data filed at 11/14/2020 1457  Gross per 24 hour   Intake 440.25 ml   Output --   Net 440.25 ml       Physical Exam  General: Resting comfortably in bed, NAD  Head: Atraumatic, normocephalic  Eyes: EOMI, conjunctiva clear  Chest wall: atraumatic, nontender to palpation  Pulm: Speaking in full sentences, no respiratory distress  Abd: soft, nt, mildly distended  Extrem: atraumatic, moving all extremities  Neuro: A&Ox3    Labs  Recent Results (from the past 24 hour(s))   Prepare RBC- Infuse within 4hrs of issue time: 2 Units Transfusion indications: Hgb <7 g/dl in hemodynamically stable patient    Collection Time: 11/14/20  7:25 AM   Result Value Ref Range    Product Code N6743C05     Unit ID [id number]     Unit ABO O     Unit RH Positive     Crossmatch Compatible     Product Status IS     Unit Expiration Date/Time [id number]     ISBT Code 5100     Unit Volume 325 ml    Product Code M8793Y73     Unit ID [id number]     Unit ABO O     Unit RH Positive     Crossmatch Compatible     Product Status XM     Unit Expiration Date/Time [id number]     ISBT Code 5100     Unit Volume 325 ml         Imaging  n/a    Assessment & Plan    Pt is a 65 y.o. female chronic anemia known hemorrhoidal disease presenting with Hb in 5s found to have bleeding from hemorrhoids s/p bedside sclerotherapy without improvement.     Dr. Corcoran to re eval today, continue to  monitor, transfuse as needed for Hb 7.     Corbin Ruiz MD   Surgery PGY-2      There are no further interventions I can offer.  Given her mast cell disorder it seems that surgery may bring undue risks.  Will sign off for now.    Javier Corcoran MD  11/15/20  9:07 AM

## 2020-11-15 NOTE — PROGRESS NOTES
"The patient continues to note bright red blood per rectum.  She did undergo an intervention by Dr. Corcoran yesterday.  She denies any improvement in the rectal bleeding..    This morning she notes a mild sore throat.  She feels her eyes are puffy.    She denies any abdominal pain or cramping.  However she states when she bends over she feels her organs are being \"crushed\".  She denies any definitive bowel movement.  She has only seeing bright red blood per rectum.  Her appetite is improving.  She denies any difficulty swallowing.  She denies any nausea or vomiting.  She was able to eat her meals.    She denies any cough or sputum production.  She denies any chest pain or shortness of breath.  She denies any palpitations.  She denies any headaches.    She did sleep relatively well    Physical exam-awake and  alert female  Skin: Warm dry.  Vague mildly pigmented rash on her trunk and proximal extremities no excoriation.  No petechiae.  No ecchymoses  HEENT: Sclera white conjunctiva pink.  Pharynx without thrush.  No erythema.  Heart: Regular rate and rhythm  Lungs: Without rales rhonchi or wheeze  Abdomen: Soft.  Mild distention.  No tenderness.  No rebound.  No gross hepatosplenomegaly  Extremities: Without cyanosis clubbing or edema  Lymphatic: Without enlargement in the axillary inguinal  or supraclavicular spaces.  Shotty cervical nodes breast: Without masses or discharge well-healed incisions  Back: Without CVA or spinal tenderness     Laboratory data-ending  Transfusion support since admission-3 units of packed red blood cells     Problem #1 anemia-this is a hypochromic microcytic process.  The last CBC in August 2020 revealed a hemoglobin of 9.4 with a hematocrit of 32.5 and MCV of 84.     Therefore, currently it is most likely that her anemia secondary to iron deficiency.  The source of her iron deficiency has been attributed to bleeding from hemorrhoids.  In the past there was some concern for AVMs.  There is " no gross bleeding at the current time.     The patient did receive a dose of IV iron therapy on Friday in the office Apparently she was administered another dose yesterday..  She has received 3 units of packed red blood cells since admission.  We will await the CBC today and then make further recommendations as to ongoing transfusion support.    I did speak with the gastroenterologist at [facility].  The upper endoscopy was unremarkable, there was no source of bleeding.  The colonoscopy revealed multiple polyps which were removed.  The pathology apparently was not available.  There was no obvious source of bleeding during that procedure either.  Her last capsule study was several years ago.      Unfortunately, the patient has been noncompliant with follow-up visits to the office.  I would maintain her hemoglobin greater than 8.     Problem #2 mastocytosis-at the current time there  has been no need for intervention.  She is now followed at the [facility].  She was to be seen by them on Monday for evaluation for possible entrance on a trial.  For now we will follow this expectantly.  She has canceled her evaluation at the [facility] for tomorrow.  Hopefully she will reschedule this.  Interestingly, they had planned a bone marrow aspirate and biopsy prior to entrance on a trial.     Problem #3 cardiovascular-she has a history of hypertension elevated lipids.  She remains on medical management with Norvasc and Crestor     Problem #4 hemorrhoids-these have been severe in the past.  She underwent an intervention yesterday by Dr. Corcoran.  Unfortunately she states she continues to have red blood per rectum without bowel movement.  She was seen by the surgical service earlier today as well.  Further intervention may be considered    Problem #5 sore throat-nothing clinical on exam I would monitor this closely    Problem #6 abdominal discomfort-I would consider a follow-up abdominal ultrasound during this admission if possible.     Problem  #7 level of care-the patient is full code

## 2020-11-15 NOTE — DISCHARGE SUMMARY
Hospital Medicine Service -  Inpatient Discharge Summary        BRIEF OVERVIEW   Admitting Provider: Constance Goldberg DO  Attending Provider: Jenaro Dougherty MD Attending phys phone: [phone]    PCP: Ashley Nazario [phone]    Admission Date: 11/13/2020  Discharge Date: 11/15/2020     DISCHARGE DIAGNOSES      Primary Discharge Diagnosis  Iron deficiency anemia    Secondary Discharge Diagnoses  Active Hospital Problems    Diagnosis Date Noted   • Iron deficiency anemia 08/17/2020     Priority: High   • Internal hemorrhoid, bleeding 07/27/2020     Priority: High   • Systemic mastocytosis 11/29/2018     Priority: Medium   • Constipation 11/13/2020     Priority: Low   • Vitamin D deficiency 08/17/2020     Priority: Low   • Anxiety 03/28/2019     Priority: Low   • Hypokalemia 11/29/2018     Priority: Low   • Essential hypertension 11/29/2018     Priority: Low      Resolved Hospital Problems   No resolved problems to display.       Problem List on Day of Discharge  Internal hemorrhoid, bleeding  Assessment & Plan  Bedside injection by Dr. Corcoran on 11/14/20     * Iron deficiency anemia  Assessment & Plan  Hgb 5.1, severe iron deficiency with ferritin 3, sat 2  Receives frequent IV iron infusions with Dr Calhoun  Total 3 U prbc      Discussed with Dr. Calhoun - per Dr. Calhoun recent scope was ok     Also with acute blood loss anemia from continuing hemorrhoidal bleed - seen by  Dr. Corcoran and injected at bedside        Systemic mastocytosis  Assessment & Plan  Follows with [facility] and locally with Dr Calhoun       Constipation  Assessment & Plan  No enema with hemorrhoidal bleed   Recommend daily stool softener     Vitamin D deficiency  Assessment & Plan  Cont supps    Anxiety  Assessment & Plan  Cont lexapro with prn xanax    Essential hypertension  Assessment & Plan  Cont norvasc          Hypokalemia  Assessment & Plan  Now normalized       SUMMARY OF HOSPITALIZATION      Presenting  "Problem/History of Present Illness  Anemia, unspecified type    This is a 65 y.o. year-old female admitted on 11/13/2020 with Anemia, unspecified type [D64.9].    From admission HPI:  Ritu Ramirez is a 65 y.o. female with a past medical history of systemic mastocytosis, chronic anemia, hypertension, hyperlipidemia, anxiety who presents with low hemoglobin.  Patient has history of chronic anemia for which she receives frequent IV iron infusions with her hematologist Dr. Calhoun.  She also sees hematology/oncology at the [facility] for her diagnosis of systemic mastocytosis.  Patient reports that over the last week or so she has felt more short of breath when she climbs stairs in her home.  She is also felt very tired and felt like her blood count must be low.  She called Dr. Calhoun and made an appointment to be seen in the office today.  In the office today she was found to have a hemoglobin of 5.5 and was sent to the emergency department for blood transfusion.  Once in the emergency department her hemoglobin was tested and found to be 5.1.  She denies any chest pain, dizziness, nausea, vomiting.  She admits to bright red blood in her stool approximately once per week that is associated with hemorrhoids.  She reports feeling constipated and has not had a bowel movement in several days despite using prune juice, senna tea, and Dulcolax.  She feels like her belly is distended.  She denies any blood in her urine or any vaginal bleeding.  She does have a history of uterine fibroids.  She is supposed to go to the [facility] in [place] this Sunday for \"full work-up\" of her systemic mastocytosis.  She is also scheduled to have a repeat bone marrow biopsy done while she is down there.  She has not required a blood transfusion in several years.  She was ordered 2 units of packed red blood cells in the ED.  She will be admitted for further evaluation and treatment of her symptoms.       Hospital Course    She was given 3 U prbc and 1 dose of " IV iron. She had BRBPR and had large internal hemorrhoid which was injected by Dr. Corcoran at bedside.  Her Hb was 8.8   She was never symptomatic.    Her potassium was low and this was replaced and normal at d/c     Exam on Day of Discharge  No acute distress  Lungs clear  Heart reg   abd  Soft   Skin with taut     Consults During Admission  IP CONSULT TO HEMATOLOGY/ONCOLOGY  IP CONSULT TO GENERAL SURGERY    DISCHARGE MEDICATIONS        Medication List      CONTINUE taking these medications    ALPRAZolam 0.25 mg tablet  Commonly known as: XANAX  Take 0.25 mg by mouth nightly as needed for anxiety.  Dose: 0.25 mg     amLODIPine 5 mg tablet  Commonly known as: NORVASC  Take 5 mg by mouth daily.  Dose: 5 mg     cholecalciferol (vitamin D3) 25 mcg (1,000 unit) capsule  Take 1,000 Units by mouth daily.  Dose: 1,000 Units     escitalopram 10 mg tablet  Commonly known as: LEXAPRO  TAKE 1 TABLET BY MOUTH EVERY DAY     hydrochlorothiazide 25 mg tablet  Commonly known as: HYDRODIURIL  Take 25 mg by mouth once daily.  Dose: 25 mg     KLOR-CON 8 8 mEq CR tablet  Take 16 mEq by mouth daily.  Dose: 16 mEq  Generic drug: potassium chloride     rosuvastatin 10 mg tablet  Commonly known as: CRESTOR  TAKE 1 TABLET BY MOUTH EVERY DAY            Instructions for after discharge     Call provider for:  extreme fatigue      Call provider for:  persistent dizziness or light-headedness      Call provider for:  persistent nausea or vomiting      Call provider for:  severe uncontrolled pain      Discharge diet      Diet Type / Texture: Cardiac (Low Sodium, Low Fat)    Follow-up with primary physician (PCP)      1 -2 weeks    Other Follow-up      Dr. Calhoun per routine    Post-Discharge Activity: Normal activity as tolerated.      Normal activity as tolerated.             PROCEDURES / LABS / IMAGING      Operative Procedures  none    Other Procedures  Bedside hemorrhoid injection by surgery 11/14/20    Pertinent Labs  Lab Results    Component Value Date    GLUCOSE 109 (H) 11/15/2020    CALCIUM 8.6 (L) 11/15/2020     11/15/2020    K 4.4 11/15/2020    CO2 21 (L) 11/15/2020     (H) 11/15/2020    BUN 11 11/15/2020    CREATININE 0.5 (L) 11/15/2020     Lab Results   Component Value Date    ALT 19 11/13/2020    AST 19 11/13/2020    ALKPHOS 56 11/13/2020    BILITOT 0.8 11/13/2020     Lab Results   Component Value Date    WBC 4.97 11/15/2020    HGB 8.8 (L) 11/15/2020    HCT 29.5 (L) 11/15/2020    MCV 68.3 (L) 11/15/2020     (H) 11/15/2020         Pertinent Imaging  X-ray Obstruction Series (abdomen 2 Views With Chest 1 View)    Result Date: 11/9/2020  IMPRESSION: Nonspecific bowel gas pattern.. No active pulmonary disease.     X-ray Chest 1 View    Result Date: 11/13/2020  IMPRESSION: No definite acute disease. COMMENT: Comparison: Chest x-ray dated 11/9/2020. Erect portable AP view of the chest.  No evidence of pneumothorax or pleural effusion.  The lungs appear clear noting increased attenuation overlying the lateral mid to lower lung zones likely due to overlying breast shadows.  Stable cardiomediastinal contours.  No upper abdominal findings of concern.  No gross bone findings of concern      OUTPATIENT  FOLLOW-UP / REFERRALS / PENDING TESTS        Outpatient Follow-Up Appointments            In 2 months Bright Selby MD [facility] Interventional Cardiology at [facility]          Referrals  No orders of the defined types were placed in this encounter.      Test Results Pending at Discharge  Unresulted Labs (From admission, onward)    None          Important Issues to Address in Follow-Up  Dr. Calhoun for IV iron and oupt ultrasound    DISCHARGE DISPOSITION      Disposition: Home     Code Status At Discharge: Full Code    Physician Order for Life-Sustaining Treatment Document Status      No documents found                   Discussed with Dr. Calhoun   >30 minutes spent in total d/c preparation,  communication and coordination of care.

## 2020-11-15 NOTE — DISCHARGE INSTRUCTIONS
Please call Dr. Calhoun's office for follow-up and to set up abdominal ultrasound    Recommend daily stool softener and/or daily metamucil as constipation will worsen you hemorrhoids

## 2020-11-16 LAB — TRYPTASE SERPL-MCNC: 40.4 MCG/L

## 2020-11-17 ENCOUNTER — TELEPHONE (OUTPATIENT)
Dept: INTERNAL MEDICINE | Facility: CLINIC | Age: 65
End: 2020-11-17

## 2020-11-17 ENCOUNTER — PATIENT OUTREACH (OUTPATIENT)
Dept: CASE MANAGEMENT | Facility: CLINIC | Age: 65
End: 2020-11-17

## 2020-11-17 DIAGNOSIS — R10.84 GENERALIZED ABDOMINAL PAIN: ICD-10-CM

## 2020-11-17 DIAGNOSIS — R14.0 ABDOMINAL BLOATING: Primary | ICD-10-CM

## 2020-11-17 LAB
CROSSMATCH: NORMAL
CROSSMATCH: NORMAL
HISTAMINE SERPL-MCNC: <1.5 NG/ML
ISBT CODE: 5100
ISBT CODE: 5100
PRODUCT CODE: NORMAL
PRODUCT CODE: NORMAL
PRODUCT STATUS: NORMAL
PRODUCT STATUS: NORMAL
SPECIMEN EXP DATE BLD: NORMAL
SPECIMEN EXP DATE BLD: NORMAL
UNIT ABO: NORMAL
UNIT ABO: NORMAL
UNIT ID: NORMAL
UNIT ID: NORMAL
UNIT RH: POSITIVE
UNIT RH: POSITIVE
UNIT VOLUME: 325 ML
UNIT VOLUME: 325 ML

## 2020-11-17 RX ORDER — FAMOTIDINE 20 MG/1
20 TABLET, FILM COATED ORAL 2 TIMES DAILY
COMMUNITY
End: 2021-04-16

## 2020-11-17 NOTE — PROGRESS NOTES
"NAME: Ritu Ramirez    MRN: [id number]    YOB: 1955    EVENT DETAILS    Discharging Facility: [facility]  Date of Admission: 11/13/20  Date of Discharge: 11/15/20  Discharge Instructions Reviewed?: Yes         Reason for Admission:  Anemia      HPI: Spoke with patient. She was sent to the E D with low hemoglobin ( 5.5.) In the E D it was found to be 5.1.    Patient was given 3 U PRBC and 1 dose of IV iron. She had BRBPR and had large internal hemorrhoid which was injected at bedside.  Hgb at discharge was 8.8 .    Patient stated that she is feeling better but still has shortness of breath with exertion. She is still having rectal bleeding but not as severe as prior to her admission. Patient is reporting abdominal discomfort and bloating. She was supposed to have an abdominal ultrasound but was not able to have one before discharge. She was advised to have the abdominal ultrasound as an outpatient. ( message for test request sent to PCP. )     Patient stated that she had a colonoscopy about a week ago. Results pending.    Patient is not reporting fever, chills, weakness, dizziness, lightheadedness,  abnormal cardiac, respiratory  or G U symptoms. She is talking an occasional stool softener to prevent constipation.    Patient is tolerating food and fluids.    Patient was supposed to go to the [facility] in [place] on 11/15/2020 for \"full work-up\" of her systemic mastocytosis and repeat bone marrow biopsy .  She has to reschedule.      MEDICATION REVIEW:    Reported by:: Patient  Prescriptions Filled?: Yes     Was a medication discrepancy indentified?: No     Medication understanding?: Yes     Medication Adherence?: Yes     Any side effects from medication?: No       Medication review Reviewed, see medication history    Additional Comments: No medication changes      FOLLOW-UP TESTS/PROCEDURES: Recommended Abdominal US    Work up & Bone Marrow Biopsy at [facility]      HOME MANAGEMENT    Living Arrangement: " Spouse or Significant Other  Home Monitoring: Blood Pressure(BP PRN)    HOME CARE SERVICES    Receiving Home Care Services: No          DURABLE MEDICAL EQUIPMENT    Durable Medical Equipment: Other(BP Monitor)  Oxygen Use: No          BARRIERS TO CARE    SELF-CARE    Living Arrangement: Spouse or Significant Other       SOCIOECONOMIC    Financial Problems?: No     Transportation Issues?: No         INTERVENTION/CARE COORDINATION    Interventions/ Care Coordination: Encouraged patient to schedule with the PCP/Specialist     PCP Appt: Encouraged/ Offered to schedule TCM appointment    Specialist Appt.: Dr. Calhoun : 11/20/2020      PLAN OF CARE:    Reviewed signs/symptoms of worsening condition or complication that necessitate a call to the Physician's office.  Educated patient on access to care.  RN phone number given for future care management needs.       Nancy Shaffer RN

## 2020-11-18 NOTE — TELEPHONE ENCOUNTER
Please let Ms James know that I placed and order for ultrasound of the abdomen for her and we can mail to her

## 2020-11-20 ENCOUNTER — HOSPITAL ENCOUNTER (OUTPATIENT)
Dept: RADIOLOGY | Facility: CLINIC | Age: 65
Discharge: HOME | End: 2020-11-20
Attending: INTERNAL MEDICINE
Payer: MEDICARE

## 2020-11-20 ENCOUNTER — LAB REQUISITION (OUTPATIENT)
Dept: LAB | Facility: HOSPITAL | Age: 65
End: 2020-11-20
Payer: MEDICARE

## 2020-11-20 DIAGNOSIS — D50.8 OTHER IRON DEFICIENCY ANEMIAS: ICD-10-CM

## 2020-11-20 DIAGNOSIS — R14.0 ABDOMINAL BLOATING: ICD-10-CM

## 2020-11-20 DIAGNOSIS — R10.84 GENERALIZED ABDOMINAL PAIN: ICD-10-CM

## 2020-11-20 LAB
ALBUMIN SERPL-MCNC: 4.7 G/DL (ref 3.4–5)
ALP SERPL-CCNC: 60 IU/L (ref 35–126)
ALT SERPL-CCNC: 21 IU/L (ref 11–54)
ANION GAP SERPL CALC-SCNC: 9 MEQ/L (ref 3–15)
ANISOCYTOSIS BLD QL SMEAR: ABNORMAL
AST SERPL-CCNC: 35 IU/L (ref 15–41)
BASOPHILS # BLD: 0.04 K/UL (ref 0.01–0.1)
BASOPHILS NFR BLD: 0.7 %
BILIRUB SERPL-MCNC: 1.1 MG/DL (ref 0.3–1.2)
BUN SERPL-MCNC: 22 MG/DL (ref 8–20)
CALCIUM SERPL-MCNC: 9.4 MG/DL (ref 8.9–10.3)
CHLORIDE SERPL-SCNC: 101 MEQ/L (ref 98–109)
CO2 SERPL-SCNC: 29 MEQ/L (ref 22–32)
CREAT SERPL-MCNC: 0.5 MG/DL (ref 0.6–1.1)
DIFFERENTIAL METHOD BLD: ABNORMAL
EOSINOPHIL # BLD: 0.47 K/UL (ref 0.04–0.36)
EOSINOPHIL NFR BLD: 8.3 %
ERYTHROCYTE [DISTWIDTH] IN BLOOD BY AUTOMATED COUNT: ABNORMAL %
GFR SERPL CREATININE-BSD FRML MDRD: >60 ML/MIN/1.73M*2
GLUCOSE SERPL-MCNC: 97 MG/DL (ref 70–99)
HCT VFR BLDCO AUTO: 37.1 % (ref 35–45)
HGB BLD-MCNC: 10.8 G/DL (ref 11.8–15.7)
HYPOCHROMIA BLD QL SMEAR: ABNORMAL
IMM GRANULOCYTES # BLD AUTO: 0.04 K/UL (ref 0–0.08)
IMM GRANULOCYTES NFR BLD AUTO: 0.7 %
LYMPHOCYTES # BLD: 0.9 K/UL (ref 1.2–3.5)
LYMPHOCYTES NFR BLD: 15.9 %
MCH RBC QN AUTO: 21.7 PG (ref 28–33.2)
MCHC RBC AUTO-ENTMCNC: 29.1 G/DL (ref 32.2–35.5)
MCV RBC AUTO: 74.5 FL (ref 83–98)
MICROCYTES BLD QL SMEAR: ABNORMAL
MONOCYTES # BLD: 0.62 K/UL (ref 0.28–0.8)
MONOCYTES NFR BLD: 10.9 %
NEUTROPHILS # BLD: 3.6 K/UL (ref 1.7–7)
NEUTROPHILS # BLD: 3.6 K/UL (ref 1.7–7)
NEUTS SEG NFR BLD: 63.5 %
NRBC BLD-RTO: 0 %
OVALOCYTES BLD QL SMEAR: ABNORMAL
PDW BLD AUTO: 8.8 FL (ref 9.4–12.3)
PLAT MORPH BLD: NORMAL
PLATELET # BLD AUTO: 347 K/UL (ref 150–369)
PLATELET # BLD EST: ABNORMAL 10*3/UL
POLYCHROMASIA BLD QL SMEAR: ABNORMAL
POTASSIUM SERPL-SCNC: 3.6 MEQ/L (ref 3.6–5.1)
PROT SERPL-MCNC: 6.9 G/DL (ref 6–8.2)
RBC # BLD AUTO: 4.98 M/UL (ref 3.93–5.22)
SODIUM SERPL-SCNC: 139 MEQ/L (ref 136–144)
WBC # BLD AUTO: 5.67 K/UL (ref 3.8–10.5)

## 2020-11-20 PROCEDURE — 80053 COMPREHEN METABOLIC PANEL: CPT | Performed by: INTERNAL MEDICINE

## 2020-11-20 PROCEDURE — 85025 COMPLETE CBC W/AUTO DIFF WBC: CPT | Performed by: INTERNAL MEDICINE

## 2020-11-20 PROCEDURE — 76700 US EXAM ABDOM COMPLETE: CPT

## 2020-11-21 ENCOUNTER — TELEPHONE (OUTPATIENT)
Dept: INTERNAL MEDICINE | Facility: CLINIC | Age: 65
End: 2020-11-21

## 2020-11-21 NOTE — TELEPHONE ENCOUNTER
Please arrange TCM with me or at least a telemed - let her know that her abdominal ultrasound shows gallbladder polyps and fatty liver and I will discuss at her appointment - alternatively she can discuss with gi if she has a follow up with them - let me know

## 2020-11-23 ENCOUNTER — OFFICE VISIT (OUTPATIENT)
Dept: INTERNAL MEDICINE | Facility: CLINIC | Age: 65
End: 2020-11-23
Payer: MEDICARE

## 2020-11-23 VITALS
HEIGHT: 60 IN | DIASTOLIC BLOOD PRESSURE: 84 MMHG | TEMPERATURE: 98.6 F | WEIGHT: 123 LBS | SYSTOLIC BLOOD PRESSURE: 134 MMHG | BODY MASS INDEX: 24.15 KG/M2 | HEART RATE: 54 BPM | OXYGEN SATURATION: 99 %

## 2020-11-23 DIAGNOSIS — E87.6 HYPOKALEMIA: ICD-10-CM

## 2020-11-23 DIAGNOSIS — Z87.898 H/O MULTIPLE PULMONARY NODULES: ICD-10-CM

## 2020-11-23 DIAGNOSIS — I10 ESSENTIAL HYPERTENSION: ICD-10-CM

## 2020-11-23 DIAGNOSIS — D50.9 IRON DEFICIENCY ANEMIA, UNSPECIFIED IRON DEFICIENCY ANEMIA TYPE: Primary | ICD-10-CM

## 2020-11-23 DIAGNOSIS — D47.02 SYSTEMIC MASTOCYTOSIS: ICD-10-CM

## 2020-11-23 DIAGNOSIS — K82.4 GALLBLADDER POLYP: ICD-10-CM

## 2020-11-23 DIAGNOSIS — R14.0 ABDOMINAL DISTENTION: ICD-10-CM

## 2020-11-23 PROCEDURE — 99495 TRANSJ CARE MGMT MOD F2F 14D: CPT | Performed by: INTERNAL MEDICINE

## 2020-11-23 RX ORDER — PREDNISONE 50 MG/1
TABLET ORAL
Qty: 3 TABLET | Refills: 0 | Status: SHIPPED | OUTPATIENT
Start: 2020-11-23 | End: 2021-04-20

## 2020-11-23 ASSESSMENT — ENCOUNTER SYMPTOMS
PALPITATIONS: 0
FEVER: 0
DIARRHEA: 0
FATIGUE: 1
CHILLS: 0
BLOOD IN STOOL: 1
RECTAL PAIN: 0
ABDOMINAL DISTENTION: 0
WHEEZING: 0
ADENOPATHY: 0
BRUISES/BLEEDS EASILY: 0
SHORTNESS OF BREATH: 0
CHEST TIGHTNESS: 0
NAUSEA: 0

## 2020-11-23 NOTE — PATIENT INSTRUCTIONS
Follow with dr. El Marsh  [phone]   Obtain CT chest abdomen and pelvis   Blood work in 3-4 weeks   Prep  For the CT scan  Prednisone 50 mg 13 hours before the test then 7 hours before and 1 hour before   Benadryl 50 mg at bedtime and in AM  And pepcid 40 mg at bedtime  Increase potassium to three daily

## 2020-11-23 NOTE — PROGRESS NOTES
Subjective      Patient ID: Ritu Ramirez is a 65 y.o. female.    She is here for follow up -  was admitted to [facility] with a hemoglobin of 5.1   Had been feeling very fatigued and short of breath.  Did not have any chest pain lightheadedness or palpitations.  She has had some hemorrhoidal bleeding in the week prior to admission - has hx of mild anemia with iron deficiency for which she sees Dr. nAi Calhoun of hematology /oncology , In the hospital she was evaluated by Dr. Corcoran for her hematochezia and had her hemorrhoids injected. She also received 3 units of packed red blood cells and another iron infusion.  Repeat hemoglobin is now 10.8.  She continues to feel fatigued albeit less so. Also her potassium hospital the way down to 2.9.  This was replaced.  She is currently on Klor-Con 2 tablets daily.  Remains on hydrochlorothiazide and amlodipine for blood pressure control. Still feels that she has some abdominal distention, occcasional constipation no gaseousness.       The following have been reviewed and updated as appropriate in this visit:  Allergies  Meds  Problems       Past Medical History:   Diagnosis Date   • Anemia    • Anxiety    • Hypertension    • Iron deficiency    • Lipid disorder    • Systemic mastocytosis    • Vitamin D deficiency      Past Surgical History:   Procedure Laterality Date   • COLONOSCOPY     • REDUCTION MAMMAPLASTY       Family History   Problem Relation Age of Onset   • Heart disease Biological Mother    • Prostate cancer Biological Father      Social History     Socioeconomic History   • Marital status:      Spouse name: Not on file   • Number of children: Not on file   • Years of education: Not on file   • Highest education level: Not on file   Occupational History   • Not on file   Social Needs   • Financial resource strain: Not on file   • Food insecurity     Worry: Not on file     Inability: Not on file   • Transportation needs     Medical: Not on file      Non-medical: Not on file   Tobacco Use   • Smoking status: Former Smoker     Packs/day: 0.25     Quit date:      Years since quittin.9   • Smokeless tobacco: Never Used   Substance and Sexual Activity   • Alcohol use: Yes     Comment: Social   • Drug use: No   • Sexual activity: Defer   Lifestyle   • Physical activity     Days per week: Not on file     Minutes per session: Not on file   • Stress: Not on file   Relationships   • Social connections     Talks on phone: Not on file     Gets together: Not on file     Attends Orthodox service: Not on file     Active member of club or organization: Not on file     Attends meetings of clubs or organizations: Not on file     Relationship status: Not on file   • Intimate partner violence     Fear of current or ex partner: Not on file     Emotionally abused: Not on file     Physically abused: Not on file     Forced sexual activity: Not on file   Other Topics Concern   • Not on file   Social History Narrative   • Not on file       Review of Systems   Constitutional: Positive for fatigue. Negative for chills and fever.   Respiratory: Negative for chest tightness, shortness of breath and wheezing.    Cardiovascular: Negative for chest pain, palpitations and leg swelling.   Gastrointestinal: Positive for blood in stool. Negative for abdominal distention, diarrhea, nausea and rectal pain.   Endocrine: Negative for cold intolerance and heat intolerance.   Hematological: Negative for adenopathy. Does not bruise/bleed easily.       Allergies   Allergen Reactions   • Anesthetics - Amide Type - Select Amino Amides      Other reaction(s): Anaphylaxis   • Clarithromycin      Other reaction(s): Rash   • Erythromycin Base      Other reaction(s): Anaphylaxis   • Iodinated Contrast Media      Other reaction(s): Anaphylaxis   • Nsaids (Non-Steroidal Anti-Inflammatory Drug)      Other reaction(s): Anaphylaxis   • Penicillins      Current Outpatient Medications   Medication Sig  Dispense Refill   • ALPRAZolam (XANAX) 0.25 mg tablet Take 0.25 mg by mouth nightly as needed for anxiety.     • amLODIPine (NORVASC) 5 mg tablet Take 5 mg by mouth daily.       • cholecalciferol, vitamin D3, 1,000 unit capsule Take 1,000 Units by mouth daily.       • escitalopram (LEXAPRO) 10 mg tablet TAKE 1 TABLET BY MOUTH EVERY DAY (Patient taking differently: Take 10 mg by mouth daily.  ) 30 tablet 11   • famotidine (PEPCID) 20 mg tablet Take 20 mg by mouth 2 (two) times a day.     • hydrochlorothiazide (HYDRODIURIL) 25 mg tablet Take 25 mg by mouth once daily.  3   • potassium chloride (KLOR-CON 8) 8 mEq CR tablet Take 16 mEq by mouth daily.     • predniSONE (DELTASONE) 50 mg tablet Take one tablet 13 hours before the procedure then 7 hours before and then one hour before 3 tablet 0   • rosuvastatin (CRESTOR) 10 mg tablet TAKE 1 TABLET BY MOUTH EVERY DAY (Patient taking differently: Take 10 mg by mouth daily.  ) 30 tablet 11     No current facility-administered medications for this visit.        Objective   Vitals:    11/23/20 0907   BP: 134/84   Pulse: (!) 54   Temp: 37 °C (98.6 °F)   SpO2: 99%   Weight: 55.8 kg (123 lb)   Height: 1.524 m (5')       Physical Exam  Vitals signs and nursing note reviewed.   Constitutional:       Appearance: She is well-developed.   HENT:      Head: Normocephalic and atraumatic.   Neck:      Musculoskeletal: Normal range of motion and neck supple.      Thyroid: No thyromegaly.   Cardiovascular:      Rate and Rhythm: Normal rate and regular rhythm.      Heart sounds: Normal heart sounds. No murmur.   Pulmonary:      Effort: Pulmonary effort is normal.      Breath sounds: Normal breath sounds.   Abdominal:      General: Bowel sounds are normal.      Palpations: Abdomen is soft. There is no mass.      Tenderness: There is no abdominal tenderness.   Skin:     General: Skin is warm and dry.   Neurological:      Mental Status: She is alert and oriented to person, place, and time.    Psychiatric:         Behavior: Behavior normal.         Thought Content: Thought content normal.         Judgment: Judgment normal.         Assessment/Plan   Problem List Items Addressed This Visit        Circulatory    Essential hypertension     Stable on hydrochlorothiazide and amlodipine.  We will continue with potassium replacement.  Recheck basic metabolic profile.  Maintain low-sodium diet.            Digestive    Gallbladder polyp     I reviewed her prior ultrasound with her.  We will check a repeat.  Advised she could consider elective removal of her gallbladder.  Referred her to Dr. El Marsh            Endocrine/Metabolic    Hypokalemia     Will increase her potassium replacement to 3 tablets daily.  Check repeat BMP.  Likely will need to stay on this dose of potassium, she is on hydrochlorothiazide         Relevant Orders    Basic metabolic panel       Hematologic    Systemic mastocytosis     Continue with follow-up with Dr. Ani Calhoun and at [facility] - currently asymptomatic         Relevant Medications    predniSONE (DELTASONE) 50 mg tablet    Iron deficiency anemia - Primary     Exacerbated by her recent rectal bleeding.  We will recheck her CBC and iron studies Recent upper endoscopy negative.  Prior capsule study negative for any bleeding source.  Colonoscopy was done in November 2023 polyps 2 in the ascending colon and one in the descending colon negative for malignancy.  Previous capsule study also negative for any bleeding source.         Relevant Orders    CBC    Ferritin    Iron and TIBC      Other Visit Diagnoses     Abdominal distention        Relevant Orders    CT ABDOMEN PELVIS WITH IV CONTRAST    H/O multiple pulmonary nodules        Relevant Orders    CT CHEST WITH IV CONTRAST          Ashley Omalley MD    11/23/2020

## 2020-11-24 NOTE — ASSESSMENT & PLAN NOTE
Stable on hydrochlorothiazide and amlodipine.  We will continue with potassium replacement.  Recheck basic metabolic profile.  Maintain low-sodium diet.

## 2020-11-24 NOTE — ASSESSMENT & PLAN NOTE
Continue with follow-up with Dr. Ani Calhoun and at [facility] - currently asymptomatic

## 2020-11-24 NOTE — ASSESSMENT & PLAN NOTE
Exacerbated by her recent rectal bleeding.  We will recheck her CBC and iron studies Recent upper endoscopy negative.  Prior capsule study negative for any bleeding source.  Colonoscopy was done in November 2023 polyps 2 in the ascending colon and one in the descending colon negative for malignancy.  Previous capsule study also negative for any bleeding source.

## 2020-11-24 NOTE — ASSESSMENT & PLAN NOTE
I reviewed her prior ultrasound with her.  We will check a repeat.  Advised she could consider elective removal of her gallbladder.  Referred her to Dr. El Marsh

## 2020-11-24 NOTE — ASSESSMENT & PLAN NOTE
Will increase her potassium replacement to 3 tablets daily.  Check repeat BMP.  Likely will need to stay on this dose of potassium, she is on hydrochlorothiazide

## 2020-11-27 ENCOUNTER — HOSPITAL ENCOUNTER (OUTPATIENT)
Dept: RADIOLOGY | Facility: HOSPITAL | Age: 65
Discharge: HOME | End: 2020-11-27
Attending: INTERNAL MEDICINE
Payer: MEDICARE

## 2020-11-27 DIAGNOSIS — R14.0 ABDOMINAL DISTENTION: ICD-10-CM

## 2020-11-27 DIAGNOSIS — Z87.898 H/O MULTIPLE PULMONARY NODULES: ICD-10-CM

## 2020-11-27 PROCEDURE — 74177 CT ABD & PELVIS W/CONTRAST: CPT | Mod: ME

## 2020-11-27 PROCEDURE — G1004 CDSM NDSC: HCPCS

## 2020-11-27 PROCEDURE — 63600105 HC IODINE BASED CONTRAST: Performed by: INTERNAL MEDICINE

## 2020-11-27 RX ADMIN — IOHEXOL 100 ML: 350 INJECTION, SOLUTION INTRAVENOUS at 15:30

## 2020-12-01 ENCOUNTER — TELEPHONE (OUTPATIENT)
Dept: INTERNAL MEDICINE | Facility: CLINIC | Age: 65
End: 2020-12-01

## 2020-12-01 RX ORDER — ALPRAZOLAM 0.25 MG/1
0.25 TABLET ORAL NIGHTLY PRN
Qty: 30 TABLET | Refills: 0 | Status: SHIPPED | OUTPATIENT
Start: 2020-12-01 | End: 2021-04-16

## 2020-12-01 NOTE — TELEPHONE ENCOUNTER
Medicine Refill Request    Last Office Visit: 11/23/2020  Last Telemedicine Visit: 10/13/2020 Ashley Nazario MD    Next Office Visit: Visit date not found  Next Telemedicine Visit: Visit date not found         Current Outpatient Medications:   •  ALPRAZolam (XANAX) 0.25 mg tablet, Take 0.25 mg by mouth nightly as needed for anxiety., Disp: , Rfl:   •  amLODIPine (NORVASC) 5 mg tablet, Take 5 mg by mouth daily.  , Disp: , Rfl:   •  cholecalciferol, vitamin D3, 1,000 unit capsule, Take 1,000 Units by mouth daily.  , Disp: , Rfl:   •  escitalopram (LEXAPRO) 10 mg tablet, TAKE 1 TABLET BY MOUTH EVERY DAY (Patient taking differently: Take 10 mg by mouth daily.  ), Disp: 30 tablet, Rfl: 11  •  famotidine (PEPCID) 20 mg tablet, Take 20 mg by mouth 2 (two) times a day., Disp: , Rfl:   •  hydrochlorothiazide (HYDRODIURIL) 25 mg tablet, Take 25 mg by mouth once daily., Disp: , Rfl: 3  •  potassium chloride (KLOR-CON 8) 8 mEq CR tablet, Take 16 mEq by mouth daily., Disp: , Rfl:   •  predniSONE (DELTASONE) 50 mg tablet, Take one tablet 13 hours before the procedure then 7 hours before and then one hour before, Disp: 3 tablet, Rfl: 0  •  rosuvastatin (CRESTOR) 10 mg tablet, TAKE 1 TABLET BY MOUTH EVERY DAY (Patient taking differently: Take 10 mg by mouth daily.  ), Disp: 30 tablet, Rfl: 11      BP Readings from Last 3 Encounters:   11/23/20 134/84   11/15/20 (!) 114/54   07/27/20 130/82       Recent Lab results:  Lab Results   Component Value Date    CHOL 148 08/11/2020   ,   Lab Results   Component Value Date    HDL 79 08/11/2020   ,   Lab Results   Component Value Date    LDLCALC 50 08/11/2020   ,   Lab Results   Component Value Date    TRIG 94 08/11/2020        Lab Results   Component Value Date    GLUCOSE 97 11/20/2020   ,   Lab Results   Component Value Date    HGBA1C 4.7 (L) 08/11/2020         Lab Results   Component Value Date    CREATININE 0.5 (L) 11/20/2020       Lab Results   Component Value Date    TSH  3.050 08/11/2020

## 2020-12-04 NOTE — TELEPHONE ENCOUNTER
Discussed pts CT with her  She has a small umbilical hernia - has hx gallbladder polyp and will be seeing Dr.. Marsh for this and discuss her hernia with him - also has stable pulmonary nodules - please mail her a copy of thereport

## 2020-12-12 LAB
BUN SERPL-MCNC: 21 MG/DL (ref 8–27)
BUN/CREAT SERPL: 34 (ref 12–28)
CALCIUM SERPL-MCNC: 9.5 MG/DL (ref 8.7–10.3)
CHLORIDE SERPL-SCNC: 100 MMOL/L (ref 96–106)
CO2 SERPL-SCNC: 26 MMOL/L (ref 20–29)
CREAT SERPL-MCNC: 0.62 MG/DL (ref 0.57–1)
FERRITIN SERPL-MCNC: 585 NG/ML (ref 15–150)
GLUCOSE SERPL-MCNC: 124 MG/DL (ref 65–99)
HCT VFR BLD AUTO: 35.4 % (ref 34–46.6)
HGB BLD-MCNC: 10.9 G/DL (ref 11.1–15.9)
IRON SATN MFR SERPL: 35 % (ref 15–55)
IRON SERPL-MCNC: 96 UG/DL (ref 27–139)
LAB CORP EGFR IF AFRICN AM: 109 ML/MIN/1.73
LAB CORP EGFR IF NONAFRICN AM: 95 ML/MIN/1.73
MCH RBC QN AUTO: 24.4 PG (ref 26.6–33)
MCHC RBC AUTO-ENTMCNC: 30.8 G/DL (ref 31.5–35.7)
MCV RBC AUTO: 79 FL (ref 79–97)
PLATELET # BLD AUTO: 386 X10E3/UL (ref 150–450)
POTASSIUM SERPL-SCNC: 3.5 MMOL/L (ref 3.5–5.2)
RBC # BLD AUTO: 4.46 X10E6/UL (ref 3.77–5.28)
SODIUM SERPL-SCNC: 139 MMOL/L (ref 134–144)
TIBC SERPL-MCNC: 272 UG/DL (ref 250–450)
UIBC SERPL-MCNC: 176 UG/DL (ref 118–369)
WBC # BLD AUTO: 6.6 X10E3/UL (ref 3.4–10.8)

## 2020-12-18 ENCOUNTER — LAB REQUISITION (OUTPATIENT)
Dept: LAB | Facility: HOSPITAL | Age: 65
End: 2020-12-18
Payer: MEDICARE

## 2020-12-18 DIAGNOSIS — D50.8 OTHER IRON DEFICIENCY ANEMIAS: ICD-10-CM

## 2020-12-18 LAB
BASOPHILS # BLD: 0.03 K/UL (ref 0.01–0.1)
BASOPHILS NFR BLD: 0.5 %
DIFFERENTIAL METHOD BLD: ABNORMAL
EOSINOPHIL # BLD: 0.38 K/UL (ref 0.04–0.36)
EOSINOPHIL NFR BLD: 5.9 %
ERYTHROCYTE [DISTWIDTH] IN BLOOD BY AUTOMATED COUNT: ABNORMAL %
FERRITIN SERPL-MCNC: 206 NG/ML (ref 11–250)
HCT VFR BLDCO AUTO: 34.4 % (ref 35–45)
HGB BLD-MCNC: 10.9 G/DL (ref 11.8–15.7)
IMM GRANULOCYTES # BLD AUTO: 0.02 K/UL (ref 0–0.08)
IMM GRANULOCYTES NFR BLD AUTO: 0.3 %
IRON SERPL-MCNC: 113 UG/DL (ref 35–150)
LYMPHOCYTES # BLD: 0.98 K/UL (ref 1.2–3.5)
LYMPHOCYTES NFR BLD: 15.3 %
MCH RBC QN AUTO: 25.3 PG (ref 28–33.2)
MCHC RBC AUTO-ENTMCNC: 31.7 G/DL (ref 32.2–35.5)
MCV RBC AUTO: 79.8 FL (ref 83–98)
MONOCYTES # BLD: 0.6 K/UL (ref 0.28–0.8)
MONOCYTES NFR BLD: 9.4 %
NEUTROPHILS # BLD: 4.4 K/UL (ref 1.7–7)
NEUTROPHILS # BLD: 4.4 K/UL (ref 1.7–7)
NEUTS SEG NFR BLD: 68.6 %
NRBC BLD-RTO: 0 %
PDW BLD AUTO: 9.2 FL (ref 9.4–12.3)
PLATELET # BLD AUTO: 290 K/UL (ref 150–369)
RBC # BLD AUTO: 4.31 M/UL (ref 3.93–5.22)
WBC # BLD AUTO: 6.41 K/UL (ref 3.8–10.5)

## 2020-12-18 PROCEDURE — 82728 ASSAY OF FERRITIN: CPT | Performed by: INTERNAL MEDICINE

## 2020-12-18 PROCEDURE — 85025 COMPLETE CBC W/AUTO DIFF WBC: CPT | Performed by: INTERNAL MEDICINE

## 2020-12-18 PROCEDURE — 83540 ASSAY OF IRON: CPT | Performed by: INTERNAL MEDICINE

## 2020-12-24 ENCOUNTER — TRANSCRIBE ORDERS (OUTPATIENT)
Dept: SCHEDULING | Age: 65
End: 2020-12-24

## 2020-12-24 DIAGNOSIS — Z12.31 ENCOUNTER FOR SCREENING MAMMOGRAM FOR MALIGNANT NEOPLASM OF BREAST: Primary | ICD-10-CM

## 2020-12-30 ENCOUNTER — TELEPHONE (OUTPATIENT)
Dept: INTERNAL MEDICINE | Facility: CLINIC | Age: 65
End: 2020-12-30

## 2020-12-30 DIAGNOSIS — D64.9 ANEMIA, UNSPECIFIED TYPE: Primary | ICD-10-CM

## 2020-12-30 NOTE — TELEPHONE ENCOUNTER
Patient is asking for another Order for blood work through [facility].  She believes that her hemoglobin has dropped because of more bleeding.  Thank you.

## 2021-01-08 ENCOUNTER — OFFICE VISIT (OUTPATIENT)
Dept: SURGERY | Facility: CLINIC | Age: 66
End: 2021-01-08
Payer: MEDICARE

## 2021-01-08 DIAGNOSIS — K82.4 GALLBLADDER POLYP: Primary | ICD-10-CM

## 2021-01-08 PROCEDURE — 99214 OFFICE O/P EST MOD 30 MIN: CPT | Performed by: SURGERY

## 2021-01-08 NOTE — LETTER
2021     Ashley Omalley MD  [address] [address]    Patient: Ritu Ramirez  YOB: 1955  Date of Visit: 2021      Dear Dr. Sirisha Omalley:    Thank you for referring Ritu Ramirez to me for evaluation. Below are my notes for this consultation.    If you have questions, please do not hesitate to call me. I look forward to following your patient along with you.         Sincerely,        Priti Cobian MD        CC: MD Aranza Nicole, Priti RED MD  2021  5:51 PM  Sign when Signing Visit  Chief Complaint:   Chief Complaint   Patient presents with   • Consult     Gallbladder    .    HPI     Patient is a 65 y.o. female who presents with the following:  A few months ago, she complained of bloating to her PCP and an ultrasound was ordered.  This showed gallbladder polyps, for which she is referred here.  Her bloating has improved.  She has chronic constipation.  She has had an unquantified weigth gain due to the COVID pandemic and being home more.  She has a rare disease called systemic mastocytosis which predisposes her to severe allergic reaction.  She has recently had an endoscopy and colonsocopy to look for mast cells in the GI tract and there were none, per her report.  She denies abd pain/n/V.  No previous abd surgery..     Medical History:   Past Medical History:   Diagnosis Date   • Anemia    • Anxiety    • Hypertension    • Iron deficiency    • Lipid disorder    • Systemic mastocytosis    • Vitamin D deficiency        Surgical History:   Past Surgical History:   Procedure Laterality Date   • COLONOSCOPY     • REDUCTION MAMMAPLASTY         Social History:   Social History     Tobacco Use   • Smoking status: Former Smoker     Packs/day: 0.25     Quit date:      Years since quittin.0   • Smokeless tobacco: Never Used   Substance Use Topics   • Alcohol use: Yes     Comment: Social   • Drug use: No       Family History:   Family  History   Problem Relation Age of Onset   • Heart disease Biological Mother    • Prostate cancer Biological Father        Allergies:   Iodinated contrast media, Penicillins, Anesthetics - amide type - select amino amides, Clarithromycin, Erythromycin base, and Nsaids (non-steroidal anti-inflammatory drug)    Current Medications:      Current Outpatient Medications:   •  amLODIPine (NORVASC) 5 mg tablet, Take 5 mg by mouth daily.  , Disp: , Rfl:   •  cholecalciferol, vitamin D3, 1,000 unit capsule, Take 1,000 Units by mouth daily.  , Disp: , Rfl:   •  escitalopram (LEXAPRO) 10 mg tablet, TAKE 1 TABLET BY MOUTH EVERY DAY (Patient taking differently: Take 10 mg by mouth daily.  ), Disp: 30 tablet, Rfl: 11  •  famotidine (PEPCID) 20 mg tablet, Take 20 mg by mouth 2 (two) times a day., Disp: , Rfl:   •  hydrochlorothiazide (HYDRODIURIL) 25 mg tablet, Take 25 mg by mouth once daily., Disp: , Rfl: 3  •  potassium chloride (KLOR-CON 8) 8 mEq CR tablet, Take 16 mEq by mouth daily., Disp: , Rfl:   •  rosuvastatin (CRESTOR) 10 mg tablet, TAKE 1 TABLET BY MOUTH EVERY DAY (Patient taking differently: Take 10 mg by mouth daily.  ), Disp: 30 tablet, Rfl: 11  •  ALPRAZolam (XANAX) 0.25 mg tablet, Take 1 tablet (0.25 mg total) by mouth nightly as needed for anxiety. (Patient not taking: Reported on 1/8/2021 ), Disp: 30 tablet, Rfl: 0  •  predniSONE (DELTASONE) 50 mg tablet, Take one tablet 13 hours before the procedure then 7 hours before and then one hour before (Patient not taking: Reported on 1/8/2021 ), Disp: 3 tablet, Rfl: 0    Review of Systems 14 other systems reviewed and findings not mentioned in HPI are not pertinent to the presenting problem.    Objective     Vital Signs for the last 24 hours:  BP: ()/()   Arterial Line BP: ()/()     Physicial Exam  Gen: NAD  Skin: warm and dry, no jaundice  Affect: Nl  Gait: Nl  HEENT: NC/AT, EOMI, no scleral icterus  Neck: Supple  Cv: Reg  Lungs: CTA B/L  Abd: soft, NT, ND, (+) BS; no  masses or hernias  Ext: WWP, no edema      TESTS: I reviewed U/S from 11/17/20 which showed multiple gallbladder polyps, the largest measuring 4 mm, hepatic steatosis, CBD 3 mm.  I reviewed Ct T/A/P from 11/27/20 which shwoed stable pulmonary nodules, small medisatinal nodes.    Assesment/Plan:    Problem List Items Addressed This Visit        Digestive    Gallbladder polyp - Primary     Gallbladder polyps are an incidental finding.  We discussed the pathophysiology of gallbladder polyps and the potential for malignancy.  At 4 mm, the risk of malignancy is almost zero.  Current recommendations are surveillance with serial ultrasounds to monitor size and growth.  If any of the polyps were to grow rapidly or exceed 1 cm in size, this would be an indiciation for cholecystectomy.  Will repeat U/S in 6 months. I spent 17 minutes on this date of service performing the following activities: preparing for visit.           Relevant Orders    ULTRASOUND ABDOMEN LIMITED            Priti Cobian MD 1/8/2021 5:50 PM

## 2021-01-08 NOTE — PROGRESS NOTES
Chief Complaint:   Chief Complaint   Patient presents with   • Consult     Gallbladder    .    HPI     Patient is a 65 y.o. female who presents with the following:  A few months ago, she complained of bloating to her PCP and an ultrasound was ordered.  This showed gallbladder polyps, for which she is referred here.  Her bloating has improved.  She has chronic constipation.  She has had an unquantified weigth gain due to the COVID pandemic and being home more.  She has a rare disease called systemic mastocytosis which predisposes her to severe allergic reaction.  She has recently had an endoscopy and colonsocopy to look for mast cells in the GI tract and there were none, per her report.  She denies abd pain/n/V.  No previous abd surgery..     Medical History:   Past Medical History:   Diagnosis Date   • Anemia    • Anxiety    • Hypertension    • Iron deficiency    • Lipid disorder    • Systemic mastocytosis    • Vitamin D deficiency        Surgical History:   Past Surgical History:   Procedure Laterality Date   • COLONOSCOPY     • REDUCTION MAMMAPLASTY         Social History:   Social History     Tobacco Use   • Smoking status: Former Smoker     Packs/day: 0.25     Quit date:      Years since quittin.0   • Smokeless tobacco: Never Used   Substance Use Topics   • Alcohol use: Yes     Comment: Social   • Drug use: No       Family History:   Family History   Problem Relation Age of Onset   • Heart disease Biological Mother    • Prostate cancer Biological Father        Allergies:   Iodinated contrast media, Penicillins, Anesthetics - amide type - select amino amides, Clarithromycin, Erythromycin base, and Nsaids (non-steroidal anti-inflammatory drug)    Current Medications:      Current Outpatient Medications:   •  amLODIPine (NORVASC) 5 mg tablet, Take 5 mg by mouth daily.  , Disp: , Rfl:   •  cholecalciferol, vitamin D3, 1,000 unit capsule, Take 1,000 Units by mouth daily.  , Disp: , Rfl:   •  escitalopram  (LEXAPRO) 10 mg tablet, TAKE 1 TABLET BY MOUTH EVERY DAY (Patient taking differently: Take 10 mg by mouth daily.  ), Disp: 30 tablet, Rfl: 11  •  famotidine (PEPCID) 20 mg tablet, Take 20 mg by mouth 2 (two) times a day., Disp: , Rfl:   •  hydrochlorothiazide (HYDRODIURIL) 25 mg tablet, Take 25 mg by mouth once daily., Disp: , Rfl: 3  •  potassium chloride (KLOR-CON 8) 8 mEq CR tablet, Take 16 mEq by mouth daily., Disp: , Rfl:   •  rosuvastatin (CRESTOR) 10 mg tablet, TAKE 1 TABLET BY MOUTH EVERY DAY (Patient taking differently: Take 10 mg by mouth daily.  ), Disp: 30 tablet, Rfl: 11  •  ALPRAZolam (XANAX) 0.25 mg tablet, Take 1 tablet (0.25 mg total) by mouth nightly as needed for anxiety. (Patient not taking: Reported on 1/8/2021 ), Disp: 30 tablet, Rfl: 0  •  predniSONE (DELTASONE) 50 mg tablet, Take one tablet 13 hours before the procedure then 7 hours before and then one hour before (Patient not taking: Reported on 1/8/2021 ), Disp: 3 tablet, Rfl: 0    Review of Systems 14 other systems reviewed and findings not mentioned in HPI are not pertinent to the presenting problem.    Objective     Vital Signs for the last 24 hours:  BP: ()/()   Arterial Line BP: ()/()     Physicial Exam  Gen: NAD  Skin: warm and dry, no jaundice  Affect: Nl  Gait: Nl  HEENT: NC/AT, EOMI, no scleral icterus  Neck: Supple  Cv: Reg  Lungs: CTA B/L  Abd: soft, NT, ND, (+) BS; no masses or hernias  Ext: WWP, no edema      TESTS: I reviewed U/S from 11/17/20 which showed multiple gallbladder polyps, the largest measuring 4 mm, hepatic steatosis, CBD 3 mm.  I reviewed Ct T/A/P from 11/27/20 which shwoed stable pulmonary nodules, small medisatinal nodes.    Assesment/Plan:    Problem List Items Addressed This Visit        Digestive    Gallbladder polyp - Primary     Gallbladder polyps are an incidental finding.  We discussed the pathophysiology of gallbladder polyps and the potential for malignancy.  At 4 mm, the risk of malignancy is almost  zero.  Current recommendations are surveillance with serial ultrasounds to monitor size and growth.  If any of the polyps were to grow rapidly or exceed 1 cm in size, this would be an indiciation for cholecystectomy.  Will repeat U/S in 6 months. I spent 17 minutes on this date of service performing the following activities: preparing for visit.           Relevant Orders    ULTRASOUND ABDOMEN LIMITED            Priti Cobian MD 1/8/2021 5:50 PM

## 2021-01-08 NOTE — ASSESSMENT & PLAN NOTE
Gallbladder polyps are an incidental finding.  We discussed the pathophysiology of gallbladder polyps and the potential for malignancy.  At 4 mm, the risk of malignancy is almost zero.  Current recommendations are surveillance with serial ultrasounds to monitor size and growth.  If any of the polyps were to grow rapidly or exceed 1 cm in size, this would be an indiciation for cholecystectomy.  Will repeat U/S in 6 months. I spent 17 minutes on this date of service performing the following activities: preparing for visit.

## 2021-01-15 ENCOUNTER — TRANSCRIBE ORDERS (OUTPATIENT)
Dept: SCHEDULING | Age: 66
End: 2021-01-15

## 2021-01-15 ENCOUNTER — APPOINTMENT (OUTPATIENT)
Dept: LAB | Facility: HOSPITAL | Age: 66
End: 2021-01-15
Attending: INTERNAL MEDICINE
Payer: MEDICARE

## 2021-01-15 DIAGNOSIS — D50.8 OTHER IRON DEFICIENCY ANEMIAS: Primary | ICD-10-CM

## 2021-01-15 DIAGNOSIS — D50.8 OTHER IRON DEFICIENCY ANEMIAS: ICD-10-CM

## 2021-01-15 LAB
BASOPHILS # BLD: 0.03 K/UL (ref 0.01–0.1)
BASOPHILS NFR BLD: 0.3 %
DIFFERENTIAL METHOD BLD: ABNORMAL
EOSINOPHIL # BLD: 0.3 K/UL (ref 0.04–0.36)
EOSINOPHIL NFR BLD: 3.5 %
ERYTHROCYTE [DISTWIDTH] IN BLOOD BY AUTOMATED COUNT: 14.1 % (ref 11.7–14.4)
FERRITIN SERPL-MCNC: 98 NG/ML (ref 11–250)
HCT VFR BLDCO AUTO: 33.8 % (ref 35–45)
HGB BLD-MCNC: 10.6 G/DL (ref 11.8–15.7)
IMM GRANULOCYTES # BLD AUTO: 0.06 K/UL (ref 0–0.08)
IMM GRANULOCYTES NFR BLD AUTO: 0.7 %
IRON SATN MFR SERPL: 22 % (ref 15–45)
IRON SERPL-MCNC: 64 UG/DL (ref 35–150)
LYMPHOCYTES # BLD: 0.69 K/UL (ref 1.2–3.5)
LYMPHOCYTES NFR BLD: 8 %
MCH RBC QN AUTO: 27 PG (ref 28–33.2)
MCHC RBC AUTO-ENTMCNC: 31.4 G/DL (ref 32.2–35.5)
MCV RBC AUTO: 86 FL (ref 83–98)
MONOCYTES # BLD: 0.63 K/UL (ref 0.28–0.8)
MONOCYTES NFR BLD: 7.3 %
NEUTROPHILS # BLD: 6.88 K/UL (ref 1.7–7)
NEUTROPHILS # BLD: 6.88 K/UL (ref 1.7–7)
NEUTS SEG NFR BLD: 80.2 %
NRBC BLD-RTO: 0 %
PDW BLD AUTO: 9.6 FL (ref 9.4–12.3)
PLATELET # BLD AUTO: 306 K/UL (ref 150–369)
RBC # BLD AUTO: 3.93 M/UL (ref 3.93–5.22)
TIBC SERPL-MCNC: 294 UG/DL (ref 270–460)
UIBC SERPL-MCNC: 230 UG/DL (ref 180–360)
WBC # BLD AUTO: 8.59 K/UL (ref 3.8–10.5)

## 2021-01-15 PROCEDURE — 82728 ASSAY OF FERRITIN: CPT

## 2021-01-15 PROCEDURE — 83550 IRON BINDING TEST: CPT

## 2021-01-15 PROCEDURE — 36415 COLL VENOUS BLD VENIPUNCTURE: CPT

## 2021-01-15 PROCEDURE — 85025 COMPLETE CBC W/AUTO DIFF WBC: CPT

## 2021-01-22 ENCOUNTER — TELEPHONE (OUTPATIENT)
Dept: CARDIOLOGY | Facility: CLINIC | Age: 66
End: 2021-01-22

## 2021-02-25 ENCOUNTER — HOSPITAL ENCOUNTER (OUTPATIENT)
Dept: RADIOLOGY | Age: 66
Discharge: HOME | End: 2021-02-25
Attending: INTERNAL MEDICINE
Payer: MEDICARE

## 2021-02-25 DIAGNOSIS — K82.4 GALLBLADDER POLYP: ICD-10-CM

## 2021-02-25 PROCEDURE — 76705 ECHO EXAM OF ABDOMEN: CPT

## 2021-02-26 ENCOUNTER — TELEPHONE (OUTPATIENT)
Dept: SURGERY | Facility: CLINIC | Age: 66
End: 2021-02-26

## 2021-02-26 DIAGNOSIS — K82.4 GALLBLADDER POLYP: Primary | ICD-10-CM

## 2021-02-26 NOTE — TELEPHONE ENCOUNTER
Patient aware of message       ----- Message from Priti Cobian MD sent at 2/25/2021  7:17 PM EST -----  Please let her know the ultrasound looks fine and the polyps are exactly the same and non-worrisome. Will repeat U/S in 6 months.  Please send rx.  Thanks.

## 2021-04-20 ENCOUNTER — OFFICE VISIT (OUTPATIENT)
Dept: CARDIOLOGY | Facility: CLINIC | Age: 66
End: 2021-04-20
Payer: MEDICARE

## 2021-04-20 ENCOUNTER — LAB REQUISITION (OUTPATIENT)
Dept: LAB | Facility: HOSPITAL | Age: 66
End: 2021-04-20
Payer: MEDICARE

## 2021-04-20 VITALS
HEART RATE: 52 BPM | WEIGHT: 123.9 LBS | OXYGEN SATURATION: 99 % | RESPIRATION RATE: 16 BRPM | SYSTOLIC BLOOD PRESSURE: 108 MMHG | BODY MASS INDEX: 24.32 KG/M2 | HEIGHT: 60 IN | DIASTOLIC BLOOD PRESSURE: 70 MMHG

## 2021-04-20 DIAGNOSIS — D47.02 SYSTEMIC MASTOCYTOSIS: ICD-10-CM

## 2021-04-20 DIAGNOSIS — R00.1 SINUS BRADYCARDIA: ICD-10-CM

## 2021-04-20 DIAGNOSIS — I10 ESSENTIAL HYPERTENSION: ICD-10-CM

## 2021-04-20 DIAGNOSIS — E87.6 HYPOKALEMIA: ICD-10-CM

## 2021-04-20 DIAGNOSIS — E78.5 DYSLIPIDEMIA: ICD-10-CM

## 2021-04-20 DIAGNOSIS — D50.0 IRON DEFICIENCY ANEMIA DUE TO CHRONIC BLOOD LOSS: ICD-10-CM

## 2021-04-20 DIAGNOSIS — D50.8 OTHER IRON DEFICIENCY ANEMIAS: ICD-10-CM

## 2021-04-20 DIAGNOSIS — R93.1 ELEVATED CORONARY ARTERY CALCIUM SCORE: Primary | ICD-10-CM

## 2021-04-20 LAB
BASOPHILS # BLD: 0.03 K/UL (ref 0.01–0.1)
BASOPHILS NFR BLD: 0.5 %
DIFFERENTIAL METHOD BLD: ABNORMAL
EOSINOPHIL # BLD: 0.3 K/UL (ref 0.04–0.36)
EOSINOPHIL NFR BLD: 5.3 %
ERYTHROCYTE [DISTWIDTH] IN BLOOD BY AUTOMATED COUNT: 24.7 % (ref 11.7–14.4)
HCT VFR BLDCO AUTO: 29.3 % (ref 35–45)
HGB BLD-MCNC: 8.6 G/DL (ref 11.8–15.7)
IMM GRANULOCYTES # BLD AUTO: 0.03 K/UL (ref 0–0.08)
IMM GRANULOCYTES NFR BLD AUTO: 0.5 %
LYMPHOCYTES # BLD: 0.91 K/UL (ref 1.2–3.5)
LYMPHOCYTES NFR BLD: 16.2 %
MCH RBC QN AUTO: 22.2 PG (ref 28–33.2)
MCHC RBC AUTO-ENTMCNC: 29.4 G/DL (ref 32.2–35.5)
MCV RBC AUTO: 75.7 FL (ref 83–98)
MONOCYTES # BLD: 0.45 K/UL (ref 0.28–0.8)
MONOCYTES NFR BLD: 8 %
NEUTROPHILS # BLD: 3.91 K/UL (ref 1.7–7)
NEUTROPHILS # BLD: 3.91 K/UL (ref 1.7–7)
NEUTS SEG NFR BLD: 69.5 %
NRBC BLD-RTO: 0 %
PDW BLD AUTO: 8.9 FL (ref 9.4–12.3)
PLATELET # BLD AUTO: 271 K/UL (ref 150–369)
RBC # BLD AUTO: 3.87 M/UL (ref 3.93–5.22)
WBC # BLD AUTO: 5.63 K/UL (ref 3.8–10.5)

## 2021-04-20 PROCEDURE — 85025 COMPLETE CBC W/AUTO DIFF WBC: CPT | Performed by: INTERNAL MEDICINE

## 2021-04-20 PROCEDURE — 93000 ELECTROCARDIOGRAM COMPLETE: CPT | Performed by: INTERNAL MEDICINE

## 2021-04-20 PROCEDURE — G8754 DIAS BP LESS 90: HCPCS | Performed by: INTERNAL MEDICINE

## 2021-04-20 PROCEDURE — 99214 OFFICE O/P EST MOD 30 MIN: CPT | Performed by: INTERNAL MEDICINE

## 2021-04-20 PROCEDURE — G8752 SYS BP LESS 140: HCPCS | Performed by: INTERNAL MEDICINE

## 2021-04-20 RX ORDER — EPINEPHRINE 0.3 MG/.3ML
1 INJECTION SUBCUTANEOUS AS NEEDED
COMMUNITY
End: 2021-07-14 | Stop reason: SDUPTHER

## 2021-04-20 ASSESSMENT — ENCOUNTER SYMPTOMS
LIGHT-HEADEDNESS: 1
HEARTBURN: 0
COLOR CHANGE: 0
SHORTNESS OF BREATH: 0
ADENOPATHY: 0
WEIGHT GAIN: 0
CLAUDICATION: 0
MUSCLE CRAMPS: 0
WEIGHT LOSS: 0
DYSPNEA ON EXERTION: 1
COUGH: 0
DIAPHORESIS: 0
ORTHOPNEA: 0
SYNCOPE: 0
HEMATOCHEZIA: 1
PALPITATIONS: 0
ALTERED MENTAL STATUS: 0
BLURRED VISION: 0

## 2021-04-20 NOTE — ASSESSMENT & PLAN NOTE
Her lipid profile in August was perfect.  She will continue on rosuvastatin and a heart healthy diet.

## 2021-04-20 NOTE — ASSESSMENT & PLAN NOTE
She is asymptomatic and we will follow her off negative chronotropic agents.  Her heart rate did augment appropriately with exercise on her last stress test.

## 2021-04-20 NOTE — ASSESSMENT & PLAN NOTE
I think she is doing reasonably well from a cardiac perspective.  She tolerated a hemoglobin of 5 without an obvious cardiac event.  She is not on aspirin because of her iron deficiency anemia.  I do not believe that she requires beta-blockers in the absence of any obvious angina or ischemia.  She will remain as active as possible.  I do not believe further testing is required at present.

## 2021-04-20 NOTE — PROGRESS NOTES
Cardiology Note       Reason for visit:   Chief Complaint   Patient presents with   • Elevated coronary artery calcium score      HPI   Ritu Ramirez is a 65 y.o. female who presents for scheduled cardiovascular follow-up in the office.    I last saw her in 2020.  She had bad rectal bleeding and in November apparently had a hemoglobin of 5.  She required transfusion and is now receiving IV iron therapy for continued blood loss thought to be secondary to a hemorrhoid.  She did have the hemorrhoid banded but apparently still bleeds.  She does have some dyspnea exertion.  She denies chest pain or pressure.  She has occasional lightheadedness.  She has palpitations but no syncope.  She has no lower extremity edema or orthopnea.  She tries to eat a heart healthy diet.  She tolerates rosuvastatin without muscle aches.      Past Medical History:   Diagnosis Date   • Anemia    • Anxiety    • Hypertension    • Iron deficiency    • Lipid disorder    • Systemic mastocytosis    • Vitamin D deficiency      Past Surgical History:   Procedure Laterality Date   • COLONOSCOPY     • REDUCTION MAMMAPLASTY       Social History     Socioeconomic History   • Marital status: Single     Spouse name: None   • Number of children: None   • Years of education: None   • Highest education level: None   Occupational History   • None   Tobacco Use   • Smoking status: Former Smoker     Packs/day: 0.25     Quit date:      Years since quittin.3   • Smokeless tobacco: Never Used   Vaping Use   • Vaping Use: Never used   Substance and Sexual Activity   • Alcohol use: Yes     Comment: Social   • Drug use: No   • Sexual activity: Defer   Other Topics Concern   • None   Social History Narrative   • None     Social Determinants of Health     Financial Resource Strain:    • Difficulty of Paying Living Expenses:    Food Insecurity:    • Worried About Running Out of Food in the Last Year:    • Ran Out of Food in the Last Year:    Transportation  Needs:    • Lack of Transportation (Medical):    • Lack of Transportation (Non-Medical):    Physical Activity:    • Days of Exercise per Week:    • Minutes of Exercise per Session:    Stress:    • Feeling of Stress :    Social Connections:    • Frequency of Communication with Friends and Family:    • Frequency of Social Gatherings with Friends and Family:    • Attends Scientologist Services:    • Active Member of Clubs or Organizations:    • Attends Club or Organization Meetings:    • Marital Status:    Intimate Partner Violence:    • Fear of Current or Ex-Partner:    • Emotionally Abused:    • Physically Abused:    • Sexually Abused:        Family History   Problem Relation Age of Onset   • Heart disease Biological Mother    • Prostate cancer Biological Father      Iodinated contrast media, Penicillins, Anesthetics - amide type - select amino amides, Clarithromycin, Erythromycin base, and Nsaids (non-steroidal anti-inflammatory drug)  Current Outpatient Medications   Medication Sig Dispense Refill   • amLODIPine (NORVASC) 5 mg tablet Take 5 mg by mouth daily.       • cholecalciferol, vitamin D3, 1,000 unit capsule Take 1,000 Units by mouth daily.       • EPINEPHrine (EPIPEN 2-ZARINA) 0.3 mg/0.3 mL injection syringe EpiPen 2-Zarina 0.3 mg/0.3 mL injection, auto-injector   INJECT INTRAMUSCULARLY AS NEEDED AS DIRECTED     • escitalopram (LEXAPRO) 10 mg tablet TAKE 1 TABLET BY MOUTH EVERY DAY (Patient taking differently: Take 10 mg by mouth daily.  ) 30 tablet 11   • hydrochlorothiazide (HYDRODIURIL) 25 mg tablet Take 25 mg by mouth once daily.  3   • potassium chloride (KLOR-CON 8) 8 mEq CR tablet Take 16 mEq by mouth daily.     • rosuvastatin (CRESTOR) 10 mg tablet TAKE 1 TABLET BY MOUTH EVERY DAY (Patient taking differently: Take 10 mg by mouth daily.  ) 30 tablet 11     No current facility-administered medications for this visit.       Review of Systems   Constitutional: Negative for diaphoresis, malaise/fatigue, weight gain  and weight loss.   HENT: Negative for nosebleeds.    Eyes: Negative for blurred vision.   Cardiovascular: Positive for dyspnea on exertion. Negative for chest pain, claudication, leg swelling, orthopnea, palpitations and syncope.   Respiratory: Negative for cough and shortness of breath.    Hematologic/Lymphatic: Negative for adenopathy.   Skin: Negative for color change.   Musculoskeletal: Negative for muscle cramps.   Gastrointestinal: Positive for hematochezia and hemorrhoids. Negative for heartburn.   Genitourinary: Negative for nocturia.   Neurological: Positive for light-headedness.   Psychiatric/Behavioral: Negative for altered mental status.     Objective   Vitals:    04/20/21 1055   BP: 108/70   BP Location: Left upper arm   Patient Position: Sitting   Pulse: (!) 52   Resp: 16   SpO2: 99%   Weight: 56.2 kg (123 lb 14.4 oz)   Height: 1.524 m (5')     Weight: 56.2 kg (123 lb 14.4 oz)     Physical Exam:    General Appearance:  Alert, no distress   Head:  Normocephalic, without obvious abnormality, atraumatic   Eyes:  Conjunctiva/corneas clear, EOM's intact   Neck: No thyroid enlargement. No JVD. No bruits    Lungs:   Clear to auscultation bilaterally, respirations unlabored, no rales, no wheezing   Heart:  Regular rhythm, S1 and S2 normal, no murmur, rub or gallop   Abdomen:   Soft, non-tender, no masses, no organomegaly   Vascular: Pulses 2+ and symmetric all extremities, no carotid bruit or jugular vein distention   Musculoskeletal:  Skin: No injury or deformity  Skin color, texture, turgor normal, no rashes or lesions, no cyanosis or edema   Extremities: Extremities normal, atraumatic, pulses normal   Behavior/Emotional: Appropriate, cooperative       Lab Results   Component Value Date    WBC 8.59 01/15/2021    HGB 10.6 (L) 01/15/2021     01/15/2021    CHOL 148 08/11/2020    TRIG 94 08/11/2020    HDL 79 08/11/2020    LDLCALC 50 08/11/2020    ALT 21 11/20/2020    AST 35 11/20/2020     12/11/2020     K 3.5 12/11/2020    CREATININE 0.62 12/11/2020    TSH 3.050 08/11/2020    INR 1.0 11/13/2020    HGBA1C 4.7 (L) 08/11/2020          ECG   sinus rhythm  iRBBB     ASSESSMENT/PLAN    Problem List Items Addressed This Visit        High    Elevated coronary artery calcium score - Primary    Overview     6/2019 score: 128 (90th%)  2/12/2020 SE--11:16, 92%mPHR, negative         Current Assessment & Plan     I think she is doing reasonably well from a cardiac perspective.  She tolerated a hemoglobin of 5 without an obvious cardiac event.  She is not on aspirin because of her iron deficiency anemia.  I do not believe that she requires beta-blockers in the absence of any obvious angina or ischemia.  She will remain as active as possible.  I do not believe further testing is required at present.         Relevant Medications    EPINEPHrine (EPIPEN 2-ZARINA) 0.3 mg/0.3 mL injection syringe    Other Relevant Orders    ECG 12 LEAD-OFFICE PERFORMED (Completed)    Essential hypertension    Overview     2017 echocardiogram: Normal structural heart         Current Assessment & Plan     Her blood pressure is fine today on amlodipine and hydrochlorothiazide.            Medium    Dyslipidemia    Current Assessment & Plan     Her lipid profile in August was perfect.  She will continue on rosuvastatin and a heart healthy diet.         Hypokalemia    Current Assessment & Plan     Her last potassium was 3.5 on a supplement.         Systemic mastocytosis    Current Assessment & Plan     She sees a specialist at [place].            Low    Iron deficiency anemia    Overview     Secondary to hemmorhoids         Current Assessment & Plan     She is being followed closely and is presently on intravenous iron therapy.  Her last hemoglobin was 8.4.  Her ferritin was still low.         Sinus bradycardia    Current Assessment & Plan     She is asymptomatic and we will follow her off negative chronotropic agents.  Her heart rate did augment appropriately  with exercise on her last stress test.         Relevant Medications    EPINEPHrine (EPIPEN 2-ZARINA) 0.3 mg/0.3 mL injection syringe           Return in about 8 months (around 12/20/2021).    Bright Selby MD  4/20/2021

## 2021-04-20 NOTE — LETTER
April 20, 2021     Ashley Omalley MD  [address] [facility] [address]    Patient: Ritu Ramirez  YOB: 1955  Date of Visit: 4/20/2021      Dear Dr. Sirisha Omalley:    Thank you for referring Ritu Ramirez to me for evaluation. Below are my notes for this consultation.    If you have questions, please do not hesitate to call me. I look forward to following your patient along with you.         Sincerely,        Bright Selby MD        CC: No Recipients  Bright Selby MD  4/20/2021 11:59 AM  Signed     Cardiology Note       Reason for visit:   Chief Complaint   Patient presents with   • Elevated coronary artery calcium score      HPI   Ritu Ramirez is a 65 y.o. female who presents for scheduled cardiovascular follow-up in the office.    I last saw her in June 2020.  She had bad rectal bleeding and in November apparently had a hemoglobin of 5.  She required transfusion and is now receiving IV iron therapy for continued blood loss thought to be secondary to a hemorrhoid.  She did have the hemorrhoid banded but apparently still bleeds.  She does have some dyspnea exertion.  She denies chest pain or pressure.  She has occasional lightheadedness.  She has palpitations but no syncope.  She has no lower extremity edema or orthopnea.  She tries to eat a heart healthy diet.  She tolerates rosuvastatin without muscle aches.      Past Medical History:   Diagnosis Date   • Anemia    • Anxiety    • Hypertension    • Iron deficiency    • Lipid disorder    • Systemic mastocytosis    • Vitamin D deficiency      Past Surgical History:   Procedure Laterality Date   • COLONOSCOPY     • REDUCTION MAMMAPLASTY       Social History     Socioeconomic History   • Marital status: Single     Spouse name: None   • Number of children: None   • Years of education: None   • Highest education level: None   Occupational History   • None   Tobacco Use   • Smoking status: Former Smoker     Packs/day: 0.25      Quit date:      Years since quittin.3   • Smokeless tobacco: Never Used   Vaping Use   • Vaping Use: Never used   Substance and Sexual Activity   • Alcohol use: Yes     Comment: Social   • Drug use: No   • Sexual activity: Defer   Other Topics Concern   • None   Social History Narrative   • None     Social Determinants of Health     Financial Resource Strain:    • Difficulty of Paying Living Expenses:    Food Insecurity:    • Worried About Running Out of Food in the Last Year:    • Ran Out of Food in the Last Year:    Transportation Needs:    • Lack of Transportation (Medical):    • Lack of Transportation (Non-Medical):    Physical Activity:    • Days of Exercise per Week:    • Minutes of Exercise per Session:    Stress:    • Feeling of Stress :    Social Connections:    • Frequency of Communication with Friends and Family:    • Frequency of Social Gatherings with Friends and Family:    • Attends Mandaen Services:    • Active Member of Clubs or Organizations:    • Attends Club or Organization Meetings:    • Marital Status:    Intimate Partner Violence:    • Fear of Current or Ex-Partner:    • Emotionally Abused:    • Physically Abused:    • Sexually Abused:        Family History   Problem Relation Age of Onset   • Heart disease Biological Mother    • Prostate cancer Biological Father      Iodinated contrast media, Penicillins, Anesthetics - amide type - select amino amides, Clarithromycin, Erythromycin base, and Nsaids (non-steroidal anti-inflammatory drug)  Current Outpatient Medications   Medication Sig Dispense Refill   • amLODIPine (NORVASC) 5 mg tablet Take 5 mg by mouth daily.       • cholecalciferol, vitamin D3, 1,000 unit capsule Take 1,000 Units by mouth daily.       • EPINEPHrine (EPIPEN 2-ZARINA) 0.3 mg/0.3 mL injection syringe EpiPen 2-Zarina 0.3 mg/0.3 mL injection, auto-injector   INJECT INTRAMUSCULARLY AS NEEDED AS DIRECTED     • escitalopram (LEXAPRO) 10 mg tablet TAKE 1 TABLET BY MOUTH EVERY  DAY (Patient taking differently: Take 10 mg by mouth daily.  ) 30 tablet 11   • hydrochlorothiazide (HYDRODIURIL) 25 mg tablet Take 25 mg by mouth once daily.  3   • potassium chloride (KLOR-CON 8) 8 mEq CR tablet Take 16 mEq by mouth daily.     • rosuvastatin (CRESTOR) 10 mg tablet TAKE 1 TABLET BY MOUTH EVERY DAY (Patient taking differently: Take 10 mg by mouth daily.  ) 30 tablet 11     No current facility-administered medications for this visit.       Review of Systems   Constitutional: Negative for diaphoresis, malaise/fatigue, weight gain and weight loss.   HENT: Negative for nosebleeds.    Eyes: Negative for blurred vision.   Cardiovascular: Positive for dyspnea on exertion. Negative for chest pain, claudication, leg swelling, orthopnea, palpitations and syncope.   Respiratory: Negative for cough and shortness of breath.    Hematologic/Lymphatic: Negative for adenopathy.   Skin: Negative for color change.   Musculoskeletal: Negative for muscle cramps.   Gastrointestinal: Positive for hematochezia and hemorrhoids. Negative for heartburn.   Genitourinary: Negative for nocturia.   Neurological: Positive for light-headedness.   Psychiatric/Behavioral: Negative for altered mental status.     Objective   Vitals:    04/20/21 1055   BP: 108/70   BP Location: Left upper arm   Patient Position: Sitting   Pulse: (!) 52   Resp: 16   SpO2: 99%   Weight: 56.2 kg (123 lb 14.4 oz)   Height: 1.524 m (5')     Weight: 56.2 kg (123 lb 14.4 oz)     Physical Exam:    General Appearance:  Alert, no distress   Head:  Normocephalic, without obvious abnormality, atraumatic   Eyes:  Conjunctiva/corneas clear, EOM's intact   Neck: No thyroid enlargement. No JVD. No bruits    Lungs:   Clear to auscultation bilaterally, respirations unlabored, no rales, no wheezing   Heart:  Regular rhythm, S1 and S2 normal, no murmur, rub or gallop   Abdomen:   Soft, non-tender, no masses, no organomegaly   Vascular: Pulses 2+ and symmetric all  extremities, no carotid bruit or jugular vein distention   Musculoskeletal:  Skin: No injury or deformity  Skin color, texture, turgor normal, no rashes or lesions, no cyanosis or edema   Extremities: Extremities normal, atraumatic, pulses normal   Behavior/Emotional: Appropriate, cooperative       Lab Results   Component Value Date    WBC 8.59 01/15/2021    HGB 10.6 (L) 01/15/2021     01/15/2021    CHOL 148 08/11/2020    TRIG 94 08/11/2020    HDL 79 08/11/2020    LDLCALC 50 08/11/2020    ALT 21 11/20/2020    AST 35 11/20/2020     12/11/2020    K 3.5 12/11/2020    CREATININE 0.62 12/11/2020    TSH 3.050 08/11/2020    INR 1.0 11/13/2020    HGBA1C 4.7 (L) 08/11/2020          ECG   sinus rhythm  iRBBB     ASSESSMENT/PLAN    Problem List Items Addressed This Visit        High    Elevated coronary artery calcium score - Primary    Overview     6/2019 score: 128 (90th%)  2/12/2020 SE--11:16, 92%mPHR, negative         Current Assessment & Plan     I think she is doing reasonably well from a cardiac perspective.  She tolerated a hemoglobin of 5 without an obvious cardiac event.  She is not on aspirin because of her iron deficiency anemia.  I do not believe that she requires beta-blockers in the absence of any obvious angina or ischemia.  She will remain as active as possible.  I do not believe further testing is required at present.         Relevant Medications    EPINEPHrine (EPIPEN 2-ZARINA) 0.3 mg/0.3 mL injection syringe    Other Relevant Orders    ECG 12 LEAD-OFFICE PERFORMED (Completed)    Essential hypertension    Overview     2017 echocardiogram: Normal structural heart         Current Assessment & Plan     Her blood pressure is fine today on amlodipine and hydrochlorothiazide.            Medium    Dyslipidemia    Current Assessment & Plan     Her lipid profile in August was perfect.  She will continue on rosuvastatin and a heart healthy diet.         Hypokalemia    Current Assessment & Plan     Her last  potassium was 3.5 on a supplement.         Systemic mastocytosis    Current Assessment & Plan     She sees a specialist at [place].            Low    Iron deficiency anemia    Overview     Secondary to hemmorhoids         Current Assessment & Plan     She is being followed closely and is presently on intravenous iron therapy.  Her last hemoglobin was 8.4.  Her ferritin was still low.         Sinus bradycardia    Current Assessment & Plan     She is asymptomatic and we will follow her off negative chronotropic agents.  Her heart rate did augment appropriately with exercise on her last stress test.         Relevant Medications    EPINEPHrine (EPIPEN 2-ZARINA) 0.3 mg/0.3 mL injection syringe           Return in about 8 months (around 12/20/2021).    Bright Selby MD  4/20/2021

## 2021-04-20 NOTE — ASSESSMENT & PLAN NOTE
She is being followed closely and is presently on intravenous iron therapy.  Her last hemoglobin was 8.4.  Her ferritin was still low.

## 2021-04-21 ENCOUNTER — TELEPHONE (OUTPATIENT)
Dept: INTERNAL MEDICINE | Facility: CLINIC | Age: 66
End: 2021-04-21

## 2021-04-21 NOTE — TELEPHONE ENCOUNTER
Patient was bit by a tick last week behind her ear.  It was removed.  She is feeling tired and would like a lyme test.

## 2021-04-22 DIAGNOSIS — A69.20 LYME DISEASE: Primary | ICD-10-CM

## 2021-04-27 ENCOUNTER — HOSPITAL ENCOUNTER (OUTPATIENT)
Dept: RADIOLOGY | Age: 66
Discharge: HOME | End: 2021-04-27
Attending: OBSTETRICS & GYNECOLOGY
Payer: MEDICARE

## 2021-04-27 DIAGNOSIS — Z12.31 ENCOUNTER FOR SCREENING MAMMOGRAM FOR MALIGNANT NEOPLASM OF BREAST: ICD-10-CM

## 2021-04-27 PROCEDURE — 77067 SCR MAMMO BI INCL CAD: CPT

## 2021-05-04 ENCOUNTER — LAB REQUISITION (OUTPATIENT)
Dept: LAB | Facility: HOSPITAL | Age: 66
DRG: 516 | End: 2021-05-04
Payer: MEDICARE

## 2021-05-04 DIAGNOSIS — D50.8 OTHER IRON DEFICIENCY ANEMIAS: ICD-10-CM

## 2021-05-04 LAB
ANISOCYTOSIS BLD QL SMEAR: ABNORMAL
BASOPHILS # BLD: 0.04 K/UL (ref 0.01–0.1)
BASOPHILS NFR BLD: 0.7 %
DACRYOCYTES BLD QL SMEAR: ABNORMAL
DIFFERENTIAL METHOD BLD: ABNORMAL
EOSINOPHIL # BLD: 0.28 K/UL (ref 0.04–0.36)
EOSINOPHIL NFR BLD: 5.1 %
ERYTHROCYTE [DISTWIDTH] IN BLOOD BY AUTOMATED COUNT: ABNORMAL %
FERRITIN SERPL-MCNC: 252 NG/ML (ref 11–250)
HCT VFR BLDCO AUTO: 36.5 % (ref 35–45)
HGB BLD-MCNC: 11.1 G/DL (ref 11.8–15.7)
HYPOCHROMIA BLD QL SMEAR: ABNORMAL
IMM GRANULOCYTES # BLD AUTO: 0.03 K/UL (ref 0–0.08)
IMM GRANULOCYTES NFR BLD AUTO: 0.5 %
IRON SATN MFR SERPL: 22 % (ref 15–45)
IRON SERPL-MCNC: 64 UG/DL (ref 35–150)
LYMPHOCYTES # BLD: 0.86 K/UL (ref 1.2–3.5)
LYMPHOCYTES NFR BLD: 15.6 %
MCH RBC QN AUTO: 23.7 PG (ref 28–33.2)
MCHC RBC AUTO-ENTMCNC: 30.4 G/DL (ref 32.2–35.5)
MCV RBC AUTO: 78 FL (ref 83–98)
MONOCYTES # BLD: 0.43 K/UL (ref 0.28–0.8)
MONOCYTES NFR BLD: 7.8 %
NEUTROPHILS # BLD: 3.88 K/UL (ref 1.7–7)
NEUTROPHILS # BLD: 3.88 K/UL (ref 1.7–7)
NEUTS SEG NFR BLD: 70.3 %
NRBC BLD-RTO: 0 %
OVALOCYTES BLD QL SMEAR: ABNORMAL
PDW BLD AUTO: 9.1 FL (ref 9.4–12.3)
PLAT MORPH BLD: NORMAL
PLATELET # BLD AUTO: 303 K/UL (ref 150–369)
PLATELET # BLD EST: ABNORMAL 10*3/UL
POIKILOCYTOSIS BLD QL SMEAR: ABNORMAL
RBC # BLD AUTO: 4.68 M/UL (ref 3.93–5.22)
SCHISTOCYTES BLD QL SMEAR: ABNORMAL
TIBC SERPL-MCNC: 291 UG/DL (ref 270–460)
UIBC SERPL-MCNC: 227 UG/DL (ref 180–360)
WBC # BLD AUTO: 5.52 K/UL (ref 3.8–10.5)

## 2021-05-04 PROCEDURE — 85025 COMPLETE CBC W/AUTO DIFF WBC: CPT | Performed by: INTERNAL MEDICINE

## 2021-05-04 PROCEDURE — 83550 IRON BINDING TEST: CPT | Performed by: INTERNAL MEDICINE

## 2021-05-04 PROCEDURE — 83540 ASSAY OF IRON: CPT | Performed by: INTERNAL MEDICINE

## 2021-05-04 PROCEDURE — 82728 ASSAY OF FERRITIN: CPT | Performed by: INTERNAL MEDICINE

## 2021-05-07 ENCOUNTER — APPOINTMENT (EMERGENCY)
Dept: RADIOLOGY | Facility: HOSPITAL | Age: 66
DRG: 516 | End: 2021-05-07
Attending: EMERGENCY MEDICINE
Payer: MEDICARE

## 2021-05-07 ENCOUNTER — HOSPITAL ENCOUNTER (INPATIENT)
Facility: HOSPITAL | Age: 66
LOS: 4 days | Discharge: NURSING, REHAB OR LONG-TERM CARE | DRG: 516 | End: 2021-05-11
Attending: EMERGENCY MEDICINE | Admitting: ORTHOPAEDIC SURGERY
Payer: MEDICARE

## 2021-05-07 DIAGNOSIS — S82.032A CLOSED DISPLACED TRANSVERSE FRACTURE OF LEFT PATELLA, INITIAL ENCOUNTER: Primary | ICD-10-CM

## 2021-05-07 LAB
ANION GAP SERPL CALC-SCNC: 15 MEQ/L (ref 3–15)
ANISOCYTOSIS BLD QL SMEAR: ABNORMAL
APAP SERPL-MCNC: <10 UG/ML (ref 10–30)
APTT PPP: 25 SEC (ref 23–35)
BASOPHILS # BLD: 0.03 K/UL (ref 0.01–0.1)
BASOPHILS NFR BLD: 0.3 %
BUN SERPL-MCNC: 20 MG/DL (ref 8–20)
CALCIUM SERPL-MCNC: 9.7 MG/DL (ref 8.9–10.3)
CHLORIDE SERPL-SCNC: 101 MEQ/L (ref 98–109)
CO2 SERPL-SCNC: 23 MEQ/L (ref 22–32)
CREAT SERPL-MCNC: 0.6 MG/DL (ref 0.6–1.1)
DACRYOCYTES BLD QL SMEAR: ABNORMAL
DIFFERENTIAL METHOD BLD: ABNORMAL
EOSINOPHIL # BLD: 0.26 K/UL (ref 0.04–0.36)
EOSINOPHIL NFR BLD: 2.6 %
ERYTHROCYTE [DISTWIDTH] IN BLOOD BY AUTOMATED COUNT: ABNORMAL %
ETHANOL SERPL-MCNC: 136 MG/DL
GFR SERPL CREATININE-BSD FRML MDRD: >60 ML/MIN/1.73M*2
GLUCOSE SERPL-MCNC: 164 MG/DL (ref 70–99)
HCT VFR BLDCO AUTO: 40.6 % (ref 35–45)
HGB BLD-MCNC: 12.4 G/DL (ref 11.8–15.7)
IMM GRANULOCYTES # BLD AUTO: 0.07 K/UL (ref 0–0.08)
IMM GRANULOCYTES NFR BLD AUTO: 0.7 %
INR PPP: 1
LYMPHOCYTES # BLD: 1.21 K/UL (ref 1.2–3.5)
LYMPHOCYTES NFR BLD: 12.1 %
MCH RBC QN AUTO: 24.8 PG (ref 28–33.2)
MCHC RBC AUTO-ENTMCNC: 30.5 G/DL (ref 32.2–35.5)
MCV RBC AUTO: 81.4 FL (ref 83–98)
MONOCYTES # BLD: 0.58 K/UL (ref 0.28–0.8)
MONOCYTES NFR BLD: 5.8 %
NEUTROPHILS # BLD: 7.88 K/UL (ref 1.7–7)
NEUTS SEG NFR BLD: 78.5 %
NRBC BLD-RTO: 0 %
OVALOCYTES BLD QL SMEAR: ABNORMAL
PDW BLD AUTO: 9.8 FL (ref 9.4–12.3)
PLAT MORPH BLD: NORMAL
PLATELET # BLD AUTO: 335 K/UL (ref 150–369)
PLATELET # BLD EST: ABNORMAL 10*3/UL
POIKILOCYTOSIS BLD QL SMEAR: ABNORMAL
POTASSIUM SERPL-SCNC: 2.9 MEQ/L (ref 3.6–5.1)
PROTHROMBIN TIME: 13 SEC (ref 12.2–14.5)
RBC # BLD AUTO: 4.99 M/UL (ref 3.93–5.22)
SALICYLATES SERPL-MCNC: <4 MG/DL
SARS-COV-2 RNA RESP QL NAA+PROBE: NEGATIVE
SCHISTOCYTES BLD QL SMEAR: ABNORMAL
SODIUM SERPL-SCNC: 139 MEQ/L (ref 136–144)
WBC # BLD AUTO: 10.03 K/UL (ref 3.8–10.5)

## 2021-05-07 PROCEDURE — 93005 ELECTROCARDIOGRAM TRACING: CPT | Performed by: PHYSICIAN ASSISTANT

## 2021-05-07 PROCEDURE — 85730 THROMBOPLASTIN TIME PARTIAL: CPT | Performed by: PHYSICIAN ASSISTANT

## 2021-05-07 PROCEDURE — 63600000 HC DRUGS/DETAIL CODE: Performed by: PHYSICIAN ASSISTANT

## 2021-05-07 PROCEDURE — 99284 EMERGENCY DEPT VISIT MOD MDM: CPT | Mod: 25

## 2021-05-07 PROCEDURE — 85610 PROTHROMBIN TIME: CPT | Performed by: PHYSICIAN ASSISTANT

## 2021-05-07 PROCEDURE — G0480 DRUG TEST DEF 1-7 CLASSES: HCPCS | Performed by: PHYSICIAN ASSISTANT

## 2021-05-07 PROCEDURE — 85025 COMPLETE CBC W/AUTO DIFF WBC: CPT | Performed by: PHYSICIAN ASSISTANT

## 2021-05-07 PROCEDURE — 12000000 HC ROOM AND CARE MED/SURG

## 2021-05-07 PROCEDURE — 36415 COLL VENOUS BLD VENIPUNCTURE: CPT | Performed by: EMERGENCY MEDICINE

## 2021-05-07 PROCEDURE — 83735 ASSAY OF MAGNESIUM: CPT | Performed by: INTERNAL MEDICINE

## 2021-05-07 PROCEDURE — 96374 THER/PROPH/DIAG INJ IV PUSH: CPT

## 2021-05-07 PROCEDURE — U0002 COVID-19 LAB TEST NON-CDC: HCPCS | Performed by: PHYSICIAN ASSISTANT

## 2021-05-07 PROCEDURE — 80048 BASIC METABOLIC PNL TOTAL CA: CPT | Performed by: PHYSICIAN ASSISTANT

## 2021-05-07 PROCEDURE — 73564 X-RAY EXAM KNEE 4 OR MORE: CPT | Mod: LT

## 2021-05-07 PROCEDURE — 25800000 HC PHARMACY IV SOLUTIONS: Performed by: STUDENT IN AN ORGANIZED HEALTH CARE EDUCATION/TRAINING PROGRAM

## 2021-05-07 PROCEDURE — 96376 TX/PRO/DX INJ SAME DRUG ADON: CPT

## 2021-05-07 PROCEDURE — 71045 X-RAY EXAM CHEST 1 VIEW: CPT

## 2021-05-07 RX ORDER — OXYCODONE HYDROCHLORIDE 5 MG/1
5-10 TABLET ORAL EVERY 4 HOURS PRN
Status: DISCONTINUED | OUTPATIENT
Start: 2021-05-07 | End: 2021-05-09

## 2021-05-07 RX ORDER — DEXTROSE 50 % IN WATER (D50W) INTRAVENOUS SYRINGE
25 AS NEEDED
Status: DISCONTINUED | OUTPATIENT
Start: 2021-05-07 | End: 2021-05-11 | Stop reason: HOSPADM

## 2021-05-07 RX ORDER — DEXTROSE 40 %
15-30 GEL (GRAM) ORAL AS NEEDED
Status: DISCONTINUED | OUTPATIENT
Start: 2021-05-07 | End: 2021-05-11 | Stop reason: HOSPADM

## 2021-05-07 RX ORDER — HYDROMORPHONE HYDROCHLORIDE 1 MG/ML
0.5 INJECTION, SOLUTION INTRAMUSCULAR; INTRAVENOUS; SUBCUTANEOUS ONCE
Status: COMPLETED | OUTPATIENT
Start: 2021-05-07 | End: 2021-05-07

## 2021-05-07 RX ORDER — HEPARIN SODIUM 5000 [USP'U]/ML
5000 INJECTION, SOLUTION INTRAVENOUS; SUBCUTANEOUS EVERY 8 HOURS
Status: COMPLETED | OUTPATIENT
Start: 2021-05-07 | End: 2021-05-08

## 2021-05-07 RX ORDER — SODIUM CHLORIDE 9 MG/ML
INJECTION, SOLUTION INTRAVENOUS CONTINUOUS
Status: ACTIVE | OUTPATIENT
Start: 2021-05-08 | End: 2021-05-08

## 2021-05-07 RX ORDER — ACETAMINOPHEN 325 MG/1
650 TABLET ORAL EVERY 4 HOURS PRN
Status: DISCONTINUED | OUTPATIENT
Start: 2021-05-07 | End: 2021-05-09

## 2021-05-07 RX ORDER — IBUPROFEN 200 MG
16-32 TABLET ORAL AS NEEDED
Status: DISCONTINUED | OUTPATIENT
Start: 2021-05-07 | End: 2021-05-11 | Stop reason: HOSPADM

## 2021-05-07 RX ADMIN — HYDROMORPHONE HYDROCHLORIDE 0.5 MG: 1 INJECTION, SOLUTION INTRAMUSCULAR; INTRAVENOUS; SUBCUTANEOUS at 20:31

## 2021-05-07 RX ADMIN — SODIUM CHLORIDE: 9 INJECTION, SOLUTION INTRAVENOUS at 23:27

## 2021-05-07 RX ADMIN — HYDROMORPHONE HYDROCHLORIDE 0.5 MG: 1 INJECTION, SOLUTION INTRAMUSCULAR; INTRAVENOUS; SUBCUTANEOUS at 19:33

## 2021-05-07 ASSESSMENT — ENCOUNTER SYMPTOMS
HEMATURIA: 0
DIARRHEA: 0
HEADACHES: 0
NAUSEA: 0
DYSURIA: 0
DIZZINESS: 0
ABDOMINAL PAIN: 0
ARTHRALGIAS: 1
FEVER: 0
CHILLS: 0
VOMITING: 0
SHORTNESS OF BREATH: 0

## 2021-05-07 NOTE — ED PROVIDER NOTES
Emergency Medicine Note  HPI   HISTORY OF PRESENT ILLNESS     65-year-old female with a history of anxiety hypertension hyperlipidemia systemic mastocytosis presents the ED for knee injury.  Prior to arrival patient tripped over a step at a restaurant line directly onto her left knee.  Obvious deformity to the left kneecap, patient admits to 10 out of 10 pain.  Denies injury to her ankle or hip.  Did not hit her head or pass out.  Patient is not taking blood thinners.            Patient History   PAST HISTORY     Reviewed from Nursing Triage: Meds       Past Medical History:   Diagnosis Date   • Anemia    • Anxiety    • Hypertension    • Iron deficiency    • Lipid disorder    • Systemic mastocytosis    • Vitamin D deficiency        Past Surgical History:   Procedure Laterality Date   • AUGMENTATION MAMMAPLASTY Bilateral    • COLONOSCOPY     • REDUCTION MAMMAPLASTY         Family History   Problem Relation Age of Onset   • Heart disease Biological Mother    • Prostate cancer Biological Father        Social History     Tobacco Use   • Smoking status: Former Smoker     Packs/day: 0.25     Quit date:      Years since quittin.3   • Smokeless tobacco: Never Used   Vaping Use   • Vaping Use: Never used   Substance Use Topics   • Alcohol use: Yes     Alcohol/week: 1.0 standard drinks     Types: 1 Glasses of wine per week     Comment: Social   • Drug use: No         Review of Systems   REVIEW OF SYSTEMS     Review of Systems   Constitutional: Negative for chills and fever.   Eyes: Negative for visual disturbance.   Respiratory: Negative for shortness of breath.    Cardiovascular: Negative for chest pain.   Gastrointestinal: Negative for abdominal pain, diarrhea, nausea and vomiting.   Genitourinary: Negative for dysuria and hematuria.   Musculoskeletal: Positive for arthralgias.   Neurological: Negative for dizziness and headaches.         VITALS     ED Vitals    Date/Time Temp Pulse Resp BP SpO2 [facility]   21  2327 -- 53 20 145/80 97 % KL   05/07/21 2243 -- 56 20 -- 96 % KMW   05/07/21 2130 -- 50 20 -- 97 % KMW   05/07/21 2045 -- 58 21 -- 92 % KMW   05/07/21 1929 37 °C (98.6 °F) 54 18 155/74 100 % KMW        Pulse Ox %: 100 % (05/07/21 2059)  Pulse Ox Interpretation: Normal (05/07/21 2059)           Physical Exam   PHYSICAL EXAM     Physical Exam  Vitals and nursing note reviewed.   Constitutional:       Appearance: She is well-developed.   HENT:      Head: Normocephalic and atraumatic.   Eyes:      Conjunctiva/sclera: Conjunctivae normal.   Cardiovascular:      Rate and Rhythm: Normal rate and regular rhythm.   Pulmonary:      Effort: Pulmonary effort is normal.      Breath sounds: Normal breath sounds.   Abdominal:      General: There is no distension.      Palpations: Abdomen is soft. There is no mass.      Tenderness: There is no abdominal tenderness.   Musculoskeletal:         General: No tenderness.      Right shoulder: Normal.      Left shoulder: Normal.      Right elbow: Normal.      Left elbow: Normal.      Right wrist: Normal.      Left wrist: Normal.      Right hand: Normal.      Left hand: Normal.      Cervical back: Normal range of motion.      Right hip: Normal.      Left hip: Normal.      Right knee: Normal.      Left knee: Deformity and bony tenderness present. Decreased range of motion.      Right ankle: Normal.      Left ankle: Normal.   Skin:     General: Skin is warm and dry.   Neurological:      General: No focal deficit present.      Mental Status: She is alert. Mental status is at baseline.   Psychiatric:         Behavior: Behavior normal.           PROCEDURES     Procedures     DATA     Results     Procedure Component Value Units Date/Time    SARS-CoV-2 (COVID-19), PCR Nasopharynx [[id number]]  (Normal) Collected: 05/07/21 1931    Specimen: Nasopharyngeal Swab from Nasopharynx Updated: 05/07/21 2059    Narrative:      The following orders were created for panel order SARS-CoV-2 (COVID-19), PCR  Nasopharynx.  Procedure                               Abnormality         Status                     ---------                               -----------         ------                     SARS-CoV-2 (COVID-19), P...[[id number]]  Normal              Final result                 Please view results for these tests on the individual orders.    SARS-CoV-2 (COVID-19), PCR Nasopharynx [[id number]]  (Normal) Collected: 05/07/21 1931    Specimen: Nasopharyngeal Swab from Nasopharynx Updated: 05/07/21 2059     SARS-CoV-2 (COVID-19) Negative     Comment: EUA/IVD       Narrative:      Nursing instructions: Obtain nasopharyngeal swab ONLY.  Send swab in viral transport media.    Basic metabolic panel [[id number]]  (Abnormal) Collected: 05/07/21 1931    Specimen: Blood, Venous Updated: 05/07/21 2007     Sodium 139 mEQ/L      Potassium 2.9 mEQ/L      Comment: Results obtained on plasma. Plasma Potassium values may be up to 0.4 mEQ/L less than serum values. The differences may be greater for patients with high platelet or white cell counts.        Chloride 101 mEQ/L      CO2 23 mEQ/L      BUN 20 mg/dL      Creatinine 0.6 mg/dL      Glucose 164 mg/dL      Calcium 9.7 mg/dL      eGFR >60.0 mL/min/1.73m*2      Anion Gap 15 mEQ/L     ER toxicology screen, serum [[id number]]  (Abnormal) Collected: 05/07/21 1932    Specimen: Blood, Venous Updated: 05/07/21 2006     Salicylate <4.0 mg/dL      Acetaminophen <10.0 ug/mL      Ethanol 136 mg/dL     CBC and differential [[id number]]  (Abnormal) Collected: 05/07/21 1931    Specimen: Blood, Venous Updated: 05/07/21 2003     WBC 10.03 K/uL      RBC 4.99 M/uL      Hemoglobin 12.4 g/dL      Hematocrit 40.6 %      MCV 81.4 fL      MCH 24.8 pg      MCHC 30.5 g/dL      RDW --     Comment: NOT MEASURED        Platelets 335 K/uL      MPV 9.8 fL      Differential Type Auto     nRBC 0.0 %      Immature Granulocytes 0.7 %      Neutrophils 78.5 %      Lymphocytes 12.1 %      Monocytes 5.8 %      Eosinophils 2.6  %      Basophils 0.3 %      Immature Granulocytes, Absolute 0.07 K/uL      Neutrophils, Absolute 7.88 K/uL      Lymphocytes, Absolute 1.21 K/uL      Monocytes, Absolute 0.58 K/uL      Eosinophils, Absolute 0.26 K/uL      Basophils, Absolute 0.03 K/uL      PLT Morphology Normal     Platelet Estimate Adequate (150,000-400,000)     Anisocytosis 1+     Ovalocytes 1+     Poikilocytes 1+     Schistocytes Occasional     Teardrop Cells Occasional    Protime-INR [[id number]]  (Normal) Collected: 05/07/21 1931    Specimen: Blood, Venous Updated: 05/07/21 1951     PT 13.0 sec      INR 1.0     Comment: INR has no defined significance when PT is within Reference Range.       APTT [[id number]]  (Normal) Collected: 05/07/21 1931    Specimen: Blood, Venous Updated: 05/07/21 1951     PTT 25 sec     RAINBOW LT GREEN [[id number]] Collected: 05/07/21 1932    Specimen: Blood, Venous Updated: 05/07/21 1935    RAINBOW PINK [[id number]] Collected: 05/07/21 1932    Specimen: Blood, Venous Updated: 05/07/21 1935    Nancy Draw Panel [[id number]] Collected: 05/07/21 1932    Specimen: Blood, Venous Updated: 05/07/21 1935    Narrative:      The following orders were created for panel order Nancy Draw Panel.  Procedure                               Abnormality         Status                     ---------                               -----------         ------                     RAINBOW PINK[[id number]]                                     In process                 RAINBOW PINK[[id number]]                                     In process                 RAINBOW LT GREEN[[id number]]                                 In process                   Please view results for these tests on the individual orders.    RAINBOW PINK [[id number]] Collected: 05/07/21 1932    Specimen: Blood, Venous Updated: 05/07/21 1935          Imaging Results          X-RAY CHEST 1 VIEW (Preliminary result)  Result time 05/07/21 21:31:17    ED Interpretation    NAD                              X-RAY KNEE LEFT 4+ VIEWS (Preliminary result)  Result time 05/07/21 20:23:55    ED Interpretation    Patellar fracture, displaced                              ECG 12 lead             Scoring tools                                 ED Course & MDM   MDM / ED COURSE and CLINICAL IMPRESSIONS     [facility]    ED Course as of May 08 0154   Fri May 07, 2021   2010 Ethanol(!): 136 [EA]   2023 Discuss case with ortho, updated patient and family     [EA]   2132 Ortho requesting admission to [facility] due to PMH     [EA]      ED Course User Index  [EA] Tara Mclaughlin PA C         Clinical Impressions as of May 08 0154   Closed displaced transverse fracture of left patella, initial encounter            Tara Mclaughlin PA C  05/08/21 0154

## 2021-05-08 ENCOUNTER — ANESTHESIA EVENT (INPATIENT)
Dept: OPERATING ROOM | Facility: HOSPITAL | Age: 66
DRG: 516 | End: 2021-05-08
Payer: MEDICARE

## 2021-05-08 ENCOUNTER — ANESTHESIA (INPATIENT)
Dept: OPERATING ROOM | Facility: HOSPITAL | Age: 66
DRG: 516 | End: 2021-05-08
Payer: MEDICARE

## 2021-05-08 ENCOUNTER — APPOINTMENT (INPATIENT)
Dept: RADIOLOGY | Facility: HOSPITAL | Age: 66
DRG: 516 | End: 2021-05-08
Attending: STUDENT IN AN ORGANIZED HEALTH CARE EDUCATION/TRAINING PROGRAM
Payer: MEDICARE

## 2021-05-08 ENCOUNTER — APPOINTMENT (INPATIENT)
Dept: RADIOLOGY | Facility: HOSPITAL | Age: 66
DRG: 516 | End: 2021-05-08
Attending: ORTHOPAEDIC SURGERY
Payer: MEDICARE

## 2021-05-08 PROBLEM — I10 ESSENTIAL HYPERTENSION: Chronic | Status: ACTIVE | Noted: 2018-11-29

## 2021-05-08 PROBLEM — D47.02 SYSTEMIC MASTOCYTOSIS: Chronic | Status: ACTIVE | Noted: 2018-11-29

## 2021-05-08 PROBLEM — Z01.818 PREOPERATIVE EXAMINATION: Status: ACTIVE | Noted: 2021-05-08

## 2021-05-08 PROBLEM — E87.6 HYPOKALEMIA: Chronic | Status: ACTIVE | Noted: 2018-11-29

## 2021-05-08 LAB
ANION GAP SERPL CALC-SCNC: 7 MEQ/L (ref 3–15)
ATRIAL RATE: 53
BUN SERPL-MCNC: 18 MG/DL (ref 8–20)
CALCIUM SERPL-MCNC: 9.1 MG/DL (ref 8.9–10.3)
CHLORIDE SERPL-SCNC: 108 MEQ/L (ref 98–109)
CO2 SERPL-SCNC: 24 MEQ/L (ref 22–32)
CREAT SERPL-MCNC: 0.6 MG/DL (ref 0.6–1.1)
ERYTHROCYTE [DISTWIDTH] IN BLOOD BY AUTOMATED COUNT: ABNORMAL %
GFR SERPL CREATININE-BSD FRML MDRD: >60 ML/MIN/1.73M*2
GLUCOSE SERPL-MCNC: 167 MG/DL (ref 70–99)
HCT VFR BLDCO AUTO: 34 % (ref 35–45)
HGB BLD-MCNC: 10.4 G/DL (ref 11.8–15.7)
MAGNESIUM SERPL-MCNC: 1.7 MG/DL (ref 1.8–2.5)
MAGNESIUM SERPL-MCNC: 1.9 MG/DL (ref 1.8–2.5)
MCH RBC QN AUTO: 24.7 PG (ref 28–33.2)
MCHC RBC AUTO-ENTMCNC: 30.6 G/DL (ref 32.2–35.5)
MCV RBC AUTO: 80.8 FL (ref 83–98)
P AXIS: 56
PDW BLD AUTO: 9.8 FL (ref 9.4–12.3)
PLATELET # BLD AUTO: 268 K/UL (ref 150–369)
POTASSIUM SERPL-SCNC: 4.1 MEQ/L (ref 3.6–5.1)
PR INTERVAL: 182
QRS DURATION: 106
QT INTERVAL: 454
QTC CALCULATION(BAZETT): 426
R AXIS: -60
RBC # BLD AUTO: 4.21 M/UL (ref 3.93–5.22)
SODIUM SERPL-SCNC: 139 MEQ/L (ref 136–144)
T WAVE AXIS: 15
TROPONIN I SERPL-MCNC: <0.02 NG/ML
VENTRICULAR RATE: 53
WBC # BLD AUTO: 6.97 K/UL (ref 3.8–10.5)

## 2021-05-08 PROCEDURE — 63700000 HC SELF-ADMINISTRABLE DRUG: Performed by: STUDENT IN AN ORGANIZED HEALTH CARE EDUCATION/TRAINING PROGRAM

## 2021-05-08 PROCEDURE — 37000001 HC ANESTHESIA GENERAL: Performed by: ORTHOPAEDIC SURGERY

## 2021-05-08 PROCEDURE — 63600000 HC DRUGS/DETAIL CODE: Performed by: SPECIALIST

## 2021-05-08 PROCEDURE — 0QSF04Z REPOSITION LEFT PATELLA WITH INTERNAL FIXATION DEVICE, OPEN APPROACH: ICD-10-PCS | Performed by: ORTHOPAEDIC SURGERY

## 2021-05-08 PROCEDURE — 63600000 HC DRUGS/DETAIL CODE: Performed by: NURSE ANESTHETIST, CERTIFIED REGISTERED

## 2021-05-08 PROCEDURE — 63600000 HC DRUGS/DETAIL CODE: Performed by: INTERNAL MEDICINE

## 2021-05-08 PROCEDURE — 71000001 HC PACU PHASE 1 INITIAL 30MIN: Performed by: ORTHOPAEDIC SURGERY

## 2021-05-08 PROCEDURE — 36415 COLL VENOUS BLD VENIPUNCTURE: CPT | Performed by: HOSPITALIST

## 2021-05-08 PROCEDURE — 36100360 XR KNEE 1 OR 2 VW LEFT: Mod: LT

## 2021-05-08 PROCEDURE — 200200 PR NO CHARGE: Performed by: HOSPITALIST

## 2021-05-08 PROCEDURE — 25800000 HC PHARMACY IV SOLUTIONS: Performed by: STUDENT IN AN ORGANIZED HEALTH CARE EDUCATION/TRAINING PROGRAM

## 2021-05-08 PROCEDURE — C1713 ANCHOR/SCREW BN/BN,TIS/BN: HCPCS | Performed by: ORTHOPAEDIC SURGERY

## 2021-05-08 PROCEDURE — 83735 ASSAY OF MAGNESIUM: CPT | Performed by: HOSPITALIST

## 2021-05-08 PROCEDURE — 63700000 HC SELF-ADMINISTRABLE DRUG: Performed by: INTERNAL MEDICINE

## 2021-05-08 PROCEDURE — 25000000 HC PHARMACY GENERAL: Performed by: NURSE ANESTHETIST, CERTIFIED REGISTERED

## 2021-05-08 PROCEDURE — 27200000 HC STERILE SUPPLY: Performed by: ORTHOPAEDIC SURGERY

## 2021-05-08 PROCEDURE — 63600000 HC DRUGS/DETAIL CODE: Performed by: HOSPITALIST

## 2021-05-08 PROCEDURE — 84484 ASSAY OF TROPONIN QUANT: CPT | Performed by: HOSPITALIST

## 2021-05-08 PROCEDURE — 63600000 HC DRUGS/DETAIL CODE: Performed by: STUDENT IN AN ORGANIZED HEALTH CARE EDUCATION/TRAINING PROGRAM

## 2021-05-08 PROCEDURE — 25800000 HC PHARMACY IV SOLUTIONS: Performed by: NURSE ANESTHETIST, CERTIFIED REGISTERED

## 2021-05-08 PROCEDURE — 99221 1ST HOSP IP/OBS SF/LOW 40: CPT | Performed by: INTERNAL MEDICINE

## 2021-05-08 PROCEDURE — C1769 GUIDE WIRE: HCPCS | Performed by: ORTHOPAEDIC SURGERY

## 2021-05-08 PROCEDURE — 36000003 HC OR LEVEL 3 INITIAL 30MIN: Performed by: ORTHOPAEDIC SURGERY

## 2021-05-08 PROCEDURE — 80048 BASIC METABOLIC PNL TOTAL CA: CPT | Performed by: HOSPITALIST

## 2021-05-08 PROCEDURE — 12000000 HC ROOM AND CARE MED/SURG

## 2021-05-08 PROCEDURE — 73560 X-RAY EXAM OF KNEE 1 OR 2: CPT | Mod: LT

## 2021-05-08 PROCEDURE — 85027 COMPLETE CBC AUTOMATED: CPT | Performed by: HOSPITALIST

## 2021-05-08 PROCEDURE — 36000013 HC OR LEVEL 3 EA ADDL MIN: Performed by: ORTHOPAEDIC SURGERY

## 2021-05-08 PROCEDURE — 83520 IMMUNOASSAY QUANT NOS NONAB: CPT | Performed by: INTERNAL MEDICINE

## 2021-05-08 PROCEDURE — 71000011 HC PACU PHASE 1 EA ADDL MIN: Performed by: ORTHOPAEDIC SURGERY

## 2021-05-08 PROCEDURE — 25000000 HC PHARMACY GENERAL: Performed by: SPECIALIST

## 2021-05-08 PROCEDURE — 25800000 HC PHARMACY IV SOLUTIONS: Performed by: INTERNAL MEDICINE

## 2021-05-08 DEVICE — SCREW 4.0M CANN. SHRT THRD 34M: Type: IMPLANTABLE DEVICE | Site: PATELLA | Status: FUNCTIONAL

## 2021-05-08 DEVICE — SCREW 4.0M CANN. SHRT THRD 30M: Type: IMPLANTABLE DEVICE | Site: PATELLA | Status: FUNCTIONAL

## 2021-05-08 RX ORDER — AMLODIPINE BESYLATE 5 MG/1
5 TABLET ORAL DAILY
Status: DISCONTINUED | OUTPATIENT
Start: 2021-05-09 | End: 2021-05-10

## 2021-05-08 RX ORDER — FENTANYL CITRATE 50 UG/ML
50 INJECTION, SOLUTION INTRAMUSCULAR; INTRAVENOUS
Status: DISCONTINUED | OUTPATIENT
Start: 2021-05-08 | End: 2021-05-09 | Stop reason: HOSPADM

## 2021-05-08 RX ORDER — ONDANSETRON HYDROCHLORIDE 2 MG/ML
INJECTION, SOLUTION INTRAVENOUS AS NEEDED
Status: DISCONTINUED | OUTPATIENT
Start: 2021-05-08 | End: 2021-05-08 | Stop reason: SURG

## 2021-05-08 RX ORDER — ESCITALOPRAM OXALATE 10 MG/1
10 TABLET ORAL DAILY
Status: DISCONTINUED | OUTPATIENT
Start: 2021-05-09 | End: 2021-05-11 | Stop reason: HOSPADM

## 2021-05-08 RX ORDER — FENTANYL CITRATE 50 UG/ML
INJECTION, SOLUTION INTRAMUSCULAR; INTRAVENOUS AS NEEDED
Status: DISCONTINUED | OUTPATIENT
Start: 2021-05-08 | End: 2021-05-08 | Stop reason: SURG

## 2021-05-08 RX ORDER — POTASSIUM CHLORIDE 750 MG/1
10 TABLET, FILM COATED, EXTENDED RELEASE ORAL DAILY
Status: DISCONTINUED | OUTPATIENT
Start: 2021-05-09 | End: 2021-05-11 | Stop reason: HOSPADM

## 2021-05-08 RX ORDER — HYDROCHLOROTHIAZIDE 25 MG/1
25 TABLET ORAL DAILY
Status: DISCONTINUED | OUTPATIENT
Start: 2021-05-09 | End: 2021-05-11 | Stop reason: HOSPADM

## 2021-05-08 RX ORDER — EPINEPHRINE 1 MG/ML
0.3 INJECTION, SOLUTION INTRAMUSCULAR; SUBCUTANEOUS AS NEEDED
Status: DISCONTINUED | OUTPATIENT
Start: 2021-05-08 | End: 2021-05-11 | Stop reason: HOSPADM

## 2021-05-08 RX ORDER — ONDANSETRON HYDROCHLORIDE 2 MG/ML
4 INJECTION, SOLUTION INTRAVENOUS
Status: DISCONTINUED | OUTPATIENT
Start: 2021-05-08 | End: 2021-05-09 | Stop reason: HOSPADM

## 2021-05-08 RX ORDER — HYDROMORPHONE HYDROCHLORIDE 1 MG/ML
INJECTION, SOLUTION INTRAMUSCULAR; INTRAVENOUS; SUBCUTANEOUS AS NEEDED
Status: DISCONTINUED | OUTPATIENT
Start: 2021-05-08 | End: 2021-05-08 | Stop reason: SURG

## 2021-05-08 RX ORDER — ROCURONIUM BROMIDE 10 MG/ML
INJECTION, SOLUTION INTRAVENOUS AS NEEDED
Status: DISCONTINUED | OUTPATIENT
Start: 2021-05-08 | End: 2021-05-08 | Stop reason: SURG

## 2021-05-08 RX ORDER — SODIUM CHLORIDE, SODIUM GLUCONATE, SODIUM ACETATE, POTASSIUM CHLORIDE AND MAGNESIUM CHLORIDE 30; 37; 368; 526; 502 MG/100ML; MG/100ML; MG/100ML; MG/100ML; MG/100ML
INJECTION, SOLUTION INTRAVENOUS CONTINUOUS PRN
Status: DISCONTINUED | OUTPATIENT
Start: 2021-05-08 | End: 2021-05-08 | Stop reason: SURG

## 2021-05-08 RX ORDER — POTASSIUM CHLORIDE 750 MG/1
40 TABLET, FILM COATED, EXTENDED RELEASE ORAL ONCE
Status: COMPLETED | OUTPATIENT
Start: 2021-05-08 | End: 2021-05-08

## 2021-05-08 RX ORDER — DIPHENHYDRAMINE HCL 25 MG
50 CAPSULE ORAL ONCE
Status: COMPLETED | OUTPATIENT
Start: 2021-05-08 | End: 2021-05-08

## 2021-05-08 RX ORDER — LABETALOL HCL 20 MG/4 ML
10 SYRINGE (ML) INTRAVENOUS ONCE
Status: COMPLETED | OUTPATIENT
Start: 2021-05-08 | End: 2021-05-08

## 2021-05-08 RX ORDER — HYDROMORPHONE HYDROCHLORIDE 1 MG/ML
0.5 INJECTION, SOLUTION INTRAMUSCULAR; INTRAVENOUS; SUBCUTANEOUS
Status: DISCONTINUED | OUTPATIENT
Start: 2021-05-08 | End: 2021-05-09 | Stop reason: HOSPADM

## 2021-05-08 RX ORDER — SENNOSIDES 8.6 MG/1
2 TABLET ORAL NIGHTLY
Status: DISCONTINUED | OUTPATIENT
Start: 2021-05-08 | End: 2021-05-10

## 2021-05-08 RX ORDER — PROPOFOL 10 MG/ML
INJECTION, EMULSION INTRAVENOUS AS NEEDED
Status: DISCONTINUED | OUTPATIENT
Start: 2021-05-08 | End: 2021-05-08 | Stop reason: SURG

## 2021-05-08 RX ORDER — MIDAZOLAM HYDROCHLORIDE 2 MG/2ML
INJECTION, SOLUTION INTRAMUSCULAR; INTRAVENOUS AS NEEDED
Status: DISCONTINUED | OUTPATIENT
Start: 2021-05-08 | End: 2021-05-08 | Stop reason: SURG

## 2021-05-08 RX ORDER — ROSUVASTATIN CALCIUM 10 MG/1
10 TABLET, COATED ORAL
Status: DISCONTINUED | OUTPATIENT
Start: 2021-05-09 | End: 2021-05-11 | Stop reason: HOSPADM

## 2021-05-08 RX ORDER — LABETALOL HCL 20 MG/4 ML
SYRINGE (ML) INTRAVENOUS AS NEEDED
Status: DISCONTINUED | OUTPATIENT
Start: 2021-05-08 | End: 2021-05-08 | Stop reason: SURG

## 2021-05-08 RX ORDER — DEXAMETHASONE SODIUM PHOSPHATE 4 MG/ML
INJECTION, SOLUTION INTRA-ARTICULAR; INTRALESIONAL; INTRAMUSCULAR; INTRAVENOUS; SOFT TISSUE AS NEEDED
Status: DISCONTINUED | OUTPATIENT
Start: 2021-05-08 | End: 2021-05-08 | Stop reason: SURG

## 2021-05-08 RX ADMIN — TRANEXAMIC ACID 600 MG: 100 INJECTION, SOLUTION INTRAVENOUS at 17:23

## 2021-05-08 RX ADMIN — OXYCODONE HYDROCHLORIDE 10 MG: 5 TABLET ORAL at 05:38

## 2021-05-08 RX ADMIN — PREDNISONE 50 MG: 20 TABLET ORAL at 03:34

## 2021-05-08 RX ADMIN — POTASSIUM CHLORIDE 40 MEQ: 149 INJECTION, SOLUTION, CONCENTRATE INTRAVENOUS at 03:48

## 2021-05-08 RX ADMIN — ROCURONIUM BROMIDE 50 MG: 10 INJECTION, SOLUTION INTRAVENOUS at 16:47

## 2021-05-08 RX ADMIN — SODIUM CHLORIDE, SODIUM GLUCONATE, SODIUM ACETATE, POTASSIUM CHLORIDE AND MAGNESIUM CHLORIDE: 526; 502; 368; 37; 30 INJECTION, SOLUTION INTRAVENOUS at 16:41

## 2021-05-08 RX ADMIN — PROPOFOL 150 MG: 10 INJECTION, EMULSION INTRAVENOUS at 16:47

## 2021-05-08 RX ADMIN — HEPARIN SODIUM 5000 UNITS: 5000 INJECTION, SOLUTION INTRAVENOUS; SUBCUTANEOUS at 01:26

## 2021-05-08 RX ADMIN — POTASSIUM CHLORIDE 40 MEQ: 750 TABLET, FILM COATED, EXTENDED RELEASE ORAL at 03:34

## 2021-05-08 RX ADMIN — HYDROMORPHONE HYDROCHLORIDE 0.5 MG: 1 INJECTION, SOLUTION INTRAMUSCULAR; INTRAVENOUS; SUBCUTANEOUS at 18:23

## 2021-05-08 RX ADMIN — PROPOFOL 50 MG: 10 INJECTION, EMULSION INTRAVENOUS at 17:50

## 2021-05-08 RX ADMIN — SENNOSIDES 2 TABLET: 8.6 TABLET, FILM COATED ORAL at 22:47

## 2021-05-08 RX ADMIN — MAGNESIUM SULFATE 2 G: 2 INJECTION INTRAVENOUS at 11:04

## 2021-05-08 RX ADMIN — ACETAMINOPHEN 650 MG: 325 TABLET, FILM COATED ORAL at 05:38

## 2021-05-08 RX ADMIN — HYDROMORPHONE HYDROCHLORIDE 0.5 MG: 1 INJECTION, SOLUTION INTRAMUSCULAR; INTRAVENOUS; SUBCUTANEOUS at 17:54

## 2021-05-08 RX ADMIN — OXYCODONE HYDROCHLORIDE 10 MG: 5 TABLET ORAL at 01:26

## 2021-05-08 RX ADMIN — ACETAMINOPHEN 650 MG: 325 TABLET, FILM COATED ORAL at 20:14

## 2021-05-08 RX ADMIN — SUGAMMADEX 117.4 MG: 100 INJECTION, SOLUTION INTRAVENOUS at 19:03

## 2021-05-08 RX ADMIN — ACETAMINOPHEN 650 MG: 325 TABLET, FILM COATED ORAL at 00:24

## 2021-05-08 RX ADMIN — ROCURONIUM BROMIDE 20 MG: 10 INJECTION, SOLUTION INTRAVENOUS at 17:52

## 2021-05-08 RX ADMIN — ONDANSETRON 4 MG: 2 INJECTION INTRAMUSCULAR; INTRAVENOUS at 18:31

## 2021-05-08 RX ADMIN — FENTANYL CITRATE 50 MCG: 50 INJECTION, SOLUTION INTRAMUSCULAR; INTRAVENOUS at 19:32

## 2021-05-08 RX ADMIN — OXYCODONE HYDROCHLORIDE 10 MG: 5 TABLET ORAL at 10:51

## 2021-05-08 RX ADMIN — LABETALOL HYDROCHLORIDE 10 MG: 5 INJECTION, SOLUTION INTRAVENOUS at 19:11

## 2021-05-08 RX ADMIN — FENTANYL CITRATE 50 MCG: 50 INJECTION, SOLUTION INTRAMUSCULAR; INTRAVENOUS at 16:45

## 2021-05-08 RX ADMIN — SODIUM CHLORIDE, SODIUM GLUCONATE, SODIUM ACETATE, POTASSIUM CHLORIDE AND MAGNESIUM CHLORIDE: 526; 502; 368; 37; 30 INJECTION, SOLUTION INTRAVENOUS at 18:52

## 2021-05-08 RX ADMIN — FENTANYL CITRATE 50 MCG: 50 INJECTION, SOLUTION INTRAMUSCULAR; INTRAVENOUS at 16:47

## 2021-05-08 RX ADMIN — LABETALOL 20 MG/4 ML (5 MG/ML) INTRAVENOUS SYRINGE 10 MG: at 21:00

## 2021-05-08 RX ADMIN — OXYCODONE HYDROCHLORIDE 5 MG: 5 TABLET ORAL at 20:15

## 2021-05-08 RX ADMIN — DEXAMETHASONE SODIUM PHOSPHATE 10 MG: 4 INJECTION, SOLUTION INTRA-ARTICULAR; INTRALESIONAL; INTRAMUSCULAR; INTRAVENOUS; SOFT TISSUE at 16:55

## 2021-05-08 RX ADMIN — OXYCODONE HYDROCHLORIDE 5 MG: 5 TABLET ORAL at 22:20

## 2021-05-08 RX ADMIN — ACETAMINOPHEN 650 MG: 325 TABLET, FILM COATED ORAL at 10:52

## 2021-05-08 RX ADMIN — PREDNISONE 50 MG: 20 TABLET ORAL at 15:10

## 2021-05-08 RX ADMIN — MIDAZOLAM HYDROCHLORIDE 2 MG: 1 INJECTION, SOLUTION INTRAMUSCULAR; INTRAVENOUS at 16:42

## 2021-05-08 RX ADMIN — PREDNISONE 50 MG: 20 TABLET ORAL at 08:52

## 2021-05-08 RX ADMIN — DIPHENHYDRAMINE HYDROCHLORIDE 50 MG: 25 CAPSULE ORAL at 15:10

## 2021-05-08 RX ADMIN — FENTANYL CITRATE 25 MCG: 50 INJECTION, SOLUTION INTRAMUSCULAR; INTRAVENOUS at 19:52

## 2021-05-08 RX ADMIN — HYDROMORPHONE HYDROCHLORIDE 0.5 MG: 1 INJECTION, SOLUTION INTRAMUSCULAR; INTRAVENOUS; SUBCUTANEOUS at 16:54

## 2021-05-08 RX ADMIN — SODIUM CHLORIDE 750 MG: 9 INJECTION, SOLUTION INTRAVENOUS at 16:07

## 2021-05-08 ASSESSMENT — ENCOUNTER SYMPTOMS
COUGH: 0
FATIGUE: 0
ARTHRALGIAS: 1
BRUISES/BLEEDS EASILY: 0
CONFUSION: 0
DIAPHORESIS: 0
SHORTNESS OF BREATH: 0
ABDOMINAL PAIN: 0
HEMATURIA: 0
DIFFICULTY URINATING: 0
PALPITATIONS: 0
NAUSEA: 0
TROUBLE SWALLOWING: 0
HEADACHES: 0
FEVER: 0
ADENOPATHY: 0
VOMITING: 0
CONSTIPATION: 0
DIARRHEA: 0
CHILLS: 0
SHORTNESS OF BREATH: 1
HEMOPTYSIS: 0

## 2021-05-08 ASSESSMENT — PATIENT HEALTH QUESTIONNAIRE - PHQ9: SUM OF ALL RESPONSES TO PHQ9 QUESTIONS 1 & 2: 0

## 2021-05-08 ASSESSMENT — COGNITIVE AND FUNCTIONAL STATUS - GENERAL
HELP NEEDED FOR BATHING: 2 - A LOT
HELP NEEDED FOR PERSONAL GROOMING: 3 - A LITTLE
MOVING TO AND FROM BED TO CHAIR: 2 - A LOT
WALKING IN HOSPITAL ROOM: 2 - A LOT
EATING MEALS: 4 - NONE
CLIMB 3 TO 5 STEPS WITH RAILING: 2 - A LOT
TOILETING: 3 - A LITTLE
STANDING UP FROM CHAIR USING ARMS: 2 - A LOT
DRESSING REGULAR LOWER BODY CLOTHING: 3 - A LITTLE
DRESSING REGULAR UPPER BODY CLOTHING: 3 - A LITTLE

## 2021-05-08 ASSESSMENT — PAIN SCALES - GENERAL: PAIN_LEVEL: 0

## 2021-05-08 ASSESSMENT — LIFESTYLE VARIABLES: TOBACCO_USE: 1

## 2021-05-08 NOTE — ED ATTESTATION NOTE
I have personally seen and examined the patient.  I reviewed and agree with physician assistant / nurse practitioner’s assessment and plan of care, with the following exceptions: None  My examination, assessment, and plan of care of Ritu Ramirez is as follows:     Patient is a 55-year-old female who presents to the emergency department for evaluation of a left knee injury.  Patient tripped and fell prior to arrival, landing directly on the left knee.  Since then, she has not been able to put any weight whatsoever on the left leg.  Complains of severe pain in the left knee.  Denies head injury.    On exam, patient is awake, alert, but uncomfortable appearing.  Respirations are nonlabored.  She has a significant pain with palpation of the left knee and is unable to perform any range of motion.  She has an easily palpable left DP pulse and she is able to wiggle her toes without any difficulty.    X-ray shows a left patella fracture.  Patient is in a significant amount of pain. Will discuss with orthopedics.         Rosalva Gallagher MD  05/07/21 2052

## 2021-05-08 NOTE — ASSESSMENT & PLAN NOTE
She is of low cardiovascular risk for intermediate risk orthopedic surgery.  She typically can complete all activities of daily living without difficulty.  She denies any chest pain or shortness of breath.  EKG is unchanged from previous.  No further noninvasive cardiovascular work-up is needed preoperatively.

## 2021-05-08 NOTE — BRIEF OP NOTE
OPEN REDUCTION INTERNAL FIXATION PATELLA FRACTURE (L) Procedure Note    Procedure:    OPEN REDUCTION INTERNAL FIXATION PATELLA FRACTURE  CPT(R) Code:  60560 - [place] OPEN RX PATELLA FX      Pre-op Diagnosis     * Closed displaced transverse fracture of left patella, initial encounter [S82.032A]       Post-op Diagnosis     * Closed displaced transverse fracture of left patella, initial encounter [S82.032A]    Surgeon(s) and Role:     * Ashley Kiser MD - Primary    Anesthesia: General    Staff:   Circulator: Priti Lawton RN  Scrub Person: Quan Hamilton    Procedure Details   Left patella fracture open reduction internal fixation,cannulated screws, tension band    Estimated Blood Loss: No blood loss documented.    Specimens:                No specimens collected during this procedure.      Drains: * No LDAs found *    Implants:   Implant Name Type Inv. Item Serial No.  Lot No. LRB No. Used Action   SCREW 4.0M DEAN. SHRT THRD 30M - [id number] Bone screw SCREW 4.0M DEAN. SHRT THRD 30M  [employer]  Left 1 Implanted   SCREW 4.0M DEAN. SHRT THRD 34M - [id number] Bone screw SCREW 4.0M DEAN. SHRT THRD 34M  [employer]  Left 1 Implanted   GUIDE WIRE 1.25MM THREADED - [id number]  GUIDE WIRE 1.25MM THREADED  [employer]  Left 2 Implanted and Explanted              Complications:  None; patient tolerated the procedure well.           Disposition: PACU - hemodynamically stable.           Condition: stable    Ashley Kiser MD  Phone Number: [phone]

## 2021-05-08 NOTE — PROGRESS NOTES
Orthopaedics Progress Note    [Interval events]  No acute events overnight    [S]  No acute distress. Pain moderately well controlled. Denies CP/SOB/nausea/vomiting. On steroid taper. NPO for likely OR today.    [O]   Vitals:    05/08/21 0015   BP:    Pulse:    Resp:    Temp:    SpO2: 96%     Physical exam  No acute distress  A&Ox3    Left lower extremity  Inspection: Knee immobilizer in place  Palpation: TTP at kneecap  Motor: Unable to perform straight leg raise  Sensory: SILT SPN/DPN/T/S/S distributions  Vascular: Palpable DP/PT pulses, Toes WWP  ROM: Active ROM of knee deferred      [A/P]   65 y.o. female with displaced left patella fracture with absent extensor mechanism    - Plan for ORIF of left patella today  - Steroid taper for history of anaphylactic shock  - Clearance pending heme/onc recommendations  - NPO/IVF  - Analgesia  - NWB LLE in knee immobilizer     Jim Yoder MD  Orthopaedic Surgery

## 2021-05-08 NOTE — ANESTHESIA PREPROCEDURE EVALUATION
Relevant Problems   CARDIOVASCULAR   (+) Dyslipidemia   (+) Essential hypertension   (+) Sinus bradycardia      HEMATOLOGY   (+) Iron deficiency anemia      MUSCULOSKELETAL   (+) Lumbar spondylosis      NEUROLOGY   (+) Anxiety      RESPIRATORY SYSTEM   (+) Dyspnea      URINARY SYSTEM   (+) Hypokalemia       Anesthesia ROS/MED HX    Anesthesia History    Previous anesthetics   History of anesthetic complications  Pulmonary    history of tobacco use and ex-smoker   Shortness of breath  Neuro/Psych    Anxiety  Cardiovascular   CAD   dyslipidemia   hypertension   Cardiac clearance reviewed, Covid19 Test Reviewed and ECG reviewed  Abnormal ECG      GI/Hepatic- neg  Musculoskeletal   Arthritis  Renal Disease- neg  Endo/Other- neg  Body Habitus: Normal  ROS/MED HX Comments:    Anesthesia History: History of anaphylaxis after anesthesia  History of systemic mastocystosis   Musculoskeletal: osteoporosis   Hematological: Systemic mastocytosis       Past Surgical History:   Procedure Laterality Date   • AUGMENTATION MAMMAPLASTY Bilateral    • COLONOSCOPY     • REDUCTION MAMMAPLASTY         Physical Exam    Airway   Mallampati: II   TM distance: >3 FB   Neck ROM: full  Cardiovascular - normal   Rhythm: regular   Rate: normalPulmonary - normal   clear to auscultation  Dental    Teeth Problems: caps        Anesthesia Plan    Plan: general    Technique: general endotracheal     Lines and Monitors: PIV     Airway: oral intubation     not instructed to abstain from smoking on day of procedure    Patient did not smoke on day of surgery  ASA 3  Blood Products:   Use of Blood Products Discussed: No   Anesthetic plan and risks discussed with: patient  Induction:    intravenous   Postop Plan:   Patient Disposition: inpatient floor planned admission   Pain Management: IV analgesics

## 2021-05-08 NOTE — ANESTHESIOLOGIST PRE-PROCEDURE ATTESTATION
Pre-Procedure Patient Identification:  I am the Primary Anesthesiologist and have identified the patient on 05/08/21 at 4:34 PM.   I have confirmed the following procedure(s) OPEN REDUCTION INTERNAL FIXATION PATELLA FRACTURE (L) will be performed by the following surgeon/proceduralist Ashley Kiser MD.

## 2021-05-08 NOTE — OR SURGEON
Pre-Procedure patient identification:  I am the primary operating surgeon/proceduralist and I have identified the patient and confirmed laterality is left on 05/08/21 at 4:20 PM Ashley Kiser MD  Phone Number: [phone]

## 2021-05-08 NOTE — ASSESSMENT & PLAN NOTE
Cardiology to eval for preop cv clearance for abnormal EKG  hemeonc to eval for preop clearance w pt's hx of mastocytosis.

## 2021-05-08 NOTE — ANESTHESIA PROCEDURE NOTES
Airway  Urgency: elective    Start Time: 5/8/2021 4:49 PM  Airway not difficult    General Information and Staff    Patient location during procedure: OR  Anesthesiologist: Jeannie Esparza MD  Resident/CRNA: Alyssa Santos CRNA  Performed: resident/CRNA     Indications and Patient Condition  Indications for airway management: anesthesia  Sedation level: deep  Preoxygenated: yes  Patient position: sniffing  Mask difficulty assessment: 1 - vent by mask    Final Airway Details  Final airway type: endotracheal airway      Successful airway: ETT    Successful intubation technique: direct laryngoscopy  Facilitating devices/methods: intubating stylet  Endotracheal tube insertion site: oral  Blade: Danielle  Blade size: #3  ETT size (mm): 7.0  Cormack-Lehane Classification: grade I - full view of glottis  Placement verified by: chest auscultation, capnometry and palpation of cuff   Measured from: lips  Number of attempts at approach: 1  Ventilation between attempts: none  Number of other approaches attempted: 0  Atraumatic airway insertion

## 2021-05-08 NOTE — CONSULTS
Hematology/Oncology Consult Note    Ritu Ramirez is a 65 y.o. female with mastocytosis and iron deficiency anemia who was admitted for left patellar fracture     Reason for Consult: mastocytosis      History of Present Illness:    Ms. Ritu Ramirez is a 65 year old female with history of mastocytosis for which she is followed at [facility]. Her disease is charcterized by rash. She is on no routine therapy. The last time she had anesthesia was around 2015 for breast reduction. She was dishahrged home and was taking azithromycin. She quickly started having severe reaction, possibly anaphylaxis and was admitted to the intensive care unit and managed accordingly. She also had a delayed reaction to contrast following that but was much milder.  With subsequent IV contrast dye, she used a prep and had no difficulty.  Last tryptase level was 40.4 in November 2020.    Mr. Ramirez also has a history of iron deficiency anemia attributed to bleeding hemorrhoids and possibly AVMs She periodically required intravenous iron.    Ms. Ramirez presented 5/7/2021 with left knee pain after a fall. She tripped over a step and fell directly on her knee. X-ray showed a left patellar fracture.              Review of Systems:    Review of Systems   Constitutional: Negative for chills, diaphoresis, fatigue and fever.   HENT:   Negative for trouble swallowing.    Respiratory: Negative for cough, hemoptysis and shortness of breath.    Cardiovascular: Negative for chest pain and palpitations.   Gastrointestinal: Negative for abdominal pain, constipation, diarrhea, nausea and vomiting.   Genitourinary: Negative for difficulty urinating and hematuria.    Musculoskeletal: Positive for arthralgias (left knee pain) and gait problem.   Neurological: Positive for gait problem. Negative for headaches.   Hematological: Negative for adenopathy. Does not bruise/bleed easily.   Psychiatric/Behavioral: Negative for confusion.              Past Medical History:    Past Medical  History:   Diagnosis Date   • Anemia    • Anxiety    • Hypertension    • Iron deficiency    • Lipid disorder    • Systemic mastocytosis    • Vitamin D deficiency          Past Surgical History:    Past Surgical History:   Procedure Laterality Date   • AUGMENTATION MAMMAPLASTY Bilateral    • COLONOSCOPY     • REDUCTION MAMMAPLASTY            Allergies:    Allergies   Allergen Reactions   • Iodinated Contrast Media      Other reaction(s): Anaphylaxis   • Penicillins Hives   • Anesthetics - Amide Type - Select Amino Amides      Other reaction(s): Anaphylaxis   • Clarithromycin      Other reaction(s): Rash   • Erythromycin Base      Other reaction(s): Anaphylaxis   • Nsaids (Non-Steroidal Anti-Inflammatory Drug)      Other reaction(s): Anaphylaxis         Medications:    Current Facility-Administered Medications   Medication Dose Route Frequency   • acetaminophen  650 mg oral q4h PRN    And   • oxyCODONE  5-10 mg oral q4h PRN   • glucose  16-32 g of dextrose oral PRN    Or   • dextrose  15-30 g of dextrose oral PRN    Or   • glucagon  1 mg intramuscular PRN    Or   • dextrose in water  25 mL intravenous PRN   • diphenhydrAMINE  50 mg oral Once   • magnesium sulfate  2 g intravenous Once   • predniSONE  50 mg oral q6h INT   • sodium chloride 0.9 %   intravenous Continuous   • vancomycin  15 mg/kg intravenous On call to OR         Social History:    Social History     Socioeconomic History   • Marital status: Single     Spouse name: None   • Number of children: None   • Years of education: None   • Highest education level: None   Occupational History   • None   Tobacco Use   • Smoking status: Former Smoker     Packs/day: 0.25     Quit date:      Years since quittin.3   • Smokeless tobacco: Never Used   Vaping Use   • Vaping Use: Never used   Substance and Sexual Activity   • Alcohol use: Yes     Alcohol/week: 1.0 standard drinks     Types: 1 Glasses of wine per week     Comment: Social   • Drug use: No   • Sexual  "activity: Defer   Other Topics Concern   • None   Social History Narrative   • None     Social Determinants of Health     Financial Resource Strain:    • Difficulty of Paying Living Expenses:    Food Insecurity:    • Worried About Running Out of Food in the Last Year:    • Ran Out of Food in the Last Year:    Transportation Needs:    • Lack of Transportation (Medical):    • Lack of Transportation (Non-Medical):    Physical Activity:    • Days of Exercise per Week:    • Minutes of Exercise per Session:    Stress:    • Feeling of Stress :    Social Connections:    • Frequency of Communication with Friends and Family:    • Frequency of Social Gatherings with Friends and Family:    • Attends Islam Services:    • Active Member of Clubs or Organizations:    • Attends Club or Organization Meetings:    • Marital Status:    Intimate Partner Violence:    • Fear of Current or Ex-Partner:    • Emotionally Abused:    • Physically Abused:    • Sexually Abused:          Family History:    Family History   Problem Relation Age of Onset   • Heart disease Biological Mother    • Prostate cancer Biological Father         Family Status   Relation Name Status   • Bio Mother  Alive   • Bio Father           Physical Exam:    Visit Vitals  /78 (BP Location: Right upper arm, Patient Position: Lying)   Pulse (!) 46   Temp 36.8 °C (98.2 °F) (Temporal)   Resp 18   Ht 1.549 m (5' 1\")   Wt 58.7 kg (129 lb 6.4 oz)   SpO2 97%   Breastfeeding No   BMI 24.45 kg/m²       Physical Exam  Vitals reviewed.   Constitutional:       General: She is not in acute distress.     Appearance: She is not ill-appearing or toxic-appearing.   Eyes:      General: No scleral icterus.  Cardiovascular:      Rate and Rhythm: Normal rate and regular rhythm.   Pulmonary:      Effort: No respiratory distress.      Breath sounds: Normal breath sounds. No wheezing or rales.   Abdominal:      General: There is no distension.      Palpations: Abdomen is soft. "   Skin:     Coloration: Skin is not jaundiced or pale.      Findings: No bruising.   Neurological:      Mental Status: She is alert and oriented to person, place, and time.   Psychiatric:         Mood and Affect: Mood normal.         Behavior: Behavior normal.         Thought Content: Thought content normal.         Judgment: Judgment normal.             Laboratories:    Results from last 3 days   Lab Units 05/08/21  0941 05/07/21  1931   WBC K/uL 6.97 10.03   HEMOGLOBIN g/dL 10.4 L 12.4   HEMATOCRIT % 34.0 L 40.6   PLATELETS K/uL 268 335       Results from last 3 days   Lab Units 05/08/21  0941 05/07/21  1931   SODIUM mEQ/L 139 139   POTASSIUM mEQ/L 4.1 2.9 L   CHLORIDE mEQ/L 108 101   CO2 mEQ/L 24 23   BUN mg/dL 18 20   CREATININE mg/dL 0.6 0.6   CALCIUM mg/dL 9.1 9.7   GLUCOSE mg/dL 167 H 164 H       Results from last 3 days   Lab Units 05/07/21  1931   PROTIME sec 13.0   INR  1.0   PTT sec 25         Radiology:     X-RAY KNEE LEFT 4+ VIEWS  Result Date: 5/8/2021    IMPRESSION: Displaced patellar fracture. The emergency department is aware of this fracture.     X-RAY CHEST 1 VIEW  Result Date: 5/8/2021    IMPRESSION: No evidence of acute pulmonary disease.         Assessment and Plan:      Mastocytosis  - followed at [facility]  - on no therapy    - check serum tryptase  - no good data guiding how to manage richard-op  - anti-histamines and/or corticosteroids usually recommended pre-op to prevent mast cell degranulation    - she has received 2 doses of Prednisone earlier today  - would recommend steroids and Benadryl 30-60 minutes pre-op  - avoid known triggers such as temperature fluctuation, pain, offending drugs, anxiety  - monitor for development of symptoms of mast cell degranulation    Iron deficiency anemia  - due to hemorrhoidal bleeding and  AVMs  - receives intermittent IV iron with Dr. Calhoun  - labs 5/4/2021 without iron deficiency and CBC on admit 5/7 without anemia  - labs today with anemia, possibly due to  bleeding from trauma and/or hydration    - monitor CBC    Left patellar fracture  - plan for surgery      Alicja Snowden MD  05/08/21

## 2021-05-08 NOTE — CONSULTS
Cardiology Consult Note  Subjective     Ritu Ramirez is a 65 y.o. female who was admitted for Closed displaced transverse fracture of left patella, initial encounter [S82.257A]. Ritu Ramirez was referred by Dr Barker for management recommendations. Ritu Ramirez is a 65-year-old female with past medical history of systemic mastocytosis, hypertension, hyperlipidemia.  She presents after a fall yesterday.  She has a displaced patellar fracture..  She is typically able to complete all activities of daily living without difficulty.  She denies any chest pain or shortness of breath.      Past Medical History:   Diagnosis Date   • Anemia    • Anxiety    • Hypertension    • Iron deficiency    • Lipid disorder    • Systemic mastocytosis    • Vitamin D deficiency        Past Surgical History:   Procedure Laterality Date   • AUGMENTATION MAMMAPLASTY Bilateral    • COLONOSCOPY     • REDUCTION MAMMAPLASTY         Social History     Social History Narrative   • Not on file       Family History   Problem Relation Age of Onset   • Heart disease Biological Mother    • Prostate cancer Biological Father        Iodinated contrast media, Penicillins, Anesthetics - amide type - select amino amides, Clarithromycin, Erythromycin base, and Nsaids (non-steroidal anti-inflammatory drug)    Current Facility-Administered Medications   Medication Dose Route Frequency Provider Last Rate Last Admin   • acetaminophen (TYLENOL) tablet 650 mg  650 mg oral q4h PRN Jim Yoedr MD   650 mg at 05/08/21 1052    And   • oxyCODONE (ROXICODONE) immediate release tablet 5-10 mg  5-10 mg oral q4h PRN Jim Yoder MD   10 mg at 05/08/21 1051   • glucose chewable tablet 16-32 g of dextrose  16-32 g of dextrose oral PRN Jim Yoder MD        Or   • dextrose 40 % oral gel 15-30 g of dextrose  15-30 g of dextrose oral PRJim Dawn MD        Or   • glucagon (GLUCAGEN) injection 1 mg  1 mg intramuscular PRN Jim Yoder MD        Or   • dextrose in water injection 12.5 g  25 mL  "intravenous PRN Jim Yoder MD       • diphenhydrAMINE (BENADRYL) capsule 50 mg  50 mg oral Once Evie Zimmerman MD       • magnesium sulfate IVPB 2g in 50 mL NSS/D5W/SWFI  2 g intravenous Once Lucero Nimesh DO Jonah 25 mL/hr at 05/08/21 1104 2 g at 05/08/21 1104   • predniSONE (DELTASONE) tablet 50 mg  50 mg oral q6h INT Evie Zimmerman MD   50 mg at 05/08/21 0852   • sodium chloride 0.9 % infusion   intravenous Continuous Jim Yoder MD 80 mL/hr at 05/07/21 2327 New Bag at 05/07/21 2327   • vancomycin (VANCOCIN) 750 mg in sodium chloride 0.9 % 250 mL IVPB  15 mg/kg intravenous On call to OR Jim Yoder MD           Review of Systems  All other systems reviewed and negative except as noted in the HPI.    Objective     Labs   Lab Results   Component Value Date    WBC 6.97 05/08/2021    HGB 10.4 (L) 05/08/2021    HCT 34.0 (L) 05/08/2021     05/08/2021    CHOL 148 08/11/2020    TRIG 94 08/11/2020    HDL 79 08/11/2020    ALT 21 11/20/2020    AST 35 11/20/2020     05/08/2021    K 4.1 05/08/2021     05/08/2021    CREATININE 0.6 05/08/2021    BUN 18 05/08/2021    CO2 24 05/08/2021    INR 1.0 05/07/2021     Lab Results   Component Value Date    TROPONINI <0.02 05/08/2021       Imaging  I have independently reviewed the pertinent imaging from the last 24 hrs.    ECG   sinus rhythm IRBBB    Physical Exam  Visit Vitals  /78 (BP Location: Right upper arm, Patient Position: Lying)   Pulse (!) 46   Temp 36.8 °C (98.2 °F) (Temporal)   Resp 18   Ht 1.549 m (5' 1\")   Wt 58.7 kg (129 lb 6.4 oz)   SpO2 97%   Breastfeeding No   BMI 24.45 kg/m²       General Appearance:    Alert, cooperative, no distress, appears stated age   Head:    Normocephalic, without obvious abnormality, atraumatic   Eyes:    PERRL, conjunctiva/corneas clear, EOM's intact,        Ears:    Normal  external ear canals, both ears   Neck:   Supple, symmetrical, trachea midline, no adenopathy;        thyroid:  No " enlargement/tenderness/nodules; no carotid    bruit or JVD   Lungs:     Clear to auscultation bilaterally, respirations unlabored   Chest wall:    No tenderness or deformity   Heart:    Regular rate and rhythm, S1 and S2 normal, no murmur, rub   or gallop   Abdomen:     Soft, non-tender, bowel sounds active all four quadrants,     no masses, no organomegaly   Extremities:  Musculoskeletal:   Extremities normal, atraumatic, no cyanosis or edema. Left leg in a brace    No injury or deformity   Pulses:   2+ and symmetric all extremities   Skin:   Skin color, texture, turgor normal, no rashes or lesions   Lymph nodes:   Cervical, supraclavicular, and axillary nodes normal   Neurologic:    Behavior/  Emotional:   Non focal exam. No gross abnormalities noted.        Appropriate, cooperative           Assessment   65 y.o. female being consulted for management recommendations   Preoperative examination  Assessment & Plan  She is of low cardiovascular risk for intermediate risk orthopedic surgery.  She typically can complete all activities of daily living without difficulty.  She denies any chest pain or shortness of breath.  EKG is unchanged from previous.  No further noninvasive cardiovascular work-up is needed preoperatively.    Essential hypertension  Assessment & Plan  Continue to monitor.  Continue medications as indicated.    Systemic mastocytosis  Assessment & Plan  Heme one consult pending.           Khang Piper MD  5/8/2021

## 2021-05-08 NOTE — CONSULTS
Hospital Medicine Service -  Inpatient Consultation         Requesting Physician: Dr. Ashley Kiser    Reason for Consultation: Preop management     HISTORY OF PRESENT ILLNESS        This is a 65 y.o. female with history of systemic mastocytosis and care of [facility] and Dr. Calhoun, also history of hypertension, hyperlipidemia along with anemia and anxiety and other conditions as below now presents with concerns for pain in the left knee after a fall and is being admitted for fracture patella.  We are consulted for preop management.     She she is under the care of [facility] systemic mastocytosis along with Dr. Calhoun.  States that the last time she had anesthesia was around 45 years ago and for breast reduction and following that she was discharged home and states that after taking azithromycin at home and within 40 minutes she started having severe reaction, possibly anaphylaxis and was admitted in the intensive care unit and managed accordingly.    She also had a delayed reaction to contrast following that but was much milder.  Later when she had her IV contrast recently she had a prep and did well without any symptoms.    She currently denies any CAD/CHF but has history of hypertension and CVA/TIA.  Denies any history of chronic kidney disease or diabetes mellitus.    She also denies any symptoms of ischemic heart disease in terms of dyspnea on exertion or chest pain in the recent past.  He denies any history of fevers, chills, cough, abdominal pain, nausea, vomiting, diarrhea though has some constipation.    PAST MEDICAL AND SURGICAL HISTORY        Past Medical History:   Diagnosis Date   • Anemia    • Anxiety    • Hypertension    • Iron deficiency    • Lipid disorder    • Systemic mastocytosis    • Vitamin D deficiency        Past Surgical History:   Procedure Laterality Date   • AUGMENTATION MAMMAPLASTY Bilateral    • COLONOSCOPY     • REDUCTION MAMMAPLASTY         PCP: Ashley Nazario,  MD    MEDICATIONS        Home Medications:  Medications Prior to Admission   Medication Sig Dispense Refill Last Dose   • amLODIPine (NORVASC) 5 mg tablet Take 5 mg by mouth daily.     2021 at 0900   • cholecalciferol, vitamin D3, 1,000 unit capsule Take 1,000 Units by mouth daily.     2021 at 0900   • EPINEPHrine (EPIPEN 2-ZARINA) 0.3 mg/0.3 mL injection syringe Inject 1 Syringe into the thigh as needed for anaphylaxis.       • escitalopram (LEXAPRO) 10 mg tablet TAKE 1 TABLET BY MOUTH EVERY DAY (Patient taking differently: Take 10 mg by mouth daily.  ) 30 tablet 11 2021 at 0900   • hydrochlorothiazide (HYDRODIURIL) 25 mg tablet Take 25 mg by mouth daily.    3 2021 at 0900   • potassium chloride (KLOR-CON 8) 8 mEq CR tablet Take 16 mEq by mouth daily.   2021 at 0900   • rosuvastatin (CRESTOR) 10 mg tablet TAKE 1 TABLET BY MOUTH EVERY DAY (Patient taking differently: Take 10 mg by mouth daily.  ) 30 tablet 11 2021 at 0900       Current inpatient medications were personally reviewed.    ALLERGIES        Iodinated contrast media, Penicillins, Anesthetics - amide type - select amino amides, Clarithromycin, Erythromycin base, and Nsaids (non-steroidal anti-inflammatory drug)    FAMILY HISTORY        Family History   Problem Relation Age of Onset   • Heart disease Biological Mother    • Prostate cancer Biological Father        SOCIAL HISTORY        Social History     Socioeconomic History   • Marital status: Single     Spouse name: None   • Number of children: None   • Years of education: None   • Highest education level: None   Occupational History   • None   Tobacco Use   • Smoking status: Former Smoker     Packs/day: 0.25     Quit date:      Years since quittin.3   • Smokeless tobacco: Never Used   Vaping Use   • Vaping Use: Never used   Substance and Sexual Activity   • Alcohol use: Yes     Alcohol/week: 1.0 standard drinks     Types: 1 Glasses of wine per week     Comment: Social   •  "Drug use: No   • Sexual activity: Defer   Other Topics Concern   • None   Social History Narrative   • None     Social Determinants of Health     Financial Resource Strain:    • Difficulty of Paying Living Expenses:    Food Insecurity:    • Worried About Running Out of Food in the Last Year:    • Ran Out of Food in the Last Year:    Transportation Needs:    • Lack of Transportation (Medical):    • Lack of Transportation (Non-Medical):    Physical Activity:    • Days of Exercise per Week:    • Minutes of Exercise per Session:    Stress:    • Feeling of Stress :    Social Connections:    • Frequency of Communication with Friends and Family:    • Frequency of Social Gatherings with Friends and Family:    • Attends Church Services:    • Active Member of Clubs or Organizations:    • Attends Club or Organization Meetings:    • Marital Status:    Intimate Partner Violence:    • Fear of Current or Ex-Partner:    • Emotionally Abused:    • Physically Abused:    • Sexually Abused:        REVIEW OF SYSTEMS        All other systems reviewed and negative except as noted in HPI    PHYSICAL EXAMINATION        Visit Vitals  BP (!) 150/76 (BP Location: Right upper arm, Patient Position: Lying)   Pulse (!) 53   Temp 37.3 °C (99.1 °F) (Temporal)   Resp 18   Ht 1.549 m (5' 1\")   Wt 58.7 kg (129 lb 6.4 oz)   SpO2 96%   Breastfeeding No   BMI 24.45 kg/m²     Body mass index is 24.45 kg/m².  No intake or output data in the 24 hours ending 05/08/21 0249    Physical Exam  Constitutional:       Appearance: She is normal weight.   HENT:      Head: Normocephalic and atraumatic.      Nose: No congestion.      Mouth/Throat:      Mouth: Mucous membranes are moist.   Eyes:      Extraocular Movements: Extraocular movements intact.      Pupils: Pupils are equal, round, and reactive to light.   Cardiovascular:      Rate and Rhythm: Normal rate and regular rhythm.      Pulses: Normal pulses.      Heart sounds: Normal heart sounds.   Pulmonary:      " Effort: Pulmonary effort is normal.      Breath sounds: Normal breath sounds.   Abdominal:      Palpations: Abdomen is soft.      Tenderness: There is no abdominal tenderness.   Musculoskeletal:      Cervical back: Neck supple.      Right lower leg: No edema.      Left lower leg: No edema.   Skin:     General: Skin is warm and dry.      Comments: Chronic rash    Neurological:      General: No focal deficit present.      Mental Status: She is alert and oriented to person, place, and time.         LABS / EKG        Labs  Results from last 7 days   Lab Units 05/07/21  1931   WBC K/uL 10.03   HEMOGLOBIN g/dL 12.4   HEMATOCRIT % 40.6   PLATELETS K/uL 335     Results from last 7 days   Lab Units 05/07/21  1931   SODIUM mEQ/L 139   POTASSIUM mEQ/L 2.9*   CHLORIDE mEQ/L 101   CO2 mEQ/L 23   BUN mg/dL 20   CREATININE mg/dL 0.6   GLUCOSE mg/dL 164*   CALCIUM mg/dL 9.7       SARS-CoV-2 (COVID-19) (no units)   Date/Time Value   05/07/2021 1931 Negative        ECG/Telemetry  I have independently reviewed the ECG. Significant findings include 53 bpm, sinus bradycardia, QTC at 426 ms, incomplete bundle branch block.    Imaging  Chest x-ray-no acute changes noted    X-ray left knee 4 views - displaced patellar fracture    ASSESSMENT AND RECOMMENDATIONS           Preoperative examination  Assessment & Plan  Currently her RCRI index on the lower side at 0.9 for any cardiovascular event in the first 30 days  From that time she might be able to proceed with any other need for optimization but with her systemic mastocytosis initiated on steroid prep for 13 hours.  We will also consult Dr. Calhoun and will wait for recommendations for further optimization prior to the procedure from systemic mastocytosis    Hypokalemia  Assessment & Plan  Current potassium at 2.9  Replete  Check magnesium level  Reassess    Systemic mastocytosis  Assessment & Plan  Under the care of [facility] and Dr. Calhoun  We will consult Dr. Calhoun for any perioperative  optimization and management    * Closed displaced transverse fracture of left patella  Assessment & Plan  For surgical management  Per primary team      Essential hypertension  Assessment & Plan  Continue amlodipine  Hold hydrochlorothiazide for now            Evie Zimmerman MD  5/8/2021

## 2021-05-08 NOTE — PLAN OF CARE
Plan of Care Review  Plan of Care Reviewed With: patient  Progress: no change  Outcome Summary: Pt NPO except sips with meds.  L knee immobilizer in place.  Wiped down with CHG wipes for OR, prednisone and benadryl given.  Cleared by cards.  Plan for OR at 1600.  Unsure of DC plan at this time.

## 2021-05-08 NOTE — PLAN OF CARE
Plan of Care Review  Plan of Care Reviewed With: patient  Progress: improving  Outcome Summary: Patient NPO except for meds. Leg maintained in immobilizer. Patient resting comfortably in bed at this time.

## 2021-05-08 NOTE — CONSULTS
Orthopaedic Surgery Consult    CC: Left patella fracture    SUBJECTIVE     HPI: Ritu Ramirez is a 65 y.o. female with PMH systemic mastocytosis, HTN, HLD, anemia requiring transfusions presenting with left knee pain after a fall. Patient tripped over a step at a restaurant this evening and fell directly onto left knee. She did not hit her head or lose consciousness. She endorses left knee pain, controlled at rest. She also endorses difficulty raising her leg. She denies any numbness or tingling. She denies pain or injury to any other extremity.    Baseline ambulatory status: unassisted community ambulator    PMH:  Past Medical History:   Diagnosis Date   • Anemia    • Anxiety    • Hypertension    • Iron deficiency    • Lipid disorder    • Systemic mastocytosis    • Vitamin D deficiency        PSH:  Past Surgical History:   Procedure Laterality Date   • AUGMENTATION MAMMAPLASTY Bilateral    • COLONOSCOPY     • REDUCTION MAMMAPLASTY         Meds:  No current facility-administered medications on file prior to encounter.     Current Outpatient Medications on File Prior to Encounter   Medication Sig Dispense Refill   • amLODIPine (NORVASC) 5 mg tablet Take 5 mg by mouth daily.       • cholecalciferol, vitamin D3, 1,000 unit capsule Take 1,000 Units by mouth daily.       • EPINEPHrine (EPIPEN 2-ZARINA) 0.3 mg/0.3 mL injection syringe EpiPen 2-Zarina 0.3 mg/0.3 mL injection, auto-injector   INJECT INTRAMUSCULARLY AS NEEDED AS DIRECTED     • escitalopram (LEXAPRO) 10 mg tablet TAKE 1 TABLET BY MOUTH EVERY DAY (Patient taking differently: Take 10 mg by mouth daily.  ) 30 tablet 11   • hydrochlorothiazide (HYDRODIURIL) 25 mg tablet Take 25 mg by mouth once daily.  3   • potassium chloride (KLOR-CON 8) 8 mEq CR tablet Take 16 mEq by mouth daily.     • rosuvastatin (CRESTOR) 10 mg tablet TAKE 1 TABLET BY MOUTH EVERY DAY (Patient taking differently: Take 10 mg by mouth daily.  ) 30 tablet 11       Allergies:   Allergies   Allergen  Reactions   • Iodinated Contrast Media      Other reaction(s): Anaphylaxis   • Penicillins Hives   • Anesthetics - Amide Type - Select Amino Amides      Other reaction(s): Anaphylaxis   • Clarithromycin      Other reaction(s): Rash   • Erythromycin Base      Other reaction(s): Anaphylaxis   • Nsaids (Non-Steroidal Anti-Inflammatory Drug)      Other reaction(s): Anaphylaxis       SH:   Social History     Socioeconomic History   • Marital status: Single     Spouse name: Not on file   • Number of children: Not on file   • Years of education: Not on file   • Highest education level: Not on file   Occupational History   • Not on file   Tobacco Use   • Smoking status: Former Smoker     Packs/day: 0.25     Quit date:      Years since quittin.3   • Smokeless tobacco: Never Used   Vaping Use   • Vaping Use: Never used   Substance and Sexual Activity   • Alcohol use: Yes     Comment: Social   • Drug use: No   • Sexual activity: Defer   Other Topics Concern   • Not on file   Social History Narrative   • Not on file     Social Determinants of Health     Financial Resource Strain:    • Difficulty of Paying Living Expenses:    Food Insecurity:    • Worried About Running Out of Food in the Last Year:    • Ran Out of Food in the Last Year:    Transportation Needs:    • Lack of Transportation (Medical):    • Lack of Transportation (Non-Medical):    Physical Activity:    • Days of Exercise per Week:    • Minutes of Exercise per Session:    Stress:    • Feeling of Stress :    Social Connections:    • Frequency of Communication with Friends and Family:    • Frequency of Social Gatherings with Friends and Family:    • Attends Worship Services:    • Active Member of Clubs or Organizations:    • Attends Club or Organization Meetings:    • Marital Status:    Intimate Partner Violence:    • Fear of Current or Ex-Partner:    • Emotionally Abused:    • Physically Abused:    • Sexually Abused:            ROS:  CV: Denies CP or  Palpitations.  Pulm: Denies SOB or Pain with inspiration      OBJECTIVE  Vitals:    05/07/21 2130   BP:    Pulse: (!) 50   Resp: 20   Temp:    SpO2: 97%       CBC Results       05/07/21 05/04/21 04/20/21                    1931 1205 1443         WBC 10.03 5.52 5.63         RBC 4.99 4.68 3.87         HGB 12.4 11.1 8.6         HCT 40.6 36.5 29.3         MCV 81.4 78.0 75.7         MCH 24.8 23.7 22.2         MCHC 30.5 30.4 29.4          303 271                       Physical Exam:    General  No acute distress  AAOx3    Right Upper Extremity  Inspection: Atraumatic, no obvious deformity, skin intact  Palpation: No TTP throughout  Motor Exam: Fires D/B/T/WF/WE/FF/HI  Sensory: SILT in A/M/R/U Nerve Distributions  Vascular: Palpable radial pulse, Brisk capillary refill  ROM: Full, passive and active    Left Upper Extremity  Inspection: Atraumatic, no obvious deformity, skin intact  Palpation: No TTP throughout  Motor Exam: Fires D/B/T/WF/WE/FF/HI  Sensory: SILT in A/M/R/U Nerve Distributions  Vascular: Palpable radial pulse, Brisk capillary refill  ROM: Full, passive and active    Right Lower Extremity  Inspection: Atraumatic, no obvious deformity, skin intact  Palpation: No TTP throughout  Motor: Fires IP/Q/TA/EHL/GS  Sensory: SILT SPN/DPN/T/S/S distributions  Vascular: Palpable DP/PT pulses, Toes WWP  ROM: Full, passive and active    **Left Lower Extremity  Inspection: Obvious deformity of left knee cap, no signs of skin compromise  Palpation: TTP at kneecap  Motor: Unable to perform straight leg raise  Sensory: SILT SPN/DPN/T/S/S distributions  Vascular: Palpable DP/PT pulses, Toes WWP  ROM: Active ROM of knee limites due to acute injury    Imaging:  XRs of left knee demonstrate displaced transverse patella fracture      ASSESSMENT & PLAN   65 y.o. female with displaced left patella fracture with absent extensor mechanism    -- Recommend admission to medicine given rare and chronic medical co-morbidities  -- NPO  at midnight  -- WBS: NWB LLE in knee immobilizer  -- Imaging: no further studies needed  -- Appreciate pre-operative risk stratification  -- Pre-operative labs  -- Medical optimization for surgery  -- Anesthesia co-management of prior anaphylaxis following breast surgery  -- DVT prophylaxis: please hold after midnight  -- Analgesia    Jim Yoder MD  PGY-1, Orthopaedic Surgery

## 2021-05-08 NOTE — OP NOTE
Operative Report    Patient:  Ritu Ramirez  :  [id number]  MRN:  [id number]  Date of Procedure:  21      Pre-operative Diagnosis:  Left Displaced Patella Fracture    Post-operative Diagnosis:  Left Displaced Patella Fracture    Operation: Left Patella ORIF    Surgeon:  Ashley Kiser MD    Assistant(s):  Siddhartha Kebede MD (Resident)    Anesthesia:  General    Estimated Blood Loss:  20 ml    Specimens:  None    Drains:  None    Disposition: awakened from anesthesia, extubated and taken to the recovery room in a stable condition, having suffered no apparent untoward event.      History of Present Illness:  Ms. Ramirez is a 65 y.o. year old female who presented to the ED after a fall who was found to have a displaced left patella fracture and inability to straight leg raise.  It was felt that the only remaining option was surgical.  A detailed conversation regarding the risks and benefits of patella ORIF surgery was had with the patient.  The risks discussed included but were not limited to: bleeding (which may or may not require transfusion), infection, damage to nerves or blood vessels, deep venous thrombosis, pulmonary embolus, prosthetic failure, fracture nonunion, fracture malunion, post-traumatic arthritis, dislocation, persistent pain, need for future surgery, medical complications (including cardiac, respiratory and neurologic complications), anaesthetic complications, and death.  Subsequent to this conversation, all of the patient's questions were answered in great detail and informed consent was obtained for a left patella ORIF.  She received preoperative medical clearance and was felt optimized for surgery.    Description of Procedure:  After routine preparation and draping of the patient in the operating room, a clinical time-out was held confirming the correct patient name, MRN, , planned procedure, site, antibiotic start time and agent, and outline of any surgical concerns.  All in attendance were  in agreement to proceed.  An anterior incision was made on the left knee to expose the displaced patella fracture.  After removing the fracture hematoma and interposed soft tissue, the fracture was inspected and noted to have significant anterior comminution with an approximately 5x5 mm free fragment in the distal lateral portion of the patella but the bulk of articular surface remained as only two distinct fragments.  The free fragment was excised.  Using visual inspection of the articular surface, it was reduced with a pointed reduction clamp.  The reduction was confirmed on AP and lateral fluoroscopic views.  Two pins were placed using fluoroscopic guidance followed by measurement for the screw lengths.  The cannulated drill was used followed by placement of the two 4.0 mm cannulated screws.  The pins were removed followed by placement of an 18 gauge cerclage wire in a figure of eight fashion through the cannulated screws.  After the cerclage wire was tensioned the end was bent to bury it within the quadriceps tendon.  The knee was flexed to 90 degrees with maintenance of the reduction.  The articular surfaced was visually inspected to demonstrate an appropriate articular reduction.  AP and lateral views confirmed the reduction and showed the placement of the screws within the patella.  The knee was irrigated with normal saline followed by closure of the medial and lateral retinaculum with #2 ethibond suture.  The wound was closed in layers and a dressing was applied to the wound.  The patient was placed in a hinged knee brace locked in extension.    [employer] components were utilized for this procedure.    Implant Name Type Inv. Item Serial No.  Lot No. LRB No. Used Action   SCREW 4.0M DEAN. SHRT THRD 30M - [id number] Bone screw SCREW 4.0M [employer]. [employer] THRD 30M  [employer]  Left 1 Implanted   SCREW 4.0M DEAN. SHRT THRD 34M - [id number] Bone screw SCREW 4.0M DEAN. SHRT THRD 34M  [employer]  TRAUMA/ORTHO  Left 1 Implanted   GUIDE WIRE 1.25MM THREADED - [id number]  GUIDE WIRE 1.25MM THREADED  [employer] TRAUMA/ORTHO  Left 2 Implanted and Explanted       I was present and scrubbed throughout all the critical components of the operation.    Ashley Kiser MD

## 2021-05-08 NOTE — PROGRESS NOTES
Hospital Medicine Service -  Daily Progress Note       SUBJECTIVE   Interval History: pt seen and examined. Resting in bed. No chest pain/dyspnea.      OBJECTIVE      Vital signs in last 24 hours:  Temp:  [36.8 °C (98.2 °F)-37.3 °C (99.1 °F)] 36.8 °C (98.2 °F)  Heart Rate:  [46-58] 46  Resp:  [18-21] 18  BP: (120-155)/(74-80) 120/78  No intake or output data in the 24 hours ending 05/08/21 1042    PHYSICAL EXAMINATION      Physical Exam  Constitutional:       Appearance: She is not diaphoretic.   Eyes:      General: No scleral icterus.     Extraocular Movements: Extraocular movements intact.   Cardiovascular:      Rate and Rhythm: Regular rhythm.      Heart sounds: No friction rub. No gallop.    Pulmonary:      Effort: Pulmonary effort is normal. No respiratory distress.      Breath sounds: No stridor. No wheezing, rhonchi or rales.   Abdominal:      General: Bowel sounds are normal. There is no distension.      Palpations: Abdomen is soft.      Tenderness: There is no abdominal tenderness. There is no guarding or rebound.   Skin:     General: Skin is warm.   Neurological:      Mental Status: She is alert and oriented to person, place, and time.   Psychiatric:         Mood and Affect: Mood normal.         Judgment: Judgment normal.        LINES, CATHETERS, DRAINS, AIRWAYS, AND WOUNDS   Lines, Drains, Airways, Wounds:  Peripheral IV 05/07/21 Left Antecubital (Active)   Number of days: 1       Comments:      LABS / IMAGING / TELE      Labs  Results from last 7 days   Lab Units 05/08/21  0941   WBC K/uL 6.97   HEMOGLOBIN g/dL 10.4*   HEMATOCRIT % 34.0*   PLATELETS K/uL 268     Results from last 7 days   Lab Units 05/08/21  0941   SODIUM mEQ/L 139   POTASSIUM mEQ/L 4.1   CHLORIDE mEQ/L 108   CO2 mEQ/L 24   BUN mg/dL 18   CREATININE mg/dL 0.6   CALCIUM mg/dL 9.1   GLUCOSE mg/dL 167*       IMAGING  BI SCREENING MAMMOGRAM BILATERAL(TOMOSYNTHESIS)    Result Date: 4/27/2021  IMPRESSION: 1.  No evidence of malignancy.  Annual screening mammography is recommended. Written results will be conveyed to the patient. FINAL ASSESSMENT - BI-RADS CATEGORY 1 - NEGATIVE (NOR NC D)   SCATTERED The patient's information has been entered into our automated reminder system with a target due date for her next mammogram. --- Please note that a report that is negative for malignancy should not delay biopsy if there is a dominant or clinically suspicious mass, as some cancers are not visualized by mammography. ---    ekg independently reviewed - sr w hr of 53. No st elevations. Nonspecific t wave abnormalities in inf/ant leads.   ASSESSMENT AND PLAN      Preoperative examination  Assessment & Plan  Cardiology to eval for preop cv clearance for abnormal EKG  hemeonc to eval for preop clearance w pt's hx of mastocytosis.     Essential hypertension  Assessment & Plan  Continue amlodipine  Hold hydrochlorothiazide for now    Hypokalemia  Assessment & Plan  Resolved. Recommend trend BMP. Will replete mag    Systemic mastocytosis  Assessment & Plan  Under the care of [facility] and Dr. White  eval from dr. white pending.     * Closed displaced transverse fracture of left patella  Assessment & Plan  For surgical management  Per primary team           VTE Prophylaxis Plan: Pharmacological DVT prophylaxis and timing of such per the discretion of the surgeon. Strongly recommend maintain sequential compression device  Code Status: Full Code  Estimated Discharge Date: 5/10/2021     Nimesh Barker DO  5/8/2021

## 2021-05-08 NOTE — H&P (VIEW-ONLY)
Hospital Medicine Service -  Inpatient Consultation         Requesting Physician: Dr. Ashley Kiser    Reason for Consultation: Preop management     HISTORY OF PRESENT ILLNESS        This is a 65 y.o. female with history of systemic mastocytosis and care of [facility] and Dr. Calhoun, also history of hypertension, hyperlipidemia along with anemia and anxiety and other conditions as below now presents with concerns for pain in the left knee after a fall and is being admitted for fracture patella.  We are consulted for preop management.     She she is under the care of [facility] systemic mastocytosis along with Dr. Calhoun.  States that the last time she had anesthesia was around 45 years ago and for breast reduction and following that she was discharged home and states that after taking azithromycin at home and within 40 minutes she started having severe reaction, possibly anaphylaxis and was admitted in the intensive care unit and managed accordingly.    She also had a delayed reaction to contrast following that but was much milder.  Later when she had her IV contrast recently she had a prep and did well without any symptoms.    She currently denies any CAD/CHF but has history of hypertension and CVA/TIA.  Denies any history of chronic kidney disease or diabetes mellitus.    She also denies any symptoms of ischemic heart disease in terms of dyspnea on exertion or chest pain in the recent past.  He denies any history of fevers, chills, cough, abdominal pain, nausea, vomiting, diarrhea though has some constipation.    PAST MEDICAL AND SURGICAL HISTORY        Past Medical History:   Diagnosis Date   • Anemia    • Anxiety    • Hypertension    • Iron deficiency    • Lipid disorder    • Systemic mastocytosis    • Vitamin D deficiency        Past Surgical History:   Procedure Laterality Date   • AUGMENTATION MAMMAPLASTY Bilateral    • COLONOSCOPY     • REDUCTION MAMMAPLASTY         PCP: Ashley Nazario,  MD    MEDICATIONS        Home Medications:  Medications Prior to Admission   Medication Sig Dispense Refill Last Dose   • amLODIPine (NORVASC) 5 mg tablet Take 5 mg by mouth daily.     2021 at 0900   • cholecalciferol, vitamin D3, 1,000 unit capsule Take 1,000 Units by mouth daily.     2021 at 0900   • EPINEPHrine (EPIPEN 2-ZARINA) 0.3 mg/0.3 mL injection syringe Inject 1 Syringe into the thigh as needed for anaphylaxis.       • escitalopram (LEXAPRO) 10 mg tablet TAKE 1 TABLET BY MOUTH EVERY DAY (Patient taking differently: Take 10 mg by mouth daily.  ) 30 tablet 11 2021 at 0900   • hydrochlorothiazide (HYDRODIURIL) 25 mg tablet Take 25 mg by mouth daily.    3 2021 at 0900   • potassium chloride (KLOR-CON 8) 8 mEq CR tablet Take 16 mEq by mouth daily.   2021 at 0900   • rosuvastatin (CRESTOR) 10 mg tablet TAKE 1 TABLET BY MOUTH EVERY DAY (Patient taking differently: Take 10 mg by mouth daily.  ) 30 tablet 11 2021 at 0900       Current inpatient medications were personally reviewed.    ALLERGIES        Iodinated contrast media, Penicillins, Anesthetics - amide type - select amino amides, Clarithromycin, Erythromycin base, and Nsaids (non-steroidal anti-inflammatory drug)    FAMILY HISTORY        Family History   Problem Relation Age of Onset   • Heart disease Biological Mother    • Prostate cancer Biological Father        SOCIAL HISTORY        Social History     Socioeconomic History   • Marital status: Single     Spouse name: None   • Number of children: None   • Years of education: None   • Highest education level: None   Occupational History   • None   Tobacco Use   • Smoking status: Former Smoker     Packs/day: 0.25     Quit date:      Years since quittin.3   • Smokeless tobacco: Never Used   Vaping Use   • Vaping Use: Never used   Substance and Sexual Activity   • Alcohol use: Yes     Alcohol/week: 1.0 standard drinks     Types: 1 Glasses of wine per week     Comment: Social   •  "Drug use: No   • Sexual activity: Defer   Other Topics Concern   • None   Social History Narrative   • None     Social Determinants of Health     Financial Resource Strain:    • Difficulty of Paying Living Expenses:    Food Insecurity:    • Worried About Running Out of Food in the Last Year:    • Ran Out of Food in the Last Year:    Transportation Needs:    • Lack of Transportation (Medical):    • Lack of Transportation (Non-Medical):    Physical Activity:    • Days of Exercise per Week:    • Minutes of Exercise per Session:    Stress:    • Feeling of Stress :    Social Connections:    • Frequency of Communication with Friends and Family:    • Frequency of Social Gatherings with Friends and Family:    • Attends L[employer] day Services:    • Active Member of Clubs or Organizations:    • Attends Club or Organization Meetings:    • Marital Status:    Intimate Partner Violence:    • Fear of Current or Ex-Partner:    • Emotionally Abused:    • Physically Abused:    • Sexually Abused:        REVIEW OF SYSTEMS        All other systems reviewed and negative except as noted in HPI    PHYSICAL EXAMINATION        Visit Vitals  BP (!) 150/76 (BP Location: Right upper arm, Patient Position: Lying)   Pulse (!) 53   Temp 37.3 °C (99.1 °F) (Temporal)   Resp 18   Ht 1.549 m (5' 1\")   Wt 58.7 kg (129 lb 6.4 oz)   SpO2 96%   Breastfeeding No   BMI 24.45 kg/m²     Body mass index is 24.45 kg/m².  No intake or output data in the 24 hours ending 05/08/21 0249    Physical Exam  Constitutional:       Appearance: She is normal weight.   HENT:      Head: Normocephalic and atraumatic.      Nose: No congestion.      Mouth/Throat:      Mouth: Mucous membranes are moist.   Eyes:      Extraocular Movements: Extraocular movements intact.      Pupils: Pupils are equal, round, and reactive to light.   Cardiovascular:      Rate and Rhythm: Normal rate and regular rhythm.      Pulses: Normal pulses.      Heart sounds: Normal heart sounds.   Pulmonary:      " Effort: Pulmonary effort is normal.      Breath sounds: Normal breath sounds.   Abdominal:      Palpations: Abdomen is soft.      Tenderness: There is no abdominal tenderness.   Musculoskeletal:      Cervical back: Neck supple.      Right lower leg: No edema.      Left lower leg: No edema.   Skin:     General: Skin is warm and dry.      Comments: Chronic rash    Neurological:      General: No focal deficit present.      Mental Status: She is alert and oriented to person, place, and time.         LABS / EKG        Labs  Results from last 7 days   Lab Units 05/07/21  1931   WBC K/uL 10.03   HEMOGLOBIN g/dL 12.4   HEMATOCRIT % 40.6   PLATELETS K/uL 335     Results from last 7 days   Lab Units 05/07/21  1931   SODIUM mEQ/L 139   POTASSIUM mEQ/L 2.9*   CHLORIDE mEQ/L 101   CO2 mEQ/L 23   BUN mg/dL 20   CREATININE mg/dL 0.6   GLUCOSE mg/dL 164*   CALCIUM mg/dL 9.7       SARS-CoV-2 (COVID-19) (no units)   Date/Time Value   05/07/2021 1931 Negative        ECG/Telemetry  I have independently reviewed the ECG. Significant findings include 53 bpm, sinus bradycardia, QTC at 426 ms, incomplete bundle branch block.    Imaging  Chest x-ray-no acute changes noted    X-ray left knee 4 views - displaced patellar fracture    ASSESSMENT AND RECOMMENDATIONS           Preoperative examination  Assessment & Plan  Currently her RCRI index on the lower side at 0.9 for any cardiovascular event in the first 30 days  From that time she might be able to proceed with any other need for optimization but with her systemic mastocytosis initiated on steroid prep for 13 hours.  We will also consult Dr. Calohun and will wait for recommendations for further optimization prior to the procedure from systemic mastocytosis    Hypokalemia  Assessment & Plan  Current potassium at 2.9  Replete  Check magnesium level  Reassess    Systemic mastocytosis  Assessment & Plan  Under the care of [facility] and Dr. Calhoun  We will consult Dr. Calhoun for any perioperative  optimization and management    * Closed displaced transverse fracture of left patella  Assessment & Plan  For surgical management  Per primary team      Essential hypertension  Assessment & Plan  Continue amlodipine  Hold hydrochlorothiazide for now            Evie Zimmerman MD  5/8/2021

## 2021-05-08 NOTE — ANESTHESIA POSTPROCEDURE EVALUATION
Patient: Ritu Ramirez    Procedure Summary     Date: 05/08/21 Room / Location: [facility] PAV OR 01 / [facility] OR PAV    Anesthesia Start: 1641 Anesthesia Stop: 1922    Procedure: OPEN REDUCTION INTERNAL FIXATION PATELLA FRACTURE (Left Knee) Diagnosis:       Closed displaced transverse fracture of left patella, initial encounter      (Closed displaced transverse fracture of left patella, initial encounter [S82.032A])    Surgeons: Ashley Kiser MD Responsible Provider: Jeannie Esparza MD    Anesthesia Type: general ASA Status: 3          Anesthesia Type: general  PACU Vitals    No data found in the last 10 encounters.           Anesthesia Post Evaluation    Pain score: 0  Pain management: adequate  Patient location during evaluation: PACU  Patient participation: complete - patient participated  Level of consciousness: awake and alert  Cardiovascular status: acceptable  Airway Patency: adequate  Respiratory status: acceptable  Hydration status: acceptable  Anesthetic complications: no

## 2021-05-09 LAB
ANION GAP SERPL CALC-SCNC: 10 MEQ/L (ref 3–15)
ANISOCYTOSIS BLD QL SMEAR: ABNORMAL
BASOPHILS # BLD: 0.02 K/UL (ref 0.01–0.1)
BASOPHILS NFR BLD: 0.1 %
BUN SERPL-MCNC: 13 MG/DL (ref 8–20)
CALCIUM SERPL-MCNC: 8.8 MG/DL (ref 8.9–10.3)
CHLORIDE SERPL-SCNC: 102 MEQ/L (ref 98–109)
CO2 SERPL-SCNC: 26 MEQ/L (ref 22–32)
CREAT SERPL-MCNC: 0.6 MG/DL (ref 0.6–1.1)
DIFFERENTIAL METHOD BLD: ABNORMAL
EOSINOPHIL # BLD: 0.01 K/UL (ref 0.04–0.36)
EOSINOPHIL NFR BLD: 0.1 %
ERYTHROCYTE [DISTWIDTH] IN BLOOD BY AUTOMATED COUNT: ABNORMAL %
GFR SERPL CREATININE-BSD FRML MDRD: >60 ML/MIN/1.73M*2
GLUCOSE SERPL-MCNC: 133 MG/DL (ref 70–99)
HCT VFR BLDCO AUTO: 35 % (ref 35–45)
HGB BLD-MCNC: 10.7 G/DL (ref 11.8–15.7)
HYPOCHROMIA BLD QL SMEAR: ABNORMAL
IMM GRANULOCYTES # BLD AUTO: 0.14 K/UL (ref 0–0.08)
IMM GRANULOCYTES NFR BLD AUTO: 1 %
LYMPHOCYTES # BLD: 0.85 K/UL (ref 1.2–3.5)
LYMPHOCYTES NFR BLD: 6 %
MAGNESIUM SERPL-MCNC: 2.3 MG/DL (ref 1.8–2.5)
MCH RBC QN AUTO: 24.8 PG (ref 28–33.2)
MCHC RBC AUTO-ENTMCNC: 30.6 G/DL (ref 32.2–35.5)
MCV RBC AUTO: 81.2 FL (ref 83–98)
MONOCYTES # BLD: 1.19 K/UL (ref 0.28–0.8)
MONOCYTES NFR BLD: 8.4 %
NEUTROPHILS # BLD: 11.93 K/UL (ref 1.7–7)
NEUTS SEG NFR BLD: 84.4 %
NRBC BLD-RTO: 0 %
OVALOCYTES BLD QL SMEAR: ABNORMAL
PDW BLD AUTO: 9.6 FL (ref 9.4–12.3)
PLATELET # BLD AUTO: 303 K/UL (ref 150–369)
PLATELET # BLD EST: ABNORMAL 10*3/UL
POIKILOCYTOSIS BLD QL SMEAR: ABNORMAL
POTASSIUM SERPL-SCNC: 3.6 MEQ/L (ref 3.6–5.1)
RBC # BLD AUTO: 4.31 M/UL (ref 3.93–5.22)
SODIUM SERPL-SCNC: 138 MEQ/L (ref 136–144)
WBC # BLD AUTO: 14.14 K/UL (ref 3.8–10.5)

## 2021-05-09 PROCEDURE — 25800000 HC PHARMACY IV SOLUTIONS: Performed by: STUDENT IN AN ORGANIZED HEALTH CARE EDUCATION/TRAINING PROGRAM

## 2021-05-09 PROCEDURE — 12000000 HC ROOM AND CARE MED/SURG

## 2021-05-09 PROCEDURE — 97166 OT EVAL MOD COMPLEX 45 MIN: CPT | Mod: GO

## 2021-05-09 PROCEDURE — 83735 ASSAY OF MAGNESIUM: CPT | Performed by: HOSPITALIST

## 2021-05-09 PROCEDURE — 36415 COLL VENOUS BLD VENIPUNCTURE: CPT | Performed by: INTERNAL MEDICINE

## 2021-05-09 PROCEDURE — 82310 ASSAY OF CALCIUM: CPT | Performed by: HOSPITALIST

## 2021-05-09 PROCEDURE — 85025 COMPLETE CBC W/AUTO DIFF WBC: CPT | Performed by: INTERNAL MEDICINE

## 2021-05-09 PROCEDURE — 63700000 HC SELF-ADMINISTRABLE DRUG: Performed by: INTERNAL MEDICINE

## 2021-05-09 PROCEDURE — 97162 PT EVAL MOD COMPLEX 30 MIN: CPT | Mod: GP

## 2021-05-09 PROCEDURE — 63600000 HC DRUGS/DETAIL CODE: Performed by: STUDENT IN AN ORGANIZED HEALTH CARE EDUCATION/TRAINING PROGRAM

## 2021-05-09 PROCEDURE — 99232 SBSQ HOSP IP/OBS MODERATE 35: CPT | Performed by: HOSPITALIST

## 2021-05-09 PROCEDURE — 63600000 HC DRUGS/DETAIL CODE: Performed by: INTERNAL MEDICINE

## 2021-05-09 PROCEDURE — 63700000 HC SELF-ADMINISTRABLE DRUG: Performed by: STUDENT IN AN ORGANIZED HEALTH CARE EDUCATION/TRAINING PROGRAM

## 2021-05-09 PROCEDURE — 25000000 HC PHARMACY GENERAL: Performed by: INTERNAL MEDICINE

## 2021-05-09 RX ORDER — HYDROMORPHONE HYDROCHLORIDE 1 MG/ML
0.25 INJECTION, SOLUTION INTRAMUSCULAR; INTRAVENOUS; SUBCUTANEOUS ONCE AS NEEDED
Status: COMPLETED | OUTPATIENT
Start: 2021-05-09 | End: 2021-05-09

## 2021-05-09 RX ORDER — FAMOTIDINE 10 MG/ML
20 INJECTION INTRAVENOUS ONCE
Status: COMPLETED | OUTPATIENT
Start: 2021-05-09 | End: 2021-05-09

## 2021-05-09 RX ORDER — HYDROMORPHONE HYDROCHLORIDE 2 MG/1
4 TABLET ORAL ONCE
Status: COMPLETED | OUTPATIENT
Start: 2021-05-09 | End: 2021-05-09

## 2021-05-09 RX ORDER — HYDROMORPHONE HYDROCHLORIDE 2 MG/1
2-4 TABLET ORAL EVERY 4 HOURS PRN
Status: DISCONTINUED | OUTPATIENT
Start: 2021-05-09 | End: 2021-05-11 | Stop reason: HOSPADM

## 2021-05-09 RX ORDER — FAMOTIDINE 10 MG/ML
20 INJECTION INTRAVENOUS EVERY 12 HOURS PRN
Status: DISCONTINUED | OUTPATIENT
Start: 2021-05-09 | End: 2021-05-11 | Stop reason: HOSPADM

## 2021-05-09 RX ORDER — HEPARIN SODIUM 5000 [USP'U]/ML
5000 INJECTION, SOLUTION INTRAVENOUS; SUBCUTANEOUS EVERY 8 HOURS
Status: DISCONTINUED | OUTPATIENT
Start: 2021-05-09 | End: 2021-05-09

## 2021-05-09 RX ORDER — DIPHENHYDRAMINE HCL 25 MG
25 CAPSULE ORAL ONCE AS NEEDED
Status: DISCONTINUED | OUTPATIENT
Start: 2021-05-09 | End: 2021-05-11 | Stop reason: HOSPADM

## 2021-05-09 RX ORDER — DIPHENHYDRAMINE HCL 25 MG
25 CAPSULE ORAL EVERY 6 HOURS PRN
Status: DISCONTINUED | OUTPATIENT
Start: 2021-05-09 | End: 2021-05-11 | Stop reason: HOSPADM

## 2021-05-09 RX ORDER — ACETAMINOPHEN 325 MG/1
650 TABLET ORAL EVERY 4 HOURS PRN
Status: DISCONTINUED | OUTPATIENT
Start: 2021-05-09 | End: 2021-05-11 | Stop reason: HOSPADM

## 2021-05-09 RX ORDER — HYDROMORPHONE HYDROCHLORIDE 1 MG/ML
0.25 INJECTION, SOLUTION INTRAMUSCULAR; INTRAVENOUS; SUBCUTANEOUS
Status: DISPENSED | OUTPATIENT
Start: 2021-05-09 | End: 2021-05-09

## 2021-05-09 RX ORDER — CETIRIZINE HYDROCHLORIDE 10 MG/1
10 TABLET ORAL ONCE
Status: COMPLETED | OUTPATIENT
Start: 2021-05-09 | End: 2021-05-09

## 2021-05-09 RX ORDER — DIPHENHYDRAMINE HCL 50 MG/ML
50 VIAL (ML) INJECTION ONCE
Status: COMPLETED | OUTPATIENT
Start: 2021-05-09 | End: 2021-05-09

## 2021-05-09 RX ADMIN — ESCITALOPRAM 10 MG: 10 TABLET, FILM COATED ORAL at 08:41

## 2021-05-09 RX ADMIN — AMLODIPINE BESYLATE 5 MG: 5 TABLET ORAL at 08:41

## 2021-05-09 RX ADMIN — ACETAMINOPHEN 650 MG: 325 TABLET, FILM COATED ORAL at 17:23

## 2021-05-09 RX ADMIN — ACETAMINOPHEN 650 MG: 325 TABLET, FILM COATED ORAL at 23:47

## 2021-05-09 RX ADMIN — OXYCODONE HYDROCHLORIDE 10 MG: 5 TABLET ORAL at 01:28

## 2021-05-09 RX ADMIN — ACETAMINOPHEN 650 MG: 325 TABLET, FILM COATED ORAL at 00:17

## 2021-05-09 RX ADMIN — POTASSIUM CHLORIDE 10 MEQ: 750 TABLET, FILM COATED, EXTENDED RELEASE ORAL at 08:41

## 2021-05-09 RX ADMIN — HEPARIN SODIUM 5000 UNITS: 5000 INJECTION, SOLUTION INTRAVENOUS; SUBCUTANEOUS at 05:37

## 2021-05-09 RX ADMIN — DIPHENHYDRAMINE HYDROCHLORIDE 25 MG: 25 CAPSULE ORAL at 03:32

## 2021-05-09 RX ADMIN — ACETAMINOPHEN 650 MG: 325 TABLET, FILM COATED ORAL at 06:59

## 2021-05-09 RX ADMIN — HYDROMORPHONE HYDROCHLORIDE 0.25 MG: 1 INJECTION, SOLUTION INTRAMUSCULAR; INTRAVENOUS; SUBCUTANEOUS at 04:43

## 2021-05-09 RX ADMIN — HYDROMORPHONE HYDROCHLORIDE 4 MG: 2 TABLET ORAL at 10:13

## 2021-05-09 RX ADMIN — FAMOTIDINE 20 MG: 10 INJECTION INTRAVENOUS at 04:32

## 2021-05-09 RX ADMIN — OXYCODONE HYDROCHLORIDE 10 MG: 5 TABLET ORAL at 05:37

## 2021-05-09 RX ADMIN — DIPHENHYDRAMINE HYDROCHLORIDE 25 MG: 25 CAPSULE ORAL at 19:18

## 2021-05-09 RX ADMIN — APIXABAN 2.5 MG: 2.5 TABLET, FILM COATED ORAL at 19:41

## 2021-05-09 RX ADMIN — HYDROMORPHONE HYDROCHLORIDE 4 MG: 2 TABLET ORAL at 17:21

## 2021-05-09 RX ADMIN — SENNOSIDES 2 TABLET: 8.6 TABLET, FILM COATED ORAL at 22:03

## 2021-05-09 RX ADMIN — APIXABAN 2.5 MG: 2.5 TABLET, FILM COATED ORAL at 08:41

## 2021-05-09 RX ADMIN — HYDROMORPHONE HYDROCHLORIDE 4 MG: 2 TABLET ORAL at 15:47

## 2021-05-09 RX ADMIN — HYDROMORPHONE HYDROCHLORIDE 4 MG: 2 TABLET ORAL at 23:47

## 2021-05-09 RX ADMIN — HYDROMORPHONE HYDROCHLORIDE 0.25 MG: 1 INJECTION, SOLUTION INTRAMUSCULAR; INTRAVENOUS; SUBCUTANEOUS at 03:54

## 2021-05-09 RX ADMIN — DIPHENHYDRAMINE HYDROCHLORIDE 50 MG: 50 INJECTION INTRAMUSCULAR; INTRAVENOUS at 04:49

## 2021-05-09 RX ADMIN — ACETAMINOPHEN 650 MG: 325 TABLET, FILM COATED ORAL at 12:05

## 2021-05-09 RX ADMIN — HYDROMORPHONE HYDROCHLORIDE 4 MG: 2 TABLET ORAL at 19:48

## 2021-05-09 RX ADMIN — VANCOMYCIN HYDROCHLORIDE 1000 MG: 1 INJECTION, POWDER, LYOPHILIZED, FOR SOLUTION INTRAVENOUS at 06:59

## 2021-05-09 RX ADMIN — FAMOTIDINE 20 MG: 10 INJECTION INTRAVENOUS at 19:41

## 2021-05-09 RX ADMIN — HYDROCHLOROTHIAZIDE 25 MG: 25 TABLET ORAL at 08:41

## 2021-05-09 RX ADMIN — CETIRIZINE HYDROCHLORIDE 10 MG: 10 TABLET, FILM COATED ORAL at 19:41

## 2021-05-09 RX ADMIN — ROSUVASTATIN CALCIUM 10 MG: 10 TABLET, FILM COATED ORAL at 17:21

## 2021-05-09 ASSESSMENT — ENCOUNTER SYMPTOMS
SHORTNESS OF BREATH: 0
HEADACHES: 0
DIFFICULTY URINATING: 0
ABDOMINAL PAIN: 0
PALPITATIONS: 0
COUGH: 0
NAUSEA: 0
CONFUSION: 0
FEVER: 0
ARTHRALGIAS: 1
TROUBLE SWALLOWING: 0
ADENOPATHY: 0
SORE THROAT: 0
BRUISES/BLEEDS EASILY: 0

## 2021-05-09 ASSESSMENT — COGNITIVE AND FUNCTIONAL STATUS - GENERAL
HELP NEEDED FOR PERSONAL GROOMING: 3 - A LITTLE
DRESSING REGULAR UPPER BODY CLOTHING: 3 - A LITTLE
MOVING TO AND FROM BED TO CHAIR: 2 - A LOT
CLIMB 3 TO 5 STEPS WITH RAILING: 2 - A LOT
WALKING IN HOSPITAL ROOM: 2 - A LOT
DRESSING REGULAR LOWER BODY CLOTHING: 1 - TOTAL
AFFECT: ANXIOUS
AFFECT: ANXIOUS
HELP NEEDED FOR BATHING: 2 - A LOT
STANDING UP FROM CHAIR USING ARMS: 2 - A LOT
EATING MEALS: 4 - NONE
TOILETING: 2 - A LOT

## 2021-05-09 NOTE — PROGRESS NOTES
Hospital Medicine Service -  Daily Progress Note       SUBJECTIVE   Interval History: pt seen and examined. Resting in bed. No chest pain/dyspnea. Overnight events noted. Symptoms of increasing swelling and puffiness of face. +itchy, red, blotchy rash on chest and face improved currently.        OBJECTIVE      Vital signs in last 24 hours:  Temp:  [36.4 °C (97.5 °F)-37.3 °C (99.1 °F)] 36.7 °C (98 °F)  Heart Rate:  [41-72] 60  Resp:  [12-23] 16  BP: (143-195)/() 172/87    Intake/Output Summary (Last 24 hours) at 5/9/2021 1314  Last data filed at 5/9/2021 1044  Gross per 24 hour   Intake 1180 ml   Output 600 ml   Net 580 ml       PHYSICAL EXAMINATION      Physical Exam  Constitutional:       Appearance: She is not diaphoretic.   Eyes:      General: No scleral icterus.     Extraocular Movements: Extraocular movements intact.   Cardiovascular:      Rate and Rhythm: Regular rhythm.      Heart sounds: No friction rub. No gallop.    Pulmonary:      Effort: Pulmonary effort is normal. No respiratory distress.      Breath sounds: No stridor. No wheezing, rhonchi or rales.   Abdominal:      General: Bowel sounds are normal.      Palpations: Abdomen is soft.      Tenderness: There is no guarding or rebound.   Neurological:      Mental Status: She is alert and oriented to person, place, and time.   Psychiatric:         Judgment: Judgment normal.        LINES, CATHETERS, DRAINS, AIRWAYS, AND WOUNDS   Lines, Drains, Airways, Wounds:  Peripheral IV 05/07/21 Left Antecubital (Active)   Number of days: 2       Surgical Incision Knee Left (Active)   Number of days: 1       Comments:      LABS / IMAGING / TELE      Labs  Results from last 7 days   Lab Units 05/09/21  0313   WBC K/uL 14.14*   HEMOGLOBIN g/dL 10.7*   HEMATOCRIT % 35.0   PLATELETS K/uL 303     Results from last 7 days   Lab Units 05/09/21  0313   SODIUM mEQ/L 138   POTASSIUM mEQ/L 3.6   CHLORIDE mEQ/L 102   CO2 mEQ/L 26   BUN mg/dL 13   CREATININE mg/dL 0.6    CALCIUM mg/dL 8.8*   GLUCOSE mg/dL 133*       IMAGING  X-RAY KNEE LEFT 1 OR 2 VIEWS    Result Date: 5/9/2021  IMPRESSION: As above.    X-RAY KNEE LEFT 4+ VIEWS    Result Date: 5/8/2021  IMPRESSION: Displaced patellar fracture. The emergency department is aware of this fracture.     BI SCREENING MAMMOGRAM BILATERAL(TOMOSYNTHESIS)    Result Date: 4/27/2021  IMPRESSION: 1.  No evidence of malignancy. Annual screening mammography is recommended. Written results will be conveyed to the patient. FINAL ASSESSMENT - BI-RADS CATEGORY 1 - NEGATIVE (NOR NC D)   SCATTERED The patient's information has been entered into our automated reminder system with a target due date for her next mammogram. --- Please note that a report that is negative for malignancy should not delay biopsy if there is a dominant or clinically suspicious mass, as some cancers are not visualized by mammography. ---    X-RAY CHEST 1 VIEW    Result Date: 5/8/2021  IMPRESSION: No evidence of acute pulmonary disease.       ASSESSMENT AND PLAN      Essential hypertension  Assessment & Plan  Continue amlodipine  hctz started  Cont to monitor.     Hypokalemia  Assessment & Plan  Cont to trend bmp    Systemic mastocytosis  Assessment & Plan  Under the care of [facility] and Dr. Unique boyd - bladimir/onc on board.  Overnight events noted.   Symptoms of increasing swelling and puffiness of face. +itchy, red, blotchy rash on chest and face improved currently.   Cont to monitor.     * Closed displaced transverse fracture of left patella  Assessment & Plan  status post left patella orif   Cont w pain management. rec PT/OT eval and treat. Further management per ortho surgery team. rec DVT proph per ortho rec.             VTE Prophylaxis Plan: Pharmacological DVT prophylaxis and timing of such per the discretion of the surgeon. Strongly recommend maintain sequential compression device  Code Status: Full Code  Estimated Discharge Date: 5/10/2021     Nimesh Barker,  DO  5/9/2021

## 2021-05-09 NOTE — PROGRESS NOTES
"Daily Progress Note    Subjective    Interval History: POD#1. Doing well. .     Objective    Vital signs in last 24 hours:  Temp:  [36.4 °C (97.5 °F)-37.3 °C (99.1 °F)] 36.7 °C (98 °F)  Heart Rate:  [41-72] 56  Resp:  [12-23] 16  BP: (143-195)/() 172/87      Intake/Output Summary (Last 24 hours) at 5/9/2021 0987  Last data filed at 5/9/2021 0349  Gross per 24 hour   Intake 1180 ml   Output 250 ml   Net 930 ml       Physical Exam:  Visit Vitals  BP (!) 172/87 (BP Location: Right upper arm, Patient Position: Lying)   Pulse (!) 56   Temp 36.7 °C (98 °F) (Temporal)   Resp 16   Ht 1.549 m (5' 1\")   Wt 58.7 kg (129 lb 6.4 oz)   SpO2 96%   Breastfeeding No   BMI 24.45 kg/m²               Lungs:     Clear to auscultation bilaterally, respirations unlabored   Chest wall:    No tenderness or deformity   Heart:    Regular rate and rhythm, S1 and S2 normal, no murmur, rub   or gallop   Extremities: No edema seen bilaterally                 •  acetaminophen, 650 mg, oral, q4h PRN **AND** oxyCODONE, 5-10 mg, oral, q4h PRN  •  amLODIPine, 5 mg, oral, Daily  •  apixaban, 2.5 mg, oral, BID **FOLLOWED BY** [DISCONTINUED] apixaban, 2.5 mg, oral, BID  •  glucose, 16-32 g of dextrose, oral, PRN **OR** dextrose, 15-30 g of dextrose, oral, PRN **OR** glucagon, 1 mg, intramuscular, PRN **OR** dextrose in water, 25 mL, intravenous, PRN  •  diphenhydrAMINE, 25 mg, oral, q6h PRN  •  diphenhydrAMINE, 25 mg, oral, Once PRN  •  EPINEPHrine, 0.3 mg, subcutaneous, PRN  •  escitalopram, 10 mg, oral, Daily  •  fentaNYL, 50 mcg, intravenous, q15 min PRN  •  hydrochlorothiazide, 25 mg, oral, Daily  •  HYDROmorphone, 0.5 mg, intravenous, q15 min PRN  •  ondansetron, 4 mg, intravenous, q15 min PRN  •  potassium chloride, 10 mEq, oral, Daily  •  rosuvastatin, 10 mg, oral, Daily (6p)  •  senna, 2 tablet, oral, Nightly    Labs  Lab Results   Component Value Date    WBC 14.14 (H) 05/09/2021    HGB 10.7 (L) 05/09/2021    HCT 35.0 05/09/2021     " 05/09/2021    CHOL 148 08/11/2020    TRIG 94 08/11/2020    HDL 79 08/11/2020    ALT 21 11/20/2020    AST 35 11/20/2020     05/09/2021    K 3.6 05/09/2021     05/09/2021    CREATININE 0.6 05/09/2021    BUN 13 05/09/2021    CO2 26 05/09/2021    TSH 3.050 08/11/2020    INR 1.0 05/07/2021       Lab Results   Component Value Date    TROPONINI <0.02 05/08/2021       Imaging  I have independently reviewed the pertinent imaging from the last 24 hrs.    ECG/Telemetry  I have independently reviewed the telemetry. No events for the last 24 hours.     Assessment & Plan    Preoperative examination  Assessment & Plan  Stable pod #1  Essential hypertension  Assessment & Plan  Continue to monitor.  adjust medications as indicated.    Systemic mastocytosis  Assessment & Plan  Heme one consult pending.           Khang Piper MD  5/9/2021

## 2021-05-09 NOTE — NURSING NOTE
Pt c/o of increasing swelling and puffiness of face. +itchy, red, blotchy rash on chest and face. Face was flushed. Denies chest pain or SOB. At this time, pt c/o 12/10 knee pain. Dr. Kebede and Dr. Reyes made aware. 20mg IV pepcid administered. Dilaudid 0.25mg additional administered for breakthrough pain. Dr. Kebede at beside to assess. Pt's rash increasing in size on chest and face. Dr. Reyes made aware. 50mg IV benadryl ordered and administered. Pt states symptoms are improving and feels more comfortable. No further orders at this time.

## 2021-05-09 NOTE — PROGRESS NOTES
Patient: Ritu Ramirez  Location: [facility] [id number]  MRN: [id number]  Today's date: 5/9/2021     Pt seated in bedside chair at end of OT session, NAD. Call bell and phone in reach, chair alarm activated and nursing notified.       Ritu is a 65 y.o. female admitted on 5/7/2021 with Closed displaced transverse fracture of left patella, initial encounter [S82.032A]. Principal problem is Closed displaced transverse fracture of left patella.    Past Medical History  Ritu has a past medical history of Anemia, Anxiety, Hypertension, Iron deficiency, Lipid disorder, Systemic mastocytosis, and Vitamin D deficiency.    History of Present Illness   Ritu Ramirez is a 65 y.o. female with PMH systemic mastocytosis, HTN, HLD, anemia requiring transfusions presenting with left knee pain after a fall. Patient tripped over a step at a restaurant this evening and fell directly onto left knee. She did not hit her head or lose consciousness. S/p L Knee ORIF, WBAT in knee brace locked in extension        OT Vitals    Date/Time Pulse SpO2 BP BP Location BP Method Pt Position [facility]   05/09/21 1008 60 96 % 172/87 Right upper arm Automatic Lying [facility]   05/09/21 1021 -- 96 % -- -- -- -- [facility]      OT Pain    Date/Time Pain Type Side/Orientation Location Rating: Rest Rating: Activity Interventions [facility]   05/09/21 1008 Pain Assessment left knee 9 8 premedicated for activity;position adjusted [facility]   05/09/21 1013 Pain Assessment -- knee 9 -- -- DF          Prior Living Environment      Most Recent Value   Current Living Arrangements  home/apartment/condo   Living Environment Comment  2sh with S.O., first floor setup, stall shower          Prior Level of Function      Most Recent Value   Dominant Hand  right   Ambulation  independent   Transferring  independent   Toileting  independent   Bathing  independent   Dressing  independent   Eating  independent   Communication  understands/communicates without difficulty   Assistive Device Currently Used  at Home  none          Occupational Profile      Most Recent Value   Reason for Services/Referral  s/p LLE patella ORIF   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, spouse   Occupational History/Life Experiences  works from home   Patient Goals  wants to dance at daughter's wedding in 2 months          OT Evaluation and Treatment - 05/09/21 1008        OT Time Calculation    Start Time  1008     Stop Time  1021     Time Calculation (min)  13 min        Session Details    Document Type  initial evaluation     Mode of Treatment  occupational therapy        General Information    Patient Profile Reviewed  yes     Onset of Illness/Injury or Date of Surgery  05/07/21     Referring Physician  [place]     General Observations of Patient  pt in supine, knee brace on and locked in extension LLE     Existing Precautions/Restrictions  cardiac;fall;weight bearing        Weight-bearing Status    Left LE Weight-Bearing Status  weight-bearing as tolerated (WBAT)    in knee brace locked in extension       Cognition/Psychosocial    Affect/Mental Status (Cognition)  anxious     Behavioral Issues (Cognition)  difficulty managing stress     Orientation Status (Cognition)  oriented x 4     Follows Commands (Cognition)  WFL     Cognitive Function  WFL     Comment, Cognition  limited by perseveration on pain         Hearing Assessment    Hearing Status  WFL        Vision Assessment/Intervention    Visual Impairment/Limitations  WFL        Sensory Assessment (Somatosensory)    Sensory Assessment (Somatosensory)  UE sensation intact        Range of Motion (ROM)    Range of Motion  ROM is WFL;bilateral upper extremities        Strength Comprehensive (MMT)    Comment  BUEs 5/5         Bed Mobility    [place], Supine to Sit  moderate assist (50-74% patient effort)     Comment (Bed Mobility)  mod a for LLE management due to pain, weakness        Transfers    Transfers  other (see comments)        Sit to Stand Transfer    [place], Sit to Stand  Transfer  minimum assist (75% or more patient effort);2 person assist;verbal cues     Verbal Cues  hand placement;technique;safety;proper use of assistive device     Assistive Device  walker, front-wheeled     Comment  min a x2 for sit to stand, completion of short distance functional transfer to bedside chair via pivot on RLE, decreased ability to weightbear on LLE due to pain        Stand to Sit Transfer    [place], Stand to Sit Transfer  minimum assist (75% or more patient effort);2 person assist;verbal cues     Verbal Cues  hand placement;technique;safety;proper use of assistive device     Assistive Device  walker, front-wheeled     Comment  cues for safety, WBAT and fall prevention         Balance    Static Sitting Balance  mild impairment     Dynamic Sitting Balance  mild impairment     Static Standing Balance  moderate impairment     Dynamic Standing Balance  moderate impairment     Comment, Balance  due to LLE pain         Upper Body Dressing    Tasks  pull over garment     [place] Assistance  threads left arm, shirt;threads right arm, shirt     [place]  minimum assist (75% or more patient effort)     Comment  due to LLE pain at eob         Lower Body Dressing    Tasks  socks     [place] Assistance  dons/doffs right sock;dons/doffs left sock     [place]  dependent (less than 25% patient effort)     Comment  decerased functional reach and LLE flexibility due to brace locked in extension         AM-PAC (TM) - ADL (Current Function)    Putting on and taking off regular lower body clothing?  1 - Total     Bathing?  2 - A Lot     Toileting?  2 - A Lot     Putting on/taking off regular upper body clothing?  3 - A Little     How much help for taking care of personal grooming?  3 - A Little     Eating meals?  4 - None     AM-PAC (TM) ADL Score  15        Therapy Assessment/Plan (OT)    Rehab Potential (OT)  good, to achieve stated therapy goals     Therapy Frequency (OT)  3-5 times/wk     Planned  Therapy Interventions (OT)  activity tolerance training;BADL retraining;occupation/activity based interventions;transfer/mobility retraining        Progress Summary (OT)    Daily Outcome Statement (OT)  OT Ritu [place] is limited in ADL and transfers compared to baseline due to pain in LLE, knee brace locked in extension and WBAT, and decreased activity tolerance. educated on adaptive ADL and transfer strategies, rec ongoing OT to promote ADL independence         Therapy Plan Review/Discharge Plan (OT)    OT Recommended Discharge Disposition  home with caregiver;home with assist;home with home health     Anticipated Equipment Needs At Discharge (OT)  commode, 3-in-1;bathing equipment;walker, front-wheeled;wheelchair;tub bench;transfer pad;reacher;patient lift;raised toilet seat;shower chair;dressing equipment                  Education Documentation  Self-Care, taught by Jesus Manuel Mack, OT at 5/9/2021 12:30 PM.  Learner: Patient  Readiness: Acceptance  Method: Explanation  Response: Verbalizes Understanding  Comment: OT POC, WBAT with knee brace in extension, adaptive ADL strategies, safety with transfers, adaptive transfer strategies               OT Goals      Most Recent Value   Transfer Goal 1   Activity/Assistive Device  toilet at 05/09/2021 1008   [place]  set-up required, supervision required at 05/09/2021 1008   Time Frame  5-7 days at 05/09/2021 1008   Progress/Outcome  goal ongoing at 05/09/2021 1008   Dressing Goal 1   Activity/Adaptive Equipment  lower body dressing at 05/09/2021 1008   [place]  minimum assist (75% or more patient effort) at 05/09/2021 1008   Time Frame  5-7 days at 05/09/2021 1008   Progress/Outcome  goal ongoing at 05/09/2021 1008   Toileting Goal 1   Activity/Assistive Device  toileting skills, all at 05/09/2021 1008   [place]  set-up required, supervision required at 05/09/2021 1008   Time Frame  5-7 days at 05/09/2021 1008   Progress/Outcome  goal  ongoing at 05/09/2021 1004

## 2021-05-09 NOTE — NURSING NOTE
Pt refusing post-operative vancomycin dose d/t concern with her allergies. Dr. Kebede made aware. No further orders at this time.

## 2021-05-09 NOTE — ASSESSMENT & PLAN NOTE
status post left patella orif   Cont w pain management. rec PT/OT eval and treat. Further management per ortho surgery team. rec DVT proph per ortho rec.

## 2021-05-09 NOTE — PLAN OF CARE
Plan of Care Review  Plan of Care Reviewed With: patient  Progress: improving  Outcome Summary: OOB x1 assist with walker, WBAT with [place]joy locked in extension. +mastocytosis reaction throughout night possibly d/t pain exacerbation when OOB. Benadryl, pepcid, dilaudid administered with improvement. Oxycodone and tylenol given ATC. Voided x1.

## 2021-05-09 NOTE — PROGRESS NOTES
Patient: Ritu Ramirez  Location: [facility] 5PAV 5436  MRN: [id number]  Today's date: 5/9/2021    Patient in chair at end of PT session, BLE elevated on leg rest, patient in NAD, all questions answered by PT, call bell and belongings in reach.    Ritu is a 65 y.o. female admitted on 5/7/2021 with Closed displaced transverse fracture of left patella, initial encounter [S82.032A]. Principal problem is Closed displaced transverse fracture of left patella.    Past Medical History  Ritu has a past medical history of Anemia, Anxiety, Hypertension, Iron deficiency, Lipid disorder, Systemic mastocytosis, and Vitamin D deficiency.    History of Present Illness   Ritu Ramirez is a 65 y.o. female with PMH systemic mastocytosis, HTN, HLD, anemia requiring transfusions presenting with left knee pain after a fall. Patient tripped over a step at a restaurant this evening and fell directly onto left knee. She did not hit her head or lose consciousness. S/p L Knee ORIF, WBAT in knee brace locked in extension        PT Vitals    Date/Time SpO2 Who   05/09/21 1021 96 % GJG          Prior Living Environment      Most Recent Value   Current Living Arrangements  home/apartment/condo   Living Environment Comment  2sh with S.O., first floor setup, stall shower          Prior Level of Function      Most Recent Value   Dominant Hand  right   Ambulation  independent   Transferring  independent   Toileting  independent   Bathing  independent   Dressing  independent   Eating  independent   Communication  understands/communicates without difficulty   Assistive Device Currently Used at Home  none          PT Evaluation and Treatment - 05/09/21 1016        PT Time Calculation    Start Time  1016     Stop Time  1027     Time Calculation (min)  11 min        Session Details    Document Type  initial evaluation     Mode of Treatment  physical therapy        General Information    Patient Profile Reviewed  yes     General Observations of Patient   pt in supine, agreeable to PT evaluation     Existing Precautions/Restrictions  cardiac;fall;weight bearing;brace worn at all times        Weight-bearing Status    Left LE Weight-Bearing Status  weight-bearing as tolerated (WBAT)    knee brace locked in extension       Cognition/Psychosocial    Affect/Mental Status (Cognition)  anxious     Behavioral Issues (Cognition)  difficulty managing stress     Orientation Status (Cognition)  oriented x 4        Sensory    Hearing Status  WFL        Vision Assessment/Intervention    Visual Impairment/Limitations  WFL        Sensory Assessment (Somatosensory)    Sensory Assessment (Somatosensory)  sensation intact;bilateral LE        Range of Motion (ROM)    Range of Motion  ROM is WFL;right lower extremity    L hip/ankle WFL, L knee in locked extension brace       Strength (Manual Muscle Testing)    Strength (Manual Muscle Testing)  strength is WFL;right lower extremity    LLE strength limited by pain w/ WB       Bed Mobility    [place], Supine to Sit  moderate assist (50-74% patient effort);1 person assist;increased time to complete     Assistive Device  bed rails;draw sheet;head of bed elevated        Sit to Stand Transfer    [place], Sit to Stand Transfer  minimum assist (75% or more patient effort);2 person assist;verbal cues     Verbal Cues  hand placement;maintaining center of gravity over base of support;maintaining precautions;preparatory posture;proper use of assistive device;safety;technique     Assistive Device  walker, front-wheeled        Stand to Sit Transfer    [place], Stand to Sit Transfer  verbal cues;minimum assist (75% or more patient effort);2 person assist     Verbal Cues  hand placement     Assistive Device  walker, front-wheeled        Gait Training    [place], Gait  minimum assist (75% or more patient effort);2 person assist;verbal cues;nonverbal cues (demo/gesture)     Assistive Device  walker, front-wheeled     Distance in Feet  2  feet     Pattern (Gait)  other (see comments)     Deviations/Abnormal Patterns (Gait)  step length decreased;stride length decreased;weight shifting decreased;antalgic     Maintains Weight-bearing Status (Gait)  able to maintain     Comment (Gait/Stairs)  difficulty WB on LLE, able to shuffle to chair using RW        Stairs Training    Comment  not able to tolerate currently        Balance    Static Sitting Balance  mild impairment     Dynamic Sitting Balance  mild impairment     Static Standing Balance  moderate impairment;supported     Dynamic Standing Balance  moderate impairment;supported     Comment, Balance  standing balance limited by pain in LLE        Motor Skills    Functional Endurance  fair        Coping    Observed Emotional State  cooperative;anxious;restless;pleasant     Verbalized Emotional State  acceptance     Trust Relationship/Rapport  care explained        AM-PAC (TM) - Mobility (Current Function)    Turning from your back to your side while in a flat bed without using bedrails?  3 - A Little     Moving from lying on your back to sitting on the side of a flat bed without using bedrails?  3 - A Little     Moving to and from a bed to a chair?  2 - A Lot     Standing up from a chair using your arms?  2 - A Lot     To walk in a hospital room?  2 - A Lot     Climbing 3-5 steps with a railing?  2 - A Lot     AM-PAC (TM) Mobility Score  14        Therapy Assessment/Plan (PT)    Rehab Potential (PT)  good, to achieve stated therapy goals     Therapy Frequency (PT)  5 times/wk     Planned Therapy Interventions (PT)  balance training;bed mobility training;gait training;stair training;strengthening;transfer training        Progress Summary (PT)    Daily Outcome Statement (PT)  Cooperative, limited by pain w/ WB on LLE during transfer OOB to chair.     Symptoms Noted During/After Treatment  increased pain     Progress Toward Functional Goals (PT)  progressing toward functional goals as expected         Therapy Plan Review/Discharge Plan (PT)    PT Recommended Discharge Disposition  home with assist;home with caregiver;home with home health     Anticipated Equipment Needs at Discharge (PT)  walker, front-wheeled                       Education Documentation  Joint Mobility/Strength, taught by Flora Benitez PT at 5/9/2021  4:47 PM.  Learner: Patient  Readiness: Acceptance  Method: Explanation, Demonstration  Response: Verbalizes Understanding, Demonstrated Understanding, Needs Reinforcement  Comment: PT provided TA and GT safety/technique education.    Home Safety, taught by Flora Benitez PT at 5/9/2021  4:47 PM.  Learner: Patient  Readiness: Acceptance  Method: Explanation, Demonstration  Response: Verbalizes Understanding, Demonstrated Understanding, Needs Reinforcement  Comment: PT provided TA and GT safety/technique education.    Energy Conservation, taught by Flora Benitez PT at 5/9/2021  4:47 PM.  Learner: Patient  Readiness: Acceptance  Method: Explanation, Demonstration  Response: Verbalizes Understanding, Demonstrated Understanding, Needs Reinforcement  Comment: PT provided TA and GT safety/technique education.    Assistive/Adaptive Devices, taught by Flora Benitez PT at 5/9/2021  4:47 PM.  Learner: Patient  Readiness: Acceptance  Method: Explanation, Demonstration  Response: Verbalizes Understanding, Demonstrated Understanding, Needs Reinforcement  Comment: PT provided TA and GT safety/technique education.          PT Goals      Most Recent Value   Bed Mobility Goal 1   Activity/Assistive Device  bed mobility activities, all at 05/09/2021 1016   [place]  independent at 05/09/2021 1016   Time Frame  by discharge at 05/09/2021 1016   Progress/Outcome  goal ongoing at 05/09/2021 1016   Transfer Goal 1   Activity/Assistive Device  all transfers, walker, front-wheeled at 05/09/2021 1016   [place]  modified independence at 05/09/2021 1016   Time Frame  by discharge at 05/09/2021 1016    Progress/Outcome  goal ongoing at 05/09/2021 1016   Gait Training Goal 1   Activity/Assistive Device  gait (walking locomotion), walker, front-wheeled at 05/09/2021 1016   [place]  modified independence at 05/09/2021 1016   Distance  50 at 05/09/2021 1016   Time Frame  by discharge at 05/09/2021 1016   Progress/Outcome  goal ongoing at 05/09/2021 1016   Stairs Goal 1   Activity/Assistive Device  stairs, all skills, walker, front-wheeled at 05/09/2021 1016   [place]  modified independence at 05/09/2021 1016   Number of Stairs  1 [able to use RW up one KANDY home per patient report] at 05/09/2021 1016   Time Frame  by discharge at 05/09/2021 1016   Progress/Outcome  goal ongoing at 05/09/2021 1016

## 2021-05-09 NOTE — NURSING NOTE
"RN was assisting pt OOB to bedside commode when she started screaming in pain. Pt stated \"it feels as if someone is ripping my knee apart.\" Pt assisted back to bed. Dr. Kebede made aware. 0.25mg dilaudid ordered and administered. No further orders at this time.   "

## 2021-05-09 NOTE — PLAN OF CARE
Problem: Adult Inpatient Plan of Care  Goal: Plan of Care Review  Outcome: Progressing  Flowsheets (Taken 5/9/2021 1220)  Progress: improving  Plan of Care Reviewed With: patient  Outcome Summary: OT Evgeny complete

## 2021-05-09 NOTE — PROGRESS NOTES
Hematology/Oncology Progress Note    Ritu Ramirez is a 65 y.o. female who was admitted for patellar fracture      History of Present Illness:    Ms. Ritu Ramirez is a 65 year old female with history of mastocytosis for which she is followed at [facility]. Her disease is charcterized by rash. She is on no routine therapy. The last time she had anesthesia was around 2015 for breast reduction. She was dishahrged home and was taking azithromycin. She quickly started having severe reaction, possibly anaphylaxis and was admitted to the intensive care unit and managed accordingly. She also had a delayed reaction to contrast following that but was much milder.  With subsequent IV contrast dye, she used a prep and had no difficulty. Last tryptase level was 40.4 in November 2020.     Mr. Ramirez also has a history of iron deficiency anemia attributed to bleeding hemorrhoids and possibly AVMs She periodically required intravenous iron.     Ms. Ramirez presented 5/7/2021 with left knee pain after a fall. She tripped over a step and fell directly on her knee. X-ray showed a left patellar fracture.     Events:    ORIF for patellar fracture 5/8/2021 - received steroids, Benadryl pre-op     Developed pruritic rash, facial swelling last night - received Benadryl     Receiving Dilaudid for knee pain      Subjective:    Review of Systems   Constitutional: Negative for fever.   HENT:   Negative for lump/mass, sore throat and trouble swallowing.    Respiratory: Negative for cough and shortness of breath.    Cardiovascular: Negative for chest pain and palpitations.   Gastrointestinal: Negative for abdominal pain and nausea.   Genitourinary: Negative for difficulty urinating.    Musculoskeletal: Positive for arthralgias (left knee) and gait problem.   Skin: Positive for itching (improved) and rash (improved).   Neurological: Positive for gait problem. Negative for headaches.   Hematological: Negative for adenopathy. Does not bruise/bleed easily.  "  Psychiatric/Behavioral: Negative for confusion.          PMH, PSH, SH, and FH were reviewed and updated in the chart.      Allergies:    Allergies   Allergen Reactions   • Iodinated Contrast Media      Other reaction(s): Anaphylaxis   • Penicillins Hives   • Anesthetics - Amide Type - Select Amino Amides      Other reaction(s): Anaphylaxis   • Clarithromycin      Other reaction(s): Rash   • Erythromycin Base      Other reaction(s): Anaphylaxis   • Nsaids (Non-Steroidal Anti-Inflammatory Drug)      Other reaction(s): Anaphylaxis         Medications:    Current Facility-Administered Medications   Medication Dose Route Frequency   • acetaminophen  650 mg oral q4h PRN   • amLODIPine  5 mg oral Daily   • apixaban  2.5 mg oral BID   • glucose  16-32 g of dextrose oral PRN    Or   • dextrose  15-30 g of dextrose oral PRN    Or   • glucagon  1 mg intramuscular PRN    Or   • dextrose in water  25 mL intravenous PRN   • diphenhydrAMINE  25 mg oral q6h PRN   • diphenhydrAMINE  25 mg oral Once PRN   • EPINEPHrine  0.3 mg subcutaneous PRN   • escitalopram  10 mg oral Daily   • hydrochlorothiazide  25 mg oral Daily   • HYDROmorphone  2-4 mg oral q4h PRN   • potassium chloride  10 mEq oral Daily   • rosuvastatin  10 mg oral Daily (6p)   • senna  2 tablet oral Nightly         Physical Exam:    Visit Vitals  BP (!) 162/87 (BP Location: Right upper arm, Patient Position: Lying)   Pulse 66   Temp 36.5 °C (97.7 °F) (Temporal)   Resp 18   Ht 1.549 m (5' 1\")   Wt 58.7 kg (129 lb 6.4 oz)   SpO2 95%   Breastfeeding No   BMI 24.45 kg/m²       Physical Exam        Laboratories:    Results from last 3 days   Lab Units 05/09/21  0313 05/08/21  0941 05/07/21  1931   WBC K/uL 14.14 H 6.97 10.03   HEMOGLOBIN g/dL 10.7 L 10.4 L 12.4   HEMATOCRIT % 35.0 34.0 L 40.6   PLATELETS K/uL 303 268 335       Results from last 3 days   Lab Units 05/09/21 0313 05/08/21  0941 05/07/21  1931   SODIUM mEQ/L 138 139 139   POTASSIUM mEQ/L 3.6 4.1 2.9 L "   CHLORIDE mEQ/L 102 108 101   CO2 mEQ/L 26 24 23   BUN mg/dL 13 18 20   CREATININE mg/dL 0.6 0.6 0.6   CALCIUM mg/dL 8.8 L 9.1 9.7   GLUCOSE mg/dL 133 H 167 H 164 H       Results from last 3 days   Lab Units 05/07/21  1931   PROTIME sec 13.0   INR  1.0   PTT sec 25         Radiology:               Assessment and Plan:    Mastocytosis  - followed at [facility]  - on no therapy     - check serum tryptase  - no good data guiding how to manage richard-op  - anti-histamines and/or corticosteroids usually recommended pre-op to prevent mast cell degranulation    - received Benadryl and steroids pre-op  - avoid known triggers such as temperature fluctuation, pain, offending drugs, anxiety  - monitor for development of symptoms of mast cell degranulation and treat prn with Benasryl, Pepcid and/or steroids     Iron deficiency anemia  - due to hemorrhoidal bleeding and  AVMs  - receives intermittent IV iron with Dr. Calhoun  - labs 5/4/2021 without iron deficiency and CBC on admit 5/7 without anemia  - labs 5/8 and 5/9  with mild anemia, possibly due to bleeding from trauma and/or hydration     - monitor CBC     Left patellar fracture  - s/p ORIF  - management per Ortho  - on prophyactic Eliquis 2.5 mg bid         Alicja Snowden MD  05/09/21

## 2021-05-09 NOTE — PLAN OF CARE
Plan of Care Review  Plan of Care Reviewed With: patient  Progress: improving  Outcome Summary: pt in sig. amount of pain this morning - pain meds changed to dilaudid PO. assist x2 OOB - unable to bare weight on her LLE. voiding fine.

## 2021-05-09 NOTE — ASSESSMENT & PLAN NOTE
Under the care of [facility] and Dr. Unique boyd - bladimir/onc on board.  Overnight events noted.   Symptoms of increasing swelling and puffiness of face. +itchy, red, blotchy rash on chest and face improved currently.   Cont to monitor.

## 2021-05-09 NOTE — PROGRESS NOTES
Orthopaedics Progress Note    [S]  NAD    [O]   Vitals:    05/08/21 2330   BP: (!) 143/80   Pulse:    Resp: 14   Temp: 36.7 °C (98.1 °F)   SpO2: 93%       Physical Exam:  SILT  +DP/PT  +EHL/FHL/PF/DF  Dressing C/d/i  Hinged knee brace locked in extension    [A/P]   65 y.o. female status post left patella orif doing well    -DVT prophylaxis  -Weight bearing as tolerated with knee brace locked in extension  -Analgesia  -Physical therapy

## 2021-05-09 NOTE — NURSING NOTE
"Pt c/o feeling as if she is \"puffing and swelling internally.\" Denies chest pain or SOB. No rash or nausea present. Dr. Kebede, ortho resident, made aware. Benadryl 25mg ordered and administered.   "

## 2021-05-09 NOTE — PLAN OF CARE
Problem: Adult Inpatient Plan of Care  Goal: Plan of Care Review  Outcome: Progressing  Flowsheets (Taken 5/9/2021 2761)  Progress: improving  Plan of Care Reviewed With: patient  Outcome Summary: PT eval completed.

## 2021-05-09 NOTE — PROGRESS NOTES
Orthopaedics Progress Note    [S]  NAD this Am  Had some pain control issues and rash, responded to benadryl    [O]   Vitals:    05/09/21 0746   BP: (!) 172/87   Pulse: (!) 56   Resp: 16   Temp: 36.7 °C (98 °F)   SpO2: 96%       Physical Exam:  SILT  +DP/PT  +EHL/FHL/PF/DF  Dressing C/d/i  Hinged knee brace locked in extension    [A/P]   65 y.o. female status post left patella orif doing well    -DVT prophylaxis > eliquis 2.5 bid  -Weight bearing as tolerated with knee brace locked in extension  -Analgesia  -Physical therapy

## 2021-05-10 PROBLEM — D72.829 LEUKOCYTOSIS: Status: ACTIVE | Noted: 2021-05-10

## 2021-05-10 LAB
BACTERIA URNS QL MICRO: NORMAL /HPF
BILIRUB UR QL STRIP.AUTO: NEGATIVE MG/DL
CLARITY UR REFRACT.AUTO: CLEAR
COLOR UR AUTO: YELLOW
GLUCOSE UR STRIP.AUTO-MCNC: NEGATIVE MG/DL
HGB UR QL STRIP.AUTO: ABNORMAL
HYALINE CASTS #/AREA URNS LPF: NORMAL /LPF
KETONES UR STRIP.AUTO-MCNC: NEGATIVE MG/DL
LEUKOCYTE ESTERASE UR QL STRIP.AUTO: NEGATIVE
NITRITE UR QL STRIP.AUTO: NEGATIVE
PH UR STRIP.AUTO: 6 [PH]
PROT UR QL STRIP.AUTO: NEGATIVE
RBC #/AREA URNS HPF: NORMAL /HPF
SARS-COV-2 RNA RESP QL NAA+PROBE: NEGATIVE
SP GR UR REFRACT.AUTO: 1.02
SQUAMOUS URNS QL MICRO: NORMAL /HPF
UROBILINOGEN UR STRIP-ACNC: 0.2 EU/DL
WBC #/AREA URNS HPF: NORMAL /HPF

## 2021-05-10 PROCEDURE — 99232 SBSQ HOSP IP/OBS MODERATE 35: CPT | Performed by: HOSPITALIST

## 2021-05-10 PROCEDURE — 12000000 HC ROOM AND CARE MED/SURG

## 2021-05-10 PROCEDURE — U0002 COVID-19 LAB TEST NON-CDC: HCPCS | Performed by: NURSE PRACTITIONER

## 2021-05-10 PROCEDURE — 97530 THERAPEUTIC ACTIVITIES: CPT | Mod: GP

## 2021-05-10 PROCEDURE — 63700000 HC SELF-ADMINISTRABLE DRUG: Performed by: NURSE PRACTITIONER

## 2021-05-10 PROCEDURE — 63700000 HC SELF-ADMINISTRABLE DRUG: Performed by: STUDENT IN AN ORGANIZED HEALTH CARE EDUCATION/TRAINING PROGRAM

## 2021-05-10 PROCEDURE — 81001 URINALYSIS AUTO W/SCOPE: CPT | Performed by: NURSE PRACTITIONER

## 2021-05-10 RX ORDER — SENNOSIDES 8.6 MG/1
1 TABLET ORAL 2 TIMES DAILY
Status: DISCONTINUED | OUTPATIENT
Start: 2021-05-10 | End: 2021-05-11 | Stop reason: HOSPADM

## 2021-05-10 RX ORDER — AMLODIPINE BESYLATE 10 MG/1
10 TABLET ORAL DAILY
Status: DISCONTINUED | OUTPATIENT
Start: 2021-05-11 | End: 2021-05-11 | Stop reason: HOSPADM

## 2021-05-10 RX ADMIN — POTASSIUM CHLORIDE 10 MEQ: 750 TABLET, FILM COATED, EXTENDED RELEASE ORAL at 08:39

## 2021-05-10 RX ADMIN — ACETAMINOPHEN 650 MG: 325 TABLET, FILM COATED ORAL at 21:17

## 2021-05-10 RX ADMIN — APIXABAN 2.5 MG: 2.5 TABLET, FILM COATED ORAL at 19:50

## 2021-05-10 RX ADMIN — AMLODIPINE BESYLATE 5 MG: 5 TABLET ORAL at 08:49

## 2021-05-10 RX ADMIN — HYDROMORPHONE HYDROCHLORIDE 4 MG: 2 TABLET ORAL at 13:27

## 2021-05-10 RX ADMIN — ESCITALOPRAM 10 MG: 10 TABLET, FILM COATED ORAL at 08:39

## 2021-05-10 RX ADMIN — ACETAMINOPHEN 650 MG: 325 TABLET, FILM COATED ORAL at 08:39

## 2021-05-10 RX ADMIN — ACETAMINOPHEN 650 MG: 325 TABLET, FILM COATED ORAL at 13:29

## 2021-05-10 RX ADMIN — SENNOSIDES 1 TABLET: 8.6 TABLET, FILM COATED ORAL at 10:54

## 2021-05-10 RX ADMIN — HYDROCHLOROTHIAZIDE 25 MG: 25 TABLET ORAL at 08:49

## 2021-05-10 RX ADMIN — SENNOSIDES 1 TABLET: 8.6 TABLET, FILM COATED ORAL at 19:49

## 2021-05-10 RX ADMIN — HYDROMORPHONE HYDROCHLORIDE 2 MG: 2 TABLET ORAL at 22:14

## 2021-05-10 RX ADMIN — HYDROMORPHONE HYDROCHLORIDE 4 MG: 2 TABLET ORAL at 08:39

## 2021-05-10 RX ADMIN — APIXABAN 2.5 MG: 2.5 TABLET, FILM COATED ORAL at 08:39

## 2021-05-10 RX ADMIN — ROSUVASTATIN CALCIUM 10 MG: 10 TABLET, FILM COATED ORAL at 17:48

## 2021-05-10 RX ADMIN — HYDROMORPHONE HYDROCHLORIDE 4 MG: 2 TABLET ORAL at 17:48

## 2021-05-10 RX ADMIN — HYDROMORPHONE HYDROCHLORIDE 4 MG: 2 TABLET ORAL at 04:27

## 2021-05-10 ASSESSMENT — COGNITIVE AND FUNCTIONAL STATUS - GENERAL
CLIMB 3 TO 5 STEPS WITH RAILING: 2 - A LOT
WALKING IN HOSPITAL ROOM: 3 - A LITTLE
MOVING TO AND FROM BED TO CHAIR: 3 - A LITTLE
AFFECT: ANXIOUS;WFL
STANDING UP FROM CHAIR USING ARMS: 3 - A LITTLE

## 2021-05-10 NOTE — NURSING NOTE
Pt c/o feeling as if her face was beginning to swell again. Denies chest pain or SOB. No rash present. PO benadryl 25 mg administered @ 1918 without improvement. Dr. Reyes made aware. 20 mg IV Pepcid and 10 mg PO zyrtec ordered and administered @ 1941. 4 mg PO Dilaudid administered as well for 9/10 knee pain. Shortly following, pt stated her symptoms had resolved.

## 2021-05-10 NOTE — NURSING NOTE
"Pt reports bladder \"pressure,\" states \"I feel like I might be getting a UTI.\" Ericka HAIDER notified. Orders received to obtained urinalysis.   "

## 2021-05-10 NOTE — PLAN OF CARE
Problem: Adult Inpatient Plan of Care  Goal: Readiness for Transition of Care  Outcome: Progressing     Problem: Adult Inpatient Plan of Care  Goal: Readiness for Transition of Care  Intervention: Mutually Develop Transition Plan  Flowsheets  Taken 5/10/2021 1134 by Amanda Rosenthal LSW  Anticipated Discharge Disposition: skilled nursing facility  Anticipated Changes Related to Illness: none  Concerns Comments: s/p ORIF of left patella  Transportation Concerns: car, none  Patient/Family Anticipated Services at Transition:  • rehabilitation services  • skilled nursing  Patient/Family Anticipates Transition to: (SNF) other (see comments)  Transportation Anticipated: agency  Concerns to be Addressed: discharge planning  Patient's Choice of Community Agency(s): Preference for [facility]  Taken 5/8/2021 0010 by Mapes, Claudette, RN  Assistive Device/Animal Currently Used at Home: none     S/p ORIF of left patella.  PT rec SNF.  SW met w/ pt bedside, pt resides in 2 story home, has a first floor set up, stall shower, Independent with ambulation prior to injury, no DME in the home.  Pt prefers [facility], referral sent and bed confirmed with Lottie in admissions for tmwr, wcv at 11:00am pt in agreement, pt agreeable to wcv cost, IMM completed, RN given # for report.    Discharge Plan  [facility]  Wcv at 11:00am on 5/11/21  RN# [phone]  IMM completed  Will need updated covid test

## 2021-05-10 NOTE — PROGRESS NOTES
The patient is out of bed to the commode this morning.  She notes persistent pain in her knee but is overall improved from today.    She did feel that had a reaction last evening.  This was well controlled with Benadryl and Pepcid.    She denies any chest pain or shortness of breath.  She denies any palpitations.  She denies any cough or sputum production.  She denies coughing of blood.    She denies any facial swelling.  She denies any difficulty swallowing.  She denies any swelling of her jaw or throat.    She denies any abdominal pain or cramping.  She denies any bright red blood per rectum or black tarry stool.    Physical exam an awake and alert female  Skin: Warm dry.  Stable rash  HEENT: Sclera white conjunctiva pink  Heart: Regular rate and rhythm  Lungs: Without rales rhonchi or wheeze  Abdomen: Soft bowel sounds present  Right lower extremity without cyanosis clubbing or edema  Left lower extremity in a brace.  Dry dressing over her knee    Laboratory data-pending    Problem #1 iron deficiency-this is been felt secondary to intermittent bleeding from hemorrhoids and AVMs.  Her iron levels are actually adequate although she does maintain a microcytosis.  We will hold on IV iron therapy in the hospital as, the formulation here is different from what she received in the office and, I would be concerned about any type of reaction especially on the heels of her recent surgery.  We will monitor her counts closely    Problem #2 status post left patellar fracture-she underwent surgical intervention.  She is recovering.  She will initiate physical therapy    Problem #3 mastocytosis-patient is being managed without intervention.  She has had some reactions likely secondary to the anesthesia and/or the pain and or surgery.  She is being monitored with Benadryl and Pepcid as needed.  For more severe reactions we could proceed with the Solu-Medrol.  I am hopeful these will be diminishing.  A tryptase level is  pending    She follows ultimately at the [facility] for her mastocytosis    Problem #4 level of care-the patient is full code

## 2021-05-10 NOTE — PATIENT CARE CONFERENCE
Care Progression Rounds Note  Date: 5/10/2021  Time: 10:02 AM     Patient Name: Ritu Ramirez     Medical Record Number: [id number]   YOB: 1955  Sex: Female      Room/Bed: 5436    Admitting Diagnosis: Closed displaced transverse fracture of left patella, initial encounter [S82.032A]   Admit Date/Time: 5/7/2021  7:28 PM    Primary Diagnosis: Closed displaced transverse fracture of left patella  Principal Problem: Closed displaced transverse fracture of left patella    GMLOS: 3.7  Anticipated Discharge Date: 5/10/2021    AM-PAC:  Mobility Score: 17    Discharge Planning:  Current Living Arrangements: home/apartment/condo    Barriers to Discharge:  Barriers to Discharge: Medical issues not resolved, Facility availability    Participants:  advanced practice provider, , nursing, social work/services, physical therapy

## 2021-05-10 NOTE — PROGRESS NOTES
Orthopaedics Progress Note    [S]  No acute distress. Pain better controlled this AM. Worked with PT to get OOB yesterday. Denies CP/SOB/n/v. AVSS.    [O]   Vitals:    05/10/21 0427   BP:    Pulse:    Resp:    Temp:    SpO2: 99%       Physical Exam:  SILT  +DP/PT  +EHL/FHL/PF/DF  Dressing C/d/i  Hinged knee brace locked in extension    [A/P]   65 y.o. female status post left patella orif 5/8/2021 doing well    -DVT prophylaxis > eliquis 2.5 bid  -Weight bearing as tolerated with knee brace locked in extension  -Analgesia  -Physical therapy    Jim Yoder MD

## 2021-05-10 NOTE — ASSESSMENT & PLAN NOTE
status post left patella orif   Cont w pain management. rec PT/OT eval and treat. Further management per ortho surgery team + monitor. rec DVT proph per ortho rec.

## 2021-05-10 NOTE — PLAN OF CARE
Plan of Care Review  Plan of Care Reviewed With: patient  Progress: improving  Outcome Summary: OOB x1 assist with brace locked in extension. PO dilaudid 4mg ATC and tylenol PRN for pain relief. Voiding without difficulty. +Small mastocytisis reaction, benadryl PO and pepcid IV with improvement.

## 2021-05-10 NOTE — PROGRESS NOTES
Hospital Medicine Service -  Daily Progress Note       SUBJECTIVE   Interval History: pt seen and examined. Resting in chair. No chest pain/dyspnea. Reports feeling better overall. No fever/chils.      OBJECTIVE      Vital signs in last 24 hours:  Temp:  [36.5 °C (97.7 °F)-37.2 °C (99 °F)] 37.2 °C (99 °F)  Heart Rate:  [59-66] 59  Resp:  [18] 18  BP: (162-186)/(80-98) 186/98    Intake/Output Summary (Last 24 hours) at 5/10/2021 1036  Last data filed at 5/10/2021 1000  Gross per 24 hour   Intake --   Output 1050 ml   Net -1050 ml       PHYSICAL EXAMINATION      Physical Exam  Constitutional:       Appearance: She is not diaphoretic.   Eyes:      General: No scleral icterus.     Extraocular Movements: Extraocular movements intact.   Cardiovascular:      Rate and Rhythm: Regular rhythm.      Heart sounds: No friction rub. No gallop.    Pulmonary:      Effort: Pulmonary effort is normal. No respiratory distress.      Breath sounds: No wheezing or rales.   Chest:      Chest wall: No tenderness.   Abdominal:      General: Bowel sounds are normal. There is no distension.      Palpations: Abdomen is soft.      Tenderness: There is no abdominal tenderness. There is no guarding or rebound.   Neurological:      Mental Status: She is alert and oriented to person, place, and time.   Psychiatric:         Judgment: Judgment normal.        LINES, CATHETERS, DRAINS, AIRWAYS, AND WOUNDS   Lines, Drains, Airways, Wounds:  Peripheral IV 05/07/21 Left Antecubital (Active)   Number of days: 3       Surgical Incision Knee Left (Active)   Number of days: 2       Comments:      LABS / IMAGING / TELE      Labs  Results from last 7 days   Lab Units 05/09/21  0313   WBC K/uL 14.14*   HEMOGLOBIN g/dL 10.7*   HEMATOCRIT % 35.0   PLATELETS K/uL 303     Results from last 7 days   Lab Units 05/09/21  0313   SODIUM mEQ/L 138   POTASSIUM mEQ/L 3.6   CHLORIDE mEQ/L 102   CO2 mEQ/L 26   BUN mg/dL 13   CREATININE mg/dL 0.6   CALCIUM mg/dL 8.8*    GLUCOSE mg/dL 133*       IMAGING  X-RAY KNEE LEFT 1 OR 2 VIEWS    Result Date: 5/10/2021  IMPRESSION: Internal fixation patellar fracture COMMENT: Two images obtained intraoperatively demonstrate two screws and a wire, placed during internal fixation of a patellar fracture. REFERENCE AIR KERMA: 3.1 mGy.    X-RAY KNEE LEFT 1 OR 2 VIEWS    Result Date: 5/9/2021  IMPRESSION: As above.    X-RAY KNEE LEFT 4+ VIEWS    Result Date: 5/8/2021  IMPRESSION: Displaced patellar fracture. The emergency department is aware of this fracture.     BI SCREENING MAMMOGRAM BILATERAL(TOMOSYNTHESIS)    Result Date: 4/27/2021  IMPRESSION: 1.  No evidence of malignancy. Annual screening mammography is recommended. Written results will be conveyed to the patient. FINAL ASSESSMENT - BI-RADS CATEGORY 1 - NEGATIVE (NOR NC D)   SCATTERED The patient's information has been entered into our automated reminder system with a target due date for her next mammogram. --- Please note that a report that is negative for malignancy should not delay biopsy if there is a dominant or clinically suspicious mass, as some cancers are not visualized by mammography. ---    X-RAY CHEST 1 VIEW    Result Date: 5/8/2021  IMPRESSION: No evidence of acute pulmonary disease.     FL FLUOROSCOPY TECHNICAL ASSISTANCE    Result Date: 5/10/2021  IMPRESSION: Internal fixation patellar fracture COMMENT: Two images obtained intraoperatively demonstrate two screws and a wire, placed during internal fixation of a patellar fracture. REFERENCE AIR KERMA: 3.1 mGy.      ASSESSMENT AND PLAN      Leukocytosis  Assessment & Plan  Recommend trend wbc and monitor for signs of infection.     Essential hypertension  Assessment & Plan  Continue amlodipine  hctz started  Cont to monitor.     Systemic mastocytosis  Assessment & Plan  Under the care of [facility] and Dr. Unique white f/u zak appreciated.   Cont to monitor.     * Closed displaced transverse fracture of left  patella  Assessment & Plan  status post left patella orif   Cont w pain management. rec PT/OT eval and treat. Further management per ortho surgery team + monitor. rec DVT proph per ortho rec.           VTE Prophylaxis Plan: Pharmacological DVT prophylaxis and timing of such per the discretion of the surgeon. Strongly recommend maintain sequential compression device  Code Status: Full Code  Estimated Discharge Date: 5/10/2021     Nimesh Barker DO  5/10/2021

## 2021-05-10 NOTE — PLAN OF CARE
Plan of Care Review  Plan of Care Reviewed With: patient  Progress: improving  Outcome Summary: Pt OOB with assist x1 and walker. Reports pain control with current regimen. Discharge plan: D/C [facility] 5/11 at 11:00am.

## 2021-05-10 NOTE — PROGRESS NOTES
"Daily Progress Note    Subjective    Interval History: POD#2. Doing well. . BP high    Objective    Vital signs in last 24 hours:  Temp:  [36.5 °C (97.7 °F)-37.2 °C (99 °F)] 37.2 °C (99 °F)  Heart Rate:  [59-66] 59  Resp:  [18] 18  BP: (162-186)/(80-98) 186/98      Intake/Output Summary (Last 24 hours) at 5/10/2021 1019  Last data filed at 5/10/2021 1000  Gross per 24 hour   Intake --   Output 1050 ml   Net -1050 ml       Physical Exam:  Visit Vitals  BP (!) 186/98 (BP Location: Right upper arm, Patient Position: Sitting)   Pulse (!) 59   Temp 37.2 °C (99 °F) (Temporal)   Resp 18   Ht 1.549 m (5' 1\")   Wt 58.7 kg (129 lb 6.4 oz)   SpO2 94%   Breastfeeding No   BMI 24.45 kg/m²               Lungs:     Clear to auscultation bilaterally, respirations unlabored   Chest wall:    No tenderness or deformity   Heart:    Regular rate and rhythm, S1 and S2 normal, no murmur, rub   or gallop   Extremities: No edema seen bilaterally                 •  acetaminophen, 650 mg, oral, q4h PRN  •  [START ON 5/11/2021] amLODIPine, 10 mg, oral, Daily  •  apixaban, 2.5 mg, oral, BID **FOLLOWED BY** [DISCONTINUED] apixaban, 2.5 mg, oral, BID  •  glucose, 16-32 g of dextrose, oral, PRN **OR** dextrose, 15-30 g of dextrose, oral, PRN **OR** glucagon, 1 mg, intramuscular, PRN **OR** dextrose in water, 25 mL, intravenous, PRN  •  diphenhydrAMINE, 25 mg, oral, q6h PRN  •  diphenhydrAMINE, 25 mg, oral, Once PRN  •  EPINEPHrine, 0.3 mg, subcutaneous, PRN  •  escitalopram, 10 mg, oral, Daily  •  famotidine, 20 mg, intravenous, q12h PRN  •  hydrochlorothiazide, 25 mg, oral, Daily  •  HYDROmorphone, 2-4 mg, oral, q4h PRN  •  potassium chloride, 10 mEq, oral, Daily  •  rosuvastatin, 10 mg, oral, Daily (6p)  •  senna, 1 tablet, oral, BID    Labs  Lab Results   Component Value Date    WBC 14.14 (H) 05/09/2021    HGB 10.7 (L) 05/09/2021    HCT 35.0 05/09/2021     05/09/2021    CHOL 148 08/11/2020    TRIG 94 08/11/2020    HDL 79 08/11/2020    " ALT 21 11/20/2020    AST 35 11/20/2020     05/09/2021    K 3.6 05/09/2021     05/09/2021    CREATININE 0.6 05/09/2021    BUN 13 05/09/2021    CO2 26 05/09/2021    TSH 3.050 08/11/2020    INR 1.0 05/07/2021       Lab Results   Component Value Date    TROPONINI <0.02 05/08/2021       Imaging  I have independently reviewed the pertinent imaging from the last 24 hrs.    ECG/Telemetry  I have independently reviewed the telemetry. No events for the last 24 hours.     Assessment & Plan    Preoperative examination  Assessment & Plan  Stable pod #1  Essential hypertension  Assessment & Plan  Continue to monitor.  Will increase amlodipine to 10 mg daily.    Systemic mastocytosis  Assessment & Plan  Heme one consult pending.           Khang Piper MD  5/10/2021

## 2021-05-10 NOTE — PROGRESS NOTES
Patient: Ritu Ramirez  Location: [facility] 5PAV 5436  MRN: [id number]  Today's date: 5/10/2021     Patient returned to seated position in recliner, NAD  Chair alarm activated  All personal needs within reach  Nsg notified of pt's performance       Ritu is a 65 y.o. female admitted on 5/7/2021 with Closed displaced transverse fracture of left patella, initial encounter [S82.032A]. Principal problem is Closed displaced transverse fracture of left patella.    Past Medical History  Ritu has a past medical history of Anemia, Anxiety, Hypertension, Iron deficiency, Lipid disorder, Systemic mastocytosis, and Vitamin D deficiency.    History of Present Illness   Ritu Ramirez is a 65 y.o. female with PMH systemic mastocytosis, HTN, HLD, anemia requiring transfusions presenting with left knee pain after a fall. Patient tripped over a step at a restaurant this evening and fell directly onto left knee. She did not hit her head or lose consciousness. S/p L Knee ORIF, WBAT in knee brace locked in extension        PT Vitals    Date/Time Pulse SpO2 Pt Activity O2 Therapy BP BP Method Pt Position [facility]   05/10/21 0759 62 95 % At rest None (Room air) 171/86 Automatic Lying LA      PT Pain    Date/Time Pain Type Side/Orientation Location Rating: Rest Rating: Activity Interventions [facility]   05/10/21 0759 Pain Assessment left knee 7 10 position adjusted LA          Prior Living Environment      Most Recent Value   Current Living Arrangements  home/apartment/condo   Living Environment Comment  2sh with S.O., first floor setup, stall shower          Prior Level of Function      Most Recent Value   Dominant Hand  right   Ambulation  independent   Transferring  independent   Toileting  independent   Bathing  independent   Dressing  independent   Eating  independent   Communication  understands/communicates without difficulty   Assistive Device Currently Used at Home  none          PT Evaluation and Treatment - 05/10/21 0759        PT Time  Calculation    Start Time  0759     Stop Time  0815     Time Calculation (min)  16 min        Session Details    Document Type  daily treatment/progress note     Mode of Treatment  physical therapy        General Information    Patient Profile Reviewed  yes     Onset of Illness/Injury or Date of Surgery  05/07/21     General Observations of Patient  Pt received supine in bed, agreeable to PT     Existing Precautions/Restrictions  fall;cardiac;weight bearing;brace worn at all times        Weight-bearing Status    Left LE Weight-Bearing Status  weight-bearing as tolerated (WBAT)    w/ HKB in ext       Cognition/Psychosocial    Affect/Mental Status (Cognition)  anxious;WFL     Orientation Status (Cognition)  oriented x 3     Follows Commands (Cognition)  WFL     Cognitive Function  attention deficit     Attention Deficit (Cognition)  minimal deficit;perseverates on recent conversation     Comment, Attention  AAOx3, very pleasant- mildly anxious regarding her pain level and limited mobility tolerance        Bed Mobility    Bed Mobility  supine to sit to supine     [place], Supine to Sit  1 person assist;minimum assist (75% or more patient effort);verbal cues     Verbal Cues (Supine to Sit)  safety;technique     Assistive Device  head of bed elevated;bed rails     Comment (Bed Mobility)  req min A to negotiate LLE 2/2 pain and inability to lift LLE off bed        Transfers    Transfers  other (see comments)     Maintains Weight-Bearing Status (Transfers)  able to maintain        Sit to Stand Transfer    [place], Sit to Stand Transfer  1 person assist;minimum assist (75% or more patient effort);verbal cues     Verbal Cues  safety;technique     Assistive Device  walker, front-wheeled     Comment  from EOB, pt unable to allow LLE to rest on floor, therapist holds LLE while pt is sitting at EOB        Stand to Sit Transfer    [place], Stand to Sit Transfer  1 person assist;minimum assist (75% or more patient  effort);verbal cues     Verbal Cues  safety;technique     Assistive Device  walker, front-wheeled     Comment  prolonged std-sit tx 2/2 p!, able to reach back for arm rests to control descent w/ VCs provided        Gait Training    [place], Gait  touching/steadying assist     Assistive Device  walker, front-wheeled     Distance in Feet  5 feet     Pattern (Gait)  step-to     Deviations/Abnormal Patterns (Gait)  step length decreased;stride length decreased;weight shifting decreased;antalgic     Maintains Weight-bearing Status (Gait)  able to maintain     Comment (Gait/Stairs)  pt w/ limited tolerance to WB to LLE 2/2 p!- short step to gait pattern, effortful, increased WB through UE, unable to tolerate further ambulation        Stairs Training    [place], Stairs  unable to assess    2/2 p!       Safety Issues, Functional Mobility    Impairments Affecting Function (Mobility)  endurance/activity tolerance;pain;balance        Balance    Balance Assessment  sitting static balance;sit to stand dynamic balance;standing static balance;standing dynamic balance     Static Sitting Balance  mild impairment;unsupported;sitting, edge of bed     Sit to Stand Dynamic Balance  moderate impairment;supported     Static Standing Balance  moderate impairment;supported     Comment, Balance  minimal std tolerance 2/2 p!        AM-PAC (TM) - Mobility (Current Function)    Turning from your back to your side while in a flat bed without using bedrails?  3 - A Little     Moving from lying on your back to sitting on the side of a flat bed without using bedrails?  3 - A Little     Moving to and from a bed to a chair?  3 - A Little     Standing up from a chair using your arms?  3 - A Little     To walk in a hospital room?  3 - A Little     Climbing 3-5 steps with a railing?  2 - A Lot     AM-PAC (TM) Mobility Score  17        Therapy Assessment/Plan (PT)    Rehab Potential (PT)  fair, will monitor progress closely     Therapy  Frequency (PT)  3-5 times/wk     Criteria for Skilled Interventions Met (PT)  yes     Problem List (PT)  coordination;mobility;pain;balance     Activity Limitations Related to Problem List  unable to ambulate safely;unable to transfer safely     Planned Therapy Interventions (PT)  balance training;bed mobility training;gait training;transfer training;patient/family education        Progress Summary (PT)    Daily Outcome Statement (PT)  5/10/21 Pt is a 65 year old s/p L patella ORIF- she is AAOx3, very cooperative; reports severe pain at rest and with activity; given the pt's pain level and limited activity tolerance- she requires assist for all functional tasks, unable to ambulate > household length distances- pt open to receiving rehab at a SNF at [place] to promote her mobility level prior to returning home      Symptoms Noted During/After Treatment  increased pain     Progress Toward Functional Goals (PT)  progressing toward functional goals slower than expected        Therapy Plan Review/Discharge Plan (PT)    PT Recommended Discharge Disposition  skilled nursing facility     Anticipated Equipment Needs at Discharge (PT)  none                       Education Documentation  No documentation found.        PT Goals      Most Recent Value   Bed Mobility Goal 1   Activity/Assistive Device  bed mobility activities, all at 05/09/2021 1016   [place]  independent at 05/09/2021 1016   Time Frame  by discharge at 05/09/2021 1016   Progress/Outcome  goal ongoing at 05/09/2021 1016   Transfer Goal 1   Activity/Assistive Device  all transfers, walker, front-wheeled at 05/09/2021 1016   [place]  modified independence at 05/09/2021 1016   Time Frame  by discharge at 05/09/2021 1016   Progress/Outcome  goal ongoing at 05/09/2021 1016   Gait Training Goal 1   Activity/Assistive Device  gait (walking locomotion), walker, front-wheeled at 05/09/2021 1016   [place]  modified independence at 05/09/2021 1016   Distance  50  at 05/09/2021 1016   Time Frame  by discharge at 05/09/2021 1016   Progress/Outcome  goal ongoing at 05/09/2021 1016   Stairs Goal 1   Activity/Assistive Device  stairs, all skills, walker, front-wheeled at 05/09/2021 1016   [place]  modified independence at 05/09/2021 1016   Number of Stairs  1 [able to use RW up one KANDY home per patient report] at 05/09/2021 1016   Time Frame  by discharge at 05/09/2021 1016   Progress/Outcome  goal ongoing at 05/09/2021 1016

## 2021-05-10 NOTE — ASSESSMENT & PLAN NOTE
Under the care of [facility] and Dr. Unique white f/u zak appreciated.   Cont to monitor.

## 2021-05-11 VITALS
BODY MASS INDEX: 24.43 KG/M2 | HEIGHT: 61 IN | RESPIRATION RATE: 16 BRPM | DIASTOLIC BLOOD PRESSURE: 89 MMHG | TEMPERATURE: 98.9 F | SYSTOLIC BLOOD PRESSURE: 140 MMHG | OXYGEN SATURATION: 96 % | WEIGHT: 129.4 LBS | HEART RATE: 70 BPM

## 2021-05-11 LAB — TRYPTASE SERPL-MCNC: 31.9 MCG/L

## 2021-05-11 PROCEDURE — 99232 SBSQ HOSP IP/OBS MODERATE 35: CPT | Performed by: HOSPITALIST

## 2021-05-11 PROCEDURE — 63700000 HC SELF-ADMINISTRABLE DRUG: Performed by: INTERNAL MEDICINE

## 2021-05-11 PROCEDURE — 63700000 HC SELF-ADMINISTRABLE DRUG: Performed by: STUDENT IN AN ORGANIZED HEALTH CARE EDUCATION/TRAINING PROGRAM

## 2021-05-11 PROCEDURE — 63700000 HC SELF-ADMINISTRABLE DRUG: Performed by: NURSE PRACTITIONER

## 2021-05-11 RX ORDER — WITCH HAZEL 50 %
1 PADS, MEDICATED (EA) TOPICAL DAILY
Status: ON HOLD | COMMUNITY
End: 2021-05-11 | Stop reason: SDUPTHER

## 2021-05-11 RX ORDER — HYDROMORPHONE HYDROCHLORIDE 2 MG/1
2-4 TABLET ORAL EVERY 4 HOURS PRN
Qty: 10 TABLET | Refills: 0 | Status: SHIPPED | OUTPATIENT
Start: 2021-05-11 | End: 2021-05-16

## 2021-05-11 RX ORDER — ACETAMINOPHEN 325 MG/1
650 TABLET ORAL EVERY 4 HOURS PRN
Start: 2021-05-11 | End: 2021-06-10

## 2021-05-11 RX ORDER — NICOTINE POLACRILEX 2 MG
1 GUM BUCCAL DAILY
COMMUNITY
End: 2024-12-09

## 2021-05-11 RX ORDER — AMLODIPINE BESYLATE 10 MG/1
10 TABLET ORAL DAILY
Qty: 30 TABLET | Refills: 0 | Status: ON HOLD
Start: 2021-05-12 | End: 2021-12-24 | Stop reason: ENTERED-IN-ERROR

## 2021-05-11 RX ORDER — AMOXICILLIN 250 MG
1 CAPSULE ORAL 2 TIMES DAILY
Qty: 60 TABLET | Refills: 0 | Status: ON HOLD
Start: 2021-05-11 | End: 2021-12-24 | Stop reason: ENTERED-IN-ERROR

## 2021-05-11 RX ORDER — WITCH HAZEL 50 %
1 PADS, MEDICATED (EA) TOPICAL DAILY
Qty: 30 TABLET | Refills: 0 | Status: ON HOLD
Start: 2021-05-11 | End: 2021-12-24 | Stop reason: ENTERED-IN-ERROR

## 2021-05-11 RX ORDER — POLYETHYLENE GLYCOL 3350 17 G/17G
17 POWDER, FOR SOLUTION ORAL DAILY PRN
Status: ON HOLD
Start: 2021-05-11 | End: 2021-12-24 | Stop reason: ENTERED-IN-ERROR

## 2021-05-11 RX ORDER — DIPHENHYDRAMINE HCL 25 MG
25 CAPSULE ORAL EVERY 6 HOURS PRN
Start: 2021-05-11 | End: 2021-06-10

## 2021-05-11 RX ORDER — FAMOTIDINE 20 MG/1
20 TABLET, FILM COATED ORAL 2 TIMES DAILY PRN
Status: ON HOLD
Start: 2021-05-11 | End: 2021-12-24 | Stop reason: ENTERED-IN-ERROR

## 2021-05-11 RX ADMIN — APIXABAN 2.5 MG: 2.5 TABLET, FILM COATED ORAL at 09:15

## 2021-05-11 RX ADMIN — POTASSIUM CHLORIDE 10 MEQ: 750 TABLET, FILM COATED, EXTENDED RELEASE ORAL at 09:15

## 2021-05-11 RX ADMIN — ACETAMINOPHEN 650 MG: 325 TABLET, FILM COATED ORAL at 09:15

## 2021-05-11 RX ADMIN — HYDROCHLOROTHIAZIDE 25 MG: 25 TABLET ORAL at 09:15

## 2021-05-11 RX ADMIN — SENNOSIDES 1 TABLET: 8.6 TABLET, FILM COATED ORAL at 09:15

## 2021-05-11 RX ADMIN — HYDROMORPHONE HYDROCHLORIDE 4 MG: 2 TABLET ORAL at 02:06

## 2021-05-11 RX ADMIN — HYDROMORPHONE HYDROCHLORIDE 4 MG: 2 TABLET ORAL at 10:39

## 2021-05-11 RX ADMIN — AMLODIPINE BESYLATE 10 MG: 10 TABLET ORAL at 09:15

## 2021-05-11 RX ADMIN — ESCITALOPRAM 10 MG: 10 TABLET, FILM COATED ORAL at 09:15

## 2021-05-11 RX ADMIN — HYDROMORPHONE HYDROCHLORIDE 4 MG: 2 TABLET ORAL at 06:28

## 2021-05-11 NOTE — ASSESSMENT & PLAN NOTE
Under the care of [facility] and Dr. Unique white f/u zak appreciated.   Cont to monitor.

## 2021-05-11 NOTE — PROGRESS NOTES
Hospital Medicine Service -  Daily Progress Note       SUBJECTIVE   Interval History: pt seen and examined. Resting in bed. No chest pain/dyspnea.      OBJECTIVE      Vital signs in last 24 hours:  Temp:  [36.5 °C (97.7 °F)-37.2 °C (98.9 °F)] 37.2 °C (98.9 °F)  Heart Rate:  [58-70] 70  Resp:  [16-17] 16  BP: (139-165)/(74-89) 140/89    Intake/Output Summary (Last 24 hours) at 5/11/2021 0993  Last data filed at 5/11/2021 0545  Gross per 24 hour   Intake --   Output 950 ml   Net -950 ml       PHYSICAL EXAMINATION      Physical Exam  Constitutional:       Appearance: She is not diaphoretic.   Eyes:      General: No scleral icterus.     Extraocular Movements: Extraocular movements intact.   Cardiovascular:      Rate and Rhythm: Regular rhythm.      Heart sounds: No friction rub. No gallop.    Pulmonary:      Effort: No respiratory distress.      Breath sounds: No stridor. No wheezing, rhonchi or rales.   Abdominal:      General: Bowel sounds are normal. There is no distension.      Palpations: Abdomen is soft.      Tenderness: There is no abdominal tenderness. There is no guarding or rebound.   Skin:     General: Skin is warm.   Neurological:      Mental Status: She is alert and oriented to person, place, and time.   Psychiatric:         Mood and Affect: Mood normal.         Judgment: Judgment normal.        LINES, CATHETERS, DRAINS, AIRWAYS, AND WOUNDS   Lines, Drains, Airways, Wounds:  Peripheral IV 05/07/21 Left Antecubital (Active)   Number of days: 4       Surgical Incision Knee Left (Active)   Number of days: 3       Comments:      LABS / IMAGING / TELE      Labs  Results from last 7 days   Lab Units 05/09/21  0313   WBC K/uL 14.14*   HEMOGLOBIN g/dL 10.7*   HEMATOCRIT % 35.0   PLATELETS K/uL 303     Results from last 7 days   Lab Units 05/09/21  0313   SODIUM mEQ/L 138   POTASSIUM mEQ/L 3.6   CHLORIDE mEQ/L 102   CO2 mEQ/L 26   BUN mg/dL 13   CREATININE mg/dL 0.6   CALCIUM mg/dL 8.8*   GLUCOSE mg/dL 133*        IMAGING  X-RAY KNEE LEFT 1 OR 2 VIEWS    Result Date: 5/10/2021  IMPRESSION: Internal fixation patellar fracture COMMENT: Two images obtained intraoperatively demonstrate two screws and a wire, placed during internal fixation of a patellar fracture. REFERENCE AIR KERMA: 3.1 mGy.    X-RAY KNEE LEFT 1 OR 2 VIEWS    Result Date: 5/9/2021  IMPRESSION: As above.    X-RAY KNEE LEFT 4+ VIEWS    Result Date: 5/8/2021  IMPRESSION: Displaced patellar fracture. The emergency department is aware of this fracture.     BI SCREENING MAMMOGRAM BILATERAL(TOMOSYNTHESIS)    Result Date: 4/27/2021  IMPRESSION: 1.  No evidence of malignancy. Annual screening mammography is recommended. Written results will be conveyed to the patient. FINAL ASSESSMENT - BI-RADS CATEGORY 1 - NEGATIVE (NOR NC D)   SCATTERED The patient's information has been entered into our automated reminder system with a target due date for her next mammogram. --- Please note that a report that is negative for malignancy should not delay biopsy if there is a dominant or clinically suspicious mass, as some cancers are not visualized by mammography. ---    X-RAY CHEST 1 VIEW    Result Date: 5/8/2021  IMPRESSION: No evidence of acute pulmonary disease.     FL FLUOROSCOPY TECHNICAL ASSISTANCE    Result Date: 5/10/2021  IMPRESSION: Internal fixation patellar fracture COMMENT: Two images obtained intraoperatively demonstrate two screws and a wire, placed during internal fixation of a patellar fracture. REFERENCE AIR KERMA: 3.1 mGy.      ASSESSMENT AND PLAN      Leukocytosis  Assessment & Plan  Recommend trend wbc and monitor for signs of infection.     Essential hypertension  Assessment & Plan  Continue amlodipine/hctz  Cont to monitor.     Systemic mastocytosis  Assessment & Plan  Under the care of [facility] and Dr. Unique white f/u zak appreciated.   Cont to monitor.     * Closed displaced transverse fracture of left patella  Assessment & Plan  status post left  patella orif   Cont w pain management. rec PT/OT eval and treat. Further management per ortho surgery team + monitor. rec DVT proph per ortho rec.             VTE Prophylaxis Plan: Pharmacological DVT prophylaxis and timing of such per the discretion of the surgeon. Strongly recommend maintain sequential compression device  Code Status: Full Code  Estimated Discharge Date: 5/10/2021     Nimesh Barker DO  5/11/2021

## 2021-05-11 NOTE — PLAN OF CARE
Plan of Care Review  Plan of Care Reviewed With: patient  Progress: improving  Outcome Summary: Pt OOB with assistx1 and walker, OOB to commode, requested to get back to bed, not sit up in chair. VSS. Discharge planned for 11:00am today via wheelchair van to [facility].

## 2021-05-11 NOTE — PROGRESS NOTES
Orthopaedics Progress Note    [S]  No acute distress. Pain controlled. Denies CP/SOB/n/v. AVSS. Plan for SNF today.    [O]   Vitals:    05/10/21 2256   BP: 139/78   Pulse: (!) 58   Resp: 17   Temp: 36.8 °C (98.2 °F)   SpO2: 96%       Physical Exam:  SILT  +DP/PT  +EHL/FHL/PF/DF  Dressing C/d/i  Hinged knee brace locked in extension    [A/P]   65 y.o. female status post left patella orif 5/8/2021 doing well    -DVT prophylaxis > eliquis 2.5 bid  -Weight bearing as tolerated with knee brace locked in extension  -Analgesia  -Physical therapy    Jim Yoder MD

## 2021-05-11 NOTE — PROGRESS NOTES
The patient feels overall improved.  She has had no further episodes of facial swelling etc.  She has not required any recent steroids or Benadryl     She denies any chest pain or shortness of breath.  She denies any palpitations.  She denies any cough or sputum production.  She denies coughing of blood.   She denies any difficulty swallowing.  She denies any swelling of her jaw or throat.     She denies any abdominal pain or cramping.  She denies any bright red blood per rectum or black tarry stool.     Physical exam an awake and alert female  Skin: Warm dry.  Stable rash  HEENT: Sclera white conjunctiva pink  Heart: Regular rate and rhythm  Lungs: Without rales rhonchi or wheeze  Abdomen: Soft bowel sounds present  Right lower extremity without cyanosis clubbing or edema  Left lower extremity in a brace.  Dry dressing over her knee     Laboratory data-pending     Problem #1 iron deficiency-this is been felt secondary to intermittent bleeding from hemorrhoids and AVMs.  Her iron levels are actually adequate although she does maintain a microcytosis.  We will hold on IV iron therapy in the hospital as, the formulation here is different from what she received in the office and, I would be concerned about any type of reaction especially on the heels of her recent surgery.  We will monitor her counts closely    We will proceed with outpatient iron replacement therapy as indicated     Problem #2 status post left patellar fracture-she underwent surgical intervention.  She is recovering.  She can physical therapy.  She will be transferred to the [facility] for [facility] therapy.     Problem #3 mastocytosis-patient is being managed without intervention.  She has had some reactions likely secondary to the anesthesia and/or the pain and or surgery.  She is being monitored with Benadryl and Pepcid as needed.  For more severe reactions we could proceed with the Solu-Medrol.    Fortunately she has had no episodes in the last 24  hours.     She follows [facility] at the [facility] for her mastocytosis    Problem #4 elevated white count-this likely reflects the steroids and will be followed closely.    Problem #5 DVT prophylaxis-as her left leg is essentially immobile, she has been placed on Eliquis as per orthopedics.  This is appropriate.  I would continue with stool softener to diminish irritation of the hemorrhoids and subsequent bleeding.  I would recommend she remain on anticoagulation until she is mobile, unless indicated otherwise    Problem #6 cardiovascular-she remains on Norvasc HydroDIURIL and Crestor.     Problem #7 level of care-the patient is full code

## 2021-05-11 NOTE — DISCHARGE INSTRUCTIONS
Blood pressure: Your blood pressure was elevated and your norvasc was increased to 10 mg daily (home dose is 5 mg daily) Please continue Norvasc 10 mg daily and adjust based on blood pressures.      DVT prophylaxis:  Eliquis 2.5 mg twice a day for 4 weeks to prevent blood clots. Please continue with stool softeners while taking Eliquis to prevent bleeding hemorrhoids.    Weight bearing:   Weight bearing as tolerated left lower extremity with hinged knee brace locked in extension.     Follow up:   Please follow up with Dr. Kiser of Orthopedics in two weeks. Please call the office at [phone] with any questions.  Please follow up with Dr. Calhoun of Hematology on May 28, 2021 at 2 pm.     Mastocytosis-patient is being managed without intervention.  She has had some reactions likely secondary to the anesthesia and/or the pain and or surgery.  She is being monitored with Benadryl and Pepcid as needed. Please continue. Please follow up with Dr. Calhoun of Hematology.     Incision Care  -Please remove your surgical dressing on 5/13/2021. At that time if the wound is dry and not draining, you can leave it uncovered, open to air. If there is drainage, please place 4x4s covered by paper tape over your incision.  -Please do not submerge incision in water, no baths, no swimming, no pools, no hot tubs, etc.   -Please keep incision clean and dry. Once your dressing has been removed, do not scrub the incision in the shower and pat dry with a clean towel not used to dry your body.   -Please make sure your incision(s) are checked for signs and symptoms of infection: If any of the below should occur, please call the office:  -drainage from incision site, opening of incision, increased redness and/or tenderness, fevers greater than 101, flu-like symptoms.  -Please call office or go to ED with any thigh/calf pain or swelling in legs, chest pain, chest congestion, problems with breathing.     Constipation/Pain  Medication:  -Take your pain medication with food. Do not drive while taking pain medication.   -Pain medications may cause constipation.   -If you have not had a bowel movement in 3 days please call the office  - Please take Senna-Colace as prescribed. Hold for loose stool. If you are having difficulties having a bowel   movement or haven't had bowel movement in 2 days, please add over the counter miralax and take as directed on package.   -Drink plenty of fluids and eat fresh fruits and vegetables.  -DO NOT SMOKE! Smoking is not healthy for your healing process.

## 2021-05-11 NOTE — PATIENT CARE CONFERENCE
Care Progression Rounds Note  Date: 5/11/2021  Time: 10:02 AM     Patient Name: Ritu Ramirez     Medical Record Number: [id number]   YOB: 1955  Sex: Female      Room/Bed: 5436    Admitting Diagnosis: Closed displaced transverse fracture of left patella, initial encounter [S82.032A]   Admit Date/Time: 5/7/2021  7:28 PM    Primary Diagnosis: Closed displaced transverse fracture of left patella  Principal Problem: Closed displaced transverse fracture of left patella    GMLOS: 3.7  Anticipated Discharge Date: 5/11/2021    AM-PAC:  Mobility Score: 17    Discharge Planning:  Current Living Arrangements: home/apartment/condo  Anticipated Discharge Disposition: skilled nursing facility    Barriers to Discharge:  Barriers to Discharge: None    Participants:  advanced practice provider, , nursing, social work/services, physical therapy

## 2021-05-12 NOTE — DISCHARGE SUMMARY
Inpatient Discharge Summary    BRIEF OVERVIEW    Admitting Provider: Dr. Ashley Kiser  H&P Notes 4/12/2021 to 5/12/2021         Date of Service   Author Author Type Status Note Type File Time    05/07/21 2233  Jim Yoder MD Resident Attested H&P 05/08/21 1621      Primary Care Physician at Discharge: Ashley Nazario [phone]    Admission Date: 5/7/2021     Discharge Date: 5/11/2021    Primary Discharge Diagnosis  Closed displaced transverse fracture of left patella    Secondary Discharge Diagnosis  Active Hospital Problems    Diagnosis Date Noted   • Leukocytosis 05/10/2021   • Preoperative examination 05/08/2021   • Closed displaced transverse fracture of left patella 05/07/2021   • Systemic mastocytosis 11/29/2018   • Hypokalemia 11/29/2018   • Essential hypertension 11/29/2018      Resolved Hospital Problems   No resolved problems to display.       DETAILS OF HOSPITAL STAY    Operative Procedures Performed  Procedure(s):  OPEN REDUCTION INTERNAL FIXATION PATELLA FRACTURE    Consults:   Consult Notes 4/12/2021 to 5/12/2021         Date of Service   Author Author Type Status Note Type File Time    05/08/21 1233  Alicja Snowden MD Physician Signed Consults 05/08/21 1447    05/08/21 1107  Khang Piper MD Physician Signed Consults 05/08/21 1110    05/08/21 0209  Evie Zimmerman MD Physician Signed Consults 05/08/21 0303    05/07/21 2204  Jim Yoder MD Resident Attested Consults 05/08/21 162          Consult Orders During Admission:  IP CONSULT TO HOSPITALIST  IP CONSULT TO HEMATOLOGY/ONCOLOGY  IP CONSULT TO CARDIOLOGY       Imaging  X-RAY KNEE LEFT 1 OR 2 VIEWS    Result Date: 5/10/2021  IMPRESSION: Internal fixation patellar fracture COMMENT: Two images obtained intraoperatively demonstrate two screws and a wire, placed during internal fixation of a patellar fracture. REFERENCE AIR KERMA: 3.1 mGy.    X-RAY KNEE LEFT 1 OR 2 VIEWS    Result Date: 5/9/2021  IMPRESSION: As  above.    X-RAY KNEE LEFT 4+ VIEWS    Result Date: 5/8/2021  IMPRESSION: Displaced patellar fracture. The emergency department is aware of this fracture.     BI SCREENING MAMMOGRAM BILATERAL(TOMOSYNTHESIS)    Result Date: 4/27/2021  IMPRESSION: 1.  No evidence of malignancy. Annual screening mammography is recommended. Written results will be conveyed to the patient. FINAL ASSESSMENT - BI-RADS CATEGORY 1 - NEGATIVE (NOR NC D)   SCATTERED The patient's information has been entered into our automated reminder system with a target due date for her next mammogram. --- Please note that a report that is negative for malignancy should not delay biopsy if there is a dominant or clinically suspicious mass, as some cancers are not visualized by mammography. ---    X-RAY CHEST 1 VIEW    Result Date: 5/8/2021  IMPRESSION: No evidence of acute pulmonary disease.     FL FLUOROSCOPY TECHNICAL ASSISTANCE    Result Date: 5/10/2021  IMPRESSION: Internal fixation patellar fracture COMMENT: Two images obtained intraoperatively demonstrate two screws and a wire, placed during internal fixation of a patellar fracture. REFERENCE AIR KERMA: 3.1 mGy.      Presenting Problem/History of Present Illness  Closed displaced transverse fracture of left patella, initial encounter [S82.032A]     Ritu Ramirez is a 65 y.o. female with PMH systemic mastocytosis, HTN, HLD, anemia requiring transfusions presenting with left knee pain after a fall. Patient tripped over a step at a restaurant this evening and fell directly onto left knee. She did not hit her head or lose consciousness. She endorses left knee pain, controlled at rest. She also endorses difficulty raising her leg. She denies any numbness or tingling. She denies pain or injury to any other extremity.       Left Lower Extremity  Inspection: Obvious deformity of left knee cap, no signs of skin compromise  Palpation: TTP at kneecap  Motor: Unable to perform straight leg raise  Sensory: SILT  SPN/DPN/T/S/S distributions  Vascular: Palpable DP/PT pulses, Toes WWP  ROM: Active ROM of knee limites due to acute injury     Imaging:  XRs of left knee demonstrate displaced transverse patella fracture    Exam on Day of Discharge  Patient seen and examined on day of discharge.  Left Lower Extremity  SILT  +DP/PT  +EHL/FHL/PF/DF  Dressing C/d/i  Hinged knee brace locked in extension    Hospital Course  Inpatient Discharge Summary    Pt is a 65 y.o. female with displaced left patella fracture. She was admitted to the orthopaedic service. Dr. Kiser recommended ORIF of left patella. The patient was seen by hospital medicine service and hematology pre-operatively for clearance and for steroid stress dosing for prevention of anaphylaxis at time of surgery The patient underwent the procedure on 5/7/2021. Patient tolerated the procedure well. The patient's post-operative course was uncomplicated. She was place din a hinged knee brace and allowed to bear weight wit her leg in full extension. The patient was seen by the orthopaedic and hospital medicine service and progressed to the point that on the day of discharge she was voiding without difficulty, tolerating a house diet, and was comfortable with all post-operative activity restrictions. At discharge the patient's vital signs were stable and the surgical incision site was clean, dry and intact. The patient was discharged on 5/11/2021 and instructed to take apixaban 2.5 mg BID  for DVT prophylaxis, continue pain medications and bowel regimen as needed, resume home medications except for those marked as held on discharge instructions, and to follow up with her surgeon. The patient was advised to call the office with any problems, including drainage from the incision, redness around the incision, worsening pain and fever greater than 101 degrees F.     Jim Yoder MD        Discharge Orders     Medication List      START taking these medications    acetaminophen 325  mg tablet  Commonly known as: TYLENOL  Take 2 tablets (650 mg total) by mouth every 4 (four) hours as needed for moderate pain.  Dose: 650 mg     apixaban 2.5 mg tablet  Commonly known as: ELIQUIS  Take 1 tablet (2.5 mg total) by mouth 2 (two) times a day Indications: blood clot in a deep vein of the extremities, a clot in the lung. To prevent blood clots.  Dose: 2.5 mg     diphenhydrAMINE 25 mg capsule  Commonly known as: BENADRYL  Take 1 capsule (25 mg total) by mouth every 6 (six) hours as needed for itching, allergies or sleep.  Dose: 25 mg     famotidine 20 mg tablet  Commonly known as: PEPCID  Take 1 tablet (20 mg total) by mouth 2 (two) times a day as needed for indigestion.  Dose: 20 mg     HYDROmorphone 2 mg tablet  Commonly known as: DILAUDID  Take 1-2 tablets (2-4 mg total) by mouth every 4 (four) hours as needed for moderate pain for up to 5 days. [place] DMP searched; no red flags.  Dose: 2-4 mg     polyethylene glycol 17 gram packet  Commonly known as: MIRALAX  Take 17 g by mouth daily as needed (constipation) for up to 3 days. For constipation associated with narcotics; hold for loose stools.  Dose: 17 g     sennosides-docusate sodium 8.6-50 mg  Commonly known as: SENNA WITH DOCUSATE SODIUM  Take 1 tablet by mouth 2 (two) times a day. For constipation associated with narcotics; hold for loose stools.  Dose: 1 tablet        CHANGE how you take these medications    amLODIPine 10 mg tablet  Commonly known as: NORVASC  Take 1 tablet (10 mg total) by mouth daily. Can adjust based on blood pressures- home medication is norvasc 5 mg.  Dose: 10 mg  What changed:   · medication strength  · how much to take  · additional instructions     B complex-vitamin C-folic acid 400 mcg tablet tablet  Take 1 tablet by mouth daily. Hold until cleared by Dr. Kiser.  Dose: 1 tablet  What changed: additional instructions        CONTINUE taking these medications    biotin 1 mg capsule  Take by mouth daily. Dose unknown      CALCIUM 500 ORAL  Take by mouth daily. Dose unknown     cholecalciferol (vitamin D3) 25 mcg (1,000 unit) capsule  Take 1,000 Units by mouth daily.  Dose: 1,000 Units     EPIPEN 2-ZARINA 0.3 mg/0.3 mL injection syringe  Inject 1 Syringe into the thigh as needed for anaphylaxis.  Dose: 1 Syringe  Generic drug: EPINEPHrine     escitalopram 10 mg tablet  Commonly known as: LEXAPRO  TAKE 1 TABLET BY MOUTH EVERY DAY     hydrochlorothiazide 25 mg tablet  Commonly known as: HYDRODIURIL  Take 25 mg by mouth daily.  Dose: 25 mg     KLOR-CON 8 8 mEq CR tablet  Take 16 mEq by mouth daily.  Dose: 16 mEq  Generic drug: potassium chloride     rosuvastatin 10 mg tablet  Commonly known as: CRESTOR  TAKE 1 TABLET BY MOUTH EVERY DAY          Instructions for after discharge     Discharge diet      Diet Type / Texture: Regular    Follow Up Appointments:      Follow-up appointments include:     Please follow up with Dr. Kiser of Orthopedics in two weeks. Please call the office at [phone] with any questions.  Please follow up with Dr. Calhoun of Hematology on May 28, 2021 at 2 pm.    Level of Care:  Skilled      Occupational Therapy Eval and Treat      Physical Therapy Eval and Treat      Weight Bearing Status      Weight bearing as tolerated in hinged knee brace locked in extension.    Weight bearing as tolerated in hinged knee brace locked in extension.            Outpatient Follow-Ups            In 7 months Bright Selby MD [facility] Interventional Cardiology at [facility]        Referrals:  No orders of the defined types were placed in this encounter.      Active Issues Requiring Follow-up  Issue: continued postoperative care      Test Results Pending at Discharge  Unresulted Labs (From admission, onward) Comment    None            Discharge Disposition  Skilled Nursing Facility - Other   Code Status at Discharge: Prior  Physician Order for Life-Sustaining Treatment Document Status      No  documents found

## 2021-05-13 PROCEDURE — U0003 INFECTIOUS AGENT DETECTION BY NUCLEIC ACID (DNA OR RNA); SEVERE ACUTE RESPIRATORY SYNDROME CORONAVIRUS 2 (SARS-COV-2) (CORONAVIRUS DISEASE [COVID-19]), AMPLIFIED PROBE TECHNIQUE, MAKING USE OF HIGH THROUGHPUT TECHNOLOGIES AS DESCRIBED BY CMS-2020-01-R: HCPCS | Performed by: INTERNAL MEDICINE

## 2021-05-14 ENCOUNTER — LAB REQUISITION (OUTPATIENT)
Dept: LAB | Facility: HOSPITAL | Age: 66
End: 2021-05-14
Attending: INTERNAL MEDICINE
Payer: MEDICARE

## 2021-05-14 DIAGNOSIS — U07.1 COVID-19: ICD-10-CM

## 2021-05-15 LAB — SARS-COV-2 RNA RESP QL NAA+PROBE: NOT DETECTED

## 2021-05-17 RX ORDER — POTASSIUM CHLORIDE 600 MG/1
TABLET, FILM COATED, EXTENDED RELEASE ORAL
Qty: 180 TABLET | Refills: 3 | Status: SHIPPED | OUTPATIENT
Start: 2021-05-17 | End: 2022-02-22 | Stop reason: SDUPTHER

## 2021-05-17 NOTE — TELEPHONE ENCOUNTER
Medicine Refill Request    Last Office Visit: 11/23/2020  Last Telemedicine Visit: 10/13/2020 Ashley Nazario MD    Next Office Visit: Visit date not found  Next Telemedicine Visit: Visit date not found         Current Outpatient Medications:   •  acetaminophen (TYLENOL) 325 mg tablet, Take 2 tablets (650 mg total) by mouth every 4 (four) hours as needed for moderate pain., Disp: , Rfl:   •  amLODIPine (NORVASC) 10 mg tablet, Take 1 tablet (10 mg total) by mouth daily. Can adjust based on blood pressures- home medication is norvasc 5 mg., Disp: 30 tablet, Rfl: 0  •  apixaban (ELIQUIS) 2.5 mg tablet, Take 1 tablet (2.5 mg total) by mouth 2 (two) times a day Indications: blood clot in a deep vein of the extremities, a clot in the lung. To prevent blood clots., Disp: 60 tablet, Rfl: 0  •  B complex-vitamin C-folic acid 400 mcg tablet tablet, Take 1 tablet by mouth daily. Hold until cleared by Dr. Kiser., Disp: 30 tablet, Rfl: 0  •  biotin 1 mg capsule, Take by mouth daily. Dose unknown, Disp: , Rfl:   •  calcium carbonate (CALCIUM 500 ORAL), Take by mouth daily. Dose unknown, Disp: , Rfl:   •  cholecalciferol, vitamin D3, 1,000 unit capsule, Take 1,000 Units by mouth daily.  , Disp: , Rfl:   •  diphenhydrAMINE (BENADRYL) 25 mg capsule, Take 1 capsule (25 mg total) by mouth every 6 (six) hours as needed for itching, allergies or sleep., Disp: , Rfl:   •  EPINEPHrine (EPIPEN 2-ZARINA) 0.3 mg/0.3 mL injection syringe, Inject 1 Syringe into the thigh as needed for anaphylaxis. , Disp: , Rfl:   •  escitalopram (LEXAPRO) 10 mg tablet, TAKE 1 TABLET BY MOUTH EVERY DAY (Patient taking differently: Take 10 mg by mouth daily.  ), Disp: 30 tablet, Rfl: 11  •  famotidine (PEPCID) 20 mg tablet, Take 1 tablet (20 mg total) by mouth 2 (two) times a day as needed for indigestion., Disp: , Rfl:   •  hydrochlorothiazide (HYDRODIURIL) 25 mg tablet, Take 25 mg by mouth daily.  , Disp: , Rfl: 3  •  polyethylene glycol  (MIRALAX) 17 gram packet, Take 17 g by mouth daily as needed (constipation) for up to 3 days. For constipation associated with narcotics; hold for loose stools., Disp: , Rfl:   •  potassium chloride (KLOR-CON 8) 8 mEq CR tablet, Take 16 mEq by mouth daily., Disp: , Rfl:   •  rosuvastatin (CRESTOR) 10 mg tablet, TAKE 1 TABLET BY MOUTH EVERY DAY (Patient taking differently: Take 10 mg by mouth daily.  ), Disp: 30 tablet, Rfl: 11  •  sennosides-docusate sodium (SENNA WITH DOCUSATE SODIUM) 8.6-50 mg, Take 1 tablet by mouth 2 (two) times a day. For constipation associated with narcotics; hold for loose stools., Disp: 60 tablet, Rfl: 0      BP Readings from Last 3 Encounters:   05/11/21 140/89   04/20/21 108/70   11/23/20 134/84       Recent Lab results:  Lab Results   Component Value Date    CHOL 148 08/11/2020   ,   Lab Results   Component Value Date    HDL 79 08/11/2020   ,   Lab Results   Component Value Date    LDLCALC 50 08/11/2020   ,   Lab Results   Component Value Date    TRIG 94 08/11/2020        Lab Results   Component Value Date    GLUCOSE 112 (H) 05/12/2021   ,   Lab Results   Component Value Date    HGBA1C 4.7 (L) 08/11/2020         Lab Results   Component Value Date    CREATININE 0.6 05/12/2021       Lab Results   Component Value Date    TSH 3.050 08/11/2020

## 2021-05-20 ENCOUNTER — TELEPHONE (OUTPATIENT)
Dept: INTERNAL MEDICINE | Facility: CLINIC | Age: 66
End: 2021-05-20

## 2021-05-20 NOTE — TELEPHONE ENCOUNTER
Patient was admitted to [facility]  Had surgery  Fractured patella  Pt was d/c from [facility] and went to the [facility] which she is being d/c today  Gave pt a hosp f/u appointment with you next week ;  Patient states they will not d/c her will any pain medication and told her to call her pcp  Pt is requesting 2 medications    1) - 5 mg every 4-6 hours 1-2 pills  Oxycodone (no aspirin ; allergic )    2)- 15 mg  Ms OxyContin  XR  every 12 hours     [facility]     If you need to speak with patient her cell number is [phone]

## 2021-05-20 NOTE — TELEPHONE ENCOUNTER
Patient does not need those prescriptions called in to the pharmacy.  She was given them in the hospital.

## 2021-05-22 PROCEDURE — G0180 MD CERTIFICATION HHA PATIENT: HCPCS | Performed by: INTERNAL MEDICINE

## 2021-05-24 ENCOUNTER — TELEPHONE (OUTPATIENT)
Dept: INTERNAL MEDICINE | Facility: CLINIC | Age: 66
End: 2021-05-24

## 2021-05-24 RX ORDER — OXYCODONE HYDROCHLORIDE 5 MG/1
5 TABLET ORAL EVERY 4 HOURS PRN
Qty: 28 TABLET | Refills: 0 | Status: SHIPPED | OUTPATIENT
Start: 2021-05-24 | End: 2021-06-03

## 2021-05-24 RX ORDER — OXYCODONE HYDROCHLORIDE 5 MG/1
TABLET ORAL
COMMUNITY
Start: 2021-05-20 | End: 2021-05-24

## 2021-05-24 RX ORDER — OXYCODONE HYDROCHLORIDE 15 MG/1
15 TABLET, FILM COATED, EXTENDED RELEASE ORAL
Qty: 14 TABLET | Refills: 0 | Status: SHIPPED | OUTPATIENT
Start: 2021-05-24 | End: 2021-05-26 | Stop reason: SDUPTHER

## 2021-05-24 NOTE — TELEPHONE ENCOUNTER
I can only send in one weeks supply of narcotics - will send this in but after that I cannot continue - I though she said she did need these  - see message of 5/20

## 2021-05-24 NOTE — TELEPHONE ENCOUNTER
Patient is requesting refills of:  1. oxycontin xr 15 mg every 12 hours    2. Oxycodone 5 mg every 4-6 hours    She also wanted to let you know that she was prescribed eliquis until the end of June when she was in the hospital. She has had 6 nosebleeds since she started taking the medication.

## 2021-05-26 ENCOUNTER — TELEPHONE (OUTPATIENT)
Dept: INTERNAL MEDICINE | Facility: CLINIC | Age: 66
End: 2021-05-26

## 2021-05-26 ENCOUNTER — OFFICE VISIT (OUTPATIENT)
Dept: INTERNAL MEDICINE | Facility: CLINIC | Age: 66
End: 2021-05-26
Payer: MEDICARE

## 2021-05-26 VITALS
TEMPERATURE: 98.1 F | SYSTOLIC BLOOD PRESSURE: 98 MMHG | BODY MASS INDEX: 22.28 KG/M2 | HEIGHT: 61 IN | HEART RATE: 65 BPM | OXYGEN SATURATION: 98 % | DIASTOLIC BLOOD PRESSURE: 70 MMHG | WEIGHT: 118 LBS

## 2021-05-26 DIAGNOSIS — D47.02 SYSTEMIC MASTOCYTOSIS: Chronic | ICD-10-CM

## 2021-05-26 DIAGNOSIS — S82.092A CLOSED SLEEVE FRACTURE OF LEFT PATELLA, INITIAL ENCOUNTER: Primary | ICD-10-CM

## 2021-05-26 DIAGNOSIS — I10 ESSENTIAL HYPERTENSION: Chronic | ICD-10-CM

## 2021-05-26 PROCEDURE — G8754 DIAS BP LESS 90: HCPCS | Performed by: INTERNAL MEDICINE

## 2021-05-26 PROCEDURE — 99213 OFFICE O/P EST LOW 20 MIN: CPT | Performed by: INTERNAL MEDICINE

## 2021-05-26 PROCEDURE — G8752 SYS BP LESS 140: HCPCS | Performed by: INTERNAL MEDICINE

## 2021-05-26 RX ORDER — OXYCODONE HCL 10 MG/1
10 TABLET, FILM COATED, EXTENDED RELEASE ORAL
Qty: 14 TABLET | Refills: 0 | Status: SHIPPED | OUTPATIENT
Start: 2021-05-26 | End: 2021-06-25 | Stop reason: ALTCHOICE

## 2021-05-26 ASSESSMENT — ENCOUNTER SYMPTOMS
PALPITATIONS: 0
EYE REDNESS: 0
DIZZINESS: 0
CHILLS: 0
CONSTIPATION: 0
STRIDOR: 0
HEADACHES: 0
FEVER: 0
CHEST TIGHTNESS: 0
LIGHT-HEADEDNESS: 0

## 2021-05-26 NOTE — PROGRESS NOTES
Subjective      Patient ID: Ritu Ramirez is a 65 y.o. female.    HPI   She fell and fractured her left patella 2021.  Sustained a closed displaced transverse fracture.  Was seen by Dr. Bonnie Kiser at [facility] underwent surgery to repair this.  Postoperatively she has done quite well.  She has not had any flare of her systemic mastocytosis.  Her blood pressure has been under good control.  She is on oxycodone immediate release and OxyContin for the next week.  Is on a bowel regimen has not had major problems with constipation.  Is ambulating with a walker.  Due for follow-up with orthopedics tomorrow.  She remains compliant with her antihypertensive regimen.  Denies any chest pain palpitation shortness of breath or lightheadedness    The following have been reviewed and updated as appropriate in this visit:       Past Medical History:   Diagnosis Date   • Anemia    • Anxiety    • Hypertension    • Iron deficiency    • Lipid disorder    • Systemic mastocytosis    • Vitamin D deficiency      Past Surgical History:   Procedure Laterality Date   • AUGMENTATION MAMMAPLASTY Bilateral    • COLONOSCOPY     • REDUCTION MAMMAPLASTY       Family History   Problem Relation Age of Onset   • Heart disease Biological Mother    • Prostate cancer Biological Father      Social History     Socioeconomic History   • Marital status: Single     Spouse name: Not on file   • Number of children: Not on file   • Years of education: Not on file   • Highest education level: Not on file   Occupational History   • Not on file   Tobacco Use   • Smoking status: Former Smoker     Packs/day: 0.25     Quit date:      Years since quittin.   • Smokeless tobacco: Never Used   Vaping Use   • Vaping Use: Never used   Substance and Sexual Activity   • Alcohol use: Yes     Alcohol/week: 1.0 standard drinks     Types: 1 Glasses of wine per week     Comment: Social   • Drug use: No   • Sexual activity: Defer   Other Topics Concern   •  Not on file   Social History Narrative   • Not on file     Social Determinants of Health     Financial Resource Strain:    • Difficulty of Paying Living Expenses:    Food Insecurity:    • Worried About Running Out of Food in the Last Year:    • Ran Out of Food in the Last Year:    Transportation Needs:    • Lack of Transportation (Medical):    • Lack of Transportation (Non-Medical):    Physical Activity:    • Days of Exercise per Week:    • Minutes of Exercise per Session:    Stress:    • Feeling of Stress :    Social Connections:    • Frequency of Communication with Friends and Family:    • Frequency of Social Gatherings with Friends and Family:    • Attends Congregation Services:    • Active Member of Clubs or Organizations:    • Attends Club or Organization Meetings:    • Marital Status:    Intimate Partner Violence:    • Fear of Current or Ex-Partner:    • Emotionally Abused:    • Physically Abused:    • Sexually Abused:        Review of Systems   Constitutional: Negative for chills and fever.   HENT: Negative for drooling.    Eyes: Negative for redness.   Respiratory: Negative for chest tightness and stridor.    Cardiovascular: Negative for chest pain, palpitations and leg swelling.   Gastrointestinal: Negative for constipation.   Skin: Negative for rash.   Neurological: Negative for dizziness, light-headedness and headaches.       Allergies   Allergen Reactions   • Iodinated Contrast Media      Other reaction(s): Anaphylaxis   • Penicillins Hives   • Anesthetics - Amide Type - Select Amino Amides      Other reaction(s): Anaphylaxis   • Clarithromycin      Other reaction(s): Rash   • Erythromycin Base      Other reaction(s): Anaphylaxis   • Nsaids (Non-Steroidal Anti-Inflammatory Drug)      Other reaction(s): Anaphylaxis     Current Outpatient Medications   Medication Sig Dispense Refill   • acetaminophen (TYLENOL) 325 mg tablet Take 2 tablets (650 mg total) by mouth every 4 (four) hours as needed for moderate  pain.     • amLODIPine (NORVASC) 10 mg tablet Take 1 tablet (10 mg total) by mouth daily. Can adjust based on blood pressures- home medication is norvasc 5 mg. 30 tablet 0   • apixaban (ELIQUIS) 2.5 mg tablet Take 1 tablet (2.5 mg total) by mouth 2 (two) times a day for 14 days. 14 tablet 1   • B complex-vitamin C-folic acid 400 mcg tablet tablet Take 1 tablet by mouth daily. Hold until cleared by Dr. Kiser. 30 tablet 0   • biotin 1 mg capsule Take by mouth daily. Dose unknown     • calcium carbonate (CALCIUM 500 ORAL) Take by mouth daily. Dose unknown     • cholecalciferol, vitamin D3, 1,000 unit capsule Take 1,000 Units by mouth daily.       • diphenhydrAMINE (BENADRYL) 25 mg capsule Take 1 capsule (25 mg total) by mouth every 6 (six) hours as needed for itching, allergies or sleep.     • EPINEPHrine (EPIPEN 2-ZARINA) 0.3 mg/0.3 mL injection syringe Inject 1 Syringe into the thigh as needed for anaphylaxis.      • escitalopram (LEXAPRO) 10 mg tablet TAKE 1 TABLET BY MOUTH EVERY DAY (Patient taking differently: Take 10 mg by mouth daily.  ) 30 tablet 11   • famotidine (PEPCID) 20 mg tablet Take 1 tablet (20 mg total) by mouth 2 (two) times a day as needed for indigestion.     • hydrochlorothiazide (HYDRODIURIL) 25 mg tablet Take 25 mg by mouth daily.    3   • oxyCODONE (OxyCONTIN) 15 mg 12 hr abuse-deterrent tablet Take 1 tablet (15 mg total) by mouth every 12 (twelve) hours. 14 tablet 0   • oxyCODONE (ROXICODONE) 5 mg immediate release tablet Take 1 tablet (5 mg total) by mouth every 4 (four) hours as needed for moderate pain for up to 10 days. 28 tablet 0   • polyethylene glycol (MIRALAX) 17 gram packet Take 17 g by mouth daily as needed (constipation) for up to 3 days. For constipation associated with narcotics; hold for loose stools.     • potassium chloride (KLOR-CON) 8 mEq CR tablet TAKE 1 TABLET BY MOUTH TWICE A  tablet 3   • rosuvastatin (CRESTOR) 10 mg tablet TAKE 1 TABLET BY MOUTH EVERY DAY  "(Patient taking differently: Take 10 mg by mouth daily.  ) 30 tablet 11   • sennosides-docusate sodium (SENNA WITH DOCUSATE SODIUM) 8.6-50 mg Take 1 tablet by mouth 2 (two) times a day. For constipation associated with narcotics; hold for loose stools. 60 tablet 0     No current facility-administered medications for this visit.       Objective   Vitals:    05/26/21 1136   BP: 98/70   Pulse: 65   Temp: 36.7 °C (98.1 °F)   SpO2: 98%   Weight: 53.5 kg (118 lb)   Height: 1.549 m (5' 1\")       Physical Exam  Vitals and nursing note reviewed.   Constitutional:       Appearance: She is well-developed.   HENT:      Head: Normocephalic and atraumatic.   Neck:      Thyroid: No thyromegaly.      Vascular: No carotid bruit.   Cardiovascular:      Rate and Rhythm: Normal rate and regular rhythm.      Pulses: Normal pulses.      Heart sounds: Normal heart sounds. No murmur heard.     Pulmonary:      Effort: Pulmonary effort is normal.      Breath sounds: Normal breath sounds.   Abdominal:      General: Bowel sounds are normal.      Palpations: Abdomen is soft. There is no mass.      Tenderness: There is no abdominal tenderness.   Musculoskeletal:      Cervical back: Normal range of motion and neck supple.      Comments: Left knee vertical incision healing well with staples drainage moderate swelling of her left knee   Skin:     General: Skin is warm and dry.   Neurological:      Mental Status: She is alert and oriented to person, place, and time.   Psychiatric:         Behavior: Behavior normal.         Thought Content: Thought content normal.         Judgment: Judgment normal.         Assessment/Plan   Problem List Items Addressed This Visit        Circulatory    Essential hypertension (Chronic)     Controlled on her current regimen.  Maintain low-sodium diet            Musculoskeletal    Closed patellar sleeve fracture of left knee - Primary     Is post left patella ORIF she is receiving physical therapy at home.  I have given " her enough pain medication for 1 more week.  We will follow up with orthopedics tomorrow.  Continue Eliquis for DVT prophylaxis for 1 more week            Hematologic    Systemic mastocytosis (Chronic)     No recent flares we will continue to follow with Dr. Ani Calhoun and at [facility]               Ashley Omalley MD    5/26/2021

## 2021-05-26 NOTE — ASSESSMENT & PLAN NOTE
Is post left patella ORIF she is receiving physical therapy at home.  I have given her enough pain medication for 1 more week.  We will follow up with orthopedics tomorrow.  Continue Eliquis for DVT prophylaxis for 1 more week

## 2021-05-26 NOTE — TELEPHONE ENCOUNTER
Per patient [facility] does not have ox[facility]contin 15 mg in stock. She would like 10 mg sent to [facility] because they have that dose in stock.    Medicine Refill Request    Last Office Visit: 5/26/2021  Last Telemedicine Visit: 10/13/2020 Ashley Nazario MD    Next Office Visit: Visit date not found  Next Telemedicine Visit: Visit date not found         Current Outpatient Medications:   •  acetaminophen (TYLENOL) 325 mg tablet, Take 2 tablets (650 mg total) by mouth every 4 (four) hours as needed for moderate pain., Disp: , Rfl:   •  amLODIPine (NORVASC) 10 mg tablet, Take 1 tablet (10 mg total) by mouth daily. Can adjust based on blood pressures- home medication is norvasc 5 mg., Disp: 30 tablet, Rfl: 0  •  apixaban (ELIQUIS) 2.5 mg tablet, Take 1 tablet (2.5 mg total) by mouth 2 (two) times a day for 14 days., Disp: 14 tablet, Rfl: 1  •  B complex-vitamin C-folic acid 400 mcg tablet tablet, Take 1 tablet by mouth daily. Hold until cleared by Dr. Kiser., Disp: 30 tablet, Rfl: 0  •  biotin 1 mg capsule, Take by mouth daily. Dose unknown, Disp: , Rfl:   •  calcium carbonate (CALCIUM 500 ORAL), Take by mouth daily. Dose unknown, Disp: , Rfl:   •  cholecalciferol, vitamin D3, 1,000 unit capsule, Take 1,000 Units by mouth daily.  , Disp: , Rfl:   •  diphenhydrAMINE (BENADRYL) 25 mg capsule, Take 1 capsule (25 mg total) by mouth every 6 (six) hours as needed for itching, allergies or sleep., Disp: , Rfl:   •  EPINEPHrine (EPIPEN 2-ZARINA) 0.3 mg/0.3 mL injection syringe, Inject 1 Syringe into the thigh as needed for anaphylaxis. , Disp: , Rfl:   •  escitalopram (LEXAPRO) 10 mg tablet, TAKE 1 TABLET BY MOUTH EVERY DAY (Patient taking differently: Take 10 mg by mouth daily.  ), Disp: 30 tablet, Rfl: 11  •  famotidine (PEPCID) 20 mg tablet, Take 1 tablet (20 mg total) by mouth 2 (two) times a day as needed for indigestion., Disp: , Rfl:   •  hydrochlorothiazide (HYDRODIURIL) 25 mg tablet, Take 25 mg by mouth daily.  ,  Disp: , Rfl: 3  •  oxyCODONE (OxyCONTIN) 15 mg 12 hr abuse-deterrent tablet, Take 1 tablet (15 mg total) by mouth every 12 (twelve) hours., Disp: 14 tablet, Rfl: 0  •  oxyCODONE (ROXICODONE) 5 mg immediate release tablet, Take 1 tablet (5 mg total) by mouth every 4 (four) hours as needed for moderate pain for up to 10 days., Disp: 28 tablet, Rfl: 0  •  polyethylene glycol (MIRALAX) 17 gram packet, Take 17 g by mouth daily as needed (constipation) for up to 3 days. For constipation associated with narcotics; hold for loose stools., Disp: , Rfl:   •  potassium chloride (KLOR-CON) 8 mEq CR tablet, TAKE 1 TABLET BY MOUTH TWICE A DAY, Disp: 180 tablet, Rfl: 3  •  rosuvastatin (CRESTOR) 10 mg tablet, TAKE 1 TABLET BY MOUTH EVERY DAY (Patient taking differently: Take 10 mg by mouth daily.  ), Disp: 30 tablet, Rfl: 11  •  sennosides-docusate sodium (SENNA WITH DOCUSATE SODIUM) 8.6-50 mg, Take 1 tablet by mouth 2 (two) times a day. For constipation associated with narcotics; hold for loose stools., Disp: 60 tablet, Rfl: 0      BP Readings from Last 3 Encounters:   05/26/21 98/70   05/11/21 140/89   04/20/21 108/70       Recent Lab results:  Lab Results   Component Value Date    CHOL 148 08/11/2020   ,   Lab Results   Component Value Date    HDL 79 08/11/2020   ,   Lab Results   Component Value Date    LDLCALC 50 08/11/2020   ,   Lab Results   Component Value Date    TRIG 94 08/11/2020        Lab Results   Component Value Date    GLUCOSE 113 (H) 05/19/2021   ,   Lab Results   Component Value Date    HGBA1C 4.7 (L) 08/11/2020         Lab Results   Component Value Date    CREATININE 0.6 05/19/2021       Lab Results   Component Value Date    TSH 3.050 08/11/2020

## 2021-05-26 NOTE — TELEPHONE ENCOUNTER
Patient called, [facility] not [facility] 15 mg in stock.  She called [facility] and they have 10 mg in stock.  She would like this to be sent in today since she didn't take it yesterday.

## 2021-06-04 ENCOUNTER — TELEPHONE (OUTPATIENT)
Dept: INTERNAL MEDICINE | Facility: CLINIC | Age: 66
End: 2021-06-04

## 2021-06-04 NOTE — TELEPHONE ENCOUNTER
Patient had her kneecap surgery yesterday.  Her bone pain has increased and the pain level is a 9.  She discontinued taking extended release oxycodone because it made her tired during the day.  She wants to know if you can send in oxycodone 5 mg immediate release or another pain reliever for her. [facility]

## 2021-06-04 NOTE — TELEPHONE ENCOUNTER
I cannot send this in any longer - it has to go through her orthopedic doctor- I only send in one week of medication

## 2021-06-18 ENCOUNTER — APPOINTMENT (OUTPATIENT)
Dept: LAB | Facility: HOSPITAL | Age: 66
End: 2021-06-18
Attending: INTERNAL MEDICINE
Payer: MEDICARE

## 2021-06-18 ENCOUNTER — TELEPHONE (OUTPATIENT)
Dept: SURGERY | Facility: CLINIC | Age: 66
End: 2021-06-18

## 2021-06-18 ENCOUNTER — TRANSCRIBE ORDERS (OUTPATIENT)
Dept: SCHEDULING | Age: 66
End: 2021-06-18

## 2021-06-18 DIAGNOSIS — D50.8 OTHER IRON DEFICIENCY ANEMIAS: Primary | ICD-10-CM

## 2021-06-18 DIAGNOSIS — D50.8 OTHER IRON DEFICIENCY ANEMIAS: ICD-10-CM

## 2021-06-18 LAB
ANION GAP SERPL CALC-SCNC: 9 MEQ/L (ref 3–15)
BASOPHILS # BLD: 0.04 K/UL (ref 0.01–0.1)
BASOPHILS NFR BLD: 0.5 %
BUN SERPL-MCNC: 22 MG/DL (ref 8–20)
CALCIUM SERPL-MCNC: 10 MG/DL (ref 8.9–10.3)
CHLORIDE SERPL-SCNC: 101 MEQ/L (ref 98–109)
CO2 SERPL-SCNC: 29 MEQ/L (ref 22–32)
CREAT SERPL-MCNC: 0.7 MG/DL (ref 0.6–1.1)
DIFFERENTIAL METHOD BLD: ABNORMAL
EOSINOPHIL # BLD: 0.48 K/UL (ref 0.04–0.36)
EOSINOPHIL NFR BLD: 6.1 %
ERYTHROCYTE [DISTWIDTH] IN BLOOD BY AUTOMATED COUNT: 17.2 % (ref 11.7–14.4)
FERRITIN SERPL-MCNC: 96 NG/ML (ref 11–250)
GFR SERPL CREATININE-BSD FRML MDRD: >60 ML/MIN/1.73M*2
GLUCOSE SERPL-MCNC: 145 MG/DL (ref 70–99)
HCT VFR BLDCO AUTO: 29.4 % (ref 35–45)
HGB BLD-MCNC: 9.1 G/DL (ref 11.8–15.7)
IMM GRANULOCYTES # BLD AUTO: 0.04 K/UL (ref 0–0.08)
IMM GRANULOCYTES NFR BLD AUTO: 0.5 %
IRON SATN MFR SERPL: 12 % (ref 15–45)
IRON SERPL-MCNC: 39 UG/DL (ref 35–150)
LYMPHOCYTES # BLD: 1.28 K/UL (ref 1.2–3.5)
LYMPHOCYTES NFR BLD: 16.4 %
MCH RBC QN AUTO: 25.7 PG (ref 28–33.2)
MCHC RBC AUTO-ENTMCNC: 31 G/DL (ref 32.2–35.5)
MCV RBC AUTO: 83.1 FL (ref 83–98)
MONOCYTES # BLD: 0.71 K/UL (ref 0.28–0.8)
MONOCYTES NFR BLD: 9.1 %
NEUTROPHILS # BLD: 5.26 K/UL (ref 1.7–7)
NEUTROPHILS # BLD: 5.26 K/UL (ref 1.7–7)
NEUTS SEG NFR BLD: 67.4 %
NRBC BLD-RTO: 0 %
PDW BLD AUTO: 8.3 FL (ref 9.4–12.3)
PLATELET # BLD AUTO: 348 K/UL (ref 150–369)
POTASSIUM SERPL-SCNC: 3.2 MEQ/L (ref 3.6–5.1)
RBC # BLD AUTO: 3.54 M/UL (ref 3.93–5.22)
SODIUM SERPL-SCNC: 139 MEQ/L (ref 136–144)
TIBC SERPL-MCNC: 330 UG/DL (ref 270–460)
UIBC SERPL-MCNC: 291 UG/DL (ref 180–360)
WBC # BLD AUTO: 7.81 K/UL (ref 3.8–10.5)

## 2021-06-18 PROCEDURE — 82728 ASSAY OF FERRITIN: CPT

## 2021-06-18 PROCEDURE — 36415 COLL VENOUS BLD VENIPUNCTURE: CPT

## 2021-06-18 PROCEDURE — 83540 ASSAY OF IRON: CPT

## 2021-06-18 PROCEDURE — 85025 COMPLETE CBC W/AUTO DIFF WBC: CPT

## 2021-06-18 PROCEDURE — 80048 BASIC METABOLIC PNL TOTAL CA: CPT

## 2021-06-18 NOTE — TELEPHONE ENCOUNTER
Pt states she's bleeding last seen nov/2020 advised 1st available states that's too far, rqst a call back

## 2021-06-20 NOTE — PROGRESS NOTES
Chief Complaint:   Chief Complaint   Patient presents with   • Hemorrhoids       HPI     Patient is a 65 y.o. female who presents with the followin/27/20 - injected large angry int hems   - injected in [facility]      Had knee surgery May 2021 for fractured patella -     Last week bleed was bad    Sees [facility] -      Medical History:   Past Medical History:   Diagnosis Date   • Anemia    • Anxiety    • Hypertension    • Iron deficiency    • Lipid disorder    • Systemic mastocytosis    • Vitamin D deficiency        Surgical History:   Past Surgical History:   Procedure Laterality Date   • AUGMENTATION MAMMAPLASTY Bilateral    • COLONOSCOPY     • PATELLA SURGERY     • REDUCTION MAMMAPLASTY         Social History:   Social History     Socioeconomic History   • Marital status: Single     Spouse name: Not on file   • Number of children: Not on file   • Years of education: Not on file   • Highest education level: Not on file   Occupational History   • Not on file   Tobacco Use   • Smoking status: Former Smoker     Packs/day: 0.25     Quit date:      Years since quittin.4   • Smokeless tobacco: Never Used   Vaping Use   • Vaping Use: Never used   Substance and Sexual Activity   • Alcohol use: Yes     Alcohol/week: 1.0 standard drinks     Types: 1 Glasses of wine per week     Comment: Social   • Drug use: No   • Sexual activity: Defer   Other Topics Concern   • Not on file   Social History Narrative   • Not on file     Social Determinants of Health     Financial Resource Strain:    • Difficulty of Paying Living Expenses:    Food Insecurity:    • Worried About Running Out of Food in the Last Year:    • Ran Out of Food in the Last Year:    Transportation Needs:    • Lack of Transportation (Medical):    • Lack of Transportation (Non-Medical):    Physical Activity:    • Days of Exercise per Week:    • Minutes of Exercise per Session:    Stress:    • Feeling of Stress :    Social Connections:    • Frequency  of Communication with Friends and Family:    • Frequency of Social Gatherings with Friends and Family:    • Attends Faith Services:    • Active Member of Clubs or Organizations:    • Attends Club or Organization Meetings:    • Marital Status:    Intimate Partner Violence:    • Fear of Current or Ex-Partner:    • Emotionally Abused:    • Physically Abused:    • Sexually Abused:        Family History:   Family History   Problem Relation Age of Onset   • Heart disease Biological Mother    • Prostate cancer Biological Father        Allergies:   Iodinated contrast media, Penicillins, Anesthetics - amide type - select amino amides, Clarithromycin, Erythromycin base, and Nsaids (non-steroidal anti-inflammatory drug)    Current Medications:      Current Outpatient Medications:   •  biotin 1 mg capsule, Take by mouth daily. Dose unknown, Disp: , Rfl:   •  calcium carbonate (CALCIUM 500 ORAL), Take by mouth daily. Dose unknown, Disp: , Rfl:   •  cholecalciferol, vitamin D3, 1,000 unit capsule, Take 1,000 Units by mouth daily.  , Disp: , Rfl:   •  EPINEPHrine (EPIPEN 2-ZARINA) 0.3 mg/0.3 mL injection syringe, Inject 1 Syringe into the thigh as needed for anaphylaxis. , Disp: , Rfl:   •  escitalopram (LEXAPRO) 10 mg tablet, TAKE 1 TABLET BY MOUTH EVERY DAY (Patient taking differently: Take 10 mg by mouth daily.  ), Disp: 30 tablet, Rfl: 11  •  hydrochlorothiazide (HYDRODIURIL) 25 mg tablet, Take 25 mg by mouth daily.  , Disp: , Rfl: 3  •  oxyCODONE (OxyCONTIN) 10 mg 12 hr abuse-deterrent tablet, Take 1 tablet (10 mg total) by mouth every 12 (twelve) hours., Disp: 14 tablet, Rfl: 0  •  potassium chloride (KLOR-CON) 8 mEq CR tablet, TAKE 1 TABLET BY MOUTH TWICE A DAY, Disp: 180 tablet, Rfl: 3  •  rosuvastatin (CRESTOR) 10 mg tablet, TAKE 1 TABLET BY MOUTH EVERY DAY (Patient taking differently: Take 10 mg by mouth daily.  ), Disp: 30 tablet, Rfl: 11  •  amLODIPine (NORVASC) 10 mg tablet, Take 1 tablet (10 mg total) by mouth  daily. Can adjust based on blood pressures- home medication is norvasc 5 mg., Disp: 30 tablet, Rfl: 0  •  apixaban (ELIQUIS) 2.5 mg tablet, Take 1 tablet (2.5 mg total) by mouth 2 (two) times a day for 14 days., Disp: 14 tablet, Rfl: 1  •  B complex-vitamin C-folic acid 400 mcg tablet tablet, Take 1 tablet by mouth daily. Hold until cleared by Dr. Kiser., Disp: 30 tablet, Rfl: 0  •  famotidine (PEPCID) 20 mg tablet, Take 1 tablet (20 mg total) by mouth 2 (two) times a day as needed for indigestion., Disp: , Rfl:   •  polyethylene glycol (MIRALAX) 17 gram packet, Take 17 g by mouth daily as needed (constipation) for up to 3 days. For constipation associated with narcotics; hold for loose stools., Disp: , Rfl:   •  sennosides-docusate sodium (SENNA WITH DOCUSATE SODIUM) 8.6-50 mg, Take 1 tablet by mouth 2 (two) times a day. For constipation associated with narcotics; hold for loose stools., Disp: 60 tablet, Rfl: 0    Review of Systems  All 14 systems reviewed and the findings are not pertinent to the current problem.    Objective     Vital Signs  There were no vitals filed for this visit.  Body mass index is 22.3 kg/m².      Physicial Exam  General Appearance:  Well developed.  Well nourished.  In no acute distress.    Anorectal Examination:    No pilonidal sinus was observed.   No pilonidal cyst was observed.   Perianal skin was not erythematous.  No perianal wart was observed.  No anal fissure was observed.   Anus did not have a fistula.   Anal sphincter tone was normal.    The patient had large, friable, grade three internal hemorrhoids.  Specifics:  Large and friable.    No external anal skin tags were observed.   Anus had no mass.  Rectum:  No rectal abscess was observed.   Rectal prolapse was not observed.   No rectal fistula was observed.   Rectal exam was not tender.   No rectal fluctuance was observed.  Rectal exam showed no mass.   Normal richard-anal skin with no lesions or dermatitis      Problem List  Items Addressed This Visit     Internal hemorrhoid, bleeding - Primary     The internal hemorrhoids were again injected with 5% phenol mixed in olive oil as a sclerotherapy.  The patient will return on a PRN basis for further injections.  If these treatments do not relieve their symptoms then surgery may be considered.                     Javier Corcoran MD 6/21/2021 2:00 PM

## 2021-06-21 ENCOUNTER — OFFICE VISIT (OUTPATIENT)
Dept: SURGERY | Facility: CLINIC | Age: 66
End: 2021-06-21
Payer: MEDICARE

## 2021-06-21 VITALS — WEIGHT: 118 LBS | BODY MASS INDEX: 22.28 KG/M2 | HEIGHT: 61 IN

## 2021-06-21 DIAGNOSIS — K64.8 INTERNAL HEMORRHOID, BLEEDING: Primary | ICD-10-CM

## 2021-06-21 PROCEDURE — G8756 NO BP MEASURE DOC: HCPCS | Performed by: SURGERY

## 2021-06-21 PROCEDURE — 46500 INJECTION INTO HEMORRHOID(S): CPT | Performed by: SURGERY

## 2021-06-21 PROCEDURE — 99213 OFFICE O/P EST LOW 20 MIN: CPT | Mod: 25 | Performed by: SURGERY

## 2021-06-21 NOTE — PROCEDURES
Procedures  The patient had large friable internal hemorrhoids that required office based therapy.  Verbal consent was obtained.  No prep was necessary.  First a digital rectal exam and then anoscopy was performed.  Injection sclerotherapy was then performed using 5% phenol in olive oil through an 18 gauge spinal needle.  The patient tolerated it well and was given instructions to return as needed.

## 2021-06-21 NOTE — LETTER
2021     Ashley Omalley MD  [address] [facility] [address]    Patient: Ritu Ramirez  YOB: 1955  Date of Visit: 2021      Dear Dr. Sirisha Omalley:    Thank you for referring Ritu Ramirez to me for evaluation. Below are my notes for this consultation.    If you have questions, please do not hesitate to call me. I look forward to following your patient along with you.         Sincerely,        Javier Corcoran MD        CC: No Recipients  Javier Corcoran MD  2021  2:00 PM  Sign when Signing Visit      Chief Complaint:   Chief Complaint   Patient presents with   • Hemorrhoids       HPI     Patient is a 65 y.o. female who presents with the followin/27/20 - injected large angry int hems   - injected in [facility]      Had knee surgery May 2021 for fractured patella -     Last week bleed was bad    Sees Unique -      Medical History:   Past Medical History:   Diagnosis Date   • Anemia    • Anxiety    • Hypertension    • Iron deficiency    • Lipid disorder    • Systemic mastocytosis    • Vitamin D deficiency        Surgical History:   Past Surgical History:   Procedure Laterality Date   • AUGMENTATION MAMMAPLASTY Bilateral    • COLONOSCOPY     • PATELLA SURGERY     • REDUCTION MAMMAPLASTY         Social History:   Social History     Socioeconomic History   • Marital status: Single     Spouse name: Not on file   • Number of children: Not on file   • Years of education: Not on file   • Highest education level: Not on file   Occupational History   • Not on file   Tobacco Use   • Smoking status: Former Smoker     Packs/day: 0.25     Quit date:      Years since quittin.4   • Smokeless tobacco: Never Used   Vaping Use   • Vaping Use: Never used   Substance and Sexual Activity   • Alcohol use: Yes     Alcohol/week: 1.0 standard drinks     Types: 1 Glasses of wine per week     Comment: Social   • Drug use: No   • Sexual activity: Defer   Other Topics  Concern   • Not on file   Social History Narrative   • Not on file     Social Determinants of Health     Financial Resource Strain:    • Difficulty of Paying Living Expenses:    Food Insecurity:    • Worried About Running Out of Food in the Last Year:    • Ran Out of Food in the Last Year:    Transportation Needs:    • Lack of Transportation (Medical):    • Lack of Transportation (Non-Medical):    Physical Activity:    • Days of Exercise per Week:    • Minutes of Exercise per Session:    Stress:    • Feeling of Stress :    Social Connections:    • Frequency of Communication with Friends and Family:    • Frequency of Social Gatherings with Friends and Family:    • Attends Yazidism Services:    • Active Member of Clubs or Organizations:    • Attends Club or Organization Meetings:    • Marital Status:    Intimate Partner Violence:    • Fear of Current or Ex-Partner:    • Emotionally Abused:    • Physically Abused:    • Sexually Abused:        Family History:   Family History   Problem Relation Age of Onset   • Heart disease Biological Mother    • Prostate cancer Biological Father        Allergies:   Iodinated contrast media, Penicillins, Anesthetics - amide type - select amino amides, Clarithromycin, Erythromycin base, and Nsaids (non-steroidal anti-inflammatory drug)    Current Medications:      Current Outpatient Medications:   •  biotin 1 mg capsule, Take by mouth daily. Dose unknown, Disp: , Rfl:   •  calcium carbonate (CALCIUM 500 ORAL), Take by mouth daily. Dose unknown, Disp: , Rfl:   •  cholecalciferol, vitamin D3, 1,000 unit capsule, Take 1,000 Units by mouth daily.  , Disp: , Rfl:   •  EPINEPHrine (EPIPEN 2-ZARINA) 0.3 mg/0.3 mL injection syringe, Inject 1 Syringe into the thigh as needed for anaphylaxis. , Disp: , Rfl:   •  escitalopram (LEXAPRO) 10 mg tablet, TAKE 1 TABLET BY MOUTH EVERY DAY (Patient taking differently: Take 10 mg by mouth daily.  ), Disp: 30 tablet, Rfl: 11  •  hydrochlorothiazide  (HYDRODIURIL) 25 mg tablet, Take 25 mg by mouth daily.  , Disp: , Rfl: 3  •  oxyCODONE (OxyCONTIN) 10 mg 12 hr abuse-deterrent tablet, Take 1 tablet (10 mg total) by mouth every 12 (twelve) hours., Disp: 14 tablet, Rfl: 0  •  potassium chloride (KLOR-CON) 8 mEq CR tablet, TAKE 1 TABLET BY MOUTH TWICE A DAY, Disp: 180 tablet, Rfl: 3  •  rosuvastatin (CRESTOR) 10 mg tablet, TAKE 1 TABLET BY MOUTH EVERY DAY (Patient taking differently: Take 10 mg by mouth daily.  ), Disp: 30 tablet, Rfl: 11  •  amLODIPine (NORVASC) 10 mg tablet, Take 1 tablet (10 mg total) by mouth daily. Can adjust based on blood pressures- home medication is norvasc 5 mg., Disp: 30 tablet, Rfl: 0  •  apixaban (ELIQUIS) 2.5 mg tablet, Take 1 tablet (2.5 mg total) by mouth 2 (two) times a day for 14 days., Disp: 14 tablet, Rfl: 1  •  B complex-vitamin C-folic acid 400 mcg tablet tablet, Take 1 tablet by mouth daily. Hold until cleared by Dr. Kiser., Disp: 30 tablet, Rfl: 0  •  famotidine (PEPCID) 20 mg tablet, Take 1 tablet (20 mg total) by mouth 2 (two) times a day as needed for indigestion., Disp: , Rfl:   •  polyethylene glycol (MIRALAX) 17 gram packet, Take 17 g by mouth daily as needed (constipation) for up to 3 days. For constipation associated with narcotics; hold for loose stools., Disp: , Rfl:   •  sennosides-docusate sodium (SENNA WITH DOCUSATE SODIUM) 8.6-50 mg, Take 1 tablet by mouth 2 (two) times a day. For constipation associated with narcotics; hold for loose stools., Disp: 60 tablet, Rfl: 0    Review of Systems  All 14 systems reviewed and the findings are not pertinent to the current problem.    Objective     Vital Signs  There were no vitals filed for this visit.  Body mass index is 22.3 kg/m².      Physicial Exam  General Appearance:  Well developed.  Well nourished.  In no acute distress.    Anorectal Examination:    No pilonidal sinus was observed.   No pilonidal cyst was observed.   Perianal skin was not erythematous.  No  perianal wart was observed.  No anal fissure was observed.   Anus did not have a fistula.   Anal sphincter tone was normal.    The patient had large, friable, grade three internal hemorrhoids.  Specifics:  Large and friable.    No external anal skin tags were observed.   Anus had no mass.  Rectum:  No rectal abscess was observed.   Rectal prolapse was not observed.   No rectal fistula was observed.   Rectal exam was not tender.   No rectal fluctuance was observed.  Rectal exam showed no mass.   Normal richard-anal skin with no lesions or dermatitis      Problem List Items Addressed This Visit     Internal hemorrhoid, bleeding - Primary     The internal hemorrhoids were again injected with 5% phenol mixed in olive oil as a sclerotherapy.  The patient will return on a PRN basis for further injections.  If these treatments do not relieve their symptoms then surgery may be considered.                     Javier Corcoran MD 6/21/2021 2:00 PM             Javier Corcoran MD  6/21/2021  2:00 PM  Sign when Signing Visit  Procedures  The patient had large friable internal hemorrhoids that required office based therapy.  Verbal consent was obtained.  No prep was necessary.  First a digital rectal exam and then anoscopy was performed.  Injection sclerotherapy was then performed using 5% phenol in olive oil through an 18 gauge spinal needle.  The patient tolerated it well and was given instructions to return as needed.

## 2021-06-23 ENCOUNTER — LAB REQUISITION (OUTPATIENT)
Dept: LAB | Facility: HOSPITAL | Age: 66
End: 2021-06-23
Payer: MEDICARE

## 2021-06-23 DIAGNOSIS — D47.01 CUTANEOUS MASTOCYTOSIS: ICD-10-CM

## 2021-06-23 LAB
ABO + RH BLD: NORMAL
BASOPHILS # BLD: 0.03 K/UL (ref 0.01–0.1)
BASOPHILS NFR BLD: 0.4 %
BLD GP AB SCN SERPL QL: NEGATIVE
D AG BLD QL: POSITIVE
DIFFERENTIAL METHOD BLD: ABNORMAL
EOSINOPHIL # BLD: 0.44 K/UL (ref 0.04–0.36)
EOSINOPHIL NFR BLD: 5.9 %
ERYTHROCYTE [DISTWIDTH] IN BLOOD BY AUTOMATED COUNT: 16.1 % (ref 11.7–14.4)
HCT VFR BLDCO AUTO: 24.5 % (ref 35–45)
HGB BLD-MCNC: 7.4 G/DL (ref 11.8–15.7)
IMM GRANULOCYTES # BLD AUTO: 0.06 K/UL (ref 0–0.08)
IMM GRANULOCYTES NFR BLD AUTO: 0.8 %
LABORATORY COMMENT REPORT: NORMAL
LYMPHOCYTES # BLD: 1.19 K/UL (ref 1.2–3.5)
LYMPHOCYTES NFR BLD: 16 %
MCH RBC QN AUTO: 25 PG (ref 28–33.2)
MCHC RBC AUTO-ENTMCNC: 30.2 G/DL (ref 32.2–35.5)
MCV RBC AUTO: 82.8 FL (ref 83–98)
MONOCYTES # BLD: 0.56 K/UL (ref 0.28–0.8)
MONOCYTES NFR BLD: 7.5 %
NEUTROPHILS # BLD: 5.16 K/UL (ref 1.7–7)
NEUTROPHILS # BLD: 5.16 K/UL (ref 1.7–7)
NEUTS SEG NFR BLD: 69.4 %
NRBC BLD-RTO: 0 %
PDW BLD AUTO: 9.1 FL (ref 9.4–12.3)
PLATELET # BLD AUTO: 412 K/UL (ref 150–369)
RBC # BLD AUTO: 2.96 M/UL (ref 3.93–5.22)
WBC # BLD AUTO: 7.44 K/UL (ref 3.8–10.5)

## 2021-06-23 PROCEDURE — 85025 COMPLETE CBC W/AUTO DIFF WBC: CPT | Performed by: INTERNAL MEDICINE

## 2021-06-23 PROCEDURE — 86900 BLOOD TYPING SEROLOGIC ABO: CPT

## 2021-06-23 PROCEDURE — 86920 COMPATIBILITY TEST SPIN: CPT

## 2021-06-24 DIAGNOSIS — D63.8 ANEMIA OF CHRONIC DISEASE: Primary | ICD-10-CM

## 2021-06-24 RX ORDER — FAMOTIDINE 10 MG/ML
20 INJECTION INTRAVENOUS ONCE AS NEEDED
Status: CANCELLED | OUTPATIENT
Start: 2021-06-24

## 2021-06-24 RX ORDER — SODIUM CHLORIDE 9 MG/ML
5 INJECTION, SOLUTION INTRAVENOUS AS NEEDED
Status: CANCELLED | OUTPATIENT
Start: 2021-06-24 | End: 2021-06-25

## 2021-06-24 RX ORDER — DIPHENHYDRAMINE HCL 50 MG/ML
12.5 VIAL (ML) INJECTION ONCE AS NEEDED
Status: CANCELLED | OUTPATIENT
Start: 2021-06-24

## 2021-06-25 ENCOUNTER — HOSPITAL ENCOUNTER (OUTPATIENT)
Dept: INPATIENT UNIT | Facility: HOSPITAL | Age: 66
Discharge: HOME | End: 2021-06-25
Attending: NURSE PRACTITIONER
Payer: MEDICARE

## 2021-06-25 VITALS
DIASTOLIC BLOOD PRESSURE: 74 MMHG | HEART RATE: 62 BPM | TEMPERATURE: 99 F | RESPIRATION RATE: 16 BRPM | OXYGEN SATURATION: 96 % | SYSTOLIC BLOOD PRESSURE: 139 MMHG

## 2021-06-25 DIAGNOSIS — D63.8 ANEMIA OF CHRONIC DISEASE: ICD-10-CM

## 2021-06-25 PROCEDURE — 36430 TRANSFUSION BLD/BLD COMPNT: CPT

## 2021-06-25 PROCEDURE — P9016 RBC LEUKOCYTES REDUCED: HCPCS

## 2021-06-25 RX ORDER — SODIUM CHLORIDE 9 MG/ML
5 INJECTION, SOLUTION INTRAVENOUS AS NEEDED
Status: DISCONTINUED | OUTPATIENT
Start: 2021-06-25 | End: 2021-06-26 | Stop reason: HOSPADM

## 2021-06-25 RX ORDER — DIPHENHYDRAMINE HCL 50 MG/ML
12.5 VIAL (ML) INJECTION ONCE AS NEEDED
Status: DISCONTINUED | OUTPATIENT
Start: 2021-06-25 | End: 2021-06-26 | Stop reason: HOSPADM

## 2021-06-25 RX ORDER — FAMOTIDINE 10 MG/ML
20 INJECTION INTRAVENOUS ONCE AS NEEDED
Status: DISCONTINUED | OUTPATIENT
Start: 2021-06-25 | End: 2021-06-26 | Stop reason: HOSPADM

## 2021-06-25 ASSESSMENT — PAIN SCALES - GENERAL: PAINLEVEL: 0-NO PAIN

## 2021-06-27 LAB
CROSSMATCH: NORMAL
CROSSMATCH: NORMAL
ISBT CODE: 5100
ISBT CODE: 5100
PRODUCT CODE: NORMAL
PRODUCT CODE: NORMAL
PRODUCT STATUS: NORMAL
PRODUCT STATUS: NORMAL
SPECIMEN EXP DATE BLD: NORMAL
SPECIMEN EXP DATE BLD: NORMAL
UNIT ABO: NORMAL
UNIT ABO: NORMAL
UNIT ID: NORMAL
UNIT ID: NORMAL
UNIT RH: POSITIVE
UNIT RH: POSITIVE
UNIT VOLUME: 325 ML
UNIT VOLUME: 325 ML

## 2021-07-14 RX ORDER — EPINEPHRINE 0.3 MG/.3ML
1 INJECTION SUBCUTANEOUS AS NEEDED
Qty: 1 EACH | Refills: 1 | Status: SHIPPED | OUTPATIENT
Start: 2021-07-14 | End: 2022-06-01 | Stop reason: ALTCHOICE

## 2021-07-14 NOTE — TELEPHONE ENCOUNTER
Medicine Refill Request    Last Office Visit: 5/26/2021  Last Telemedicine Visit: 10/13/2020 Ashley Nazario MD    Next Office Visit: Visit date not found  Next Telemedicine Visit: Visit date not found         Current Outpatient Medications:   •  amLODIPine (NORVASC) 10 mg tablet, Take 1 tablet (10 mg total) by mouth daily. Can adjust based on blood pressures- home medication is norvasc 5 mg., Disp: 30 tablet, Rfl: 0  •  B complex-vitamin C-folic acid 400 mcg tablet tablet, Take 1 tablet by mouth daily. Hold until cleared by Dr. Kiser., Disp: 30 tablet, Rfl: 0  •  biotin 1 mg capsule, Take by mouth daily. Dose unknown, Disp: , Rfl:   •  calcium carbonate (CALCIUM 500 ORAL), Take by mouth daily. Dose unknown, Disp: , Rfl:   •  cholecalciferol, vitamin D3, 1,000 unit capsule, Take 1,000 Units by mouth daily.  , Disp: , Rfl:   •  EPINEPHrine (EPIPEN 2-ZARINA) 0.3 mg/0.3 mL injection syringe, Inject 1 Syringe into the thigh as needed for anaphylaxis. , Disp: , Rfl:   •  escitalopram (LEXAPRO) 10 mg tablet, TAKE 1 TABLET BY MOUTH EVERY DAY (Patient taking differently: Take 10 mg by mouth daily.  ), Disp: 30 tablet, Rfl: 11  •  famotidine (PEPCID) 20 mg tablet, Take 1 tablet (20 mg total) by mouth 2 (two) times a day as needed for indigestion., Disp: , Rfl:   •  hydrochlorothiazide (HYDRODIURIL) 25 mg tablet, Take 25 mg by mouth daily.  , Disp: , Rfl: 3  •  polyethylene glycol (MIRALAX) 17 gram packet, Take 17 g by mouth daily as needed (constipation) for up to 3 days. For constipation associated with narcotics; hold for loose stools., Disp: , Rfl:   •  potassium chloride (KLOR-CON) 8 mEq CR tablet, TAKE 1 TABLET BY MOUTH TWICE A DAY, Disp: 180 tablet, Rfl: 3  •  rosuvastatin (CRESTOR) 10 mg tablet, TAKE 1 TABLET BY MOUTH EVERY DAY (Patient taking differently: Take 10 mg by mouth daily.  ), Disp: 30 tablet, Rfl: 11  •  sennosides-docusate sodium (SENNA WITH DOCUSATE SODIUM) 8.6-50 mg, Take 1 tablet by  mouth 2 (two) times a day. For constipation associated with narcotics; hold for loose stools., Disp: 60 tablet, Rfl: 0      BP Readings from Last 3 Encounters:   06/25/21 139/74   05/26/21 98/70   05/11/21 140/89       Recent Lab results:  Lab Results   Component Value Date    CHOL 148 08/11/2020   ,   Lab Results   Component Value Date    HDL 79 08/11/2020   ,   Lab Results   Component Value Date    LDLCALC 50 08/11/2020   ,   Lab Results   Component Value Date    TRIG 94 08/11/2020        Lab Results   Component Value Date    GLUCOSE 145 (H) 06/18/2021   ,   Lab Results   Component Value Date    HGBA1C 4.7 (L) 08/11/2020         Lab Results   Component Value Date    CREATININE 0.7 06/18/2021       Lab Results   Component Value Date    TSH 3.050 08/11/2020

## 2021-07-15 ENCOUNTER — APPOINTMENT (OUTPATIENT)
Dept: LAB | Facility: HOSPITAL | Age: 66
End: 2021-07-15
Attending: INTERNAL MEDICINE
Payer: MEDICARE

## 2021-07-15 ENCOUNTER — TRANSCRIBE ORDERS (OUTPATIENT)
Dept: SCHEDULING | Age: 66
End: 2021-07-15

## 2021-07-15 DIAGNOSIS — D50.8 OTHER IRON DEFICIENCY ANEMIAS: ICD-10-CM

## 2021-07-15 DIAGNOSIS — D50.8 OTHER IRON DEFICIENCY ANEMIAS: Primary | ICD-10-CM

## 2021-07-15 LAB
ANION GAP SERPL CALC-SCNC: 8 MEQ/L (ref 3–15)
BASOPHILS # BLD: 0.03 K/UL (ref 0.01–0.1)
BASOPHILS NFR BLD: 0.4 %
BUN SERPL-MCNC: 20 MG/DL (ref 8–20)
CALCIUM SERPL-MCNC: 9.6 MG/DL (ref 8.9–10.3)
CHLORIDE SERPL-SCNC: 105 MEQ/L (ref 98–109)
CO2 SERPL-SCNC: 25 MEQ/L (ref 22–32)
CREAT SERPL-MCNC: 0.6 MG/DL (ref 0.6–1.1)
DIFFERENTIAL METHOD BLD: ABNORMAL
EOSINOPHIL # BLD: 0.34 K/UL (ref 0.04–0.36)
EOSINOPHIL NFR BLD: 4.6 %
ERYTHROCYTE [DISTWIDTH] IN BLOOD BY AUTOMATED COUNT: 15.4 % (ref 11.7–14.4)
FERRITIN SERPL-MCNC: 45 NG/ML (ref 11–250)
GFR SERPL CREATININE-BSD FRML MDRD: >60 ML/MIN/1.73M*2
GLUCOSE SERPL-MCNC: 100 MG/DL (ref 70–99)
HCT VFR BLDCO AUTO: 34.4 % (ref 35–45)
HGB BLD-MCNC: 10.4 G/DL (ref 11.8–15.7)
IMM GRANULOCYTES # BLD AUTO: 0.1 K/UL (ref 0–0.08)
IMM GRANULOCYTES NFR BLD AUTO: 1.4 %
IRON SATN MFR SERPL: 9 % (ref 15–45)
IRON SERPL-MCNC: 31 UG/DL (ref 35–150)
LYMPHOCYTES # BLD: 1.04 K/UL (ref 1.2–3.5)
LYMPHOCYTES NFR BLD: 14.1 %
MCH RBC QN AUTO: 25.2 PG (ref 28–33.2)
MCHC RBC AUTO-ENTMCNC: 30.2 G/DL (ref 32.2–35.5)
MCV RBC AUTO: 83.3 FL (ref 83–98)
MONOCYTES # BLD: 0.44 K/UL (ref 0.28–0.8)
MONOCYTES NFR BLD: 6 %
NEUTROPHILS # BLD: 5.41 K/UL (ref 1.7–7)
NEUTROPHILS # BLD: 5.41 K/UL (ref 1.7–7)
NEUTS SEG NFR BLD: 73.5 %
NRBC BLD-RTO: 0 %
PDW BLD AUTO: 8.7 FL (ref 9.4–12.3)
PLATELET # BLD AUTO: 292 K/UL (ref 150–369)
POTASSIUM SERPL-SCNC: 4.1 MEQ/L (ref 3.6–5.1)
RBC # BLD AUTO: 4.13 M/UL (ref 3.93–5.22)
SODIUM SERPL-SCNC: 138 MEQ/L (ref 136–144)
TIBC SERPL-MCNC: 356 UG/DL (ref 270–460)
UIBC SERPL-MCNC: 325 UG/DL (ref 180–360)
WBC # BLD AUTO: 7.36 K/UL (ref 3.8–10.5)

## 2021-07-15 PROCEDURE — 36415 COLL VENOUS BLD VENIPUNCTURE: CPT

## 2021-07-15 PROCEDURE — 85025 COMPLETE CBC W/AUTO DIFF WBC: CPT

## 2021-07-15 PROCEDURE — 80048 BASIC METABOLIC PNL TOTAL CA: CPT

## 2021-07-15 PROCEDURE — 82728 ASSAY OF FERRITIN: CPT

## 2021-07-15 PROCEDURE — 83550 IRON BINDING TEST: CPT

## 2021-07-19 ENCOUNTER — LAB REQUISITION (OUTPATIENT)
Dept: LAB | Facility: HOSPITAL | Age: 66
End: 2021-07-19
Payer: MEDICARE

## 2021-07-19 DIAGNOSIS — R68.89 OTHER GENERAL SYMPTOMS AND SIGNS: ICD-10-CM

## 2021-07-19 LAB
BASOPHILS # BLD: 0.03 K/UL (ref 0.01–0.1)
BASOPHILS NFR BLD: 0.4 %
DIFFERENTIAL METHOD BLD: ABNORMAL
EOSINOPHIL # BLD: 0.35 K/UL (ref 0.04–0.36)
EOSINOPHIL NFR BLD: 4.2 %
ERYTHROCYTE [DISTWIDTH] IN BLOOD BY AUTOMATED COUNT: 14.8 % (ref 11.7–14.4)
HCT VFR BLDCO AUTO: 33.3 % (ref 35–45)
HGB BLD-MCNC: 10.2 G/DL (ref 11.8–15.7)
IMM GRANULOCYTES # BLD AUTO: 0.1 K/UL (ref 0–0.08)
IMM GRANULOCYTES NFR BLD AUTO: 1.2 %
LYMPHOCYTES # BLD: 1.03 K/UL (ref 1.2–3.5)
LYMPHOCYTES NFR BLD: 12.4 %
MCH RBC QN AUTO: 24.9 PG (ref 28–33.2)
MCHC RBC AUTO-ENTMCNC: 30.6 G/DL (ref 32.2–35.5)
MCV RBC AUTO: 81.4 FL (ref 83–98)
MONOCYTES # BLD: 0.58 K/UL (ref 0.28–0.8)
MONOCYTES NFR BLD: 7 %
NEUTROPHILS # BLD: 6.2 K/UL (ref 1.7–7)
NEUTROPHILS # BLD: 6.2 K/UL (ref 1.7–7)
NEUTS SEG NFR BLD: 74.8 %
NRBC BLD-RTO: 0 %
PDW BLD AUTO: 8.7 FL (ref 9.4–12.3)
PLATELET # BLD AUTO: 349 K/UL (ref 150–369)
RBC # BLD AUTO: 4.09 M/UL (ref 3.93–5.22)
WBC # BLD AUTO: 8.29 K/UL (ref 3.8–10.5)

## 2021-07-19 PROCEDURE — 85025 COMPLETE CBC W/AUTO DIFF WBC: CPT | Performed by: INTERNAL MEDICINE

## 2021-08-02 ENCOUNTER — LAB REQUISITION (OUTPATIENT)
Dept: LAB | Facility: HOSPITAL | Age: 66
End: 2021-08-02
Payer: MEDICARE

## 2021-08-02 DIAGNOSIS — D50.8 OTHER IRON DEFICIENCY ANEMIAS: ICD-10-CM

## 2021-08-02 LAB
BASOPHILS # BLD: 0.03 K/UL (ref 0.01–0.1)
BASOPHILS NFR BLD: 0.4 %
DIFFERENTIAL METHOD BLD: ABNORMAL
EOSINOPHIL # BLD: 0.23 K/UL (ref 0.04–0.36)
EOSINOPHIL NFR BLD: 3 %
ERYTHROCYTE [DISTWIDTH] IN BLOOD BY AUTOMATED COUNT: 14.8 % (ref 11.7–14.4)
FERRITIN SERPL-MCNC: 49 NG/ML (ref 11–250)
HCT VFR BLDCO AUTO: 29.5 % (ref 35–45)
HGB BLD-MCNC: 9 G/DL (ref 11.8–15.7)
IMM GRANULOCYTES # BLD AUTO: 0.05 K/UL (ref 0–0.08)
IMM GRANULOCYTES NFR BLD AUTO: 0.7 %
IRON SATN MFR SERPL: 6 % (ref 15–45)
IRON SERPL-MCNC: 23 UG/DL (ref 35–150)
LYMPHOCYTES # BLD: 0.82 K/UL (ref 1.2–3.5)
LYMPHOCYTES NFR BLD: 10.7 %
MCH RBC QN AUTO: 24.3 PG (ref 28–33.2)
MCHC RBC AUTO-ENTMCNC: 30.5 G/DL (ref 32.2–35.5)
MCV RBC AUTO: 79.7 FL (ref 83–98)
MONOCYTES # BLD: 0.51 K/UL (ref 0.28–0.8)
MONOCYTES NFR BLD: 6.6 %
NEUTROPHILS # BLD: 6.04 K/UL (ref 1.7–7)
NEUTROPHILS # BLD: 6.04 K/UL (ref 1.7–7)
NEUTS SEG NFR BLD: 78.6 %
NRBC BLD-RTO: 0 %
PDW BLD AUTO: 8.9 FL (ref 9.4–12.3)
PLATELET # BLD AUTO: 347 K/UL (ref 150–369)
RBC # BLD AUTO: 3.7 M/UL (ref 3.93–5.22)
TIBC SERPL-MCNC: 379 UG/DL (ref 270–460)
UIBC SERPL-MCNC: 356 UG/DL (ref 180–360)
WBC # BLD AUTO: 7.68 K/UL (ref 3.8–10.5)

## 2021-08-02 PROCEDURE — 82728 ASSAY OF FERRITIN: CPT | Performed by: INTERNAL MEDICINE

## 2021-08-02 PROCEDURE — 83550 IRON BINDING TEST: CPT | Performed by: INTERNAL MEDICINE

## 2021-08-02 PROCEDURE — 85025 COMPLETE CBC W/AUTO DIFF WBC: CPT | Performed by: INTERNAL MEDICINE

## 2021-08-16 RX ORDER — ESCITALOPRAM OXALATE 10 MG/1
TABLET ORAL
Qty: 90 TABLET | Refills: 3 | Status: SHIPPED | OUTPATIENT
Start: 2021-08-16 | End: 2022-07-20

## 2021-08-16 NOTE — TELEPHONE ENCOUNTER
Medicine Refill Request    Last Office Visit: 5/26/2021  Last Telemedicine Visit: 10/13/2020 Ashley Nazario MD    Next Office Visit: Visit date not found  Next Telemedicine Visit: Visit date not found         Current Outpatient Medications:   •  amLODIPine (NORVASC) 10 mg tablet, Take 1 tablet (10 mg total) by mouth daily. Can adjust based on blood pressures- home medication is norvasc 5 mg., Disp: 30 tablet, Rfl: 0  •  B complex-vitamin C-folic acid 400 mcg tablet tablet, Take 1 tablet by mouth daily. Hold until cleared by Dr. Kiser., Disp: 30 tablet, Rfl: 0  •  biotin 1 mg capsule, Take by mouth daily. Dose unknown, Disp: , Rfl:   •  calcium carbonate (CALCIUM 500 ORAL), Take by mouth daily. Dose unknown, Disp: , Rfl:   •  cholecalciferol, vitamin D3, 1,000 unit capsule, Take 1,000 Units by mouth daily.  , Disp: , Rfl:   •  EPINEPHrine (EPIPEN 2-ZARINA) 0.3 mg/0.3 mL injection syringe, Inject 0.3 mL (0.3 mg total) into the thigh as needed for anaphylaxis., Disp: 1 each, Rfl: 1  •  escitalopram (LEXAPRO) 10 mg tablet, TAKE 1 TABLET BY MOUTH EVERY DAY (Patient taking differently: Take 10 mg by mouth daily.  ), Disp: 30 tablet, Rfl: 11  •  famotidine (PEPCID) 20 mg tablet, Take 1 tablet (20 mg total) by mouth 2 (two) times a day as needed for indigestion., Disp: , Rfl:   •  hydrochlorothiazide (HYDRODIURIL) 25 mg tablet, Take 25 mg by mouth daily.  , Disp: , Rfl: 3  •  polyethylene glycol (MIRALAX) 17 gram packet, Take 17 g by mouth daily as needed (constipation) for up to 3 days. For constipation associated with narcotics; hold for loose stools., Disp: , Rfl:   •  potassium chloride (KLOR-CON) 8 mEq CR tablet, TAKE 1 TABLET BY MOUTH TWICE A DAY, Disp: 180 tablet, Rfl: 3  •  rosuvastatin (CRESTOR) 10 mg tablet, TAKE 1 TABLET BY MOUTH EVERY DAY (Patient taking differently: Take 10 mg by mouth daily.  ), Disp: 30 tablet, Rfl: 11  •  sennosides-docusate sodium (SENNA WITH DOCUSATE SODIUM) 8.6-50 mg,  Take 1 tablet by mouth 2 (two) times a day. For constipation associated with narcotics; hold for loose stools., Disp: 60 tablet, Rfl: 0      BP Readings from Last 3 Encounters:   06/25/21 139/74   05/26/21 98/70   05/11/21 140/89       Recent Lab results:  Lab Results   Component Value Date    CHOL 148 08/11/2020   ,   Lab Results   Component Value Date    HDL 79 08/11/2020   ,   Lab Results   Component Value Date    LDLCALC 50 08/11/2020   ,   Lab Results   Component Value Date    TRIG 94 08/11/2020        Lab Results   Component Value Date    GLUCOSE 100 (H) 07/15/2021   ,   Lab Results   Component Value Date    HGBA1C 4.7 (L) 08/11/2020         Lab Results   Component Value Date    CREATININE 0.6 07/15/2021       Lab Results   Component Value Date    TSH 3.050 08/11/2020

## 2021-08-18 ENCOUNTER — LAB REQUISITION (OUTPATIENT)
Dept: LAB | Facility: HOSPITAL | Age: 66
End: 2021-08-18
Payer: MEDICARE

## 2021-08-18 DIAGNOSIS — D50.8 OTHER IRON DEFICIENCY ANEMIAS: ICD-10-CM

## 2021-08-18 LAB
BASOPHILS # BLD: 0.02 K/UL (ref 0.01–0.1)
BASOPHILS NFR BLD: 0.3 %
DIFFERENTIAL METHOD BLD: ABNORMAL
EOSINOPHIL # BLD: 0.25 K/UL (ref 0.04–0.36)
EOSINOPHIL NFR BLD: 3.6 %
ERYTHROCYTE [DISTWIDTH] IN BLOOD BY AUTOMATED COUNT: 15.1 % (ref 11.7–14.4)
HCT VFR BLDCO AUTO: 30.8 % (ref 35–45)
HGB BLD-MCNC: 9 G/DL (ref 11.8–15.7)
IMM GRANULOCYTES # BLD AUTO: 0.05 K/UL (ref 0–0.08)
IMM GRANULOCYTES NFR BLD AUTO: 0.7 %
LYMPHOCYTES # BLD: 0.85 K/UL (ref 1.2–3.5)
LYMPHOCYTES NFR BLD: 12.2 %
MCH RBC QN AUTO: 22.8 PG (ref 28–33.2)
MCHC RBC AUTO-ENTMCNC: 29.2 G/DL (ref 32.2–35.5)
MCV RBC AUTO: 78.2 FL (ref 83–98)
MONOCYTES # BLD: 0.52 K/UL (ref 0.28–0.8)
MONOCYTES NFR BLD: 7.4 %
NEUTROPHILS # BLD: 5.29 K/UL (ref 1.7–7)
NEUTROPHILS # BLD: 5.29 K/UL (ref 1.7–7)
NEUTS SEG NFR BLD: 75.8 %
NRBC BLD-RTO: 0 %
PDW BLD AUTO: 8.9 FL (ref 9.4–12.3)
PLATELET # BLD AUTO: 329 K/UL (ref 150–369)
RBC # BLD AUTO: 3.94 M/UL (ref 3.93–5.22)
WBC # BLD AUTO: 6.98 K/UL (ref 3.8–10.5)

## 2021-08-18 PROCEDURE — 85025 COMPLETE CBC W/AUTO DIFF WBC: CPT | Performed by: INTERNAL MEDICINE

## 2021-08-25 ENCOUNTER — APPOINTMENT (OUTPATIENT)
Dept: LAB | Facility: HOSPITAL | Age: 66
End: 2021-08-25
Attending: INTERNAL MEDICINE
Payer: MEDICARE

## 2021-08-25 ENCOUNTER — TRANSCRIBE ORDERS (OUTPATIENT)
Dept: SCHEDULING | Age: 66
End: 2021-08-25

## 2021-08-25 DIAGNOSIS — D50.8 OTHER IRON DEFICIENCY ANEMIAS: Primary | ICD-10-CM

## 2021-08-25 DIAGNOSIS — D50.8 OTHER IRON DEFICIENCY ANEMIAS: ICD-10-CM

## 2021-08-25 LAB
BASOPHILS # BLD: 0.03 K/UL (ref 0.01–0.1)
BASOPHILS NFR BLD: 0.3 %
DIFFERENTIAL METHOD BLD: ABNORMAL
EOSINOPHIL # BLD: 0.41 K/UL (ref 0.04–0.36)
EOSINOPHIL NFR BLD: 4.4 %
ERYTHROCYTE [DISTWIDTH] IN BLOOD BY AUTOMATED COUNT: 16.2 % (ref 11.7–14.4)
FERRITIN SERPL-MCNC: 75 NG/ML (ref 11–250)
HCT VFR BLDCO AUTO: 27.9 % (ref 35–45)
HGB BLD-MCNC: 8.3 G/DL (ref 11.8–15.7)
IMM GRANULOCYTES # BLD AUTO: 0.06 K/UL (ref 0–0.08)
IMM GRANULOCYTES NFR BLD AUTO: 0.6 %
IRON SATN MFR SERPL: 5 % (ref 15–45)
IRON SERPL-MCNC: 20 UG/DL (ref 35–150)
LYMPHOCYTES # BLD: 1.18 K/UL (ref 1.2–3.5)
LYMPHOCYTES NFR BLD: 12.7 %
MCH RBC QN AUTO: 23.2 PG (ref 28–33.2)
MCHC RBC AUTO-ENTMCNC: 29.7 G/DL (ref 32.2–35.5)
MCV RBC AUTO: 77.9 FL (ref 83–98)
MONOCYTES # BLD: 0.65 K/UL (ref 0.28–0.8)
MONOCYTES NFR BLD: 7 %
NEUTROPHILS # BLD: 6.93 K/UL (ref 1.7–7)
NEUTROPHILS # BLD: 6.93 K/UL (ref 1.7–7)
NEUTS SEG NFR BLD: 75 %
NRBC BLD-RTO: 0 %
PDW BLD AUTO: 9.1 FL (ref 9.4–12.3)
PLATELET # BLD AUTO: 396 K/UL (ref 150–369)
RBC # BLD AUTO: 3.58 M/UL (ref 3.93–5.22)
TIBC SERPL-MCNC: 391 UG/DL (ref 270–460)
UIBC SERPL-MCNC: 371 UG/DL (ref 180–360)
WBC # BLD AUTO: 9.26 K/UL (ref 3.8–10.5)

## 2021-08-25 PROCEDURE — 83540 ASSAY OF IRON: CPT

## 2021-08-25 PROCEDURE — 82728 ASSAY OF FERRITIN: CPT

## 2021-08-25 PROCEDURE — 36415 COLL VENOUS BLD VENIPUNCTURE: CPT

## 2021-08-25 PROCEDURE — 85025 COMPLETE CBC W/AUTO DIFF WBC: CPT

## 2021-09-13 ENCOUNTER — TELEPHONE (OUTPATIENT)
Dept: INTERNAL MEDICINE | Facility: CLINIC | Age: 66
End: 2021-09-13

## 2021-09-13 ENCOUNTER — LAB REQUISITION (OUTPATIENT)
Dept: LAB | Facility: HOSPITAL | Age: 66
End: 2021-09-13
Payer: MEDICARE

## 2021-09-13 DIAGNOSIS — E78.5 HYPERLIPIDEMIA, UNSPECIFIED HYPERLIPIDEMIA TYPE: ICD-10-CM

## 2021-09-13 DIAGNOSIS — D50.8 OTHER IRON DEFICIENCY ANEMIAS: ICD-10-CM

## 2021-09-13 DIAGNOSIS — Z86.39 HX OF NUTRITIONAL DEFICIENCY: Primary | ICD-10-CM

## 2021-09-13 DIAGNOSIS — E53.1 PYRIDOXINE DEFICIENCY: ICD-10-CM

## 2021-09-13 DIAGNOSIS — E55.9 VITAMIN D DEFICIENCY: ICD-10-CM

## 2021-09-13 LAB
BASOPHILS # BLD: 0.04 K/UL (ref 0.01–0.1)
BASOPHILS NFR BLD: 0.7 %
DIFFERENTIAL METHOD BLD: ABNORMAL
EOSINOPHIL # BLD: 0.49 K/UL (ref 0.04–0.36)
EOSINOPHIL NFR BLD: 8.5 %
ERYTHROCYTE [DISTWIDTH] IN BLOOD BY AUTOMATED COUNT: 18.6 % (ref 11.7–14.4)
HCT VFR BLDCO AUTO: 30.3 % (ref 35–45)
HGB BLD-MCNC: 9.1 G/DL (ref 11.8–15.7)
IMM GRANULOCYTES # BLD AUTO: 0.05 K/UL (ref 0–0.08)
IMM GRANULOCYTES NFR BLD AUTO: 0.9 %
LYMPHOCYTES # BLD: 1.04 K/UL (ref 1.2–3.5)
LYMPHOCYTES NFR BLD: 18.1 %
MCH RBC QN AUTO: 24.4 PG (ref 28–33.2)
MCHC RBC AUTO-ENTMCNC: 30 G/DL (ref 32.2–35.5)
MCV RBC AUTO: 81.2 FL (ref 83–98)
MONOCYTES # BLD: 0.48 K/UL (ref 0.28–0.8)
MONOCYTES NFR BLD: 8.3 %
NEUTROPHILS # BLD: 3.66 K/UL (ref 1.7–7)
NEUTROPHILS # BLD: 3.66 K/UL (ref 1.7–7)
NEUTS SEG NFR BLD: 63.5 %
NRBC BLD-RTO: 0 %
PDW BLD AUTO: 9.1 FL (ref 9.4–12.3)
PLATELET # BLD AUTO: 403 K/UL (ref 150–369)
RBC # BLD AUTO: 3.73 M/UL (ref 3.93–5.22)
VIT B12 SERPL-MCNC: 1277 PG/ML (ref 180–914)
WBC # BLD AUTO: 5.76 K/UL (ref 3.8–10.5)

## 2021-09-13 PROCEDURE — 85025 COMPLETE CBC W/AUTO DIFF WBC: CPT | Performed by: INTERNAL MEDICINE

## 2021-09-13 PROCEDURE — 36415 COLL VENOUS BLD VENIPUNCTURE: CPT | Performed by: INTERNAL MEDICINE

## 2021-09-13 PROCEDURE — 82607 VITAMIN B-12: CPT | Performed by: INTERNAL MEDICINE

## 2021-09-13 RX ORDER — LORAZEPAM 0.5 MG/1
0.5 TABLET ORAL NIGHTLY PRN
Qty: 30 TABLET | Refills: 0 | Status: ON HOLD | OUTPATIENT
Start: 2021-09-13 | End: 2021-12-24 | Stop reason: ENTERED-IN-ERROR

## 2021-09-13 NOTE — TELEPHONE ENCOUNTER
Patient is requesting a prescription for ativan to help her sleep. She had a visit with Dr. Calhoun today who told her to contact her primary physician for this.

## 2021-09-14 NOTE — TELEPHONE ENCOUNTER
Notified patient.  She is requesting labs to be done.  She feels her vitamin b's and d's and phosphates are out of whack.

## 2021-09-17 ENCOUNTER — APPOINTMENT (EMERGENCY)
Dept: RADIOLOGY | Facility: HOSPITAL | Age: 66
End: 2021-09-17
Attending: EMERGENCY MEDICINE
Payer: MEDICARE

## 2021-09-17 ENCOUNTER — HOSPITAL ENCOUNTER (EMERGENCY)
Facility: HOSPITAL | Age: 66
Discharge: HOME | End: 2021-09-17
Attending: EMERGENCY MEDICINE | Admitting: EMERGENCY MEDICINE
Payer: MEDICARE

## 2021-09-17 VITALS
OXYGEN SATURATION: 99 % | RESPIRATION RATE: 18 BRPM | HEIGHT: 61 IN | DIASTOLIC BLOOD PRESSURE: 64 MMHG | SYSTOLIC BLOOD PRESSURE: 146 MMHG | TEMPERATURE: 97.2 F | HEART RATE: 56 BPM | BODY MASS INDEX: 22.28 KG/M2 | WEIGHT: 118 LBS

## 2021-09-17 DIAGNOSIS — D64.9 CHRONIC ANEMIA: ICD-10-CM
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
## 2021-09-17 DIAGNOSIS — S09.90XA CLOSED HEAD INJURY, INITIAL ENCOUNTER: ICD-10-CM

## 2021-09-17 LAB
ALBUMIN SERPL-MCNC: 4.5 G/DL (ref 3.4–5)
ALP SERPL-CCNC: 79 IU/L (ref 35–126)
ALT SERPL-CCNC: 28 IU/L (ref 11–54)
ANION GAP SERPL CALC-SCNC: 10 MEQ/L (ref 3–15)
AST SERPL-CCNC: 40 IU/L (ref 15–41)
BASOPHILS # BLD: 0.03 K/UL (ref 0.01–0.1)
BASOPHILS NFR BLD: 0.5 %
BILIRUB DIRECT SERPL-MCNC: 0.2 MG/DL
BILIRUB SERPL-MCNC: 0.2 MG/DL (ref 0.3–1.2)
BUN SERPL-MCNC: 18 MG/DL (ref 8–20)
CALCIUM SERPL-MCNC: 9.3 MG/DL (ref 8.9–10.3)
CHLORIDE SERPL-SCNC: 106 MEQ/L (ref 98–109)
CO2 SERPL-SCNC: 23 MEQ/L (ref 22–32)
CREAT SERPL-MCNC: 0.5 MG/DL (ref 0.6–1.1)
DIFFERENTIAL METHOD BLD: ABNORMAL
EOSINOPHIL # BLD: 0.45 K/UL (ref 0.04–0.36)
EOSINOPHIL NFR BLD: 6.9 %
ERYTHROCYTE [DISTWIDTH] IN BLOOD BY AUTOMATED COUNT: 19.3 % (ref 11.7–14.4)
GFR SERPL CREATININE-BSD FRML MDRD: >60 ML/MIN/1.73M*2
GLUCOSE SERPL-MCNC: 113 MG/DL (ref 70–99)
HCT VFR BLDCO AUTO: 30.9 % (ref 35–45)
HGB BLD-MCNC: 9.3 G/DL (ref 11.8–15.7)
IMM GRANULOCYTES # BLD AUTO: 0.07 K/UL (ref 0–0.08)
IMM GRANULOCYTES NFR BLD AUTO: 1.1 %
LYMPHOCYTES # BLD: 0.89 K/UL (ref 1.2–3.5)
LYMPHOCYTES NFR BLD: 13.6 %
MCH RBC QN AUTO: 25.1 PG (ref 28–33.2)
MCHC RBC AUTO-ENTMCNC: 30.1 G/DL (ref 32.2–35.5)
MCV RBC AUTO: 83.3 FL (ref 83–98)
MONOCYTES # BLD: 0.59 K/UL (ref 0.28–0.8)
MONOCYTES NFR BLD: 9 %
NEUTROPHILS # BLD: 4.53 K/UL (ref 1.7–7)
NEUTS SEG NFR BLD: 68.9 %
NRBC BLD-RTO: 0 %
PDW BLD AUTO: 9.7 FL (ref 9.4–12.3)
PLATELET # BLD AUTO: 330 K/UL (ref 150–369)
POTASSIUM SERPL-SCNC: 3.7 MEQ/L (ref 3.6–5.1)
PROT SERPL-MCNC: 7.3 G/DL (ref 6–8.2)
RBC # BLD AUTO: 3.71 M/UL (ref 3.93–5.22)
SODIUM SERPL-SCNC: 139 MEQ/L (ref 136–144)
TROPONIN I SERPL-MCNC: <0.02 NG/ML
WBC # BLD AUTO: 6.56 K/UL (ref 3.8–10.5)

## 2021-09-17 PROCEDURE — 96360 HYDRATION IV INFUSION INIT: CPT

## 2021-09-17 PROCEDURE — 84484 ASSAY OF TROPONIN QUANT: CPT | Performed by: EMERGENCY MEDICINE

## 2021-09-17 PROCEDURE — 3E0337Z INTRODUCTION OF ELECTROLYTIC AND WATER BALANCE SUBSTANCE INTO PERIPHERAL VEIN, PERCUTANEOUS APPROACH: ICD-10-PCS | Performed by: EMERGENCY MEDICINE

## 2021-09-17 PROCEDURE — 82040 ASSAY OF SERUM ALBUMIN: CPT | Performed by: PHYSICIAN ASSISTANT

## 2021-09-17 PROCEDURE — 85025 COMPLETE CBC W/AUTO DIFF WBC: CPT | Performed by: EMERGENCY MEDICINE

## 2021-09-17 PROCEDURE — 99284 EMERGENCY DEPT VISIT MOD MDM: CPT | Mod: 25

## 2021-09-17 PROCEDURE — 71045 X-RAY EXAM CHEST 1 VIEW: CPT

## 2021-09-17 PROCEDURE — G1004 CDSM NDSC: HCPCS

## 2021-09-17 PROCEDURE — 25800000 HC PHARMACY IV SOLUTIONS: Performed by: PHYSICIAN ASSISTANT

## 2021-09-17 PROCEDURE — 36415 COLL VENOUS BLD VENIPUNCTURE: CPT | Performed by: EMERGENCY MEDICINE

## 2021-09-17 PROCEDURE — 80048 BASIC METABOLIC PNL TOTAL CA: CPT | Performed by: EMERGENCY MEDICINE

## 2021-09-17 PROCEDURE — 93005 ELECTROCARDIOGRAM TRACING: CPT | Performed by: EMERGENCY MEDICINE

## 2021-09-17 PROCEDURE — 70450 CT HEAD/BRAIN W/O DYE: CPT | Mod: MG

## 2021-09-17 RX ADMIN — SODIUM CHLORIDE 500 ML: 9 INJECTION, SOLUTION INTRAVENOUS at 12:43

## 2021-09-17 ASSESSMENT — ENCOUNTER SYMPTOMS
HEMATURIA: 0
DYSURIA: 0
NECK PAIN: 0
WOUND: 0
VOMITING: 0
APPETITE CHANGE: 0
DIARRHEA: 0
SORE THROAT: 0
COUGH: 0
FEVER: 0
BACK PAIN: 0
DIFFICULTY URINATING: 0
ABDOMINAL PAIN: 0
SHORTNESS OF BREATH: 0
NAUSEA: 1

## 2021-09-17 NOTE — ED PROVIDER NOTES
Emergency Medicine Note  HPI   HISTORY OF PRESENT ILLNESS     66-year-old female history of anemia, anxiety, hypertension, hyperlipidemia, iron deficiency anemia, systemic mastocytosis follows with Dr. Calhoun presents to the ED for evaluation of fall.  Patient reports fasting yesterday for Yom Kippur and in the evening she had drink of alcohol while at a party (Her s/o reports two drinks of ETOH).  She went to bed around 1 AM.  Her fiancé reports the patient got up at 1:15AM and walked out of the bedroom.  He then heard her yelling from the hallway outside their kitchen around 2:45AM.  He reports there was crumbs on the ground and in the kitchen the chair was leaning back against the wall.  The patient was lying on the floor, he helped her stand up and she walked back to bed where he reports she was mumbling the whole way.  She then got out of bed at 3:45AM and 4:30AM to go to the bathroom, was answering his questions appropriately and walked steadily to the bathroom.  Patient only remembers going to bed last night at 1am and then waking up at 8:30AM this morning without any recollection of the events overnight.  She complains of posterior headache 5 out of 10 soreness since she woke up this morning as well as feeling foggy and nausea.  She denies any vision changes, difficulty speaking or walking, extremity numbness or weakness, neck pain, back pain, abdominal pain, blood in stool or urine, vomiting, diarrhea, or any other injuries.  She reports previous history of syncopal episodes, follows with Dr. Selby cardiology.  She started Ativan 5 days ago at night to help sleep otherwise, denies any other new medications.  No recent long car rides or plane trips, recent surgery, history of blood clots, leg swelling or calf pain.  She is not on blood thinners      History provided by:  Patient (pt's S/O)  Trauma  Mechanism of injury: fall     Current symptoms:       Associated symptoms:             Reports headache and  nausea.             Denies abdominal pain, back pain, chest pain, neck pain and vomiting.         Patient History   PAST HISTORY     Reviewed from Nursing Triage:      Past Medical History:   Diagnosis Date   • Anemia    • Anxiety    • Hypertension    • Iron deficiency    • Lipid disorder    • Systemic mastocytosis    • Vitamin D deficiency        Past Surgical History:   Procedure Laterality Date   • AUGMENTATION MAMMAPLASTY Bilateral    • COLONOSCOPY     • PATELLA SURGERY     • REDUCTION MAMMAPLASTY         Family History   Problem Relation Age of Onset   • Heart disease Biological Mother    • Prostate cancer Biological Father        Social History     Tobacco Use   • Smoking status: Former Smoker     Packs/day: 0.25     Quit date:      Years since quittin.7   • Smokeless tobacco: Never Used   Vaping Use   • Vaping Use: Never used   Substance Use Topics   • Alcohol use: Yes     Alcohol/week: 1.0 standard drink     Types: 1 Glasses of wine per week     Comment: Social   • Drug use: No         Review of Systems   REVIEW OF SYSTEMS     Review of Systems   Constitutional: Negative for appetite change and fever.   HENT: Negative for congestion, dental problem, ear pain, facial swelling and sore throat.    Eyes: Negative for photophobia, pain and visual disturbance.   Respiratory: Negative for cough and shortness of breath.    Cardiovascular: Negative for chest pain.   Gastrointestinal: Positive for nausea. Negative for abdominal pain, blood in stool, diarrhea and vomiting.   Genitourinary: Negative for difficulty urinating, dysuria and hematuria.   Musculoskeletal: Negative for back pain, gait problem, joint swelling and neck pain.   Skin: Negative for wound.   Allergic/Immunologic: Negative for immunocompromised state.   Neurological: Positive for headaches. Negative for dizziness, facial asymmetry, speech difficulty, weakness and numbness.   Hematological: Does not bruise/bleed easily.         VITALS     ED  Vitals    Date/Time Temp Pulse Resp BP SpO2 [facility]   09/17/21 1724 -- 56 18 146/64 99 % SS   09/17/21 1430 -- 58 20 151/73 -- BAT   09/17/21 1205 36.2 °C (97.2 °F) 57 16 166/73 100 % JLG        Pulse Ox %: 100 % (09/17/21 1339)  Pulse Ox Interpretation: Normal (09/17/21 1339)  Heart Rate: 57 (09/17/21 1339)  Rhythm Strip Interpretation: Sinus Bradycardia (09/17/21 1339)     Physical Exam   PHYSICAL EXAM     Physical Exam  Vitals and nursing note reviewed.   Constitutional:       General: She is not in acute distress.  HENT:      Head: Normocephalic. No raccoon eyes or Knox's sign.      Right Ear: Tympanic membrane normal.      Left Ear: Tympanic membrane normal.      Nose: Nose normal.      Mouth/Throat:      Mouth: Mucous membranes are moist.      Comments: No tongue injury  Eyes:      General: No visual field deficit.     Extraocular Movements: Extraocular movements intact.      Conjunctiva/sclera: Conjunctivae normal.      Pupils: Pupils are equal, round, and reactive to light.   Cardiovascular:      Rate and Rhythm: Regular rhythm. Bradycardia present.   Pulmonary:      Effort: Pulmonary effort is normal. No respiratory distress.      Breath sounds: Normal breath sounds.   Abdominal:      Palpations: Abdomen is soft.      Tenderness: There is no abdominal tenderness. There is no guarding or rebound.   Musculoskeletal:         General: No swelling, tenderness, deformity or signs of injury.      Cervical back: Normal range of motion and neck supple. No spinous process tenderness or muscular tenderness.      Right lower leg: No edema.      Left lower leg: No edema.      Comments: Back NTTP nml skin inspection    MAEx  4 with FAROM without pain   Skin:     General: Skin is warm and dry.   Neurological:      General: No focal deficit present.      Mental Status: She is alert and oriented to person, place, and time.      Cranial Nerves: Cranial nerves are intact. No dysarthria or facial asymmetry.      Sensory:  Sensation is intact.      Motor: Motor function is intact. No pronator drift.      Coordination: Finger-Nose-Finger Test normal.   Psychiatric:         Mood and Affect: Mood normal.           PROCEDURES     Procedures     DATA     Results     Procedure Component Value Units Date/Time    Hepatic function panel [[id number]]  (Abnormal) Collected: 09/17/21 1223    Specimen: Blood, Venous Updated: 09/17/21 1312     Albumin 4.5 g/dL      Bilirubin, Total 0.2 mg/dL      Comment: SLIGHT HEMOLYSIS, RESULT MAY BE INCREASED.        Bilirubin, Direct 0.2 mg/dL      Comment: SLIGHT HEMOLYSIS, RESULT MAY BE DECREASED.        Alkaline Phosphatase 79 IU/L      AST (SGOT) 40 IU/L      Comment: SLIGHT HEMOLYSIS, RESULT MAY BE INCREASED.        ALT (SGPT) 28 IU/L      Comment: SLIGHT HEMOLYSIS, RESULT MAY BE INCREASED.        Total Protein 7.3 g/dL      Comment: Test performed on plasma which typically contains approximately 0.4 g/dL more protein than serum.       Troponin I [[id number]]  (Normal) Collected: 09/17/21 1223    Specimen: Blood, Venous Updated: 09/17/21 1259     Troponin I <0.02 ng/mL     Basic metabolic panel [[id number]]  (Abnormal) Collected: 09/17/21 1223    Specimen: Blood, Venous Updated: 09/17/21 1257     Sodium 139 mEQ/L      Potassium 3.7 mEQ/L      Comment: Results obtained on plasma. Plasma Potassium values may be up to 0.4 mEQ/L less than serum values. The differences may be greater for patients with high platelet or white cell counts.  SLIGHT HEMOLYSIS, RESULT MAY BE INCREASED.        Chloride 106 mEQ/L      CO2 23 mEQ/L      BUN 18 mg/dL      Creatinine 0.5 mg/dL      Glucose 113 mg/dL      Calcium 9.3 mg/dL      eGFR >60.0 mL/min/1.73m*2      Anion Gap 10 mEQ/L     CBC and differential [[id number]]  (Abnormal) Collected: 09/17/21 1223    Specimen: Blood, Venous Updated: 09/17/21 1238     WBC 6.56 K/uL      RBC 3.71 M/uL      Hemoglobin 9.3 g/dL      Hematocrit 30.9 %      MCV 83.3 fL      MCH 25.1 pg      MCHC  30.1 g/dL      RDW 19.3 %      Platelets 330 K/uL      Comment: PLT CLUMPING SUSPECTED. PLT COUNT MAY BE SLIGHTLY HIGHER THAN REPORTED. IF CLINICALLY WARRANTED, SUGGEST COLLECTING SODIUM CITRATE TUBE (BLUE TOP) IN ADDITION TO EDTA TUBE FOR FOLLOW UP CBC TESTING.. RESULTS OBTAINED AFTER VORTEXING TO ELIMINATE PLT   CLUMPS        MPV 9.7 fL      Differential Type Auto     nRBC 0.0 %      Immature Granulocytes 1.1 %      Neutrophils 68.9 %      Lymphocytes 13.6 %      Monocytes 9.0 %      Eosinophils 6.9 %      Basophils 0.5 %      Immature Granulocytes, Absolute 0.07 K/uL      Neutrophils, Absolute 4.53 K/uL      Lymphocytes, Absolute 0.89 K/uL      Monocytes, Absolute 0.59 K/uL      Eosinophils, Absolute 0.45 K/uL      Basophils, Absolute 0.03 K/uL           Imaging Results          CT HEAD WITHOUT IV CONTRAST (Final result)  Result time 09/17/21 16:50:18    Final result                 Impression:    IMPRESSION:  1. No evidence of intracranial traumatic injury, acute infarct, hemorrhage mass  or mass effect.  2. Right scalp hematoma without subjacent calvarial fracture.  3. Unchanged nonspecific prominence of the calvarial thickness with  mineralization of the diploic space except for scattered lucent areas that  appear to represent focal fat as noted the previous MRI examination.    COMMENT:    Brain parenchyma: Normal CT appearance.  Ventricles, cisterns, and sulci: Again seen is mild diffuse volume loss  appropriate for age.    Calvarium and extra cranial soft tissues: See impression.  Visualized paranasal sinuses and mastoid air cells: Clear bilaterally.    I certify that I have personally reviewed this examination and agree with this  report.  Lisa Fragoso MD               Narrative:    STUDY:   CT examination of the head performed without intravenous contrast  following the [facility] ED standard protocol. Images reviewed in brain,  stroke, subdural and bone windows.    CT DOSE:  One or more dose  reduction techniques (e.g. automated exposure  control, adjustment of the mA and/or kV according to patient size, use of  iterative reconstruction technique) utilized for this examination.    CLINICAL HISTORY: 66-year-old female with acute headache, syncope, possible head  trauma.    COMPARISON: Prior head CTs most recently dated 11/2/2017.  Prior brain MRIs most  recently dated 7/13/2019.                               X-RAY CHEST 1 VIEW (Final result)  Result time 09/17/21 14:48:47    Final result                 Impression:    IMPRESSION: No acute cardiopulmonary process.             Narrative:    Chest x-ray    CLINICAL HISTORY: Syncope.    COMPARISON: 5/7/2021.    COMMENT: Portable AP upright examination of the chest obtained.  Cardiomediastinal silhouette and pulmonary vascularity within normal limits.  Lungs are clear.  Trachea is midline.  No pleural fluid or air.  Osseous  structures are intact.                                ECG 12 lead   Final Result          Scoring tools                                 ED Course & MDM   MDM / ED COURSE and CLINICAL IMPRESSIONS     MDM  Number of Diagnoses or Management Options     Amount and/or Complexity of Data Reviewed  Clinical lab tests: reviewed and ordered  Tests in the radiology section of CPT®: ordered and reviewed  Decide to obtain previous medical records or to obtain history from someone other than the patient: yes    Patient Progress  Patient progress: stable      ED Course as of 09/19/21 1726   Fri Sep 17, 2021   1213 Pt not in ED room yet [TC]   1225 EKG interpretation 58 sinus bradycardia, LAD, incomplete right bundle branch block, no ST elevation, Q-waves in lead III and aVF, normal QT/QTc [TC]   1356 Labs reviewed no significant abnormalities.  Chronic anemia similar to previous. [TC]   1709 CT HEAD WITHOUT IV CONTRAST  IMPRESSION:  1. No evidence of intracranial traumatic injury, acute infarct, hemorrhage mass  or mass effect.  2. Right scalp hematoma  without subjacent calvarial fracture.  3. Unchanged nonspecific prominence of the calvarial thickness with  mineralization of the diploic space except for scattered lucent areas that  appear to represent focal fat as noted the previous MRI examination. [TC]   1712 Suspect fall possibility related to fasting, alcohol use, and newly prescribed Ativan.  Discussed not mixing alcohol with Ativan. Encouraged to  drink plenty of fluids.  Will have patient follow-up with her family doctor and cardiology.  Suspect also possible mild concussion, we will give concussion referral. [TC]      ED Course User Index  [TC] Estrella Mcclain PA C         Clinical Impressions as of 09/19/21 1726   Fall, initial encounter   Closed head injury, initial encounter   Chronic anemia          Disposition  Discharged      Estrella Mcclain PA C  09/19/21 1728

## 2021-09-17 NOTE — DISCHARGE INSTRUCTIONS
Take 600 mg of Motrin with food every 6 hours as needed for pain as instructed on the bottle    Take 650 mg of Tylenol every 4 hours as needed for pain as instructed on the bottle     Return to the emergency department for worsening of symptoms or if you develop any new concerning symptoms including vision changes, extremity numbness or weakness, difficulty walking or speaking, chest pain, difficulty breathing, fainting.     Follow up with your family doctor within 48 hours for re-evaluation and further treatment and monitoring.

## 2021-09-18 LAB
ATRIAL RATE: 58
P AXIS: 46
PR INTERVAL: 130
QRS DURATION: 96
QT INTERVAL: 440
QTC CALCULATION(BAZETT): 431
R AXIS: -36
T WAVE AXIS: 18
VENTRICULAR RATE: 58

## 2021-09-19 ASSESSMENT — ENCOUNTER SYMPTOMS
PHOTOPHOBIA: 0
FACIAL SWELLING: 0
NUMBNESS: 0
EYE PAIN: 0
SPEECH DIFFICULTY: 0
FACIAL ASYMMETRY: 0
WEAKNESS: 0
DIZZINESS: 0
JOINT SWELLING: 0
HEADACHES: 1
BLOOD IN STOOL: 0
BRUISES/BLEEDS EASILY: 0

## 2021-09-21 ENCOUNTER — TELEPHONE (OUTPATIENT)
Dept: INTERNAL MEDICINE | Facility: CLINIC | Age: 66
End: 2021-09-21

## 2021-09-21 NOTE — TELEPHONE ENCOUNTER
LVM for patient to perform ED telephone f/u call.  The patient presented to the ED at [facility] on 9/17/21 due to a fall and has possible concussion.  I asked the patient to call back to see how she is doing and to schedule f/u if needed.

## 2021-09-21 NOTE — TELEPHONE ENCOUNTER
Patient called back and stated she is feeling much better.  The patient does not need f/u at this time.  The patient stated she was due for some lab work and I confirmed that the script is in the system and the patient will go to [facility] to have labs done.

## 2021-10-13 ENCOUNTER — APPOINTMENT (OUTPATIENT)
Dept: LAB | Facility: HOSPITAL | Age: 66
End: 2021-10-13
Attending: INTERNAL MEDICINE
Payer: MEDICARE

## 2021-10-13 DIAGNOSIS — E78.5 HYPERLIPIDEMIA, UNSPECIFIED HYPERLIPIDEMIA TYPE: ICD-10-CM

## 2021-10-13 DIAGNOSIS — E53.1 PYRIDOXINE DEFICIENCY: ICD-10-CM

## 2021-10-13 DIAGNOSIS — E55.9 VITAMIN D DEFICIENCY: ICD-10-CM

## 2021-10-13 DIAGNOSIS — Z86.39 HX OF NUTRITIONAL DEFICIENCY: ICD-10-CM

## 2021-10-13 LAB
25(OH)D3 SERPL-MCNC: 37 NG/ML (ref 30–100)
ALBUMIN SERPL-MCNC: 3.7 G/DL (ref 3.4–5)
ALP SERPL-CCNC: 75 IU/L (ref 35–126)
ALT SERPL-CCNC: 30 IU/L (ref 11–54)
ANION GAP SERPL CALC-SCNC: 15 MEQ/L (ref 3–15)
AST SERPL-CCNC: 21 IU/L (ref 15–41)
BILIRUB SERPL-MCNC: 0.3 MG/DL (ref 0.3–1.2)
BUN SERPL-MCNC: 17 MG/DL (ref 8–20)
CALCIUM SERPL-MCNC: 9.6 MG/DL (ref 8.9–10.3)
CHLORIDE SERPL-SCNC: 102 MEQ/L (ref 98–109)
CHOLEST SERPL-MCNC: 157 MG/DL
CO2 SERPL-SCNC: 24 MEQ/L (ref 22–32)
CREAT SERPL-MCNC: 0.6 MG/DL (ref 0.6–1.1)
GFR SERPL CREATININE-BSD FRML MDRD: >60 ML/MIN/1.73M*2
GLUCOSE SERPL-MCNC: 110 MG/DL (ref 70–99)
HDLC SERPL-MCNC: 76 MG/DL
HDLC SERPL: 2.1 {RATIO}
LDLC SERPL CALC-MCNC: 66 MG/DL
NONHDLC SERPL-MCNC: 81 MG/DL
POTASSIUM SERPL-SCNC: 3.8 MEQ/L (ref 3.6–5.1)
PROT SERPL-MCNC: 5.4 G/DL (ref 6–8.2)
SODIUM SERPL-SCNC: 141 MEQ/L (ref 136–144)
TRIGL SERPL-MCNC: 75 MG/DL (ref 30–149)

## 2021-10-13 PROCEDURE — 84425 ASSAY OF VITAMIN B-1: CPT

## 2021-10-13 PROCEDURE — 84207 ASSAY OF VITAMIN B-6: CPT

## 2021-10-13 PROCEDURE — 80061 LIPID PANEL: CPT

## 2021-10-13 PROCEDURE — 82306 VITAMIN D 25 HYDROXY: CPT

## 2021-10-13 PROCEDURE — 36415 COLL VENOUS BLD VENIPUNCTURE: CPT

## 2021-10-13 PROCEDURE — 80053 COMPREHEN METABOLIC PANEL: CPT

## 2021-10-17 LAB — VIT B6 SERPL-MCNC: 13.1 NG/ML (ref 2.1–21.7)

## 2021-10-18 ENCOUNTER — APPOINTMENT (OUTPATIENT)
Dept: LAB | Facility: HOSPITAL | Age: 66
End: 2021-10-18
Attending: INTERNAL MEDICINE
Payer: MEDICARE

## 2021-10-18 ENCOUNTER — TRANSCRIBE ORDERS (OUTPATIENT)
Dept: SCHEDULING | Age: 66
End: 2021-10-18
Payer: MEDICARE

## 2021-10-18 DIAGNOSIS — D50.8 OTHER IRON DEFICIENCY ANEMIAS: Primary | ICD-10-CM

## 2021-10-18 DIAGNOSIS — D50.8 OTHER IRON DEFICIENCY ANEMIAS: ICD-10-CM

## 2021-10-18 LAB
BASOPHILS # BLD: 0.04 K/UL (ref 0.01–0.1)
BASOPHILS NFR BLD: 0.8 %
DIFFERENTIAL METHOD BLD: ABNORMAL
EOSINOPHIL # BLD: 0.39 K/UL (ref 0.04–0.36)
EOSINOPHIL NFR BLD: 7.8 %
ERYTHROCYTE [DISTWIDTH] IN BLOOD BY AUTOMATED COUNT: 16.3 % (ref 11.7–14.4)
FERRITIN SERPL-MCNC: 67 NG/ML (ref 11–250)
FOLATE SERPL-MCNC: >20 NG/ML
HCT VFR BLDCO AUTO: 35.9 % (ref 35–45)
HGB BLD-MCNC: 10.7 G/DL (ref 11.8–15.7)
IMM GRANULOCYTES # BLD AUTO: 0.04 K/UL (ref 0–0.08)
IMM GRANULOCYTES NFR BLD AUTO: 0.8 %
IRON SATN MFR SERPL: 9 % (ref 15–45)
IRON SERPL-MCNC: 34 UG/DL (ref 35–150)
LYMPHOCYTES # BLD: 0.66 K/UL (ref 1.2–3.5)
LYMPHOCYTES NFR BLD: 13.2 %
MCH RBC QN AUTO: 24.4 PG (ref 28–33.2)
MCHC RBC AUTO-ENTMCNC: 29.8 G/DL (ref 32.2–35.5)
MCV RBC AUTO: 81.8 FL (ref 83–98)
MONOCYTES # BLD: 0.43 K/UL (ref 0.28–0.8)
MONOCYTES NFR BLD: 8.6 %
NEUTROPHILS # BLD: 3.43 K/UL (ref 1.7–7)
NEUTROPHILS # BLD: 3.43 K/UL (ref 1.7–7)
NEUTS SEG NFR BLD: 68.8 %
NRBC BLD-RTO: 0 %
PDW BLD AUTO: 8.5 FL (ref 9.4–12.3)
PLATELET # BLD AUTO: 333 K/UL (ref 150–369)
RBC # BLD AUTO: 4.39 M/UL (ref 3.93–5.22)
TIBC SERPL-MCNC: 365 UG/DL (ref 270–460)
UIBC SERPL-MCNC: 331 UG/DL (ref 180–360)
VIT B1 SERPL-SCNC: 16 NMOL/L (ref 8–30)
VIT B12 SERPL-MCNC: 1131 PG/ML (ref 180–914)
WBC # BLD AUTO: 4.99 K/UL (ref 3.8–10.5)

## 2021-10-18 PROCEDURE — 82746 ASSAY OF FOLIC ACID SERUM: CPT

## 2021-10-18 PROCEDURE — 85025 COMPLETE CBC W/AUTO DIFF WBC: CPT

## 2021-10-18 PROCEDURE — 82607 VITAMIN B-12: CPT

## 2021-10-18 PROCEDURE — 83550 IRON BINDING TEST: CPT

## 2021-10-18 PROCEDURE — 82728 ASSAY OF FERRITIN: CPT

## 2021-10-18 PROCEDURE — 36415 COLL VENOUS BLD VENIPUNCTURE: CPT

## 2021-11-09 ENCOUNTER — LAB REQUISITION (OUTPATIENT)
Dept: LAB | Facility: HOSPITAL | Age: 66
End: 2021-11-09
Payer: MEDICARE

## 2021-11-09 DIAGNOSIS — D50.8 OTHER IRON DEFICIENCY ANEMIAS: ICD-10-CM

## 2021-11-09 LAB
BASOPHILS # BLD: 0.02 K/UL (ref 0.01–0.1)
BASOPHILS NFR BLD: 0.2 %
DIFFERENTIAL METHOD BLD: ABNORMAL
EOSINOPHIL # BLD: 0.4 K/UL (ref 0.04–0.36)
EOSINOPHIL NFR BLD: 4.9 %
ERYTHROCYTE [DISTWIDTH] IN BLOOD BY AUTOMATED COUNT: 15.4 % (ref 11.7–14.4)
HCT VFR BLDCO AUTO: 29.7 % (ref 35–45)
HGB BLD-MCNC: 9.1 G/DL (ref 11.8–15.7)
IMM GRANULOCYTES # BLD AUTO: 0.04 K/UL (ref 0–0.08)
IMM GRANULOCYTES NFR BLD AUTO: 0.5 %
LYMPHOCYTES # BLD: 1.18 K/UL (ref 1.2–3.5)
LYMPHOCYTES NFR BLD: 14.4 %
MCH RBC QN AUTO: 23.6 PG (ref 28–33.2)
MCHC RBC AUTO-ENTMCNC: 30.6 G/DL (ref 32.2–35.5)
MCV RBC AUTO: 77.1 FL (ref 83–98)
MONOCYTES # BLD: 0.56 K/UL (ref 0.28–0.8)
MONOCYTES NFR BLD: 6.8 %
NEUTROPHILS # BLD: 6.02 K/UL (ref 1.7–7)
NEUTROPHILS # BLD: 6.02 K/UL (ref 1.7–7)
NEUTS SEG NFR BLD: 73.2 %
NRBC BLD-RTO: 0 %
PDW BLD AUTO: 9.4 FL (ref 9.4–12.3)
PLATELET # BLD AUTO: 357 K/UL (ref 150–369)
RBC # BLD AUTO: 3.85 M/UL (ref 3.93–5.22)
WBC # BLD AUTO: 8.22 K/UL (ref 3.8–10.5)

## 2021-11-09 PROCEDURE — 85025 COMPLETE CBC W/AUTO DIFF WBC: CPT | Performed by: INTERNAL MEDICINE

## 2021-11-16 ENCOUNTER — LAB REQUISITION (OUTPATIENT)
Dept: LAB | Facility: HOSPITAL | Age: 66
End: 2021-11-16
Payer: MEDICARE

## 2021-11-16 DIAGNOSIS — D50.8 OTHER IRON DEFICIENCY ANEMIAS: ICD-10-CM

## 2021-11-16 LAB
BASOPHILS # BLD: 0.03 K/UL (ref 0.01–0.1)
BASOPHILS NFR BLD: 0.4 %
DIFFERENTIAL METHOD BLD: ABNORMAL
EOSINOPHIL # BLD: 0.34 K/UL (ref 0.04–0.36)
EOSINOPHIL NFR BLD: 4 %
ERYTHROCYTE [DISTWIDTH] IN BLOOD BY AUTOMATED COUNT: 16.3 % (ref 11.7–14.4)
HCT VFR BLDCO AUTO: 29.5 % (ref 35–45)
HGB BLD-MCNC: 8.9 G/DL (ref 11.8–15.7)
IMM GRANULOCYTES # BLD AUTO: 0.07 K/UL (ref 0–0.08)
IMM GRANULOCYTES NFR BLD AUTO: 0.8 %
LYMPHOCYTES # BLD: 1.01 K/UL (ref 1.2–3.5)
LYMPHOCYTES NFR BLD: 11.8 %
MCH RBC QN AUTO: 24.1 PG (ref 28–33.2)
MCHC RBC AUTO-ENTMCNC: 30.2 G/DL (ref 32.2–35.5)
MCV RBC AUTO: 79.7 FL (ref 83–98)
MONOCYTES # BLD: 0.56 K/UL (ref 0.28–0.8)
MONOCYTES NFR BLD: 6.5 %
NEUTROPHILS # BLD: 6.54 K/UL (ref 1.7–7)
NEUTROPHILS # BLD: 6.54 K/UL (ref 1.7–7)
NEUTS SEG NFR BLD: 76.5 %
NRBC BLD-RTO: 0 %
PDW BLD AUTO: 9.8 FL (ref 9.4–12.3)
PLATELET # BLD AUTO: 404 K/UL (ref 150–369)
RBC # BLD AUTO: 3.7 M/UL (ref 3.93–5.22)
WBC # BLD AUTO: 8.55 K/UL (ref 3.8–10.5)

## 2021-11-16 PROCEDURE — 85025 COMPLETE CBC W/AUTO DIFF WBC: CPT | Performed by: INTERNAL MEDICINE

## 2021-11-23 ENCOUNTER — LAB REQUISITION (OUTPATIENT)
Dept: LAB | Facility: HOSPITAL | Age: 66
End: 2021-11-23
Payer: MEDICARE

## 2021-11-23 DIAGNOSIS — D50.8 OTHER IRON DEFICIENCY ANEMIAS: ICD-10-CM

## 2021-11-23 LAB
ALBUMIN SERPL-MCNC: 4.3 G/DL (ref 3.4–5)
ALP SERPL-CCNC: 82 IU/L (ref 35–126)
ALT SERPL-CCNC: 20 IU/L (ref 11–54)
ANION GAP SERPL CALC-SCNC: 10 MEQ/L (ref 3–15)
AST SERPL-CCNC: 21 IU/L (ref 15–41)
BASOPHILS # BLD: 0.05 K/UL (ref 0.01–0.1)
BASOPHILS NFR BLD: 0.6 %
BILIRUB SERPL-MCNC: 0.5 MG/DL (ref 0.3–1.2)
BUN SERPL-MCNC: 18 MG/DL (ref 8–20)
CALCIUM SERPL-MCNC: 9.8 MG/DL (ref 8.9–10.3)
CHLORIDE SERPL-SCNC: 100 MEQ/L (ref 98–109)
CO2 SERPL-SCNC: 27 MEQ/L (ref 22–32)
CREAT SERPL-MCNC: 0.6 MG/DL (ref 0.6–1.1)
DIFFERENTIAL METHOD BLD: ABNORMAL
EOSINOPHIL # BLD: 0.38 K/UL (ref 0.04–0.36)
EOSINOPHIL NFR BLD: 4.4 %
ERYTHROCYTE [DISTWIDTH] IN BLOOD BY AUTOMATED COUNT: 17.7 % (ref 11.7–14.4)
FERRITIN SERPL-MCNC: 69 NG/ML (ref 11–250)
GFR SERPL CREATININE-BSD FRML MDRD: >60 ML/MIN/1.73M*2
GLUCOSE SERPL-MCNC: 91 MG/DL (ref 70–99)
HCT VFR BLDCO AUTO: 27.9 % (ref 35–45)
HGB BLD-MCNC: 8.2 G/DL (ref 11.8–15.7)
IMM GRANULOCYTES # BLD AUTO: 0.07 K/UL (ref 0–0.08)
IMM GRANULOCYTES NFR BLD AUTO: 0.8 %
IRON SATN MFR SERPL: 5 % (ref 15–45)
IRON SERPL-MCNC: 19 UG/DL (ref 35–150)
LYMPHOCYTES # BLD: 0.9 K/UL (ref 1.2–3.5)
LYMPHOCYTES NFR BLD: 10.4 %
MCH RBC QN AUTO: 24 PG (ref 28–33.2)
MCHC RBC AUTO-ENTMCNC: 29.4 G/DL (ref 32.2–35.5)
MCV RBC AUTO: 81.6 FL (ref 83–98)
MONOCYTES # BLD: 0.53 K/UL (ref 0.28–0.8)
MONOCYTES NFR BLD: 6.1 %
NEUTROPHILS # BLD: 6.76 K/UL (ref 1.7–7)
NEUTROPHILS # BLD: 6.76 K/UL (ref 1.7–7)
NEUTS SEG NFR BLD: 77.7 %
NRBC BLD-RTO: 0 %
PDW BLD AUTO: 9.3 FL (ref 9.4–12.3)
PLATELET # BLD AUTO: 415 K/UL (ref 150–369)
POTASSIUM SERPL-SCNC: 3.3 MEQ/L (ref 3.6–5.1)
PROT SERPL-MCNC: 6.7 G/DL (ref 6–8.2)
RBC # BLD AUTO: 3.42 M/UL (ref 3.93–5.22)
SODIUM SERPL-SCNC: 137 MEQ/L (ref 136–144)
TIBC SERPL-MCNC: 407 UG/DL (ref 270–460)
UIBC SERPL-MCNC: 388 UG/DL (ref 180–360)
WBC # BLD AUTO: 8.69 K/UL (ref 3.8–10.5)

## 2021-11-23 PROCEDURE — 83540 ASSAY OF IRON: CPT | Performed by: INTERNAL MEDICINE

## 2021-11-23 PROCEDURE — 85025 COMPLETE CBC W/AUTO DIFF WBC: CPT | Performed by: INTERNAL MEDICINE

## 2021-11-23 PROCEDURE — 80053 COMPREHEN METABOLIC PANEL: CPT | Performed by: INTERNAL MEDICINE

## 2021-11-23 PROCEDURE — 82728 ASSAY OF FERRITIN: CPT | Performed by: INTERNAL MEDICINE

## 2021-11-29 RX ORDER — ROSUVASTATIN CALCIUM 10 MG/1
10 TABLET, COATED ORAL
Qty: 30 TABLET | Refills: 11 | Status: SHIPPED | OUTPATIENT
Start: 2021-11-29 | End: 2021-11-29 | Stop reason: SDUPTHER

## 2021-11-29 NOTE — TELEPHONE ENCOUNTER
Medicine Refill Request    Last Office Visit: Visit date not found  Last Telemedicine Visit: Visit date not found    Next Office Visit: Visit date not found  Next Telemedicine Visit: Visit date not found     Pt is requesting refill for rosuvastatin. Pt is out of meds.        Current Outpatient Medications:   •  amLODIPine (NORVASC) 10 mg tablet, Take 1 tablet (10 mg total) by mouth daily. Can adjust based on blood pressures- home medication is norvasc 5 mg., Disp: 30 tablet, Rfl: 0  •  B complex-vitamin C-folic acid 400 mcg tablet tablet, Take 1 tablet by mouth daily. Hold until cleared by Dr. Kiser., Disp: 30 tablet, Rfl: 0  •  biotin 1 mg capsule, Take by mouth daily. Dose unknown, Disp: , Rfl:   •  calcium carbonate (CALCIUM 500 ORAL), Take by mouth daily. Dose unknown, Disp: , Rfl:   •  cholecalciferol, vitamin D3, 1,000 unit capsule, Take 1,000 Units by mouth daily.  , Disp: , Rfl:   •  EPINEPHrine (EPIPEN 2-ZARINA) 0.3 mg/0.3 mL injection syringe, Inject 0.3 mL (0.3 mg total) into the thigh as needed for anaphylaxis., Disp: 1 each, Rfl: 1  •  escitalopram (LEXAPRO) 10 mg tablet, TAKE 1 TABLET BY MOUTH EVERY DAY, Disp: 90 tablet, Rfl: 3  •  famotidine (PEPCID) 20 mg tablet, Take 1 tablet (20 mg total) by mouth 2 (two) times a day as needed for indigestion., Disp: , Rfl:   •  hydrochlorothiazide (HYDRODIURIL) 25 mg tablet, Take 25 mg by mouth daily.  , Disp: , Rfl: 3  •  LORazepam (ATIVAN) 0.5 mg tablet, Take 1 tablet (0.5 mg total) by mouth nightly as needed (insomnia)., Disp: 30 tablet, Rfl: 0  •  polyethylene glycol (MIRALAX) 17 gram packet, Take 17 g by mouth daily as needed (constipation) for up to 3 days. For constipation associated with narcotics; hold for loose stools., Disp: , Rfl:   •  potassium chloride (KLOR-CON) 8 mEq CR tablet, TAKE 1 TABLET BY MOUTH TWICE A DAY, Disp: 180 tablet, Rfl: 3  •  rosuvastatin (CRESTOR) 10 mg tablet, TAKE 1 TABLET BY MOUTH EVERY DAY (Patient taking differently: Take 10  mg by mouth daily.  ), Disp: 30 tablet, Rfl: 11  •  sennosides-docusate sodium (SENNA WITH DOCUSATE SODIUM) 8.6-50 mg, Take 1 tablet by mouth 2 (two) times a day. For constipation associated with narcotics; hold for loose stools., Disp: 60 tablet, Rfl: 0      BP Readings from Last 3 Encounters:   09/17/21 (!) 146/64   06/25/21 139/74   05/26/21 98/70       Recent Lab results:  Lab Results   Component Value Date    CHOL 157 10/13/2021   ,   Lab Results   Component Value Date    HDL 76 10/13/2021   ,   Lab Results   Component Value Date    LDLCALC 66 10/13/2021   ,   Lab Results   Component Value Date    TRIG 75 10/13/2021        Lab Results   Component Value Date    GLUCOSE 91 11/23/2021   ,   Lab Results   Component Value Date    HGBA1C 4.7 (L) 08/11/2020         Lab Results   Component Value Date    CREATININE 0.6 11/23/2021       Lab Results   Component Value Date    TSH 3.050 08/11/2020

## 2021-11-30 ENCOUNTER — LAB REQUISITION (OUTPATIENT)
Dept: LAB | Facility: HOSPITAL | Age: 66
End: 2021-11-30
Payer: MEDICARE

## 2021-11-30 DIAGNOSIS — D50.8 OTHER IRON DEFICIENCY ANEMIAS: ICD-10-CM

## 2021-11-30 LAB
BASOPHILS # BLD: 0.02 K/UL (ref 0.01–0.1)
BASOPHILS NFR BLD: 0.3 %
DIFFERENTIAL METHOD BLD: ABNORMAL
EOSINOPHIL # BLD: 0.35 K/UL (ref 0.04–0.36)
EOSINOPHIL NFR BLD: 5.6 %
ERYTHROCYTE [DISTWIDTH] IN BLOOD BY AUTOMATED COUNT: 17.8 % (ref 11.7–14.4)
HCT VFR BLDCO AUTO: 29.6 % (ref 35–45)
HGB BLD-MCNC: 8.8 G/DL (ref 11.8–15.7)
IMM GRANULOCYTES # BLD AUTO: 0.03 K/UL (ref 0–0.08)
IMM GRANULOCYTES NFR BLD AUTO: 0.5 %
LYMPHOCYTES # BLD: 0.79 K/UL (ref 1.2–3.5)
LYMPHOCYTES NFR BLD: 12.6 %
MCH RBC QN AUTO: 23.7 PG (ref 28–33.2)
MCHC RBC AUTO-ENTMCNC: 29.7 G/DL (ref 32.2–35.5)
MCV RBC AUTO: 79.6 FL (ref 83–98)
MONOCYTES # BLD: 0.56 K/UL (ref 0.28–0.8)
MONOCYTES NFR BLD: 8.9 %
NEUTROPHILS # BLD: 4.54 K/UL (ref 1.7–7)
NEUTROPHILS # BLD: 4.54 K/UL (ref 1.7–7)
NEUTS SEG NFR BLD: 72.1 %
NRBC BLD-RTO: 0 %
PDW BLD AUTO: 8.8 FL (ref 9.4–12.3)
PLATELET # BLD AUTO: 408 K/UL (ref 150–369)
RBC # BLD AUTO: 3.72 M/UL (ref 3.93–5.22)
WBC # BLD AUTO: 6.29 K/UL (ref 3.8–10.5)

## 2021-11-30 PROCEDURE — 85025 COMPLETE CBC W/AUTO DIFF WBC: CPT | Performed by: INTERNAL MEDICINE

## 2021-12-08 ENCOUNTER — LAB REQUISITION (OUTPATIENT)
Dept: LAB | Facility: HOSPITAL | Age: 66
End: 2021-12-08
Payer: MEDICARE

## 2021-12-08 DIAGNOSIS — D50.8 OTHER IRON DEFICIENCY ANEMIAS: ICD-10-CM

## 2021-12-08 LAB
BASOPHILS # BLD: 0.04 K/UL (ref 0.01–0.1)
BASOPHILS NFR BLD: 0.7 %
DIFFERENTIAL METHOD BLD: ABNORMAL
EOSINOPHIL # BLD: 0.38 K/UL (ref 0.04–0.36)
EOSINOPHIL NFR BLD: 6.8 %
ERYTHROCYTE [DISTWIDTH] IN BLOOD BY AUTOMATED COUNT: 18.4 % (ref 11.7–14.4)
HCT VFR BLDCO AUTO: 37.7 % (ref 35–45)
HGB BLD-MCNC: 11 G/DL (ref 11.8–15.7)
IMM GRANULOCYTES # BLD AUTO: 0.05 K/UL (ref 0–0.08)
IMM GRANULOCYTES NFR BLD AUTO: 0.9 %
LYMPHOCYTES # BLD: 0.91 K/UL (ref 1.2–3.5)
LYMPHOCYTES NFR BLD: 16.3 %
MCH RBC QN AUTO: 23 PG (ref 28–33.2)
MCHC RBC AUTO-ENTMCNC: 29.2 G/DL (ref 32.2–35.5)
MCV RBC AUTO: 78.7 FL (ref 83–98)
MONOCYTES # BLD: 0.46 K/UL (ref 0.28–0.8)
MONOCYTES NFR BLD: 8.2 %
NEUTROPHILS # BLD: 3.76 K/UL (ref 1.7–7)
NEUTROPHILS # BLD: 3.76 K/UL (ref 1.7–7)
NEUTS SEG NFR BLD: 67.1 %
NRBC BLD-RTO: 0 %
PDW BLD AUTO: 9 FL (ref 9.4–12.3)
PLATELET # BLD AUTO: 408 K/UL (ref 150–369)
RBC # BLD AUTO: 4.79 M/UL (ref 3.93–5.22)
WBC # BLD AUTO: 5.6 K/UL (ref 3.8–10.5)

## 2021-12-08 PROCEDURE — 85025 COMPLETE CBC W/AUTO DIFF WBC: CPT | Performed by: INTERNAL MEDICINE

## 2021-12-14 ENCOUNTER — TELEPHONE (OUTPATIENT)
Dept: SCHEDULING | Facility: CLINIC | Age: 66
End: 2021-12-14
Payer: MEDICARE

## 2021-12-14 NOTE — TELEPHONE ENCOUNTER
PT called to cancel todays appt due to covid exposure.     Appt has been rescheduled for 2/4/2022 tentatively.    Pt will call back with covid test status to see about an earlier appt.

## 2021-12-16 ENCOUNTER — LAB REQUISITION (OUTPATIENT)
Dept: LAB | Facility: HOSPITAL | Age: 66
End: 2021-12-16
Payer: MEDICARE

## 2021-12-16 DIAGNOSIS — D50.8 OTHER IRON DEFICIENCY ANEMIAS: ICD-10-CM

## 2021-12-16 LAB
BASOPHILS # BLD: 0.02 K/UL (ref 0.01–0.1)
BASOPHILS NFR BLD: 0.4 %
DIFFERENTIAL METHOD BLD: ABNORMAL
EOSINOPHIL # BLD: 0.38 K/UL (ref 0.04–0.36)
EOSINOPHIL NFR BLD: 6.8 %
ERYTHROCYTE [DISTWIDTH] IN BLOOD BY AUTOMATED COUNT: 18 % (ref 11.7–14.4)
HCT VFR BLDCO AUTO: 37.1 % (ref 35–45)
HGB BLD-MCNC: 10.7 G/DL (ref 11.8–15.7)
IMM GRANULOCYTES # BLD AUTO: 0.04 K/UL (ref 0–0.08)
IMM GRANULOCYTES NFR BLD AUTO: 0.7 %
LYMPHOCYTES # BLD: 0.79 K/UL (ref 1.2–3.5)
LYMPHOCYTES NFR BLD: 14.2 %
MCH RBC QN AUTO: 22.7 PG (ref 28–33.2)
MCHC RBC AUTO-ENTMCNC: 28.8 G/DL (ref 32.2–35.5)
MCV RBC AUTO: 78.8 FL (ref 83–98)
MONOCYTES # BLD: 0.55 K/UL (ref 0.28–0.8)
MONOCYTES NFR BLD: 9.9 %
NEUTROPHILS # BLD: 3.78 K/UL (ref 1.7–7)
NEUTROPHILS # BLD: 3.78 K/UL (ref 1.7–7)
NEUTS SEG NFR BLD: 68 %
NRBC BLD-RTO: 0 %
PDW BLD AUTO: 9.4 FL (ref 9.4–12.3)
PLATELET # BLD AUTO: 384 K/UL (ref 150–369)
RBC # BLD AUTO: 4.71 M/UL (ref 3.93–5.22)
WBC # BLD AUTO: 5.56 K/UL (ref 3.8–10.5)

## 2021-12-16 PROCEDURE — 85025 COMPLETE CBC W/AUTO DIFF WBC: CPT | Performed by: INTERNAL MEDICINE

## 2021-12-17 ENCOUNTER — TELEPHONE (OUTPATIENT)
Dept: SURGERY | Facility: CLINIC | Age: 66
End: 2021-12-17
Payer: MEDICARE

## 2021-12-21 NOTE — TELEPHONE ENCOUNTER
DR KUHN pt--she called requesting information on a calcium score and CT results for DR WHITT [facility].  She is at work and does not have access to her results.     She can be reached at [phone].  
I called the patient.  She no longer needed our assistance.  She found her test results.  
no chest pain and no edema.

## 2021-12-23 ENCOUNTER — LAB REQUISITION (OUTPATIENT)
Dept: LAB | Facility: HOSPITAL | Age: 66
DRG: 394 | End: 2021-12-23
Payer: MEDICARE

## 2021-12-23 ENCOUNTER — HOSPITAL ENCOUNTER (INPATIENT)
Facility: HOSPITAL | Age: 66
LOS: 1 days | Discharge: HOME | DRG: 394 | End: 2021-12-24
Attending: EMERGENCY MEDICINE | Admitting: HOSPITALIST
Payer: MEDICARE

## 2021-12-23 DIAGNOSIS — D64.9 SYMPTOMATIC ANEMIA: Primary | ICD-10-CM

## 2021-12-23 DIAGNOSIS — D50.8 OTHER IRON DEFICIENCY ANEMIAS: ICD-10-CM

## 2021-12-23 DIAGNOSIS — D50.9 IRON DEFICIENCY ANEMIA, UNSPECIFIED: ICD-10-CM

## 2021-12-23 DIAGNOSIS — K92.2 GASTROINTESTINAL HEMORRHAGE, UNSPECIFIED GASTROINTESTINAL HEMORRHAGE TYPE: ICD-10-CM

## 2021-12-23 LAB
ABO + RH BLD: NORMAL
ALBUMIN SERPL-MCNC: 4.1 G/DL (ref 3.4–5)
ALP SERPL-CCNC: 70 IU/L (ref 35–126)
ALT SERPL-CCNC: 21 IU/L (ref 11–54)
ANION GAP SERPL CALC-SCNC: 8 MEQ/L (ref 3–15)
ANISOCYTOSIS BLD QL SMEAR: ABNORMAL
ANISOCYTOSIS BLD QL SMEAR: ABNORMAL
AST SERPL-CCNC: 22 IU/L (ref 15–41)
BASOPHILS # BLD: 0.02 K/UL (ref 0.01–0.1)
BASOPHILS # BLD: 0.03 K/UL (ref 0.01–0.1)
BASOPHILS # BLD: 0.03 K/UL (ref 0.01–0.1)
BASOPHILS NFR BLD: 0.3 %
BASOPHILS NFR BLD: 0.4 %
BASOPHILS NFR BLD: 0.5 %
BILIRUB SERPL-MCNC: 0.4 MG/DL (ref 0.3–1.2)
BLD GP AB SCN SERPL QL: NEGATIVE
BUN SERPL-MCNC: 18 MG/DL (ref 8–20)
CALCIUM SERPL-MCNC: 9 MG/DL (ref 8.9–10.3)
CHLORIDE SERPL-SCNC: 101 MEQ/L (ref 98–109)
CO2 SERPL-SCNC: 28 MEQ/L (ref 22–32)
CREAT SERPL-MCNC: 0.7 MG/DL (ref 0.6–1.1)
D AG BLD QL: POSITIVE
DIFFERENTIAL METHOD BLD: ABNORMAL
EOSINOPHIL # BLD: 0.33 K/UL (ref 0.04–0.36)
EOSINOPHIL # BLD: 0.34 K/UL (ref 0.04–0.36)
EOSINOPHIL # BLD: 0.4 K/UL (ref 0.04–0.36)
EOSINOPHIL NFR BLD: 5.2 %
EOSINOPHIL NFR BLD: 5.4 %
EOSINOPHIL NFR BLD: 5.5 %
ERYTHROCYTE [DISTWIDTH] IN BLOOD BY AUTOMATED COUNT: 19.1 % (ref 11.7–14.4)
GFR SERPL CREATININE-BSD FRML MDRD: >60 ML/MIN/1.73M*2
GLUCOSE SERPL-MCNC: 136 MG/DL (ref 70–99)
HCT VFR BLDCO AUTO: 23.6 % (ref 35–45)
HCT VFR BLDCO AUTO: 24 % (ref 35–45)
HCT VFR BLDCO AUTO: 24.4 % (ref 35–45)
HGB BLD-MCNC: 6.9 G/DL (ref 11.8–15.7)
HGB BLD-MCNC: 7 G/DL (ref 11.8–15.7)
HGB BLD-MCNC: 7.1 G/DL (ref 11.8–15.7)
IMM GRANULOCYTES # BLD AUTO: 0.04 K/UL (ref 0–0.08)
IMM GRANULOCYTES # BLD AUTO: 0.05 K/UL (ref 0–0.08)
IMM GRANULOCYTES # BLD AUTO: 0.06 K/UL (ref 0–0.08)
IMM GRANULOCYTES NFR BLD AUTO: 0.6 %
IMM GRANULOCYTES NFR BLD AUTO: 0.8 %
IMM GRANULOCYTES NFR BLD AUTO: 0.8 %
LABORATORY COMMENT REPORT: NORMAL
LYMPHOCYTES # BLD: 0.97 K/UL (ref 1.2–3.5)
LYMPHOCYTES # BLD: 1.04 K/UL (ref 1.2–3.5)
LYMPHOCYTES # BLD: 1.2 K/UL (ref 1.2–3.5)
LYMPHOCYTES NFR BLD: 15.8 %
LYMPHOCYTES NFR BLD: 15.9 %
LYMPHOCYTES NFR BLD: 16.4 %
MAGNESIUM SERPL-MCNC: 1.8 MG/DL (ref 1.8–2.5)
MCH RBC QN AUTO: 23.1 PG (ref 28–33.2)
MCH RBC QN AUTO: 23.3 PG (ref 28–33.2)
MCH RBC QN AUTO: 23.9 PG (ref 28–33.2)
MCHC RBC AUTO-ENTMCNC: 28.7 G/DL (ref 32.2–35.5)
MCHC RBC AUTO-ENTMCNC: 29.2 G/DL (ref 32.2–35.5)
MCHC RBC AUTO-ENTMCNC: 29.6 G/DL (ref 32.2–35.5)
MCV RBC AUTO: 79.7 FL (ref 83–98)
MCV RBC AUTO: 80.5 FL (ref 83–98)
MCV RBC AUTO: 80.8 FL (ref 83–98)
MONOCYTES # BLD: 0.48 K/UL (ref 0.28–0.8)
MONOCYTES # BLD: 0.52 K/UL (ref 0.28–0.8)
MONOCYTES # BLD: 0.52 K/UL (ref 0.28–0.8)
MONOCYTES NFR BLD: 6.6 %
MONOCYTES NFR BLD: 7.9 %
MONOCYTES NFR BLD: 8.5 %
NEUTROPHILS # BLD: 4.26 K/UL (ref 1.7–7)
NEUTROPHILS # BLD: 4.26 K/UL (ref 1.7–7)
NEUTROPHILS # BLD: 4.59 K/UL (ref 1.7–7)
NEUTROPHILS # BLD: 4.59 K/UL (ref 1.7–7)
NEUTROPHILS # BLD: 5.14 K/UL (ref 1.7–7)
NEUTS SEG NFR BLD: 69.2 %
NEUTS SEG NFR BLD: 69.9 %
NEUTS SEG NFR BLD: 70.3 %
NRBC BLD-RTO: 0 %
PDW BLD AUTO: 8.6 FL (ref 9.4–12.3)
PDW BLD AUTO: 8.7 FL (ref 9.4–12.3)
PDW BLD AUTO: 9.1 FL (ref 9.4–12.3)
PLAT MORPH BLD: NORMAL
PLAT MORPH BLD: NORMAL
PLATELET # BLD AUTO: 323 K/UL (ref 150–369)
PLATELET # BLD AUTO: 331 K/UL (ref 150–369)
PLATELET # BLD AUTO: 345 K/UL (ref 150–369)
PLATELET # BLD EST: ABNORMAL 10*3/UL
PLATELET # BLD EST: ABNORMAL 10*3/UL
POLYCHROMASIA BLD QL SMEAR: ABNORMAL
POLYCHROMASIA BLD QL SMEAR: ABNORMAL
POTASSIUM SERPL-SCNC: 3.4 MEQ/L (ref 3.6–5.1)
PROT SERPL-MCNC: 6.3 G/DL (ref 6–8.2)
RBC # BLD AUTO: 2.96 M/UL (ref 3.93–5.22)
RBC # BLD AUTO: 2.97 M/UL (ref 3.93–5.22)
RBC # BLD AUTO: 3.03 M/UL (ref 3.93–5.22)
SARS-COV-2 RNA RESP QL NAA+PROBE: NEGATIVE
SODIUM SERPL-SCNC: 137 MEQ/L (ref 136–144)
SPECIMEN EXP DATE BLD: NORMAL
WBC # BLD AUTO: 6.15 K/UL (ref 3.8–10.5)
WBC # BLD AUTO: 6.56 K/UL (ref 3.8–10.5)
WBC # BLD AUTO: 7.31 K/UL (ref 3.8–10.5)

## 2021-12-23 PROCEDURE — 85025 COMPLETE CBC W/AUTO DIFF WBC: CPT | Performed by: EMERGENCY MEDICINE

## 2021-12-23 PROCEDURE — 86850 RBC ANTIBODY SCREEN: CPT

## 2021-12-23 PROCEDURE — 12000000 HC ROOM AND CARE MED/SURG

## 2021-12-23 PROCEDURE — 99285 EMERGENCY DEPT VISIT HI MDM: CPT | Mod: 25

## 2021-12-23 PROCEDURE — 93005 ELECTROCARDIOGRAM TRACING: CPT | Performed by: PHYSICIAN ASSISTANT

## 2021-12-23 PROCEDURE — 85025 COMPLETE CBC W/AUTO DIFF WBC: CPT | Performed by: INTERNAL MEDICINE

## 2021-12-23 PROCEDURE — 85025 COMPLETE CBC W/AUTO DIFF WBC: CPT

## 2021-12-23 PROCEDURE — 63700000 HC SELF-ADMINISTRABLE DRUG: Performed by: PHYSICIAN ASSISTANT

## 2021-12-23 PROCEDURE — 83735 ASSAY OF MAGNESIUM: CPT | Performed by: PHYSICIAN ASSISTANT

## 2021-12-23 PROCEDURE — 80053 COMPREHEN METABOLIC PANEL: CPT

## 2021-12-23 PROCEDURE — 86920 COMPATIBILITY TEST SPIN: CPT

## 2021-12-23 PROCEDURE — 36415 COLL VENOUS BLD VENIPUNCTURE: CPT

## 2021-12-23 PROCEDURE — P9016 RBC LEUKOCYTES REDUCED: HCPCS

## 2021-12-23 PROCEDURE — 99223 1ST HOSP IP/OBS HIGH 75: CPT | Performed by: HOSPITALIST

## 2021-12-23 PROCEDURE — 80053 COMPREHEN METABOLIC PANEL: CPT | Performed by: EMERGENCY MEDICINE

## 2021-12-23 PROCEDURE — 36430 TRANSFUSION BLD/BLD COMPNT: CPT

## 2021-12-23 PROCEDURE — U0002 COVID-19 LAB TEST NON-CDC: HCPCS | Performed by: PHYSICIAN ASSISTANT

## 2021-12-23 PROCEDURE — 85025 COMPLETE CBC W/AUTO DIFF WBC: CPT | Mod: 91 | Performed by: INTERNAL MEDICINE

## 2021-12-23 PROCEDURE — 25000000 HC PHARMACY GENERAL: Performed by: PHYSICIAN ASSISTANT

## 2021-12-23 PROCEDURE — 1123F ACP DISCUSS/DSCN MKR DOCD: CPT | Performed by: HOSPITALIST

## 2021-12-23 RX ORDER — ALPRAZOLAM 0.25 MG/1
0.25 TABLET ORAL
Status: ON HOLD | COMMUNITY
End: 2021-12-24 | Stop reason: ENTERED-IN-ERROR

## 2021-12-23 RX ORDER — METHYLPREDNISOLONE 4 MG/1
TABLET ORAL
Status: ON HOLD | COMMUNITY
End: 2021-12-24 | Stop reason: ENTERED-IN-ERROR

## 2021-12-23 RX ORDER — POTASSIUM CHLORIDE 750 MG/1
40 TABLET, FILM COATED, EXTENDED RELEASE ORAL ONCE
Status: DISCONTINUED | OUTPATIENT
Start: 2021-12-23 | End: 2021-12-24

## 2021-12-23 RX ORDER — SODIUM CHLORIDE 9 MG/ML
5 INJECTION, SOLUTION INTRAVENOUS AS NEEDED
Status: DISCONTINUED | OUTPATIENT
Start: 2021-12-23 | End: 2021-12-24 | Stop reason: HOSPADM

## 2021-12-23 RX ORDER — PANTOPRAZOLE SODIUM 40 MG/10ML
40 INJECTION, POWDER, LYOPHILIZED, FOR SOLUTION INTRAVENOUS ONCE
Status: COMPLETED | OUTPATIENT
Start: 2021-12-23 | End: 2021-12-23

## 2021-12-23 RX ORDER — POTASSIUM CHLORIDE 750 MG/1
40 TABLET, FILM COATED, EXTENDED RELEASE ORAL ONCE
Status: COMPLETED | OUTPATIENT
Start: 2021-12-23 | End: 2021-12-23

## 2021-12-23 RX ORDER — PANTOPRAZOLE SODIUM 40 MG/10ML
40 INJECTION, POWDER, LYOPHILIZED, FOR SOLUTION INTRAVENOUS EVERY 12 HOURS
Status: DISCONTINUED | OUTPATIENT
Start: 2021-12-24 | End: 2021-12-24 | Stop reason: HOSPADM

## 2021-12-23 RX ADMIN — PANTOPRAZOLE SODIUM 40 MG: 40 INJECTION, POWDER, FOR SOLUTION INTRAVENOUS at 21:29

## 2021-12-23 RX ADMIN — POTASSIUM CHLORIDE 40 MEQ: 750 TABLET, FILM COATED, EXTENDED RELEASE ORAL at 23:46

## 2021-12-23 ASSESSMENT — ENCOUNTER SYMPTOMS
BLOOD IN STOOL: 1
VOMITING: 0
COUGH: 0
HEADACHES: 0
ANAL BLEEDING: 1
ABDOMINAL PAIN: 1
DIZZINESS: 0
CHILLS: 0
FEVER: 0
CHEST TIGHTNESS: 0
HEMATURIA: 0
SHORTNESS OF BREATH: 0
DIFFICULTY URINATING: 0
DIARRHEA: 0
LIGHT-HEADEDNESS: 1
NAUSEA: 0

## 2021-12-24 VITALS
TEMPERATURE: 98.2 F | SYSTOLIC BLOOD PRESSURE: 108 MMHG | HEIGHT: 61 IN | DIASTOLIC BLOOD PRESSURE: 62 MMHG | HEART RATE: 55 BPM | OXYGEN SATURATION: 95 % | RESPIRATION RATE: 14 BRPM | BODY MASS INDEX: 23.88 KG/M2 | WEIGHT: 126.5 LBS

## 2021-12-24 PROBLEM — E87.6 HYPOKALEMIA: Status: ACTIVE | Noted: 2021-12-24

## 2021-12-24 PROBLEM — D47.02 SYSTEMIC MASTOCYTOSIS: Status: ACTIVE | Noted: 2021-12-24

## 2021-12-24 PROBLEM — K64.8 OTHER HEMORRHOIDS: Status: ACTIVE | Noted: 2021-12-24

## 2021-12-24 LAB
ANION GAP SERPL CALC-SCNC: 8 MEQ/L (ref 3–15)
ATRIAL RATE: 56
BASOPHILS # BLD: 0.03 K/UL (ref 0.01–0.1)
BASOPHILS NFR BLD: 0.5 %
BUN SERPL-MCNC: 16 MG/DL (ref 8–20)
CALCIUM SERPL-MCNC: 9.2 MG/DL (ref 8.9–10.3)
CHLORIDE SERPL-SCNC: 102 MEQ/L (ref 98–109)
CO2 SERPL-SCNC: 28 MEQ/L (ref 22–32)
CREAT SERPL-MCNC: 0.5 MG/DL (ref 0.6–1.1)
DIFFERENTIAL METHOD BLD: ABNORMAL
EOSINOPHIL # BLD: 0.45 K/UL (ref 0.04–0.36)
EOSINOPHIL NFR BLD: 7 %
ERYTHROCYTE [DISTWIDTH] IN BLOOD BY AUTOMATED COUNT: 18.3 % (ref 11.7–14.4)
GFR SERPL CREATININE-BSD FRML MDRD: >60 ML/MIN/1.73M*2
GLUCOSE SERPL-MCNC: 106 MG/DL (ref 70–99)
HCT VFR BLDCO AUTO: 27.3 % (ref 35–45)
HGB BLD-MCNC: 8.3 G/DL (ref 11.8–15.7)
IMM GRANULOCYTES # BLD AUTO: 0.11 K/UL (ref 0–0.08)
IMM GRANULOCYTES NFR BLD AUTO: 1.7 %
LYMPHOCYTES # BLD: 0.99 K/UL (ref 1.2–3.5)
LYMPHOCYTES NFR BLD: 15.5 %
MCH RBC QN AUTO: 25 PG (ref 28–33.2)
MCHC RBC AUTO-ENTMCNC: 30.4 G/DL (ref 32.2–35.5)
MCV RBC AUTO: 82.2 FL (ref 83–98)
MONOCYTES # BLD: 0.38 K/UL (ref 0.28–0.8)
MONOCYTES NFR BLD: 5.9 %
NEUTROPHILS # BLD: 4.44 K/UL (ref 1.7–7)
NEUTS SEG NFR BLD: 69.4 %
NRBC BLD-RTO: 0 %
P AXIS: 40
PDW BLD AUTO: 9.6 FL (ref 9.4–12.3)
PLATELET # BLD AUTO: 330 K/UL (ref 150–369)
POTASSIUM SERPL-SCNC: 3.5 MEQ/L (ref 3.6–5.1)
PR INTERVAL: 130
QRS DURATION: 100
QT INTERVAL: 454
QTC CALCULATION(BAZETT): 438
R AXIS: -37
RBC # BLD AUTO: 3.32 M/UL (ref 3.93–5.22)
SODIUM SERPL-SCNC: 138 MEQ/L (ref 136–144)
T WAVE AXIS: -10
VENTRICULAR RATE: 56
WBC # BLD AUTO: 6.4 K/UL (ref 3.8–10.5)

## 2021-12-24 PROCEDURE — 93010 ELECTROCARDIOGRAM REPORT: CPT | Performed by: INTERNAL MEDICINE

## 2021-12-24 PROCEDURE — 80048 BASIC METABOLIC PNL TOTAL CA: CPT | Performed by: HOSPITALIST

## 2021-12-24 PROCEDURE — 36415 COLL VENOUS BLD VENIPUNCTURE: CPT | Performed by: HOSPITALIST

## 2021-12-24 PROCEDURE — 63700000 HC SELF-ADMINISTRABLE DRUG: Performed by: STUDENT IN AN ORGANIZED HEALTH CARE EDUCATION/TRAINING PROGRAM

## 2021-12-24 PROCEDURE — 25000000 HC PHARMACY GENERAL: Performed by: HOSPITALIST

## 2021-12-24 PROCEDURE — 200200 PR NO CHARGE: Performed by: SURGERY

## 2021-12-24 PROCEDURE — 63600000 HC DRUGS/DETAIL CODE: Performed by: HOSPITALIST

## 2021-12-24 PROCEDURE — 63700000 HC SELF-ADMINISTRABLE DRUG: Performed by: HOSPITALIST

## 2021-12-24 PROCEDURE — 85025 COMPLETE CBC W/AUTO DIFF WBC: CPT | Performed by: HOSPITALIST

## 2021-12-24 RX ORDER — AMLODIPINE BESYLATE 5 MG/1
5 TABLET ORAL DAILY
Status: DISCONTINUED | OUTPATIENT
Start: 2021-12-24 | End: 2021-12-24 | Stop reason: HOSPADM

## 2021-12-24 RX ORDER — ESCITALOPRAM OXALATE 10 MG/1
10 TABLET ORAL
Status: DISCONTINUED | OUTPATIENT
Start: 2021-12-24 | End: 2021-12-24 | Stop reason: HOSPADM

## 2021-12-24 RX ORDER — ROSUVASTATIN CALCIUM 10 MG/1
10 TABLET, COATED ORAL NIGHTLY
Status: DISCONTINUED | OUTPATIENT
Start: 2021-12-24 | End: 2021-12-24 | Stop reason: HOSPADM

## 2021-12-24 RX ORDER — CHOLECALCIFEROL (VITAMIN D3) 25 MCG
1000 TABLET ORAL DAILY
Status: DISCONTINUED | OUTPATIENT
Start: 2021-12-24 | End: 2021-12-24 | Stop reason: HOSPADM

## 2021-12-24 RX ORDER — HYDROCORTISONE ACETATE 25 MG/1
12.5 SUPPOSITORY RECTAL 2 TIMES DAILY
Status: DISCONTINUED | OUTPATIENT
Start: 2021-12-24 | End: 2021-12-24 | Stop reason: HOSPADM

## 2021-12-24 RX ORDER — WITCH HAZEL 50 %
1 PADS, MEDICATED (EA) TOPICAL DAILY
COMMUNITY
End: 2023-06-26

## 2021-12-24 RX ORDER — NITROGLYCERIN 0.4 MG/1
0.4 TABLET SUBLINGUAL EVERY 5 MIN PRN
Status: DISCONTINUED | OUTPATIENT
Start: 2021-12-24 | End: 2021-12-24 | Stop reason: HOSPADM

## 2021-12-24 RX ORDER — ACETAMINOPHEN 325 MG/1
650 TABLET ORAL EVERY 4 HOURS PRN
Status: DISCONTINUED | OUTPATIENT
Start: 2021-12-24 | End: 2021-12-24 | Stop reason: HOSPADM

## 2021-12-24 RX ORDER — HYDROCORTISONE ACETATE 25 MG/1
12.5 SUPPOSITORY RECTAL 2 TIMES DAILY
Qty: 3 SUPPOSITORY | Refills: 0 | Status: SHIPPED | OUTPATIENT
Start: 2021-12-24 | End: 2022-02-23

## 2021-12-24 RX ORDER — DEXTROSE 50 % IN WATER (D50W) INTRAVENOUS SYRINGE
25 AS NEEDED
Status: DISCONTINUED | OUTPATIENT
Start: 2021-12-24 | End: 2021-12-24 | Stop reason: HOSPADM

## 2021-12-24 RX ORDER — AMLODIPINE BESYLATE 5 MG/1
5 TABLET ORAL DAILY
COMMUNITY
End: 2022-08-16 | Stop reason: SDUPTHER

## 2021-12-24 RX ORDER — BISACODYL 5 MG
5 TABLET, DELAYED RELEASE (ENTERIC COATED) ORAL DAILY
Status: DISCONTINUED | OUTPATIENT
Start: 2021-12-24 | End: 2021-12-24

## 2021-12-24 RX ORDER — POTASSIUM CHLORIDE 750 MG/1
10 TABLET, FILM COATED, EXTENDED RELEASE ORAL DAILY
Status: DISCONTINUED | OUTPATIENT
Start: 2021-12-24 | End: 2021-12-24 | Stop reason: HOSPADM

## 2021-12-24 RX ORDER — ALUMINUM HYDROXIDE, MAGNESIUM HYDROXIDE, AND SIMETHICONE 1200; 120; 1200 MG/30ML; MG/30ML; MG/30ML
30 SUSPENSION ORAL EVERY 4 HOURS PRN
Status: DISCONTINUED | OUTPATIENT
Start: 2021-12-24 | End: 2021-12-24 | Stop reason: HOSPADM

## 2021-12-24 RX ORDER — IBUPROFEN 200 MG
16-32 TABLET ORAL AS NEEDED
Status: DISCONTINUED | OUTPATIENT
Start: 2021-12-24 | End: 2021-12-24 | Stop reason: HOSPADM

## 2021-12-24 RX ORDER — BISACODYL 5 MG
10 TABLET, DELAYED RELEASE (ENTERIC COATED) ORAL 2 TIMES DAILY
Status: DISCONTINUED | OUTPATIENT
Start: 2021-12-24 | End: 2021-12-24 | Stop reason: HOSPADM

## 2021-12-24 RX ORDER — ONDANSETRON HYDROCHLORIDE 2 MG/ML
4 INJECTION, SOLUTION INTRAVENOUS EVERY 8 HOURS PRN
Status: DISCONTINUED | OUTPATIENT
Start: 2021-12-24 | End: 2021-12-24 | Stop reason: HOSPADM

## 2021-12-24 RX ORDER — FAMOTIDINE 20 MG/1
20 TABLET, FILM COATED ORAL 2 TIMES DAILY PRN
COMMUNITY
End: 2022-01-17 | Stop reason: ALTCHOICE

## 2021-12-24 RX ORDER — LORAZEPAM 0.5 MG/1
0.5 TABLET ORAL NIGHTLY PRN
COMMUNITY
End: 2022-03-21 | Stop reason: ALTCHOICE

## 2021-12-24 RX ORDER — SENNOSIDES 8.6 MG/1
1 TABLET ORAL 2 TIMES DAILY PRN
Status: DISCONTINUED | OUTPATIENT
Start: 2021-12-24 | End: 2021-12-24 | Stop reason: HOSPADM

## 2021-12-24 RX ORDER — BISACODYL 10 MG/1
10 SUPPOSITORY RECTAL DAILY PRN
Status: DISCONTINUED | OUTPATIENT
Start: 2021-12-24 | End: 2021-12-24 | Stop reason: HOSPADM

## 2021-12-24 RX ORDER — BISACODYL 5 MG
5 TABLET, DELAYED RELEASE (ENTERIC COATED) ORAL DAILY
COMMUNITY

## 2021-12-24 RX ORDER — LORAZEPAM 0.5 MG/1
0.5 TABLET ORAL NIGHTLY PRN
Status: DISCONTINUED | OUTPATIENT
Start: 2021-12-24 | End: 2021-12-24 | Stop reason: HOSPADM

## 2021-12-24 RX ORDER — DEXTROSE 40 %
15-30 GEL (GRAM) ORAL AS NEEDED
Status: DISCONTINUED | OUTPATIENT
Start: 2021-12-24 | End: 2021-12-24 | Stop reason: HOSPADM

## 2021-12-24 RX ADMIN — HYDROCORTISONE ACETATE 12.5 MG: 25 SUPPOSITORY RECTAL at 11:15

## 2021-12-24 RX ADMIN — ROSUVASTATIN CALCIUM 10 MG: 10 TABLET, FILM COATED ORAL at 11:09

## 2021-12-24 RX ADMIN — BISACODYL 5 MG: 5 TABLET, COATED ORAL at 09:40

## 2021-12-24 RX ADMIN — PANTOPRAZOLE SODIUM 40 MG: 40 INJECTION, POWDER, FOR SOLUTION INTRAVENOUS at 09:39

## 2021-12-24 RX ADMIN — ESCITALOPRAM OXALATE 10 MG: 10 TABLET ORAL at 06:44

## 2021-12-24 RX ADMIN — Medication 1000 UNITS: at 09:40

## 2021-12-24 RX ADMIN — MAGNESIUM SULFATE 2 G: 2 INJECTION INTRAVENOUS at 04:11

## 2021-12-24 RX ADMIN — POTASSIUM CHLORIDE 10 MEQ: 750 TABLET, FILM COATED, EXTENDED RELEASE ORAL at 09:40

## 2021-12-24 RX ADMIN — AMLODIPINE BESYLATE 5 MG: 5 TABLET ORAL at 09:40

## 2021-12-24 ASSESSMENT — ENCOUNTER SYMPTOMS
CHILLS: 0
ARTHRALGIAS: 0
HALLUCINATIONS: 0
SHORTNESS OF BREATH: 0
CONSTIPATION: 0
ABDOMINAL PAIN: 0
BLOOD IN STOOL: 0
NAUSEA: 0
DIZZINESS: 0
VOMITING: 0
FEVER: 0
CHEST TIGHTNESS: 0
DIARRHEA: 0
ABDOMINAL DISTENTION: 0

## 2021-12-24 ASSESSMENT — PATIENT HEALTH QUESTIONNAIRE - PHQ9: SUM OF ALL RESPONSES TO PHQ9 QUESTIONS 1 & 2: 0

## 2021-12-24 NOTE — CONSULTS
Gastroenterology  Consultation Note       REASON FOR CONSULT   BRBPR    Consulting Physician:   HISTORY OF PRESENT ILLNESS      This is a 66 y.o. female with a past medical history of systemic mastocytosis bleeding hemorrhoids, anemia, HTN, HLD who presents with abnormal labs.    Patient had routine labs drawn by her hematologist in outpatient, and was noted to have hemoglobin of 7 on 12/23, from 10.7 on 12/16.  She notes history of intermittent bleeding hemorrhoids for years where she has periods of time where every bowel movement was accompanied by bright red blood in the toilet and on toilet paper.  During these bouts, patient reports becoming anemic.  She follows with Dr. Corcoran for internal hemorrhoids and has had sclerotherapy injections multiple times.  She is scheduled for banding/sclerotherapy with Dr. Corcoran on 1/5/2022.  She was last injected with 5% phenol mixed and all of oil as a sclerotherapy on 6/21/2021.  She currently reports visualizing blood in the toilet with every bowel movement, blood abdominal bloating and distention.  She is on no AC/AP.    In the ED she was afebrile hemodynamically stable. Labs notable for hemoglobin 7.1--> 1 unit--> 8.3, MCV 80, platelets 245, BUN 18, creatinine 0.7.  Surgery was consulted and plans to discuss with Dr. Corcoran the utility of bedside sclerotherapy versus banded while inpatient if her hemoglobin continues to drop.    This morning she reports having a bowel movement without any evidence of blood.  She reports taking Dulcolax daily to promote bowel movements every other day with some amount of straining.  She denies rectal pain.  Last colonoscopy 6/20/2018: 6 mm polyp in transverse colon, removed by cold snare.  Remainder of colon is normal.  Repeat colonoscopy 3 to 5 years.    PAST MEDICAL AND SURGICAL HISTORY      PMHx:  Past Medical History:   Diagnosis Date    Anemia     Anxiety     Hypertension     Iron deficiency     Lipid disorder     Systemic  mastocytosis     Vitamin D deficiency        PSHx:  Past Surgical History:   Procedure Laterality Date    AUGMENTATION MAMMAPLASTY Bilateral     COLONOSCOPY      PATELLA SURGERY      REDUCTION MAMMAPLASTY         PCP:   Ashley Nazario MD    MEDICATIONS      Prior to Admission medications    Medication Sig Start Date End Date Taking? Authorizing Provider   amLODIPine 5 mg tablet Take 5 mg by mouth daily.   Yes lOi Grewal MD   B complex-vitamin C-folic acid 400 mcg tablet tablet Take 1 tablet by mouth daily.   Yes Oli Grewal MD   bisacodyL (DULCOLAX, BISACODYL,) 5 mg EC tablet Take 5 mg by mouth daily.   Yes Oli Grewal MD   famotidine 20 mg tablet Take 20 mg by mouth 2 (two) times a day as needed for indigestion or heartburn.   Yes Oli Grewal MD   LORazepam 0.5 mg tablet Take 0.5 mg by mouth nightly as needed (insomnia).   Yes Oli Grewal MD   biotin 1 mg capsule Take 1 capsule by mouth daily. Dose unknown      Oli Grewal MD   calcium carbonate (CALCIUM 500 ORAL) Take 500 mg by mouth daily. Dose unknown      Oli Grewal MD   cholecalciferol, vitamin D3, 1,000 unit capsule Take 1,000 Units by mouth daily.      Oli Grewal MD   EPINEPHrine (EPIPEN 2-ZARINA) 0.3 mg/0.3 mL injection syringe Inject 0.3 mL (0.3 mg total) into the thigh as needed for anaphylaxis. 7/14/21   Ashley Nazario MD   escitalopram (LEXAPRO) 10 mg tablet TAKE 1 TABLET BY MOUTH EVERY DAY 8/16/21   Ashley Nazario MD   hydrochlorothiazide (HYDRODIURIL) 25 mg tablet Take 25 mg by mouth daily.   5/24/18   Oli Grewal MD   potassium chloride (KLOR-CON) 8 mEq CR tablet TAKE 1 TABLET BY MOUTH TWICE A DAY 5/17/21   Ashley Nazario MD   rosuvastatin 10 mg tablet Take 1 tablet (10 mg total) by mouth once daily. 11/29/21   Bright Selby MD       Home medications were personally reviewed.    ALLERGIES       Anesthetics - amide type - select amino amides, Iodinated contrast media, Penicillins, Clarithromycin, Erythromycin base, and Nsaids (non-steroidal anti-inflammatory drug)    FAMILY HISTORY      Family History   Problem Relation Age of Onset    Heart disease Biological Mother     Prostate cancer Biological Father        SOCIAL HISTORY      Social History     Socioeconomic History    Marital status: Single     Spouse name: None    Number of children: None    Years of education: None    Highest education level: None   Occupational History    None   Tobacco Use    Smoking status: Former Smoker     Packs/day: 0.25     Quit date:      Years since quittin.0    Smokeless tobacco: Never Used   Vaping Use    Vaping Use: Never used   Substance and Sexual Activity    Alcohol use: Yes     Alcohol/week: 1.0 standard drink     Types: 1 Glasses of wine per week     Comment: Social    Drug use: No    Sexual activity: Defer   Other Topics Concern    None   Social History Narrative    None     Social Determinants of Health     Financial Resource Strain: Not on file   Food Insecurity: No Food Insecurity    Worried About Running Out of Food in the Last Year: Never true    Ran Out of Food in the Last Year: Never true   Transportation Needs: Not on file   Physical Activity: Not on file   Stress: Not on file   Social Connections: Not on file   Intimate Partner Violence: Not on file   Housing Stability: Not on file       REVIEW OF SYSTEMS      Review of Systems   Constitutional: Negative for chills and fever.   Respiratory: Negative for chest tightness and shortness of breath.    Gastrointestinal: Negative for abdominal distention, abdominal pain, blood in stool, constipation, diarrhea, nausea and vomiting.   Musculoskeletal: Negative for arthralgias.   Skin: Negative for rash.   Neurological: Negative for dizziness.   Psychiatric/Behavioral: Negative for hallucinations.       PHYSICAL EXAMINATION      Temp:  [36.5 °C (97.7  °F)-37.2 °C (99 °F)] 36.8 °C (98.2 °F)  Heart Rate:  [55-83] 55  Resp:  [14-18] 14  BP: ()/(41-75) 108/62  Body mass index is 23.9 kg/m².    Physical Exam  Constitutional:       General: She is not in acute distress.  Cardiovascular:      Rate and Rhythm: Normal rate.      Heart sounds: No murmur heard.  Pulmonary:      Effort: Pulmonary effort is normal. No respiratory distress.   Abdominal:      General: Bowel sounds are normal. There is no distension.      Palpations: Abdomen is soft.      Tenderness: There is no abdominal tenderness.   Neurological:      Mental Status: She is alert and oriented to person, place, and time.         LABS / IMAGING / EKG        Labs  Results from last 7 days   Lab Units 12/24/21  0409 12/23/21  1653   SODIUM mEQ/L 138 137   POTASSIUM mEQ/L 3.5* 3.4*   CHLORIDE mEQ/L 102 101   CO2 mEQ/L 28 28   BUN mg/dL 16 18   CREATININE mg/dL 0.5* 0.7   CALCIUM mg/dL 9.2 9.0   ALBUMIN g/dL  --  4.1   BILIRUBIN TOTAL mg/dL  --  0.4   ALK PHOS IU/L  --  70   ALT IU/L  --  21   AST IU/L  --  22   GLUCOSE mg/dL 106* 136*           Results from last 7 days   Lab Units 12/23/21  1653   MAGNESIUM mg/dL 1.8     Results from last 7 days   Lab Units 12/24/21  0409 12/23/21  1653 12/23/21  1502   WBC K/uL 6.40 7.31 6.15   HEMOGLOBIN g/dL 8.3* 7.1* 6.9*   HEMATOCRIT % 27.3* 24.0* 23.6*   PLATELETS K/uL 330 345 331         Imaging  ECG 12 lead   ED Interpretation   Sinus bradycardia 56bpm. LAD. Incomplete RBBB. Inferior infarct age undetermined                ASSESSMENT AND PLAN      Other hemorrhoids  Assessment & Plan  -66 y.o. female with a past medical history of systemic mastocytosis bleeding hemorrhoids, anemia, HTN, HLD who presents with abnormal labs.  -hemoglobin of 7.0 on 12/23, from 10.7 on 12/16  -Long history of hemorrhoidal bleeding, follows close with Dr. Corcoran.  Status post multiple sclerotherapy injections, last 6/21/2021.  Plans for outpatient sclerotherapy versus banding on  1/5/2022  -Endorses bright red blood per rectum with every bowel movement, filling the toilet with blood.  This is consistent with prior episodes of bleeding, where hemoglobin down trends  -no AC/AP  -Hemoglobin responded appropriately to 1 unit of blood from 7.1->8.3    -Patient presents with acute anemia likely secondary to known internal hemorrhoidal bleeding.  She follows with Dr. Corcoran and surgery is following the patient, with consideration of inpatient banding versus sclerotherapy.  I have a low suspicion for any other GI sources of potential bleeding given most recent colonoscopy    Plan:  -Trend CBC, transfuse prn per primary team  -Dulcolax BID PO to promote daily BMs (patient prefers dulcolax to osmotic laxatives)  -hydrocortisone suppositories BID  -f/u surgery recs for therapy of hemorrhoids  -no plans for inpatient colonoscopy at this time. Recommend outpatient follow up for surveillance colonoscopy.    Attending:  Agree. She has recuerrent hemorrhoidal bleeding for which she is under Dr Corcoran's care.  This has abated and she will be discharged.  She has arranged to f/u w him in office for deinitive rx.  Pls call w ?s    Leighton Calderon MD         VTE Assessment: Padua VTE Score: 0  Code Status: Full Code  Estimated discharge date: 12/30/2021

## 2021-12-24 NOTE — H&P
Hospital Medicine Service -  History & Physical        CHIEF COMPLAINT   Abnormal labs noting concerns for anemia, BRBPR     HISTORY OF PRESENT ILLNESS      Ritu Ramirez is a 66 y.o. female with a past medical history of systemic mastocytosis, history of anemia requiring iron infusions as well as blood transfusions -patient follows with Dr. Ani Calhoun, hematology/oncology.  The patient has a known history of hemorrhoids for which she has been treated in the past with sclerotherapy by Dr. Javier Corcoran, colorectal surgery.  The patient has a history of anxiety, osteoporosis, depression, vitamin D deficiency.    The patient tells me that she has had episodes of bright red blood per rectum which she states has been ongoing intermittently since August 2021.  She notes that she has had a history of hemorrhoids and she believes that this is her ongoing bleeding from her hemorrhoids (internal).  She does not report any anal discomfort.  She is not on antiplatelet or anticoagulant therapy.  She does not use chronic NSAIDs.    She tells me that over the course of the last 2 weeks, she has had increasing fatigue, dyspnea with exertion and occasional chest discomfort with exercising and feeling rundown when she goes to the gym.    She tells me that her outpatient hematologist, routinely checks lab work.  She was called by her hematologist today who noted that her hemoglobin had dropped from 10.7 to 7.1 and was referred to the emergency department for considerations of a blood transfusion. Upon personal review of records, her last hemoglobin on record was December 16, 2021 noting that her hemoglobin at that time was 10.7.  Due to the sudden drop in hemoglobin, the patient was referred to the emergency department by her hematologist.    The patient currently denies upon going hematochezia.  Patient admits that she has had loose stools, but has not been on any recent antibiotics or had any ill contacts or had any travel  history.  She denies any fever, chills or rigors.  She has no abdominal pain.  She denies nausea, vomiting.  She has not been constipated.    Review of systems:    She does report occasional lightheadedness with positional changes.    No rhinorrhea, sore throat, otalgia, postnasal drip.  No visual changes, headaches,  focal weakness, speech disturbances.  No dysphagia.  No cough or shortness of breath.  No palpitations, leg edema, orthopnea, paroxysmal nocturnal dyspnea.     No dysuria or hematuria.  No myalgias or joint pain.  No fever, chills, rigors.  No rashes. No depression or anxiety. No suicidal thoughts or homicidal thoughts.     Other than what has been mentioned, the remainder of the review of systems otherwise negative.    PAST MEDICAL AND SURGICAL HISTORY      Past medical history: Anxiety.  Chronic anemia with prior history of IV iron infusions and blood transfusions.  History of systemic mastocytosis.  Hypertension.  Dyslipidemia.  Vitamin D deficiency.  Osteoporosis.  History of depression.  History of hemorrhoids.      Past surgical history: History of cosmetic breast surgery.  Bone marrow biopsy.  Upper endoscopy and colonoscopy in the past by Dr. Андрей Wolfe with reported internal hemorrhoids.  History of left patellar fracture for which he underwent ORIF.  History of sclerotherapy by Dr. Corcoran for treatment of her history of hemorrhoids.        MEDICATIONS      Prior to Admission medications    Medication Sig Start Date End Date Taking? Authorizing Provider   amLODIPine 5 mg tablet Take 5 mg by mouth daily.   Yes Oli Grewal MD   B complex-vitamin C-folic acid 400 mcg tablet tablet Take 1 tablet by mouth daily.   Yes Oli Grewal MD   bisacodyL (DULCOLAX, BISACODYL,) 5 mg EC tablet Take 5 mg by mouth daily.   Yes Oli Grewal MD   famotidine 20 mg tablet Take 20 mg by mouth 2 (two) times a day as needed for indigestion or heartburn.   Yes Oli Grewal MD    LORazepam 0.5 mg tablet Take 0.5 mg by mouth nightly as needed (insomnia).   Yes Oli Grewal MD   biotin 1 mg capsule Take 1 capsule by mouth daily. Dose unknown      Oli Grewal MD   calcium carbonate (CALCIUM 500 ORAL) Take 500 mg by mouth daily. Dose unknown      Oli Grewal MD   cholecalciferol, vitamin D3, 1,000 unit capsule Take 1,000 Units by mouth daily.      Oli Grewal MD   EPINEPHrine (EPIPEN 2-ZARINA) 0.3 mg/0.3 mL injection syringe Inject 0.3 mL (0.3 mg total) into the thigh as needed for anaphylaxis. 7/14/21   Ashley Nazario MD   escitalopram (LEXAPRO) 10 mg tablet TAKE 1 TABLET BY MOUTH EVERY DAY 8/16/21   Ashley Nazario MD   hydrochlorothiazide (HYDRODIURIL) 25 mg tablet Take 25 mg by mouth daily.   5/24/18   Oli Grewal MD   potassium chloride (KLOR-CON) 8 mEq CR tablet TAKE 1 TABLET BY MOUTH TWICE A DAY 5/17/21   Ashley Nazario MD   rosuvastatin 10 mg tablet Take 1 tablet (10 mg total) by mouth once daily. 11/29/21   Bright Selby MD   ALPRAZolam 0.25 mg tablet Take 0.25 mg by mouth.  12/24/21  Oli Grewal MD   amLODIPine (NORVASC) 10 mg tablet Take 1 tablet (10 mg total) by mouth daily. Can adjust based on blood pressures- home medication is norvasc 5 mg. 5/12/21 12/24/21  Rosalva Bradshaw CRNP   B complex-vitamin C-folic acid 400 mcg tablet tablet Take 1 tablet by mouth daily. Hold until cleared by Dr. Kiser. 5/11/21 12/24/21  Rosalva Bradshaw CRNP   famotidine (PEPCID) 20 mg tablet Take 1 tablet (20 mg total) by mouth 2 (two) times a day as needed for indigestion. 5/11/21 12/24/21  Rosalva Bradshaw CRNP   LORazepam (ATIVAN) 0.5 mg tablet Take 1 tablet (0.5 mg total) by mouth nightly as needed (insomnia). 9/13/21 12/24/21  Ashley Nazario MD                               ALLERGIES      Anesthetics - amide type - select amino amides, Iodinated contrast media,  Penicillins, Clarithromycin, Erythromycin base, and Nsaids (non-steroidal anti-inflammatory drug)    FAMILY HISTORY        Family history: Mother alive at 96 years old with a history of dementia.  Her mother also has a history of mitral valvular heart disease.    Her father  of complications from prostate cancer.    SOCIAL HISTORY        Social history: Patient is single and resides with her long-term male partner, Gagandeep.  She designates her partner as her emergency medical proxy and surrogate. Gagandeep Douglas can be reached at [phone]. Her secondary contact is her daughter, Edith Rose [phone].     The patient tells me she has 3 children (2 sons and 1 daughter).    The patient works as a psychotherapist.    The patient is an ex-smoker who smoked socially for a brief time.    The patient tells me that she consumes wine on occasion.  She denies a history of alcohol use disorder.    She denies substance use.        REVIEW OF SYSTEMS      All other systems reviewed and negative except as noted in HPI    PHYSICAL EXAMINATION      Temp:  [36.5 °C (97.7 °F)-37.2 °C (99 °F)] 36.5 °C (97.7 °F)  Heart Rate:  [60-83] 62  Resp:  [16-17] 16  BP: ()/(41-75) 106/62  Body mass index is 23.9 kg/m².    Physical Exam       General: Alert and oriented ×3, not in acute distress.  Pale appearing female.  HEENT: Normocephalic atraumatic.  Pupils equal round reactive to light.  Extraocular movements are intact.  Sclera anicteric.  No tragus tenderness.  Nares are patent.  Oropharynx is clear without exudates.  There is no sinus tenderness.  Moist mucous membranes.  Cardiovascular: S1, S2 without S3 or S4.  There is no murmurs, rubs, or gallops.  Pulses are 2+.  Neck: No jugular venous distention, no carotid bruits, no thyromegaly.  Pulmonary: Clear to auscultation bilaterally.  There is no wheezing, rhonchi, or crackles.  Abdomen: Soft, bowel sounds are present, nontender, nondistended.  No rebound or guarding.  Extremities:  No edema, cyanosis or lymphadenopathy.  Neurologic examination: Cranial nerves II through XII are grossly intact.  Sensation intact to light touch.  Power is noted to be equal and symmetric at 5 out of 5.  There is no pronator drift, slurring of speech or facial droop.  Babinski signs are downgoing bilaterally.  Deep tendon reflexes noted be equal and symmetric at 2+.  Finger to nose and heel to shin coordination or intact and symmetric.  Skin: No rashes or skin breakdown.  Skin is warm, dry, well perfused.  Musculoskeletal: Full range of motion in all extremities without joint effusions or noted tenderness.  No CVA tenderness.  Psychiatric: Appropriate mood and affect.  Normal insight and cognition.  No hallucinations, delusions, or suicidal thoughts.  Rectal examination: Rectal examination was performed by ER staff who reported to me that they did not note any stool in the rectal vault.  There is noted an external hemorrhoid which was not irritated.  Hemoccult was negative.  The patient did not want me to repeat the rectal examination.    LABS / IMAGING / EKG        Labs    Sodium 137.  Potassium 3.4.  Chloride 101.  Bicarb 28.  BUN 18.  Creatinine 0.7.  Glucose 136.  Calcium 9.0.  AST 22.  ALT 21.  Alkaline phosphatase 70.  Total protein 6.3.  Albumin 4.1.  Total bilirubin 0.4.  Anion gap 8.  Magnesium 1.8.    White cell count 7.31.  Red cell count 2.97.  Hemoglobin 7.1.  Hematocrit 24%.  MCV 80.8.  MCH 23.9.  MCHC 29.6.  RDW 19.1.  Platelet count 345.  Differential: 70.3% neutrophils, 16.4% lymphocytes, 6.6% monocytes, 5.5% eosinophils, 0.4% basophils.    Type and screen O positive, antibody negative.    COVID-19 is negative.    EKG which I personally reviewed notes a sinus bradycardia.  Ventricular rate 56 bpm.  QT corrected time is 438 ms.  There is a T wave inversion in lead V1.  Patient has a incomplete right bundle branch block.      ASSESSMENT AND PLAN           * Symptomatic anemia  Assessment &  Plan  Significant drop in Hb over the course of two weeks (last Hb on record 12/16/21 10.7 to 7.1 - referred to ED by patient's hematologist, Dr. Calhoun for concerns of rapid decline in Hb    Admit to [facility] team for further evaluation and workup  Transfuse 1 Unit PRBC tonight  Keep Hb > 8.0 g/dL  Potentially can be secondary to bleeding internal hemorrhoid  Will keep NPO p MN  Start Protonix BID  Consult GI and colorectal surgery   Patient is known to Dr. Corcoran - who has performed sclerotherapy in past for patient's hemorrhoid    Patient confirms she is a full code. She designates her partner, Gagandeep Douglas, as her emergency medical proxy/surrogate [phone]. All of patient's questions answered to her satisfaction    Other hemorrhoids  Assessment & Plan  Has both internal and external hemorrhoid   Concerns for rapid Hb drop in last two weeks by outpatient hematologist, Dr. Calhoun  Will consult colorectal surgery - known to Dr. Corcoran    Hypokalemia  Assessment & Plan  I checked a Mg level and this is on low end of normal 1.8  Aim for K = 4.0 and Mg = 2.0 while on HCTZ  Will replete electrolytes - administer KDur 40 mEq po x1 dose  Give Mg sulfate infusion  Hold HCTZ for now until electrolytes repleted to goal  Recheck electrolytes in hospital and in AM    Systemic mastocytosis  Assessment & Plan  Patient follows with Dr. Calhoun  Will consult Hematology         VTE Assessment: Padua VTE Score: 0  Code Status: Full Code  Estimated discharge date: 12/30/2021     Lucien Mccrary MD  12/24/2021

## 2021-12-24 NOTE — PROGRESS NOTES
Surgery Brief Note    Patient discussed with Dr. Corcoran.    No acute intervention at this time.  Pt should follow up with him on Monday, 12/27 to discuss her hemorrhoids. Information placed into discharge paperwork.    Care per primary.  Surgery to signoff. Call with questions.     Pineda Kendrick MD

## 2021-12-24 NOTE — PATIENT CARE CONFERENCE
Care Progression Rounds Note  Date: 12/24/2021  Time: 10:41 AM     Patient Name: Ritu Ramirez     Medical Record Number: [id number]   YOB: 1955  Sex: Female      Room/Bed: [facility]    Admitting Diagnosis: Symptomatic anemia [D64.9]  Gastrointestinal hemorrhage, unspecified gastrointestinal hemorrhage type [K92.2]   Admit Date/Time: 12/23/2021  7:27 PM    Primary Diagnosis: Symptomatic anemia  Principal Problem: Symptomatic anemia    GMLOS: pending  Anticipated Discharge Date: 12/24/2021    AM-PAC:  Mobility Score:      Discharge Planning:  Concerns to be Addressed: discharge planning  Anticipated Discharge Disposition: home without assistance or services    Barriers to Discharge:  None    Comments:       Participants:  nursing,social work/services

## 2021-12-24 NOTE — CONSULTS
General Surgery Consult    Subjective     Ritu Ramirez is a 66 y.o. female who was admitted for Symptomatic anemia [D64.9]  Gastrointestinal hemorrhage, unspecified gastrointestinal hemorrhage type [K92.2]. Patient was seen in consultation at the request of referring physician for management recommendations. Patient is 66-year-old female with medical history significant for systemic mastocytosis, as well as a history of bleeding hemorrhoids previously injected by Dr. Corcoran in June of this year.  She was sent 10 by her outpatient hematologist after her hemoglobin was noted to drop from 10.7 on 12/16 to 7 on 12/23.  The patient states that she has had issues with intermittently bleeding hemorrhoids for years.  She states that she will have periods of time when every time she uses the bathroom there is bright red blood in the toilet, and she becomes more anemic.  Known to Dr. Corcoran for her bleeding hemorrhoids, and has had them injected multiple times.  She is scheduled for banding/sclerotherapy in his office January 5 of next year.    Today, the patient reports that she sees blood in the toilet with every bowel movement.  She also reports abdominal bloating and distention.  Her bowel movements have otherwise been normal.  She denies chest pain, shortness of breath, abdominal pain, fever/chills.  She follows with [facility] for her systemic mastocytosis and is not on systemic therapy for it.    PMH: Anemia, hemorrhoids, anxiety, hypertension, systemic mastocytosis, vitamin D deficiency  PSH: Breast augmentation and reduction, patellar surgery  Meds: Amlodipine, B vitamins, biotin, Dulcolax, calcium, Lexapro, famotidine, hydrochlorothiazide, lorazepam, potassium, rosuvastatin  All: Amide anesthetics, iodinated contrast, penicillin, clarithromycin, erythromycin, NSAIDs     Medical History:   Past Medical History:   Diagnosis Date    Anemia     Anxiety     Hypertension     Iron deficiency     Lipid disorder     Systemic  mastocytosis     Vitamin D deficiency        Surgical History:   Past Surgical History:   Procedure Laterality Date    AUGMENTATION MAMMAPLASTY Bilateral     COLONOSCOPY      PATELLA SURGERY      REDUCTION MAMMAPLASTY         Social History:   Social History     Social History Narrative    Not on file       Family History:   Family History   Problem Relation Age of Onset    Heart disease Biological Mother     Prostate cancer Biological Father        Allergies: Anesthetics - amide type - select amino amides, Iodinated contrast media, Penicillins, Clarithromycin, Erythromycin base, and Nsaids (non-steroidal anti-inflammatory drug)    Home Medications:    amLODIPine, Take 5 mg by mouth daily.    B complex-vitamin C-folic acid, Take 1 tablet by mouth daily.    bisacodyL, Take 5 mg by mouth daily.    famotidine, Take 20 mg by mouth 2 (two) times a day as needed for indigestion or heartburn.    LORazepam, Take 0.5 mg by mouth nightly as needed (insomnia).    biotin, Take 1 capsule by mouth daily. Dose unknown      calcium carbonate (CALCIUM 500 ORAL), Take 500 mg by mouth daily. Dose unknown      cholecalciferol (vitamin D3), Take 1,000 Units by mouth daily.      EPINEPHrine, Inject 0.3 mL (0.3 mg total) into the thigh as needed for anaphylaxis.    escitalopram, TAKE 1 TABLET BY MOUTH EVERY DAY    hydrochlorothiazide, Take 25 mg by mouth daily.      potassium chloride, TAKE 1 TABLET BY MOUTH TWICE A DAY    rosuvastatin, Take 1 tablet (10 mg total) by mouth once daily.    Current Medications:    acetaminophen, 650 mg, oral, q4h PRN    alum-mag hydroxide-simeth, 30 mL, oral, q4h PRN    amLODIPine, 5 mg, oral, Daily    bisacodyL, 10 mg, rectal, Daily PRN    bisacodyL, 5 mg, oral, Daily    cholecalciferol (vitamin D3), 1,000 Units, oral, Daily    glucose, 16-32 g of dextrose, oral, PRN **OR** dextrose, 15-30 g of dextrose, oral, PRN **OR** glucagon, 1 mg, intramuscular, PRN **OR** dextrose in water, 25 mL, intravenous, PRN     "escitalopram, 10 mg, oral, Daily (6a)    LORazepam, 0.5 mg, oral, Nightly PRN    magnesium sulfate, 2 g, intravenous, Once    nitroglycerin, 0.4 mg, sublingual, q5 min PRN    ondansetron, 4 mg, intravenous, q8h PRN    pantoprazole, 40 mg, intravenous, q12h LIAN    potassium chloride, 10 mEq, oral, Daily    rosuvastatin, 10 mg, oral, Nightly    senna, 1 tablet, oral, 2x daily PRN    sodium chloride 0.9 %, 5 mL/hr, intravenous, PRN    Review of Systems  Pertinent items are noted in HPI.    Objective     Physicial Exam  Visit Vitals  /62 (BP Location: Right upper arm, Patient Position: Lying)   Pulse 62   Temp 36.5 °C (97.7 °F) (Temporal)   Resp 16   Ht 1.549 m (5' 1\")   Wt 57.4 kg (126 lb 8 oz)   SpO2 94%   BMI 23.90 kg/m²       Physical Exam  Constitutional:       Appearance: Normal appearance. She is normal weight.   Cardiovascular:      Rate and Rhythm: Normal rate and regular rhythm.   Pulmonary:      Effort: Pulmonary effort is normal. No respiratory distress.   Abdominal:      General: There is no distension.      Palpations: Abdomen is soft.      Tenderness: There is no abdominal tenderness.   Genitourinary:     Comments: External non-thrombosed hemorrhoids  Internal large, non-thrombosed hemorrhoids   No blood on ELIGIO  Musculoskeletal:         General: Normal range of motion.      Cervical back: Normal range of motion.   Skin:     General: Skin is warm and dry.   Neurological:      Mental Status: She is alert.         Labs  Results from last 7 days   Lab Units 12/23/21  1653   SODIUM mEQ/L 137   POTASSIUM mEQ/L 3.4*   CHLORIDE mEQ/L 101   CO2 mEQ/L 28   BUN mg/dL 18   CREATININE mg/dL 0.7   GLUCOSE mg/dL 136*     Results from last 7 days   Lab Units 12/23/21  1653   ALBUMIN g/dL 4.1   BILIRUBIN TOTAL mg/dL 0.4   ALK PHOS IU/L 70   ALT IU/L 21   AST IU/L 22     Results from last 7 days   Lab Units 12/23/21  1653 12/23/21  1502 12/23/21  1432   WBC K/uL 7.31 6.15 6.56   HEMOGLOBIN g/dL 7.1* 6.9* 7.0* "   PLATELETS K/uL 345 331 323             Imaging  Not applicable    Assessment     66-year-old female with acute on chronic anemia, and bleeding hemorrhoids sent in from hematologist office for work-up of anemia.  We are consulted for management of her bleeding hemorrhoids.        Plan     -Transfuse to goal hemoglobin greater than 7  -Monitor for bloody bowel movements  -We will discuss with Dr. Corcoran the utility of bedside sclerotherapy versus banding while patient is admitted, if her hemoglobin continues to drop  Glayds Meyers MD    Went home    Javier Corcoran MD  12/25/21  8:11 AM

## 2021-12-24 NOTE — ED ATTESTATION NOTE
Physician Attestation:     I personally saw and evaluated the patient, participated in the management, and agree with the findings in the above note except as where stated.  The Physician Assistant and I discussed  the case, workup, and disposition.      Chief Complaint  Chief Complaint   Patient presents with   • Abnormal Lab     hgb dropped from 10.5 to 7 in 2wks saw  sent in for 2 prbc possible gi bleed denies SOB          66-year-old female presents the emergency department for evaluation.  Patient with history of systemic mastocytosis as well as iron deficiency anemia presents for evaluation of anemia.  Drop in hemoglobin.  Sent in by her hematologist for 2 units of packed red blood cells.  Has had bright red blood per rectum thought to be second to hemorrhoids.  Slight epigastric pain.  Chest pain 2 days ago.  No shortness of breath.  No lightheadedness    Nontoxic no acute distress  Vital signs stable  Regular rate and rhythm  Lungs good auscultation bilaterally  Abdomen soft without tenderness or peritonitis  Does not appear weak    Plan: transfuse 2 units.  Rectal exam by PA without much stool in rectal vault to test.  Will give Protonix.       Ceasar Ross, DO  12/23/21 1950

## 2021-12-24 NOTE — NURSING NOTE
Pt for dc.  will  patient at noon Discussed AVS summary in detail with patient.Importance of follow up and new prescription discussed. Pt acknowledges instruction to follow up with Dr. Corcoran on 12/27.  Pt denies questions concerns at this time. Will ctm.

## 2021-12-24 NOTE — ASSESSMENT & PLAN NOTE
-66 y.o. female with a past medical history of systemic mastocytosis bleeding hemorrhoids, anemia, HTN, HLD who presents with abnormal labs.  -hemoglobin of 7.0 on 12/23, from 10.7 on 12/16  -Long history of hemorrhoidal bleeding, follows close with Dr. Corcoran.  Status post multiple sclerotherapy injections, last 6/21/2021.  Plans for outpatient sclerotherapy versus banding on 1/5/2022  -Endorses bright red blood per rectum with every bowel movement, filling the toilet with blood.  This is consistent with prior episodes of bleeding, where hemoglobin down trends  -no AC/AP  -Hemoglobin responded appropriately to 1 unit of blood from 7.1->8.3    -Patient presents with acute anemia likely secondary to known internal hemorrhoidal bleeding.  She follows with Dr. Corcoran and surgery is following the patient, with consideration of inpatient banding versus sclerotherapy.  I have a low suspicion for any other GI sources of potential bleeding given most recent colonoscopy    Plan:  -Trend CBC, transfuse prn per primary team  -Dulcolax BID PO to promote daily BMs (patient prefers dulcolax to osmotic laxatives)  -hydrocortisone suppositories BID  -f/u surgery recs for therapy of hemorrhoids  -no plans for inpatient colonoscopy at this time. Recommend outpatient follow up for surveillance colonoscopy.

## 2021-12-24 NOTE — ED PROVIDER NOTES
"Emergency Medicine Note  HPI   HISTORY OF PRESENT ILLNESS     67yo F w/ PMH systemic mastocytosis, anemia who was sent by Dr. Calhoun for worsening anemia noted as an o/p. Pt states her hgb has dropped in the last 2 weeks from 11.5 to around 7 so she was sent for a \"GI work up\" and blood transfusion. Pt states she has had BRB in stool for weeks thought to be due to hemorrhoids she has that she is due to have a banding procedure by Dr. Corcoran done on 22. Reports 2 days ago she had chest pain and SOB while working out but no current symptoms.             Patient History   PAST HISTORY     Reviewed from Nursing Triage:  Tobacco  Allergies  Meds  Problems  Med Hx  Surg Hx  Fam Hx  Soc   Hx      Past Medical History:   Diagnosis Date   • Anemia    • Anxiety    • Hypertension    • Iron deficiency    • Lipid disorder    • Systemic mastocytosis    • Vitamin D deficiency        Past Surgical History:   Procedure Laterality Date   • AUGMENTATION MAMMAPLASTY Bilateral    • COLONOSCOPY     • PATELLA SURGERY     • REDUCTION MAMMAPLASTY         Family History   Problem Relation Age of Onset   • Heart disease Biological Mother    • Prostate cancer Biological Father        Social History     Tobacco Use   • Smoking status: Former Smoker     Packs/day: 0.25     Quit date:      Years since quittin.0   • Smokeless tobacco: Never Used   Vaping Use   • Vaping Use: Never used   Substance Use Topics   • Alcohol use: Yes     Alcohol/week: 1.0 standard drink     Types: 1 Glasses of wine per week     Comment: Social   • Drug use: No         Review of Systems   REVIEW OF SYSTEMS     Review of Systems   Constitutional: Negative for chills and fever.   Respiratory: Negative for cough, chest tightness and shortness of breath.    Cardiovascular: Positive for chest pain (2d ago, none now).   Gastrointestinal: Positive for abdominal pain (crampy), anal bleeding and blood in stool. Negative for diarrhea, nausea and vomiting. "   Genitourinary: Negative for difficulty urinating, hematuria and vaginal bleeding.   Neurological: Positive for light-headedness. Negative for dizziness and headaches.         VITALS     ED Vitals    Date/Time Temp Pulse Resp BP SpO2 [facility]   12/24/21 0015 -- 60 -- 98/50 95 %    12/24/21 0000 -- 60 -- 92/49 95 %    12/23/21 2349 -- 64 -- 97/47 95 %    12/23/21 2317 -- -- -- 111/63 --    12/23/21 2300 -- 62 -- 90/52 94 %    12/23/21 2249 -- -- -- 94/55 --    12/23/21 2230 -- -- -- 109/62 --    12/23/21 2223 -- 68 -- 101/65 97 %    12/23/21 2205 -- 68 -- 97/49 95 %    12/23/21 2200 -- 68 -- 86/41 95 %    12/23/21 2145 36.7 °C (98 °F) 72 -- 94/54 94 %    12/23/21 2140 36.7 °C (98 °F) 69 16 103/65 95 %    12/23/21 1641 37.2 °C (99 °F) 83 17 117/75 99 % MMM        Pulse Ox %: 99 % (12/23/21 2023)  Pulse Ox Interpretation: Normal (12/23/21 2023)  Heart Rate: 83 (12/23/21 2023)  Rhythm Strip Interpretation: Normal Sinus Rhythm (12/23/21 2023)     Physical Exam   PHYSICAL EXAM     Physical Exam  Vitals and nursing note reviewed.   Constitutional:       General: She is not in acute distress.     Appearance: Normal appearance. She is normal weight. She is not toxic-appearing.   HENT:      Head: Normocephalic.   Eyes:      Extraocular Movements: Extraocular movements intact.      Conjunctiva/sclera: Conjunctivae normal.   Cardiovascular:      Rate and Rhythm: Normal rate and regular rhythm.      Pulses: Normal pulses.      Heart sounds: Normal heart sounds.   Pulmonary:      Effort: Pulmonary effort is normal. No respiratory distress.      Breath sounds: Normal breath sounds.   Abdominal:      General: There is no distension.      Palpations: Abdomen is soft.      Tenderness: There is no abdominal tenderness.   Musculoskeletal:         General: No swelling. Normal range of motion.      Cervical back: Normal range of motion and neck supple.   Skin:     General: Skin is warm and dry.      Capillary Refill:  Capillary refill takes less than 2 seconds.   Neurological:      General: No focal deficit present.      Mental Status: She is alert and oriented to person, place, and time. Mental status is at baseline.   Psychiatric:         Mood and Affect: Mood normal.         Behavior: Behavior normal.         Thought Content: Thought content normal.         Judgment: Judgment normal.           PROCEDURES     Procedures     DATA     Results     Procedure Component Value Units Date/Time    SARS-CoV-2 (COVID-19), PCR Nasopharynx [[id number]]  (Normal) Collected: 12/23/21 2201    Specimen: Nasopharyngeal Swab from Nasopharynx Updated: 12/23/21 2233    Narrative:      The following orders were created for panel order SARS-CoV-2 (COVID-19), PCR Nasopharynx.  Procedure                               Abnormality         Status                     ---------                               -----------         ------                     SARS-CoV-2 (COVID-19), P...[[id number]]  Normal              Final result                 Please view results for these tests on the individual orders.    SARS-CoV-2 (COVID-19), PCR Nasopharynx [[id number]]  (Normal) Collected: 12/23/21 2201    Specimen: Nasopharyngeal Swab from Nasopharynx Updated: 12/23/21 2233     SARS-CoV-2 (COVID-19) Negative    Narrative:      Nursing instructions: Obtain nasopharyngeal swab ONLY. Send swab in viral transport media.    Prepare RBC-(BLOOD BANK ORDER for PRODUCT): 1 Units Transfusion indications: Hgb <8 g/dL and symptomatic anemia [[id number]] Collected: 12/23/21 1953     Updated: 12/23/21 2121     Product Code S4546I12     Unit ID [id number]     Unit ABO O     Unit RH Positive     Crossmatch Compatible     Product Status IS     Unit Expiration Date/Time 202201202359     ISBT Code 5100    Magnesium [[id number]]  (Normal) Collected: 12/23/21 1653    Specimen: Blood, Venous Updated: 12/23/21 2009     Magnesium 1.8 mg/dL     Comprehensive metabolic panel [[id number]]   (Abnormal) Collected: 12/23/21 1653    Specimen: Blood, Venous Updated: 12/23/21 1726     Sodium 137 mEQ/L      Potassium 3.4 mEQ/L      Chloride 101 mEQ/L      CO2 28 mEQ/L      BUN 18 mg/dL      Creatinine 0.7 mg/dL      Glucose 136 mg/dL      Calcium 9.0 mg/dL      AST (SGOT) 22 IU/L      ALT (SGPT) 21 IU/L      Alkaline Phosphatase 70 IU/L      Total Protein 6.3 g/dL      Albumin 4.1 g/dL      Bilirubin, Total 0.4 mg/dL      eGFR >60.0 mL/min/1.73m*2      Anion Gap 8 mEQ/L     CBC and differential [[id number]]  (Abnormal) Collected: 12/23/21 1653    Specimen: Blood, Venous Updated: 12/23/21 1700     WBC 7.31 K/uL      RBC 2.97 M/uL      Hemoglobin 7.1 g/dL      Hematocrit 24.0 %      MCV 80.8 fL      MCH 23.9 pg      MCHC 29.6 g/dL      RDW 19.1 %      Platelets 345 K/uL      MPV 8.7 fL      Differential Type Auto     nRBC 0.0 %      Immature Granulocytes 0.8 %      Neutrophils 70.3 %      Lymphocytes 16.4 %      Monocytes 6.6 %      Eosinophils 5.5 %      Basophils 0.4 %      Immature Granulocytes, Absolute 0.06 K/uL      Neutrophils, Absolute 5.14 K/uL      Lymphocytes, Absolute 1.20 K/uL      Monocytes, Absolute 0.48 K/uL      Eosinophils, Absolute 0.40 K/uL      Basophils, Absolute 0.03 K/uL     Type and screen [[id number]] Collected: 12/23/21 1653    Specimen: Blood, Venous Updated: 12/23/21 1656          Imaging Results    None         ECG 12 lead   ED Interpretation   Sinus bradycardia 56bpm. LAD. Incomplete RBBB. Inferior infarct age undetermined          Scoring tools                                 ED Course & MDM   MDM / ED COURSE and CLINICAL IMPRESSIONS     MDM    ED Course as of 12/24/21 0029   Thu Dec 23, 2021   1940 Rectal exam performed with FAWAD Fox, present as chaperone. Heme neg but no real stool seen on glove. Consented pt for bl.  [SHERMAN]   2022 EKG reviewed. Paged [facility] [SHERMAN]   2034 Awaiting callback [facility]. [SHERMAN]   2133 Spoke to Dr. Mccrary about adm [SHERMAN]      ED Course User Index  [SHERMAN] Paul  DOYLE Maynard         Clinical Impressions as of 12/24/21 0029   Symptomatic anemia   Gastrointestinal hemorrhage, unspecified gastrointestinal hemorrhage type            Alley Miller PA C  12/24/21 0030

## 2021-12-24 NOTE — DISCHARGE INSTRUCTIONS
Please call the office of Dr. Jose Corcoran at [phone] to schedule an appointment on Monday, 12/27 to discuss treatment for your hemorrhoids.

## 2021-12-24 NOTE — ASSESSMENT & PLAN NOTE
I checked a Mg level and this is on low end of normal 1.8  Aim for K = 4.0 and Mg = 2.0 while on HCTZ  Will replete electrolytes - administer KDur 40 mEq po x1 dose  Give Mg sulfate infusion  Hold HCTZ for now until electrolytes repleted to goal  Recheck electrolytes in hospital and in AM

## 2021-12-24 NOTE — ASSESSMENT & PLAN NOTE
Significant drop in Hb over the course of two weeks (last Hb on record 12/16/21 10.7 to 7.1 - referred to ED by patient's hematologist, Dr. Calhoun for concerns of rapid decline in Hb    Admit to [facility] [place] for further evaluation and workup  Transfuse 1 Unit PRBC tonight  Keep Hb > 8.0 g/dL  Potentially can be secondary to bleeding internal hemorrhoid  Will keep NPO p MN  Start Protonix BID  Consult GI and colorectal surgery   Patient is known to Dr. Corcoran - who has performed sclerotherapy in past for patient's hemorrhoid    Patient confirms she is a full code. She designates her partner, Gagandeep Douglas, as her emergency medical proxy/surrogate [phone]. All of patient's questions answered to her satisfaction

## 2021-12-24 NOTE — ASSESSMENT & PLAN NOTE
Has both internal and external hemorrhoid   Concerns for rapid Hb drop in last two weeks by outpatient hematologist, Dr. Calhoun  Will consult colorectal surgery - known to Dr. Corcoran

## 2021-12-24 NOTE — PLAN OF CARE
Problem: Adult Inpatient Plan of Care  Goal: Readiness for Transition of Care  Outcome: Progressing  Intervention: Mutually Develop Transition Plan  Flowsheets (Taken 12/24/2021 3612)  Anticipated Discharge Disposition: home with assistance  Discharge Coordination/Progress: SW completed assessment with pt at bedside.  Assistive Device/Animal Currently Used at Home: none  Concerns Comments: Admit w/ abnormal labs-anemia BRBPR  Transportation Concerns: car, none  Readmission Within the Last 30 Days: no previous admission in last 30 days  Patient/Family Anticipated Services at Transition: none  Patient/Family Anticipates Transition to: home with family  Transportation Anticipated: family or friend will provide  Concerns to be Addressed: discharge planning  Patient's Choice of Community Agency(s): [facility] and OP PT   SW met with pt at bedside to introduce self and role. Pt confirms she resides with SO in a 2SH, FFSU, and stall shower. Pt reports she is ind w/ amb, adl's and no use of AD/DME. PCP and pharmacy confirmed as listed. Pt confirms [facility] and OP PT. No dc needs identified. IMM done.

## 2021-12-25 LAB
CROSSMATCH: NORMAL
ISBT CODE: 5100
PRODUCT CODE: NORMAL
PRODUCT STATUS: NORMAL
SPECIMEN EXP DATE BLD: NORMAL
UNIT ABO: NORMAL
UNIT ID: NORMAL
UNIT RH: POSITIVE

## 2021-12-27 NOTE — UM PHYSICIAN REVIEW NOTE
Utilization Secondary Review Note      Patient Name: Ritu Ramirez      MRN: [id number]  Insurance: MEDICARE  Admission date: 12/23/2021  Initial order:    Inpatient  Planned admission: No  Post Discharge Review: Yes            Outpatient services are appropriate for this 66 y.o. year old female with 1 midnight hospital stay for anemia due to hemorrhoids and bleeding.    Olimpia Gore, DO  12/27/2021

## 2021-12-27 NOTE — UM PHYSICIAN REVIEW NOTE
Post discharge review.    Outpatient services are appropriate for 1 MN stay for BRBPR thought to be from bleeding hemorrhoids.    Carly Fernando, DO

## 2021-12-28 ENCOUNTER — TRANSCRIBE ORDERS (OUTPATIENT)
Dept: SCHEDULING | Age: 66
End: 2021-12-28

## 2021-12-28 ENCOUNTER — LAB REQUISITION (OUTPATIENT)
Dept: LAB | Facility: HOSPITAL | Age: 66
End: 2021-12-28
Payer: MEDICARE

## 2021-12-28 ENCOUNTER — OFFICE VISIT (OUTPATIENT)
Dept: SURGERY | Facility: CLINIC | Age: 66
End: 2021-12-28
Payer: MEDICARE

## 2021-12-28 ENCOUNTER — PATIENT OUTREACH (OUTPATIENT)
Dept: CASE MANAGEMENT | Facility: CLINIC | Age: 66
End: 2021-12-28
Payer: MEDICARE

## 2021-12-28 ENCOUNTER — APPOINTMENT (OUTPATIENT)
Dept: LAB | Facility: HOSPITAL | Age: 66
End: 2021-12-28
Attending: INTERNAL MEDICINE
Payer: MEDICARE

## 2021-12-28 VITALS — BODY MASS INDEX: 22.84 KG/M2 | WEIGHT: 121 LBS | HEIGHT: 61 IN

## 2021-12-28 DIAGNOSIS — D50.8 OTHER IRON DEFICIENCY ANEMIAS: ICD-10-CM

## 2021-12-28 DIAGNOSIS — D50.8 OTHER IRON DEFICIENCY ANEMIAS: Primary | ICD-10-CM

## 2021-12-28 DIAGNOSIS — K64.8 INTERNAL HEMORRHOID, BLEEDING: Primary | ICD-10-CM

## 2021-12-28 LAB
ABO + RH BLD: NORMAL
BASOPHILS # BLD: 0.03 K/UL (ref 0.01–0.1)
BASOPHILS NFR BLD: 0.5 %
BLD GP AB SCN SERPL QL: NEGATIVE
D AG BLD QL: POSITIVE
DIFFERENTIAL METHOD BLD: ABNORMAL
EOSINOPHIL # BLD: 0.41 K/UL (ref 0.04–0.36)
EOSINOPHIL NFR BLD: 7 %
ERYTHROCYTE [DISTWIDTH] IN BLOOD BY AUTOMATED COUNT: 19 % (ref 11.7–14.4)
HCT VFR BLDCO AUTO: 30.4 % (ref 35–45)
HGB BLD-MCNC: 9 G/DL (ref 11.8–15.7)
IMM GRANULOCYTES # BLD AUTO: 0.06 K/UL (ref 0–0.08)
IMM GRANULOCYTES NFR BLD AUTO: 1 %
LABORATORY COMMENT REPORT: NORMAL
LYMPHOCYTES # BLD: 0.79 K/UL (ref 1.2–3.5)
LYMPHOCYTES NFR BLD: 13.6 %
MCH RBC QN AUTO: 24.4 PG (ref 28–33.2)
MCHC RBC AUTO-ENTMCNC: 29.6 G/DL (ref 32.2–35.5)
MCV RBC AUTO: 82.4 FL (ref 83–98)
MONOCYTES # BLD: 0.47 K/UL (ref 0.28–0.8)
MONOCYTES NFR BLD: 8.1 %
NEUTROPHILS # BLD: 4.07 K/UL (ref 1.7–7)
NEUTROPHILS # BLD: 4.07 K/UL (ref 1.7–7)
NEUTS SEG NFR BLD: 69.8 %
NRBC BLD-RTO: 0 %
PDW BLD AUTO: 8.5 FL (ref 9.4–12.3)
PLATELET # BLD AUTO: 350 K/UL (ref 150–369)
RBC # BLD AUTO: 3.69 M/UL (ref 3.93–5.22)
SPECIMEN EXP DATE BLD: NORMAL
WBC # BLD AUTO: 5.83 K/UL (ref 3.8–10.5)

## 2021-12-28 PROCEDURE — 46500 INJECTION INTO HEMORRHOID(S): CPT | Performed by: SURGERY

## 2021-12-28 PROCEDURE — 86850 RBC ANTIBODY SCREEN: CPT

## 2021-12-28 PROCEDURE — 85025 COMPLETE CBC W/AUTO DIFF WBC: CPT

## 2021-12-28 PROCEDURE — 86920 COMPATIBILITY TEST SPIN: CPT

## 2021-12-28 PROCEDURE — G8756 NO BP MEASURE DOC: HCPCS | Performed by: SURGERY

## 2021-12-28 PROCEDURE — 99213 OFFICE O/P EST LOW 20 MIN: CPT | Mod: 25 | Performed by: SURGERY

## 2021-12-28 PROCEDURE — 36415 COLL VENOUS BLD VENIPUNCTURE: CPT

## 2021-12-28 ASSESSMENT — ENCOUNTER SYMPTOMS
DIZZINESS: 0
WEAKNESS: 0
LIGHT-HEADEDNESS: 0
FEVER: 0
SHORTNESS OF BREATH: 1
CHILLS: 0
HEADACHES: 0
FATIGUE: 1
PALPITATIONS: 0

## 2021-12-28 NOTE — PROGRESS NOTES
Chief Complaint:   Chief Complaint   Patient presents with   • Rectal Bleeding       HPI     Patient is a 66 y.o. female who presents with the followin/27/20 - injected large angry int hems   - [facility] - injected in hospital for HGB 6  21 - injected again     injection seemed to work again - much less bleed for 6 mo then really bad in past month - admitted to ER last week and got 1 u PRBC - HGB was 7          Medical History:   Past Medical History:   Diagnosis Date   • Anemia    • Anxiety    • Hypertension    • Iron deficiency    • Lipid disorder    • Systemic mastocytosis    • Vitamin D deficiency        Surgical History:   Past Surgical History:   Procedure Laterality Date   • AUGMENTATION MAMMAPLASTY Bilateral    • COLONOSCOPY     • PATELLA SURGERY     • REDUCTION MAMMAPLASTY         Social History:   Social History     Socioeconomic History   • Marital status: Single     Spouse name: Not on file   • Number of children: Not on file   • Years of education: Not on file   • Highest education level: Not on file   Occupational History   • Not on file   Tobacco Use   • Smoking status: Former Smoker     Packs/day: 0.25     Quit date:      Years since quittin.0   • Smokeless tobacco: Never Used   Vaping Use   • Vaping Use: Never used   Substance and Sexual Activity   • Alcohol use: Yes     Alcohol/week: 1.0 standard drink     Types: 1 Glasses of wine per week     Comment: Social   • Drug use: No   • Sexual activity: Defer   Other Topics Concern   • Not on file   Social History Narrative   • Not on file     Social Determinants of Health     Financial Resource Strain: Not on file   Food Insecurity: No Food Insecurity   • Worried About Running Out of Food in the Last Year: Never true   • Ran Out of Food in the Last Year: Never true   Transportation Needs: Not on file   Physical Activity: Not on file   Stress: Not on file   Social Connections: Not on file   Intimate Partner Violence: Not on  file   Housing Stability: Not on file       Family History:   Family History   Problem Relation Age of Onset   • Heart disease Biological Mother    • Prostate cancer Biological Father        Allergies:   Anesthetics - amide type - select amino amides, Iodinated contrast media, Penicillins, Clarithromycin, Erythromycin base, and Nsaids (non-steroidal anti-inflammatory drug)    Current Medications:      Current Outpatient Medications:   •  amLODIPine 5 mg tablet, Take 5 mg by mouth daily., Disp: , Rfl:   •  B complex-vitamin C-folic acid 400 mcg tablet tablet, Take 1 tablet by mouth daily., Disp: , Rfl:   •  biotin 1 mg capsule, Take 1 capsule by mouth daily. Dose unknown  , Disp: , Rfl:   •  bisacodyL (DULCOLAX, BISACODYL,) 5 mg EC tablet, Take 5 mg by mouth daily., Disp: , Rfl:   •  calcium carbonate (CALCIUM 500 ORAL), Take 500 mg by mouth daily. Dose unknown  , Disp: , Rfl:   •  cholecalciferol, vitamin D3, 1,000 unit capsule, Take 1,000 Units by mouth daily.  , Disp: , Rfl:   •  EPINEPHrine (EPIPEN 2-ZARINA) 0.3 mg/0.3 mL injection syringe, Inject 0.3 mL (0.3 mg total) into the thigh as needed for anaphylaxis., Disp: 1 each, Rfl: 1  •  escitalopram (LEXAPRO) 10 mg tablet, TAKE 1 TABLET BY MOUTH EVERY DAY, Disp: 90 tablet, Rfl: 3  •  famotidine 20 mg tablet, Take 20 mg by mouth 2 (two) times a day as needed for indigestion or heartburn., Disp: , Rfl:   •  hydrochlorothiazide (HYDRODIURIL) 25 mg tablet, Take 25 mg by mouth daily.  , Disp: , Rfl: 3  •  LORazepam 0.5 mg tablet, Take 0.5 mg by mouth nightly as needed (insomnia)., Disp: , Rfl:   •  potassium chloride (KLOR-CON) 8 mEq CR tablet, TAKE 1 TABLET BY MOUTH TWICE A DAY, Disp: 180 tablet, Rfl: 3  •  rosuvastatin 10 mg tablet, Take 1 tablet (10 mg total) by mouth once daily., Disp: 30 tablet, Rfl: 11  •  hydrocortisone 25 mg suppository, Insert 0.5 suppositories (12.5 mg total) into the rectum 2 (two) times a day for 3 days., Disp: 3 suppository, Rfl: 0    Review  of Systems  All 14 systems reviewed and the findings are not pertinent to the current problem.    Objective     Vital Signs  There were no vitals filed for this visit.  Body mass index is 22.86 kg/m².      Physicial Exam  General Appearance:  Well developed.  Well nourished.  In no acute distress.    Anorectal Examination:    No pilonidal sinus was observed.   No pilonidal cyst was observed.   Perianal skin was not erythematous.  No perianal wart was observed.  No anal fissure was observed.   Anus did not have a fistula.   Anal sphincter tone was normal.    The patient had large, friable, grade three internal hemorrhoids.  Specifics:  Not as bad as before.    No external anal skin tags were observed.   Anus had no mass.  Rectum:  No rectal abscess was observed.   Rectal prolapse was not observed.   No rectal fistula was observed.   Rectal exam was not tender.   No rectal fluctuance was observed.  Rectal exam showed no mass.   Normal richard-anal skin with no lesions or dermatitis      Problem List Items Addressed This Visit     None              Javier Corcoran MD 12/28/2021 8:18 AM

## 2021-12-28 NOTE — PROGRESS NOTES
NAME: Ritu Ramirez    MRN: [id number]    YOB: 1955    Event Review:    Initial TCM Patient Outreach Date: 12/21/21    Assessment completed with: Patient  Patient stated reason for hospitalization: Low hemoglobin  Discharge Diagnosis: Symptomaic Anemia    Patient readmitted in the last 30 days: No  Discharging Facility: [facility]  Date of Admission: 12/23/21  Date of Discharge: 12/24/21    Discharging Facilty SNF/Rehab: None          Patient's perception of their health status since discharge: Same    HPI:  Spoke with patient who reports was at [facility] for anemia,  Rectal bleeding 12/23 di=iscarged home 12/27/2021. Patient reports she is doing okay. Has to get a blood transfusion on 12/29/2021.Today saw Dr Corcoran for treatment of internal hemmorhoids.    Patienteports she is independent in ambulatoin, activites of daily living,lives in a 2 story home, has a good support system.    Patient repeorts her presenting symptom for anemia is shortness of breath, denies chest palpitations/pain,dizziness, light headedness,  weakness,  syncope..     Patient does report  fatigue from poor sleep while in the hospital.    Education provided patient is aware of signs/ symptoms of cardiovascular deficits.    No further questions or concerns, no further Care Coordinator needs.      Review of Systems   Constitutional: Positive for fatigue. Negative for chills and fever.   Respiratory: Positive for shortness of breath (SOB is presenting- symptom  on her anemia).    Cardiovascular: Negative for chest pain, palpitations and leg swelling.   Neurological: Negative for dizziness, syncope, weakness, light-headedness and headaches.       Medication Review:    Medication Review: Yes     Reported by: Patient  Any new medications prescribed at discharge?: Yes  Is the patient having any side effects they believe may be caused by any medication additions or changes?: No  New prescriptions filled?: No     Do you have enough of  your regularly prescribed medications?: Yes       Medication adherence problem?: No  Was a medication discrepancy indentified?: No       Nursing Interventions: Nurse provided patient education  Reconciled the current and discharge medications: Yes  Reviewed AVS (Discharge Instructions)?: Yes         Acute Pain:       Chronic Pain:No      Diet/Nutrition:    Type of Diet: Regular (Does not add salt)  Diet Adherence: Adherent with diet          Home Care Services:None         Post-Discharge Durable Medical Equipment::    Durable Medical Equipment: Blood pressure cuff  Oxygen Use: No        Home Management:    Living Arrangement: Significant Other  Support System:: None  Type of Residence: [address] [place] [address]  Home Monitoring: Blood Pressure  Any patient reported falls in the last 3 months?: No  Buddhism or spiritual beliefs that impact treatment?: No    Appointment Scheduling:       Appointment Date: 01/17/22  Appointment Time: 0330  Appointment Provider: Dr Omalley  Patient Scheduling Dispositions: Appointment Scheduled by Pt     Follow-Ups:       Relevant Specialist Follow-ups: Dr Corcoran 12/28    Interventions/ Care Coordination:    Interventions/ Care Coordination: Encouraged patient to call PCP/Specialist (Signs / symptoms of anemia, cardiovascular deficits)  General Education: Respiratory precautions (flu, colds, pneumonia, COVID-19),Falls/Home safety       Reviewed signs/symptoms of worsening condition or complication that necessitate a call to the Physician's office.  Educated patient on access to care.  RN phone number given for future care management.    Linda Mcmahon RN   [phone]

## 2021-12-28 NOTE — LETTER
2021     Ashley Omalley MD  [address] [facility] [address]    Patient: Ritu Ramirez  YOB: 1955  Date of Visit: 2021      Dear Dr. Sirisha Omalley:    Thank you for referring Ritu Ramirez to me for evaluation. Below are my notes for this consultation.    If you have questions, please do not hesitate to call me. I look forward to following your patient along with you.         Sincerely,        Javier Corcoran MD        CC: No Recipients  Javier Corcoran MD  2021 10:22 AM  Sign when Signing Visit      Chief Complaint:   Chief Complaint   Patient presents with   • Rectal Bleeding       HPI     Patient is a 66 y.o. female who presents with the followin/27/20 - injected large angry int hems   - [facility] - injected in hospital for HGB 6  21 - injected again     injection seemed to work again - much less bleed for 6 mo then really bad in past month - admitted to ER last week and got 1 u PRBC - HGB was 7          Medical History:   Past Medical History:   Diagnosis Date   • Anemia    • Anxiety    • Hypertension    • Iron deficiency    • Lipid disorder    • Systemic mastocytosis    • Vitamin D deficiency        Surgical History:   Past Surgical History:   Procedure Laterality Date   • AUGMENTATION MAMMAPLASTY Bilateral    • COLONOSCOPY     • PATELLA SURGERY     • REDUCTION MAMMAPLASTY         Social History:   Social History     Socioeconomic History   • Marital status: Single     Spouse name: Not on file   • Number of children: Not on file   • Years of education: Not on file   • Highest education level: Not on file   Occupational History   • Not on file   Tobacco Use   • Smoking status: Former Smoker     Packs/day: 0.25     Quit date:      Years since quittin.0   • Smokeless tobacco: Never Used   Vaping Use   • Vaping Use: Never used   Substance and Sexual Activity   • Alcohol use: Yes     Alcohol/week: 1.0 standard drink     Types:  1 Glasses of wine per week     Comment: Social   • Drug use: No   • Sexual activity: Defer   Other Topics Concern   • Not on file   Social History Narrative   • Not on file     Social Determinants of Health     Financial Resource Strain: Not on file   Food Insecurity: No Food Insecurity   • Worried About Running Out of Food in the Last Year: Never true   • Ran Out of Food in the Last Year: Never true   Transportation Needs: Not on file   Physical Activity: Not on file   Stress: Not on file   Social Connections: Not on file   Intimate Partner Violence: Not on file   Housing Stability: Not on file       Family History:   Family History   Problem Relation Age of Onset   • Heart disease Biological Mother    • Prostate cancer Biological Father        Allergies:   Anesthetics - amide type - select amino amides, Iodinated contrast media, Penicillins, Clarithromycin, Erythromycin base, and Nsaids (non-steroidal anti-inflammatory drug)    Current Medications:      Current Outpatient Medications:   •  amLODIPine 5 mg tablet, Take 5 mg by mouth daily., Disp: , Rfl:   •  B complex-vitamin C-folic acid 400 mcg tablet tablet, Take 1 tablet by mouth daily., Disp: , Rfl:   •  biotin 1 mg capsule, Take 1 capsule by mouth daily. Dose unknown  , Disp: , Rfl:   •  bisacodyL (DULCOLAX, BISACODYL,) 5 mg EC tablet, Take 5 mg by mouth daily., Disp: , Rfl:   •  calcium carbonate (CALCIUM 500 ORAL), Take 500 mg by mouth daily. Dose unknown  , Disp: , Rfl:   •  cholecalciferol, vitamin D3, 1,000 unit capsule, Take 1,000 Units by mouth daily.  , Disp: , Rfl:   •  EPINEPHrine (EPIPEN 2-ZARINA) 0.3 mg/0.3 mL injection syringe, Inject 0.3 mL (0.3 mg total) into the thigh as needed for anaphylaxis., Disp: 1 each, Rfl: 1  •  escitalopram (LEXAPRO) 10 mg tablet, TAKE 1 TABLET BY MOUTH EVERY DAY, Disp: 90 tablet, Rfl: 3  •  famotidine 20 mg tablet, Take 20 mg by mouth 2 (two) times a day as needed for indigestion or heartburn., Disp: , Rfl:   •   hydrochlorothiazide (HYDRODIURIL) 25 mg tablet, Take 25 mg by mouth daily.  , Disp: , Rfl: 3  •  LORazepam 0.5 mg tablet, Take 0.5 mg by mouth nightly as needed (insomnia)., Disp: , Rfl:   •  potassium chloride (KLOR-CON) 8 mEq CR tablet, TAKE 1 TABLET BY MOUTH TWICE A DAY, Disp: 180 tablet, Rfl: 3  •  rosuvastatin 10 mg tablet, Take 1 tablet (10 mg total) by mouth once daily., Disp: 30 tablet, Rfl: 11  •  hydrocortisone 25 mg suppository, Insert 0.5 suppositories (12.5 mg total) into the rectum 2 (two) times a day for 3 days., Disp: 3 suppository, Rfl: 0    Review of Systems  All 14 systems reviewed and the findings are not pertinent to the current problem.    Objective     Vital Signs  There were no vitals filed for this visit.  Body mass index is 22.86 kg/m².      Physicial Exam  General Appearance:  Well developed.  Well nourished.  In no acute distress.    Anorectal Examination:    No pilonidal sinus was observed.   No pilonidal cyst was observed.   Perianal skin was not erythematous.  No perianal wart was observed.  No anal fissure was observed.   Anus did not have a fistula.   Anal sphincter tone was normal.    The patient had large, friable, grade three internal hemorrhoids.  Specifics:  Not as bad as before.    No external anal skin tags were observed.   Anus had no mass.  Rectum:  No rectal abscess was observed.   Rectal prolapse was not observed.   No rectal fistula was observed.   Rectal exam was not tender.   No rectal fluctuance was observed.  Rectal exam showed no mass.   Normal richard-anal skin with no lesions or dermatitis      Problem List Items Addressed This Visit     None              Javier Corcoran MD 12/28/2021 8:18 AM             Javier Corcoran MD  12/28/2021 10:23 AM  Sign when Signing Visit  Procedures  The patient had large friable internal hemorrhoids that required office based therapy.  Verbal consent was obtained.  No prep was necessary.  First a digital rectal exam and then  anoscopy was performed.  Injection sclerotherapy was then performed using 5% phenol in olive oil through an 18 gauge spinal needle.  The patient tolerated it well and was given instructions to return as needed.

## 2021-12-29 ENCOUNTER — HOSPITAL ENCOUNTER (OUTPATIENT)
Dept: INPATIENT UNIT | Facility: HOSPITAL | Age: 66
Discharge: HOME | End: 2021-12-29
Attending: NURSE PRACTITIONER
Payer: MEDICARE

## 2021-12-29 VITALS
SYSTOLIC BLOOD PRESSURE: 124 MMHG | HEART RATE: 57 BPM | OXYGEN SATURATION: 99 % | RESPIRATION RATE: 16 BRPM | DIASTOLIC BLOOD PRESSURE: 75 MMHG | TEMPERATURE: 97 F

## 2021-12-29 DIAGNOSIS — D50.9 IRON DEFICIENCY ANEMIA, UNSPECIFIED IRON DEFICIENCY ANEMIA TYPE: ICD-10-CM

## 2021-12-29 PROCEDURE — 36430 TRANSFUSION BLD/BLD COMPNT: CPT

## 2021-12-29 PROCEDURE — 25800000 HC PHARMACY IV SOLUTIONS: Performed by: NURSE PRACTITIONER

## 2021-12-29 PROCEDURE — P9016 RBC LEUKOCYTES REDUCED: HCPCS

## 2021-12-29 RX ORDER — FAMOTIDINE 10 MG/ML
20 INJECTION INTRAVENOUS ONCE AS NEEDED
Status: DISCONTINUED | OUTPATIENT
Start: 2021-12-29 | End: 2021-12-30 | Stop reason: HOSPADM

## 2021-12-29 RX ORDER — DIPHENHYDRAMINE HCL 50 MG/ML
12.5 VIAL (ML) INJECTION ONCE AS NEEDED
Status: DISCONTINUED | OUTPATIENT
Start: 2021-12-29 | End: 2021-12-30 | Stop reason: HOSPADM

## 2021-12-29 RX ORDER — SODIUM CHLORIDE 9 MG/ML
5 INJECTION, SOLUTION INTRAVENOUS AS NEEDED
Status: DISCONTINUED | OUTPATIENT
Start: 2021-12-29 | End: 2021-12-30 | Stop reason: HOSPADM

## 2021-12-29 RX ADMIN — SODIUM CHLORIDE 5 ML/HR: 9 INJECTION, SOLUTION INTRAVENOUS at 10:03

## 2021-12-29 ASSESSMENT — ENCOUNTER SYMPTOMS
LOSS OF SENSATION IN FEET: 0
OCCASIONAL FEELINGS OF UNSTEADINESS: 0
DEPRESSION: 0

## 2021-12-31 LAB
CROSSMATCH: NORMAL
ISBT CODE: NORMAL
PRODUCT CODE: NORMAL
PRODUCT STATUS: NORMAL
SPECIMEN EXP DATE BLD: NORMAL
UNIT ABO: NORMAL
UNIT ID: NORMAL
UNIT RH: POSITIVE

## 2022-01-11 ENCOUNTER — APPOINTMENT (OUTPATIENT)
Dept: LAB | Facility: HOSPITAL | Age: 67
End: 2022-01-11
Attending: INTERNAL MEDICINE
Payer: MEDICARE

## 2022-01-11 ENCOUNTER — TRANSCRIBE ORDERS (OUTPATIENT)
Dept: SCHEDULING | Age: 67
End: 2022-01-11

## 2022-01-11 DIAGNOSIS — D50.8 OTHER IRON DEFICIENCY ANEMIAS: ICD-10-CM

## 2022-01-11 DIAGNOSIS — D50.8 OTHER IRON DEFICIENCY ANEMIAS: Primary | ICD-10-CM

## 2022-01-11 LAB
BASOPHILS # BLD: 0.04 K/UL (ref 0.01–0.1)
BASOPHILS NFR BLD: 0.7 %
DIFFERENTIAL METHOD BLD: ABNORMAL
EOSINOPHIL # BLD: 0.4 K/UL (ref 0.04–0.36)
EOSINOPHIL NFR BLD: 6.8 %
ERYTHROCYTE [DISTWIDTH] IN BLOOD BY AUTOMATED COUNT: 16.9 % (ref 11.7–14.4)
HCT VFR BLDCO AUTO: 36.3 % (ref 35–45)
HGB BLD-MCNC: 10.9 G/DL (ref 11.8–15.7)
IMM GRANULOCYTES # BLD AUTO: 0.04 K/UL (ref 0–0.08)
IMM GRANULOCYTES NFR BLD AUTO: 0.7 %
LYMPHOCYTES # BLD: 0.92 K/UL (ref 1.2–3.5)
LYMPHOCYTES NFR BLD: 15.6 %
MCH RBC QN AUTO: 24.2 PG (ref 28–33.2)
MCHC RBC AUTO-ENTMCNC: 30 G/DL (ref 32.2–35.5)
MCV RBC AUTO: 80.7 FL (ref 83–98)
MONOCYTES # BLD: 0.43 K/UL (ref 0.28–0.8)
MONOCYTES NFR BLD: 7.3 %
NEUTROPHILS # BLD: 4.06 K/UL (ref 1.7–7)
NEUTROPHILS # BLD: 4.06 K/UL (ref 1.7–7)
NEUTS SEG NFR BLD: 68.9 %
NRBC BLD-RTO: 0 %
PDW BLD AUTO: 8.9 FL (ref 9.4–12.3)
PLATELET # BLD AUTO: 300 K/UL (ref 150–369)
RBC # BLD AUTO: 4.5 M/UL (ref 3.93–5.22)
WBC # BLD AUTO: 5.89 K/UL (ref 3.8–10.5)

## 2022-01-11 PROCEDURE — 85025 COMPLETE CBC W/AUTO DIFF WBC: CPT

## 2022-01-11 PROCEDURE — 36415 COLL VENOUS BLD VENIPUNCTURE: CPT

## 2022-01-17 ENCOUNTER — OFFICE VISIT (OUTPATIENT)
Dept: INTERNAL MEDICINE | Facility: CLINIC | Age: 67
End: 2022-01-17
Payer: MEDICARE

## 2022-01-17 ENCOUNTER — TELEPHONE (OUTPATIENT)
Dept: INTERNAL MEDICINE | Facility: CLINIC | Age: 67
End: 2022-01-17

## 2022-01-17 VITALS
OXYGEN SATURATION: 97 % | HEIGHT: 61 IN | HEART RATE: 87 BPM | BODY MASS INDEX: 23.03 KG/M2 | WEIGHT: 122 LBS | DIASTOLIC BLOOD PRESSURE: 80 MMHG | SYSTOLIC BLOOD PRESSURE: 120 MMHG | TEMPERATURE: 99.1 F

## 2022-01-17 DIAGNOSIS — I10 HYPERTENSION, UNSPECIFIED TYPE: ICD-10-CM

## 2022-01-17 DIAGNOSIS — K64.8 INTERNAL HEMORRHOID, BLEEDING: ICD-10-CM

## 2022-01-17 DIAGNOSIS — D47.02 SYSTEMIC MASTOCYTOSIS: Chronic | ICD-10-CM

## 2022-01-17 DIAGNOSIS — D50.9 IRON DEFICIENCY ANEMIA, UNSPECIFIED IRON DEFICIENCY ANEMIA TYPE: ICD-10-CM

## 2022-01-17 DIAGNOSIS — R93.1 ELEVATED CORONARY ARTERY CALCIUM SCORE: ICD-10-CM

## 2022-01-17 DIAGNOSIS — M81.8 AGE-RELATED OSTEOPOROSIS WITHOUT FRACTURE: Primary | ICD-10-CM

## 2022-01-17 DIAGNOSIS — D47.09 MASTOCYTOSIS: ICD-10-CM

## 2022-01-17 DIAGNOSIS — E78.5 HYPERLIPIDEMIA, UNSPECIFIED HYPERLIPIDEMIA TYPE: ICD-10-CM

## 2022-01-17 PROCEDURE — G8752 SYS BP LESS 140: HCPCS | Performed by: INTERNAL MEDICINE

## 2022-01-17 PROCEDURE — G8754 DIAS BP LESS 90: HCPCS | Performed by: INTERNAL MEDICINE

## 2022-01-17 PROCEDURE — 99214 OFFICE O/P EST MOD 30 MIN: CPT | Performed by: INTERNAL MEDICINE

## 2022-01-17 ASSESSMENT — ENCOUNTER SYMPTOMS
POLYPHAGIA: 0
CHEST TIGHTNESS: 0
POLYDIPSIA: 0
RECTAL PAIN: 0
ABDOMINAL PAIN: 0
BRUISES/BLEEDS EASILY: 0
LIGHT-HEADEDNESS: 0
UNEXPECTED WEIGHT CHANGE: 0
SHORTNESS OF BREATH: 0
DIARRHEA: 0
CONSTIPATION: 0
DIZZINESS: 0
HEADACHES: 0
FEVER: 0
CHILLS: 0
COUGH: 0
PALPITATIONS: 0
BLOOD IN STOOL: 0

## 2022-01-17 NOTE — PROGRESS NOTES
Subjective      Patient ID: Ritu Ramirez is a 66 y.o. female.    HPI  She is here for follow up - had a bone marrow biopsy at [facility] to evaluate her mastocytosis. Spindled mast cells 25% of normal, increased from 15 %. She is ucrrently asymptomatic and is holding on treatment.  She was hospitalized at [facility] for severe anemia secondary to hemorrhoidal bleeding and had underwent sclerotherapy with Dr. Corcoran 12/28/21 - She did require transfusion while in the hospital.(one unsit PRBC's)  Most recent HGB 10.9 and she has not had any further rectal bleeding.  She also had a bone density done at [facility] which revealed worsening of osteoporosis.  Admittedly had been off of Prolia for approximately a year and a half due to the pandemic.  She will be following with a rheumatologist upcoming -continues on calcium and vitamin D supplementation.  She remains compliant with her hydrochlorothiazide And amlodipine for her hypertension.  Has not had any chest pain palpitation shortness of breath or lightheadedness.  Continues on rosuvastatin for her hyperlipidemia.  Is due for her fasting lipid profile.  The following have been reviewed and updated as appropriate in this visit:   Allergies  Meds  Problems       Past Medical History:   Diagnosis Date   • Anemia    • Anxiety    • Hypertension    • Iron deficiency    • Lipid disorder    • Systemic mastocytosis    • Vitamin D deficiency      Past Surgical History:   Procedure Laterality Date   • AUGMENTATION MAMMAPLASTY Bilateral    • COLONOSCOPY     • PATELLA SURGERY     • REDUCTION MAMMAPLASTY       Family History   Problem Relation Age of Onset   • Heart disease Biological Mother    • Prostate cancer Biological Father      Social History     Socioeconomic History   • Marital status: Single     Spouse name: Not on file   • Number of children: Not on file   • Years of education: Not on file   • Highest education level: Not on file   Occupational History   • Not on file   Tobacco Use   •  Smoking status: Former Smoker     Packs/day: 0.25     Quit date:      Years since quittin.0   • Smokeless tobacco: Never Used   Vaping Use   • Vaping Use: Never used   Substance and Sexual Activity   • Alcohol use: Yes     Alcohol/week: 1.0 standard drink     Types: 1 Glasses of wine per week     Comment: Social   • Drug use: No   • Sexual activity: Defer   Other Topics Concern   • Not on file   Social History Narrative   • Not on file     Social Determinants of Health     Financial Resource Strain: Not on file   Food Insecurity: No Food Insecurity   • Worried About Running Out of Food in the Last Year: Never true   • Ran Out of Food in the Last Year: Never true   Transportation Needs: Not on file   Physical Activity: Not on file   Stress: Not on file   Social Connections: Not on file   Intimate Partner Violence: Not on file   Housing Stability: Not on file       Review of Systems   Constitutional: Negative for chills, fever and unexpected weight change.   Respiratory: Negative for cough, chest tightness and shortness of breath.    Cardiovascular: Negative for chest pain, palpitations and leg swelling.   Gastrointestinal: Negative for abdominal pain, blood in stool, constipation, diarrhea and rectal pain.   Endocrine: Negative for cold intolerance, heat intolerance, polydipsia, polyphagia and polyuria.   Skin: Negative for rash.   Neurological: Negative for dizziness, light-headedness and headaches.   Hematological: Does not bruise/bleed easily.       Allergies   Allergen Reactions   • Anesthetics - Amide Type - Select Amino Amides Anaphylaxis     Other reaction(s): Anaphylaxis   • Iodinated Contrast Media      Other reaction(s): Anaphylaxis   • Penicillins Hives   • Clarithromycin      Other reaction(s): Rash   • Erythromycin Base      Other reaction(s): Anaphylaxis   • Nsaids (Non-Steroidal Anti-Inflammatory Drug)      Other reaction(s): Anaphylaxis     Current Outpatient Medications   Medication Sig  "Dispense Refill   • amLODIPine 5 mg tablet Take 5 mg by mouth daily.     • B complex-vitamin C-folic acid 400 mcg tablet tablet Take 1 tablet by mouth daily.     • biotin 1 mg capsule Take 1 capsule by mouth daily. Dose unknown       • bisacodyL (DULCOLAX, BISACODYL,) 5 mg EC tablet Take 5 mg by mouth daily.     • calcium carbonate (CALCIUM 500 ORAL) Take 500 mg by mouth daily. Dose unknown       • cholecalciferol, vitamin D3, 1,000 unit capsule Take 1,000 Units by mouth daily.       • EPINEPHrine (EPIPEN 2-ZARINA) 0.3 mg/0.3 mL injection syringe Inject 0.3 mL (0.3 mg total) into the thigh as needed for anaphylaxis. 1 each 1   • escitalopram (LEXAPRO) 10 mg tablet TAKE 1 TABLET BY MOUTH EVERY DAY 90 tablet 3   • hydrochlorothiazide (HYDRODIURIL) 25 mg tablet Take 25 mg by mouth daily.    3   • hydrocortisone 25 mg suppository Insert 0.5 suppositories (12.5 mg total) into the rectum 2 (two) times a day for 3 days. 3 suppository 0   • LORazepam 0.5 mg tablet Take 0.5 mg by mouth nightly as needed (insomnia).     • potassium chloride (KLOR-CON) 8 mEq CR tablet TAKE 1 TABLET BY MOUTH TWICE A  tablet 3   • rosuvastatin 10 mg tablet Take 1 tablet (10 mg total) by mouth once daily. 30 tablet 11     No current facility-administered medications for this visit.       Objective   Vitals:    01/17/22 1512   BP: 120/80   Pulse: 87   Temp: 37.3 °C (99.1 °F)   SpO2: 97%   Weight: 55.3 kg (122 lb)   Height: 1.549 m (5' 1\")       Physical Exam  Vitals and nursing note reviewed.   Constitutional:       Appearance: She is well-developed.   HENT:      Head: Normocephalic and atraumatic.   Neck:      Thyroid: No thyromegaly.      Vascular: No carotid bruit.   Cardiovascular:      Rate and Rhythm: Normal rate and regular rhythm.      Pulses: Normal pulses.      Heart sounds: Normal heart sounds. No murmur heard.  Pulmonary:      Effort: Pulmonary effort is normal.      Breath sounds: Normal breath sounds.   Abdominal:      General: " Bowel sounds are normal.      Palpations: Abdomen is soft. There is no mass.      Tenderness: There is no abdominal tenderness.   Musculoskeletal:      Cervical back: Normal range of motion and neck supple.   Skin:     General: Skin is warm and dry.   Neurological:      Mental Status: She is alert and oriented to person, place, and time.   Psychiatric:         Behavior: Behavior normal.         Thought Content: Thought content normal.         Judgment: Judgment normal.         Assessment/Plan   Problem List Items Addressed This Visit        Circulatory    Hypertension     Remains stable on her current regimen of amlodipine and hydrochlorothiazide.  Potassium was low in the hospital we will recheck this.         Relevant Orders    TSH 3rd Generation    Comprehensive metabolic panel    CBC    Elevated coronary artery calcium score     She continues to do well.  We will review her fasting lipid profile maintain heart healthy Mediterranean diet.  No ischemic symptoms.         Internal hemorrhoid, bleeding     Her hemoglobin is stable she has not had any recurrent bleeding.  We will continue to follow with Dr. Corcoran            Musculoskeletal    Age-related osteoporosis without fracture - Primary     Had been on prolia but missed several injection- will see rheumatology   Continue with calcium and vitamin D  Check TSH vitamin D and PTH          Relevant Orders    Vitamin D 25 hydroxy    PTH, intact       Endocrine/Metabolic    Hyperlipidemia     Continue rosuvastatin we will check fasting lipid profile         Relevant Orders    Lipid panel       Hematologic    Systemic mastocytosis (Chronic)     She has not had any recent flares.  Will monitor closely given her increased percentage of mast cells on her most recent bone marrow.  She follows regularly with Dr. Ani Calhoun.  Tryptase level done at the [facility] is stable         Iron deficiency anemia     Related to chronic GI bleeding from hemorrhoids.  We will recheck her  iron studies.         RESOLVED: Mastocytosis    Relevant Orders    ULTRASOUND ABDOMEN COMPLETE          Ashley Omalley MD    1/17/2022

## 2022-01-18 NOTE — ASSESSMENT & PLAN NOTE
Remains stable on her current regimen of amlodipine and hydrochlorothiazide.  Potassium was low in the hospital we will recheck this.

## 2022-01-18 NOTE — ASSESSMENT & PLAN NOTE
Had been on prolia but missed several injection- will see rheumatology   Continue with calcium and vitamin D  Check TSH vitamin D and PTH

## 2022-01-18 NOTE — TELEPHONE ENCOUNTER
Please have Ms Ramirez arrange a medicare annual wellness with dennise prior to her next visit with me. Or can do the MAW with me if she wants

## 2022-01-18 NOTE — ASSESSMENT & PLAN NOTE
She continues to do well.  We will review her fasting lipid profile maintain heart healthy Mediterranean diet.  No ischemic symptoms.

## 2022-01-18 NOTE — ASSESSMENT & PLAN NOTE
Her hemoglobin is stable she has not had any recurrent bleeding.  We will continue to follow with Dr. Corcoran

## 2022-01-18 NOTE — ASSESSMENT & PLAN NOTE
She has not had any recent flares.  Will monitor closely given her increased percentage of mast cells on her most recent bone marrow.  She follows regularly with Dr. Ani Calhoun.  Tryptase level done at the [facility] is stable

## 2022-02-02 ENCOUNTER — LAB REQUISITION (OUTPATIENT)
Dept: LAB | Facility: HOSPITAL | Age: 67
End: 2022-02-02
Payer: MEDICARE

## 2022-02-02 DIAGNOSIS — D50.8 OTHER IRON DEFICIENCY ANEMIAS: ICD-10-CM

## 2022-02-02 LAB
ANION GAP SERPL CALC-SCNC: 11 MEQ/L (ref 3–15)
BASOPHILS # BLD: 0.03 K/UL (ref 0.01–0.1)
BASOPHILS NFR BLD: 0.4 %
BUN SERPL-MCNC: 24 MG/DL (ref 8–20)
CALCIUM SERPL-MCNC: 10.3 MG/DL (ref 8.9–10.3)
CHLORIDE SERPL-SCNC: 98 MEQ/L (ref 98–109)
CO2 SERPL-SCNC: 26 MEQ/L (ref 22–32)
CREAT SERPL-MCNC: 0.6 MG/DL (ref 0.6–1.1)
DIFFERENTIAL METHOD BLD: ABNORMAL
EOSINOPHIL # BLD: 0.43 K/UL (ref 0.04–0.36)
EOSINOPHIL NFR BLD: 5.8 %
ERYTHROCYTE [DISTWIDTH] IN BLOOD BY AUTOMATED COUNT: 16 % (ref 11.7–14.4)
FERRITIN SERPL-MCNC: 31 NG/ML (ref 11–250)
GFR SERPL CREATININE-BSD FRML MDRD: >60 ML/MIN/1.73M*2
GLUCOSE SERPL-MCNC: 132 MG/DL (ref 70–99)
HCT VFR BLDCO AUTO: 35.9 % (ref 35–45)
HGB BLD-MCNC: 11.2 G/DL (ref 11.8–15.7)
IMM GRANULOCYTES # BLD AUTO: 0.03 K/UL (ref 0–0.08)
IMM GRANULOCYTES NFR BLD AUTO: 0.4 %
IRON SATN MFR SERPL: 7 % (ref 15–45)
IRON SERPL-MCNC: 28 UG/DL (ref 35–150)
LYMPHOCYTES # BLD: 1.15 K/UL (ref 1.2–3.5)
LYMPHOCYTES NFR BLD: 15.5 %
MCH RBC QN AUTO: 23.5 PG (ref 28–33.2)
MCHC RBC AUTO-ENTMCNC: 31.2 G/DL (ref 32.2–35.5)
MCV RBC AUTO: 75.3 FL (ref 83–98)
MONOCYTES # BLD: 0.65 K/UL (ref 0.28–0.8)
MONOCYTES NFR BLD: 8.8 %
NEUTROPHILS # BLD: 5.11 K/UL (ref 1.7–7)
NEUTROPHILS # BLD: 5.11 K/UL (ref 1.7–7)
NEUTS SEG NFR BLD: 69.1 %
NRBC BLD-RTO: 0 %
PDW BLD AUTO: 9.6 FL (ref 9.4–12.3)
PLATELET # BLD AUTO: 392 K/UL (ref 150–369)
POTASSIUM SERPL-SCNC: 3.1 MEQ/L (ref 3.6–5.1)
RBC # BLD AUTO: 4.77 M/UL (ref 3.93–5.22)
SODIUM SERPL-SCNC: 135 MEQ/L (ref 136–144)
TIBC SERPL-MCNC: 424 UG/DL (ref 270–460)
UIBC SERPL-MCNC: 396 UG/DL (ref 180–360)
WBC # BLD AUTO: 7.4 K/UL (ref 3.8–10.5)

## 2022-02-02 PROCEDURE — 83550 IRON BINDING TEST: CPT | Performed by: INTERNAL MEDICINE

## 2022-02-02 PROCEDURE — 80048 BASIC METABOLIC PNL TOTAL CA: CPT | Performed by: INTERNAL MEDICINE

## 2022-02-02 PROCEDURE — 82728 ASSAY OF FERRITIN: CPT | Performed by: INTERNAL MEDICINE

## 2022-02-02 PROCEDURE — 85025 COMPLETE CBC W/AUTO DIFF WBC: CPT | Performed by: INTERNAL MEDICINE

## 2022-02-07 ENCOUNTER — HOSPITAL ENCOUNTER (OUTPATIENT)
Dept: RADIOLOGY | Age: 67
Discharge: HOME | End: 2022-02-07
Attending: INTERNAL MEDICINE
Payer: MEDICARE

## 2022-02-07 ENCOUNTER — APPOINTMENT (OUTPATIENT)
Dept: LAB | Facility: HOSPITAL | Age: 67
End: 2022-02-07
Attending: INTERNAL MEDICINE
Payer: MEDICARE

## 2022-02-07 DIAGNOSIS — E78.5 HYPERLIPIDEMIA, UNSPECIFIED HYPERLIPIDEMIA TYPE: ICD-10-CM

## 2022-02-07 DIAGNOSIS — D47.09 MASTOCYTOSIS: ICD-10-CM

## 2022-02-07 DIAGNOSIS — I10 HYPERTENSION, UNSPECIFIED TYPE: ICD-10-CM

## 2022-02-07 DIAGNOSIS — M81.8 AGE-RELATED OSTEOPOROSIS WITHOUT FRACTURE: ICD-10-CM

## 2022-02-07 DIAGNOSIS — A69.20 LYME DISEASE: ICD-10-CM

## 2022-02-07 LAB
25(OH)D3 SERPL-MCNC: 54 NG/ML (ref 30–100)
ALBUMIN SERPL-MCNC: 4.4 G/DL (ref 3.4–5)
ALP SERPL-CCNC: 65 IU/L (ref 35–126)
ALT SERPL-CCNC: 24 IU/L (ref 11–54)
ANION GAP SERPL CALC-SCNC: 12 MEQ/L (ref 3–15)
AST SERPL-CCNC: 25 IU/L (ref 15–41)
BILIRUB SERPL-MCNC: 1 MG/DL (ref 0.3–1.2)
BUN SERPL-MCNC: 15 MG/DL (ref 8–20)
CALCIUM SERPL-MCNC: 9.5 MG/DL (ref 8.9–10.3)
CALCIUM SERPL-MCNC: 9.6 MG/DL (ref 8.9–10.3)
CHLORIDE SERPL-SCNC: 101 MEQ/L (ref 98–109)
CHOLEST SERPL-MCNC: 150 MG/DL
CO2 SERPL-SCNC: 24 MEQ/L (ref 22–32)
CREAT SERPL-MCNC: 0.6 MG/DL (ref 0.6–1.1)
ERYTHROCYTE [DISTWIDTH] IN BLOOD BY AUTOMATED COUNT: 16.4 % (ref 11.7–14.4)
GFR SERPL CREATININE-BSD FRML MDRD: >60 ML/MIN/1.73M*2
GLUCOSE SERPL-MCNC: 107 MG/DL (ref 70–99)
HCT VFR BLDCO AUTO: 38.3 % (ref 35–45)
HDLC SERPL-MCNC: 71 MG/DL
HDLC SERPL: 2.1 {RATIO}
HGB BLD-MCNC: 11.4 G/DL (ref 11.8–15.7)
LDLC SERPL CALC-MCNC: 62 MG/DL
MCH RBC QN AUTO: 23.1 PG (ref 28–33.2)
MCHC RBC AUTO-ENTMCNC: 29.8 G/DL (ref 32.2–35.5)
MCV RBC AUTO: 77.5 FL (ref 83–98)
NONHDLC SERPL-MCNC: 79 MG/DL
PDW BLD AUTO: 9.7 FL (ref 9.4–12.3)
PLATELET # BLD AUTO: 369 K/UL (ref 150–369)
POTASSIUM SERPL-SCNC: 3.5 MEQ/L (ref 3.6–5.1)
PROT SERPL-MCNC: 6.6 G/DL (ref 6–8.2)
PTH-INTACT SERPL-MCNC: 45.9 PG/ML (ref 12–88)
RBC # BLD AUTO: 4.94 M/UL (ref 3.93–5.22)
SODIUM SERPL-SCNC: 137 MEQ/L (ref 136–144)
TRIGL SERPL-MCNC: 85 MG/DL (ref 30–149)
TSH SERPL DL<=0.05 MIU/L-ACNC: 1.53 MIU/L (ref 0.34–5.6)
WBC # BLD AUTO: 6.4 K/UL (ref 3.8–10.5)

## 2022-02-07 PROCEDURE — 80053 COMPREHEN METABOLIC PANEL: CPT

## 2022-02-07 PROCEDURE — 84443 ASSAY THYROID STIM HORMONE: CPT

## 2022-02-07 PROCEDURE — 86618 LYME DISEASE ANTIBODY: CPT

## 2022-02-07 PROCEDURE — 82306 VITAMIN D 25 HYDROXY: CPT

## 2022-02-07 PROCEDURE — 83970 ASSAY OF PARATHORMONE: CPT

## 2022-02-07 PROCEDURE — 36415 COLL VENOUS BLD VENIPUNCTURE: CPT

## 2022-02-07 PROCEDURE — 85027 COMPLETE CBC AUTOMATED: CPT

## 2022-02-07 PROCEDURE — 76700 US EXAM ABDOM COMPLETE: CPT

## 2022-02-07 PROCEDURE — 80061 LIPID PANEL: CPT

## 2022-02-11 LAB — B BURGDOR AB SER IA-ACNC: 0.18 RATIO

## 2022-02-17 ENCOUNTER — LAB REQUISITION (OUTPATIENT)
Dept: LAB | Facility: HOSPITAL | Age: 67
End: 2022-02-17
Payer: MEDICARE

## 2022-02-17 DIAGNOSIS — D50.8 OTHER IRON DEFICIENCY ANEMIAS: ICD-10-CM

## 2022-02-17 LAB
BASOPHILS # BLD: 0.02 K/UL (ref 0.01–0.1)
BASOPHILS NFR BLD: 0.3 %
DIFFERENTIAL METHOD BLD: ABNORMAL
EOSINOPHIL # BLD: 0.44 K/UL (ref 0.04–0.36)
EOSINOPHIL NFR BLD: 5.8 %
ERYTHROCYTE [DISTWIDTH] IN BLOOD BY AUTOMATED COUNT: 19 % (ref 11.7–14.4)
HCT VFR BLDCO AUTO: 38.8 % (ref 35–45)
HGB BLD-MCNC: 12 G/DL (ref 11.8–15.7)
IMM GRANULOCYTES # BLD AUTO: 0.06 K/UL (ref 0–0.08)
IMM GRANULOCYTES NFR BLD AUTO: 0.8 %
LYMPHOCYTES # BLD: 0.97 K/UL (ref 1.2–3.5)
LYMPHOCYTES NFR BLD: 12.8 %
MCH RBC QN AUTO: 23.7 PG (ref 28–33.2)
MCHC RBC AUTO-ENTMCNC: 30.9 G/DL (ref 32.2–35.5)
MCV RBC AUTO: 76.7 FL (ref 83–98)
MONOCYTES # BLD: 0.55 K/UL (ref 0.28–0.8)
MONOCYTES NFR BLD: 7.3 %
NEUTROPHILS # BLD: 5.53 K/UL (ref 1.7–7)
NEUTROPHILS # BLD: 5.53 K/UL (ref 1.7–7)
NEUTS SEG NFR BLD: 73 %
NRBC BLD-RTO: 0 %
PDW BLD AUTO: 9.1 FL (ref 9.4–12.3)
PHOSPHATE SERPL-MCNC: 2 MG/DL (ref 2.4–4.7)
PLATELET # BLD AUTO: 364 K/UL (ref 150–369)
RBC # BLD AUTO: 5.06 M/UL (ref 3.93–5.22)
WBC # BLD AUTO: 7.57 K/UL (ref 3.8–10.5)

## 2022-02-17 PROCEDURE — 85025 COMPLETE CBC W/AUTO DIFF WBC: CPT | Performed by: INTERNAL MEDICINE

## 2022-02-17 PROCEDURE — 84100 ASSAY OF PHOSPHORUS: CPT | Performed by: INTERNAL MEDICINE

## 2022-02-21 ENCOUNTER — TRANSCRIBE ORDERS (OUTPATIENT)
Dept: SCHEDULING | Age: 67
End: 2022-02-21

## 2022-02-21 ENCOUNTER — APPOINTMENT (OUTPATIENT)
Dept: LAB | Facility: HOSPITAL | Age: 67
End: 2022-02-21
Attending: INTERNAL MEDICINE
Payer: MEDICARE

## 2022-02-21 DIAGNOSIS — D50.8 OTHER IRON DEFICIENCY ANEMIAS: Primary | ICD-10-CM

## 2022-02-21 DIAGNOSIS — D50.8 OTHER IRON DEFICIENCY ANEMIAS: ICD-10-CM

## 2022-02-21 DIAGNOSIS — E83.39 OTHER DISORDERS OF PHOSPHORUS METABOLISM: ICD-10-CM

## 2022-02-21 LAB
ALBUMIN SERPL-MCNC: 4.7 G/DL (ref 3.4–5)
ALP SERPL-CCNC: 66 IU/L (ref 35–126)
ALT SERPL-CCNC: 30 IU/L (ref 11–54)
ANION GAP SERPL CALC-SCNC: 13 MEQ/L (ref 3–15)
AST SERPL-CCNC: 27 IU/L (ref 15–41)
BASOPHILS # BLD: 0.03 K/UL (ref 0.01–0.1)
BASOPHILS NFR BLD: 0.3 %
BILIRUB SERPL-MCNC: 0.4 MG/DL (ref 0.3–1.2)
BUN SERPL-MCNC: 27 MG/DL (ref 8–20)
CALCIUM SERPL-MCNC: 9 MG/DL (ref 8.9–10.3)
CHLORIDE SERPL-SCNC: 94 MEQ/L (ref 98–109)
CO2 SERPL-SCNC: 26 MEQ/L (ref 22–32)
CREAT SERPL-MCNC: 0.7 MG/DL (ref 0.6–1.1)
DIFFERENTIAL METHOD BLD: ABNORMAL
EOSINOPHIL # BLD: 0.31 K/UL (ref 0.04–0.36)
EOSINOPHIL NFR BLD: 3.2 %
ERYTHROCYTE [DISTWIDTH] IN BLOOD BY AUTOMATED COUNT: 18.6 % (ref 11.7–14.4)
GFR SERPL CREATININE-BSD FRML MDRD: >60 ML/MIN/1.73M*2
GLUCOSE SERPL-MCNC: 110 MG/DL (ref 70–99)
HCT VFR BLDCO AUTO: 36.5 % (ref 35–45)
HGB BLD-MCNC: 11.7 G/DL (ref 11.8–15.7)
IMM GRANULOCYTES # BLD AUTO: 0.04 K/UL (ref 0–0.08)
IMM GRANULOCYTES NFR BLD AUTO: 0.4 %
LYMPHOCYTES # BLD: 0.99 K/UL (ref 1.2–3.5)
LYMPHOCYTES NFR BLD: 10.2 %
MCH RBC QN AUTO: 24.2 PG (ref 28–33.2)
MCHC RBC AUTO-ENTMCNC: 32.1 G/DL (ref 32.2–35.5)
MCV RBC AUTO: 75.4 FL (ref 83–98)
MONOCYTES # BLD: 0.97 K/UL (ref 0.28–0.8)
MONOCYTES NFR BLD: 10 %
NEUTROPHILS # BLD: 7.38 K/UL (ref 1.7–7)
NEUTROPHILS # BLD: 7.38 K/UL (ref 1.7–7)
NEUTS SEG NFR BLD: 75.9 %
NRBC BLD-RTO: 0 %
PDW BLD AUTO: 9.1 FL (ref 9.4–12.3)
PHOSPHATE SERPL-MCNC: 1.2 MG/DL (ref 2.4–4.7)
PLATELET # BLD AUTO: 411 K/UL (ref 150–369)
POTASSIUM SERPL-SCNC: 2.5 MEQ/L (ref 3.6–5.1)
PROT SERPL-MCNC: 7.3 G/DL (ref 6–8.2)
RBC # BLD AUTO: 4.84 M/UL (ref 3.93–5.22)
SODIUM SERPL-SCNC: 133 MEQ/L (ref 136–144)
WBC # BLD AUTO: 9.72 K/UL (ref 3.8–10.5)

## 2022-02-21 PROCEDURE — 85025 COMPLETE CBC W/AUTO DIFF WBC: CPT

## 2022-02-21 PROCEDURE — 80053 COMPREHEN METABOLIC PANEL: CPT

## 2022-02-21 PROCEDURE — 36415 COLL VENOUS BLD VENIPUNCTURE: CPT

## 2022-02-21 PROCEDURE — 84100 ASSAY OF PHOSPHORUS: CPT

## 2022-02-22 ENCOUNTER — TELEPHONE (OUTPATIENT)
Dept: CARDIOLOGY | Facility: CLINIC | Age: 67
End: 2022-02-22
Payer: MEDICARE

## 2022-02-22 RX ORDER — POTASSIUM CHLORIDE 1500 MG/1
80 TABLET, EXTENDED RELEASE ORAL DAILY
Start: 2022-02-22 | End: 2022-06-01 | Stop reason: ALTCHOICE

## 2022-02-22 NOTE — TELEPHONE ENCOUNTER
I spoke to the patient as well as Dr. Selby.  She was recently called back from hematology.  She will begin potassium 80 mEq daily for the next 4 days.  She will then lower to 40 mEq daily.  She is due for repeat lab studies on February 28.  I told her to reach out to Dr. Sahu regarding her diarrhea and her Linzess which she was using for chronic constipation.  She will discontinue hydrochlorothiazide.  We will see her in the office tomorrow, February 23 at 4:00.

## 2022-02-22 NOTE — TELEPHONE ENCOUNTER
Pt of Dr. Valverde- Pt calling to reschedule canceled appointment from 2/7.     States she has been having episodes of tachycardia since Friday. Happens randomly- bending over, taking a few steps, and when she got into bed on Saturday night. HR at that time 112 bpm, /115, she took extra dose of amlodipine and BP returned to normal. Some SOB. Denies CP, dizziness.     Had recent injection from heme/onc to raise hgb that is known to lower electrolytes. Yesterday K+ 2.5 and phosphorus 1.2. This is being managed by heme/onc. Pt PO K increased, increasing phosphorus through diet as she can not tolerate supplemental. Repeat labs on 2/28.     Pt on HCTZ 25 mg daily.     States she is well hydrated as she has also been having diarrhea x 2 weeks since starting linzess.     Pt asking for c/b at [phone] to schedule appointment.

## 2022-02-23 ENCOUNTER — OFFICE VISIT (OUTPATIENT)
Dept: CARDIOLOGY | Facility: CLINIC | Age: 67
End: 2022-02-23
Payer: MEDICARE

## 2022-02-23 VITALS
SYSTOLIC BLOOD PRESSURE: 110 MMHG | HEIGHT: 61 IN | RESPIRATION RATE: 18 BRPM | HEART RATE: 54 BPM | WEIGHT: 120 LBS | DIASTOLIC BLOOD PRESSURE: 64 MMHG | BODY MASS INDEX: 22.66 KG/M2 | OXYGEN SATURATION: 97 %

## 2022-02-23 DIAGNOSIS — R93.1 ELEVATED CORONARY ARTERY CALCIUM SCORE: Primary | ICD-10-CM

## 2022-02-23 PROBLEM — R00.2 PALPITATIONS: Status: ACTIVE | Noted: 2022-02-23

## 2022-02-23 PROCEDURE — G8754 DIAS BP LESS 90: HCPCS | Performed by: INTERNAL MEDICINE

## 2022-02-23 PROCEDURE — G8752 SYS BP LESS 140: HCPCS | Performed by: INTERNAL MEDICINE

## 2022-02-23 PROCEDURE — 99214 OFFICE O/P EST MOD 30 MIN: CPT | Performed by: INTERNAL MEDICINE

## 2022-02-23 PROCEDURE — 93000 ELECTROCARDIOGRAM COMPLETE: CPT | Performed by: INTERNAL MEDICINE

## 2022-02-23 ASSESSMENT — ENCOUNTER SYMPTOMS: PALPITATIONS: 1

## 2022-02-23 NOTE — PROGRESS NOTES
Cardiology Note       Reason for visit:   Chief Complaint   Patient presents with   • Rapid Heart Rate      HPI   Ritu Ramirez is a 66 y.o. female who presents for an urgent visit. Patient called the office yesterday complaining of a rapid heartbeat since the end of last week.  It occurs randomly.  Her blood pressure has also been elevated.  She took an extra dose of amlodipine and her blood pressure returned to normal.  She had a recent injection from hematology to raise her hemoglobin.  Her potassium 2 days ago was 2.5 with a phosphorus level of 1.2.  She was instructed by her hematologist, Dr. Ani Calhoun to increase her potassium supplement to 80 mEq daily for the next 4 days and then decrease it to 40 meq daily. She was told to increase phosphorus in her diet.  The patient also mentioned that she has been having diarrhea for the last 2 weeks since she began taking Linzess which she is using for constipation.  She was instructed to call her gastroenterologist.The patient was instructed to come into the office for evaluation.    She is feeling better.  She no longer has palpitations.  Her diarrhea is less.  She spoke to Dr. Sahu her gastroenterologist and is no longer taking Linzess.  She denies chest pain, shortness of breath, lightheadedness or syncope.  She exercises by lifting weights and does some boxing 3 days a week for 45 minutes.  She feels well while doing so. She is going to [facility] next month. She is due for repeat labs on February 28.      Past Medical History:   Diagnosis Date   • Anemia    • Anxiety    • Hypertension    • Iron deficiency    • Lipid disorder    • Systemic mastocytosis    • Vitamin D deficiency      Past Surgical History:   Procedure Laterality Date   • AUGMENTATION MAMMAPLASTY Bilateral    • COLONOSCOPY     • PATELLA SURGERY     • REDUCTION MAMMAPLASTY       Social History     Socioeconomic History   • Marital status: Single     Spouse name: None   • Number of children: None    • Years of education: None   • Highest education level: None   Occupational History   • None   Tobacco Use   • Smoking status: Former Smoker     Packs/day: 0.25     Quit date:      Years since quittin.1   • Smokeless tobacco: Never Used   Vaping Use   • Vaping Use: Never used   Substance and Sexual Activity   • Alcohol use: Yes     Alcohol/week: 1.0 standard drink     Types: 1 Glasses of wine per week     Comment: Social   • Drug use: No   • Sexual activity: Defer   Other Topics Concern   • None   Social History Narrative   • None     Social Determinants of Health     Financial Resource Strain: Not on file   Food Insecurity: No Food Insecurity   • Worried About Running Out of Food in the Last Year: Never true   • Ran Out of Food in the Last Year: Never true   Transportation Needs: Not on file   Physical Activity: Not on file   Stress: Not on file   Social Connections: Not on file   Intimate Partner Violence: Not on file   Housing Stability: Not on file     Family History   Problem Relation Age of Onset   • Heart disease Biological Mother    • Prostate cancer Biological Father      Anesthetics - amide type - select amino amides, Iodinated contrast media, Penicillins, Clarithromycin, Erythromycin base, and Nsaids (non-steroidal anti-inflammatory drug)  Current Outpatient Medications   Medication Sig Dispense Refill   • amLODIPine 5 mg tablet Take 5 mg by mouth daily.     • B complex-vitamin C-folic acid 400 mcg tablet tablet Take 1 tablet by mouth daily.     • biotin 1 mg capsule Take 1 capsule by mouth daily. Dose unknown       • bisacodyL (DULCOLAX, BISACODYL,) 5 mg EC tablet Take 5 mg by mouth daily.     • calcium carbonate (CALCIUM 500 ORAL) Take 500 mg by mouth daily. Dose unknown       • cholecalciferol, vitamin D3, 1,000 unit capsule Take 1,000 Units by mouth daily.       • EPINEPHrine (EPIPEN 2-ZARIAN) 0.3 mg/0.3 mL injection syringe Inject 0.3 mL (0.3 mg total) into the thigh as needed for  anaphylaxis. 1 each 1   • escitalopram (LEXAPRO) 10 mg tablet TAKE 1 TABLET BY MOUTH EVERY DAY 90 tablet 3   • potassium chloride (K-TAB) 20 mEq CR tablet Take 4 tablets (80 mEq total) by mouth daily.     • rosuvastatin 10 mg tablet Take 1 tablet (10 mg total) by mouth once daily. 30 tablet 11   • hydrocortisone 25 mg suppository Insert 0.5 suppositories (12.5 mg total) into the rectum 2 (two) times a day for 3 days. 3 suppository 0   • LORazepam 0.5 mg tablet Take 0.5 mg by mouth nightly as needed (insomnia).       No current facility-administered medications for this visit.       Review of Systems   Cardiovascular: Positive for palpitations.   All other systems reviewed and are negative.    Objective   There were no vitals filed for this visit.    Physical Exam:    General Appearance:  Alert, no distress   Head:  Normocephalic, without obvious abnormality, atraumatic   Eyes:  Conjunctiva/corneas clear, EOM's intact   Neck: No thyroid enlargement. No JVD. No bruits    Lungs:   Clear to auscultation bilaterally, respirations unlabored, no rales, no wheezing   Heart:  Regular rhythm, S1 and S2 normal, no murmur, rub or gallop   Abdomen:   Soft, non-tender, no masses, no organomegaly   Vascular: Pulses 2+ and symmetric all extremities, no carotid bruit or jugular vein distention   Musculoskeletal:  Skin: No injury or deformity  Skin color, texture, turgor normal, no rashes or lesions, no cyanosis or edema   Extremities: Extremities normal, atraumatic, pulses normal   Behavior/Emotional: Appropriate, cooperative       Lab Results   Component Value Date    WBC 9.72 02/21/2022    HGB 11.7 (L) 02/21/2022     (H) 02/21/2022    CHOL 150 02/07/2022    TRIG 85 02/07/2022    HDL 71 02/07/2022    ALT 30 02/21/2022    AST 27 02/21/2022     (L) 02/21/2022    K 2.5 (LL) 02/21/2022    CREATININE 0.7 02/21/2022    TSH 1.53 02/07/2022    INR 1.0 05/07/2021    HGBA1C 4.7 (L) 08/11/2020          ECG   sinus bradycardia  @  54 IRBBB   ASSESSMENT/PLAN    Elevated coronary artery calcium score  6/2019 score: 128 (90th%)  2/12/2020 SE--11:16, 92%mPHR, negative    She is active and vigorous without symptoms of concern.  We do not believe that she needs further stress testing at this time.    Palpitations  Patient has been having palpitations.  She had laboratory studies drawn 2 days ago.  Her potassium was 2.5 with a phosphorus level of 1.2.  She has increased her potassium to 80 mEq daily for the next 4 days and then she will lower to 40 mEq daily as directed by her oncologist. She is due for repeat labs on February 28. She is feeling much better.  We do not believe that she needs further testing at this time.  She does know to contact us with any concerns.  We will follow-up with her in the office in 8 months.    Iron deficiency anemia  Patient had a hemoglobin of 11.7 on February 21, 2022.    Dyslipidemia  Patient remains on rosuvastatin with good tolerance.  She had a lipid panel drawn on February 7, 2022.  Total cholesterol 150, triglycerides 85, HDL 71 with an LDL of 62.    Primary hypertension  2017 echocardiogram: Normal structural heart    Her blood pressure is under reasonable control today.  She will continue on her current dose of amlodipine.    Sinus bradycardia  She is asymptomatic.  We will follow her off of negative chronotropic agents.    Systemic mastocytosis  Per Dr. Ani Calhoun.     By signing my name below, I, Nora Hoffman, attest that this documentation has been prepared under the direction and in the presence of Bright Selby MD.    2/23/2022 2:47 PM      IBright MD, personally performed the services described in this documentation as described by Nora Hoffman in my presence, and it is accurate and complete.        Bright Selby M.D., [facility]         Return in about 8 months (around 10/23/2022).    MELIZA Caba  2/23/2022

## 2022-02-23 NOTE — ASSESSMENT & PLAN NOTE
2017 echocardiogram: Normal structural heart    Her blood pressure is under reasonable control today.  She will continue on her current dose of amlodipine.

## 2022-02-23 NOTE — ASSESSMENT & PLAN NOTE
6/2019 score: 128 (90th%)  2/12/2020 SE--11:16, 92%mPHR, negative    She is active and vigorous without symptoms of concern.  We do not believe that she needs further stress testing at this time.

## 2022-02-23 NOTE — LETTER
February 23, 2022     Ashley Omalley MD  [address] [facility] [address]    Patient: Ritu Ramirez  YOB: 1955  Date of Visit: 2/23/2022      Dear Dr. Sirisha Omalley:    Thank you for referring Ritu Ramirez to me for evaluation. Below are my notes for this consultation.    If you have questions, please do not hesitate to call me. I look forward to following your patient along with you.         Sincerely,        Bright Selby MD        CC: No Recipients  Bright Selby MD  2/23/2022  5:09 PM  Signed     Cardiology Note       Reason for visit:   Chief Complaint   Patient presents with   • Rapid Heart Rate      HPI   Ritu Ramirez is a 66 y.o. female who presents for an urgent visit. Patient called the office yesterday complaining of a rapid heartbeat since the end of last week.  It occurs randomly.  Her blood pressure has also been elevated.  She took an extra dose of amlodipine and her blood pressure returned to normal.  She had a recent injection from hematology to raise her hemoglobin.  Her potassium 2 days ago was 2.5 with a phosphorus level of 1.2.  She was instructed by her hematologist, Dr. Ani Calhoun to increase her potassium supplement to 80 mEq daily for the next 4 days and then decrease it to 40 meq daily. She was told to increase phosphorus in her diet.  The patient also mentioned that she has been having diarrhea for the last 2 weeks since she began taking Linzess which she is using for constipation.  She was instructed to call her gastroenterologist.The patient was instructed to come into the office for evaluation.    She is feeling better.  She no longer has palpitations.  Her diarrhea is less.  She spoke to Dr. Sahu her gastroenterologist and is no longer taking Linzess.  She denies chest pain, shortness of breath, lightheadedness or syncope.  She exercises by lifting weights and does some boxing 3 days a week for 45 minutes.  She feels well while doing  so. She is going to [facility] next month. She is due for repeat labs on .      Past Medical History:   Diagnosis Date   • Anemia    • Anxiety    • Hypertension    • Iron deficiency    • Lipid disorder    • Systemic mastocytosis    • Vitamin D deficiency      Past Surgical History:   Procedure Laterality Date   • AUGMENTATION MAMMAPLASTY Bilateral    • COLONOSCOPY     • PATELLA SURGERY     • REDUCTION MAMMAPLASTY       Social History     Socioeconomic History   • Marital status: Single     Spouse name: None   • Number of children: None   • Years of education: None   • Highest education level: None   Occupational History   • None   Tobacco Use   • Smoking status: Former Smoker     Packs/day: 0.25     Quit date:      Years since quittin.1   • Smokeless tobacco: Never Used   Vaping Use   • Vaping Use: Never used   Substance and Sexual Activity   • Alcohol use: Yes     Alcohol/week: 1.0 standard drink     Types: 1 Glasses of wine per week     Comment: Social   • Drug use: No   • Sexual activity: Defer   Other Topics Concern   • None   Social History Narrative   • None     Social Determinants of Health     Financial Resource Strain: Not on file   Food Insecurity: No Food Insecurity   • Worried About Running Out of Food in the Last Year: Never true   • Ran Out of Food in the Last Year: Never true   Transportation Needs: Not on file   Physical Activity: Not on file   Stress: Not on file   Social Connections: Not on file   Intimate Partner Violence: Not on file   Housing Stability: Not on file     Family History   Problem Relation Age of Onset   • Heart disease Biological Mother    • Prostate cancer Biological Father      Anesthetics - amide type - select amino amides, Iodinated contrast media, Penicillins, Clarithromycin, Erythromycin base, and Nsaids (non-steroidal anti-inflammatory drug)  Current Outpatient Medications   Medication Sig Dispense Refill   • amLODIPine 5 mg tablet Take 5 mg by mouth  daily.     • B complex-vitamin C-folic acid 400 mcg tablet tablet Take 1 tablet by mouth daily.     • biotin 1 mg capsule Take 1 capsule by mouth daily. Dose unknown       • bisacodyL (DULCOLAX, BISACODYL,) 5 mg EC tablet Take 5 mg by mouth daily.     • calcium carbonate (CALCIUM 500 ORAL) Take 500 mg by mouth daily. Dose unknown       • cholecalciferol, vitamin D3, 1,000 unit capsule Take 1,000 Units by mouth daily.       • EPINEPHrine (EPIPEN 2-ZARINA) 0.3 mg/0.3 mL injection syringe Inject 0.3 mL (0.3 mg total) into the thigh as needed for anaphylaxis. 1 each 1   • escitalopram (LEXAPRO) 10 mg tablet TAKE 1 TABLET BY MOUTH EVERY DAY 90 tablet 3   • potassium chloride (K-TAB) 20 mEq CR tablet Take 4 tablets (80 mEq total) by mouth daily.     • rosuvastatin 10 mg tablet Take 1 tablet (10 mg total) by mouth once daily. 30 tablet 11   • hydrocortisone 25 mg suppository Insert 0.5 suppositories (12.5 mg total) into the rectum 2 (two) times a day for 3 days. 3 suppository 0   • LORazepam 0.5 mg tablet Take 0.5 mg by mouth nightly as needed (insomnia).       No current facility-administered medications for this visit.       Review of Systems   Cardiovascular: Positive for palpitations.   All other systems reviewed and are negative.    Objective   There were no vitals filed for this visit.    Physical Exam:    General Appearance:  Alert, no distress   Head:  Normocephalic, without obvious abnormality, atraumatic   Eyes:  Conjunctiva/corneas clear, EOM's intact   Neck: No thyroid enlargement. No JVD. No bruits    Lungs:   Clear to auscultation bilaterally, respirations unlabored, no rales, no wheezing   Heart:  Regular rhythm, S1 and S2 normal, no murmur, rub or gallop   Abdomen:   Soft, non-tender, no masses, no organomegaly   Vascular: Pulses 2+ and symmetric all extremities, no carotid bruit or jugular vein distention   Musculoskeletal:  Skin: No injury or deformity  Skin color, texture, turgor normal, no rashes or  lesions, no cyanosis or edema   Extremities: Extremities normal, atraumatic, pulses normal   Behavior/Emotional: Appropriate, cooperative       Lab Results   Component Value Date    WBC 9.72 02/21/2022    HGB 11.7 (L) 02/21/2022     (H) 02/21/2022    CHOL 150 02/07/2022    TRIG 85 02/07/2022    HDL 71 02/07/2022    ALT 30 02/21/2022    AST 27 02/21/2022     (L) 02/21/2022    K 2.5 (LL) 02/21/2022    CREATININE 0.7 02/21/2022    TSH 1.53 02/07/2022    INR 1.0 05/07/2021    HGBA1C 4.7 (L) 08/11/2020          ECG   sinus bradycardia  @ 54 IRBBB   ASSESSMENT/PLAN    Elevated coronary artery calcium score  6/2019 score: 128 (90th%)  2/12/2020 SE--11:16, 92%mPHR, negative    She is active and vigorous without symptoms of concern.  We do not believe that she needs further stress testing at this time.    Palpitations  Patient has been having palpitations.  She had laboratory studies drawn 2 days ago.  Her potassium was 2.5 with a phosphorus level of 1.2.  She has increased her potassium to 80 mEq daily for the next 4 days and then she will lower to 40 mEq daily as directed by her oncologist. She is due for repeat labs on February 28. She is feeling much better.  We do not believe that she needs further testing at this time.  She does know to contact us with any concerns.  We will follow-up with her in the office in 8 months.    Iron deficiency anemia  Patient had a hemoglobin of 11.7 on February 21, 2022.    Dyslipidemia  Patient remains on rosuvastatin with good tolerance.  She had a lipid panel drawn on February 7, 2022.  Total cholesterol 150, triglycerides 85, HDL 71 with an LDL of 62.    Primary hypertension  2017 echocardiogram: Normal structural heart    Her blood pressure is under reasonable control today.  She will continue on her current dose of amlodipine.    Sinus bradycardia  She is asymptomatic.  We will follow her off of negative chronotropic agents.    Systemic mastocytosis  Per Dr. Law  Unique.     By signing my name below, I, Nora Hoffman, attest that this documentation has been prepared under the direction and in the presence of Bright Selby MD.    2/23/2022 2:47 PM      I, Bright Selby MD, personally performed the services described in this documentation as described by Nora Hoffman in my presence, and it is accurate and complete.        Bright Selby M.D., [facility]         Return in about 8 months (around 10/23/2022).    MELIZA Caba  2/23/2022

## 2022-02-23 NOTE — ASSESSMENT & PLAN NOTE
Patient has been having palpitations.  She had laboratory studies drawn 2 days ago.  Her potassium was 2.5 with a phosphorus level of 1.2.  She has increased her potassium to 80 mEq daily for the next 4 days and then she will lower to 40 mEq daily as directed by her oncologist. She is due for repeat labs on February 28. She is feeling much better.  We do not believe that she needs further testing at this time.  She does know to contact us with any concerns.  We will follow-up with her in the office in 8 months.

## 2022-02-23 NOTE — ASSESSMENT & PLAN NOTE
Patient remains on rosuvastatin with good tolerance.  She had a lipid panel drawn on February 7, 2022.  Total cholesterol 150, triglycerides 85, HDL 71 with an LDL of 62.

## 2022-02-28 ENCOUNTER — APPOINTMENT (OUTPATIENT)
Dept: LAB | Facility: HOSPITAL | Age: 67
End: 2022-02-28
Attending: INTERNAL MEDICINE
Payer: MEDICARE

## 2022-02-28 ENCOUNTER — TRANSCRIBE ORDERS (OUTPATIENT)
Dept: SCHEDULING | Age: 67
End: 2022-02-28

## 2022-02-28 DIAGNOSIS — D50.8 OTHER IRON DEFICIENCY ANEMIAS: ICD-10-CM

## 2022-02-28 DIAGNOSIS — D50.8 OTHER IRON DEFICIENCY ANEMIAS: Primary | ICD-10-CM

## 2022-02-28 LAB
ANION GAP SERPL CALC-SCNC: 11 MEQ/L (ref 3–15)
BASOPHILS # BLD: 0.03 K/UL (ref 0.01–0.1)
BASOPHILS NFR BLD: 0.4 %
BUN SERPL-MCNC: 21 MG/DL (ref 8–20)
CALCIUM SERPL-MCNC: 9.3 MG/DL (ref 8.9–10.3)
CHLORIDE SERPL-SCNC: 95 MEQ/L (ref 98–109)
CO2 SERPL-SCNC: 28 MEQ/L (ref 22–32)
CREAT SERPL-MCNC: 0.9 MG/DL (ref 0.6–1.1)
DIFFERENTIAL METHOD BLD: ABNORMAL
EOSINOPHIL # BLD: 0.28 K/UL (ref 0.04–0.36)
EOSINOPHIL NFR BLD: 3.5 %
ERYTHROCYTE [DISTWIDTH] IN BLOOD BY AUTOMATED COUNT: 19.7 % (ref 11.7–14.4)
GFR SERPL CREATININE-BSD FRML MDRD: >60 ML/MIN/1.73M*2
GLUCOSE SERPL-MCNC: 105 MG/DL (ref 70–99)
HCT VFR BLDCO AUTO: 33.7 % (ref 35–45)
HGB BLD-MCNC: 10.8 G/DL (ref 11.8–15.7)
IMM GRANULOCYTES # BLD AUTO: 0.05 K/UL (ref 0–0.08)
IMM GRANULOCYTES NFR BLD AUTO: 0.6 %
LYMPHOCYTES # BLD: 0.98 K/UL (ref 1.2–3.5)
LYMPHOCYTES NFR BLD: 12.2 %
MAGNESIUM SERPL-MCNC: 2 MG/DL (ref 1.8–2.5)
MCH RBC QN AUTO: 24.9 PG (ref 28–33.2)
MCHC RBC AUTO-ENTMCNC: 32 G/DL (ref 32.2–35.5)
MCV RBC AUTO: 77.8 FL (ref 83–98)
MONOCYTES # BLD: 0.68 K/UL (ref 0.28–0.8)
MONOCYTES NFR BLD: 8.5 %
NEUTROPHILS # BLD: 5.99 K/UL (ref 1.7–7)
NEUTROPHILS # BLD: 5.99 K/UL (ref 1.7–7)
NEUTS SEG NFR BLD: 74.8 %
NRBC BLD-RTO: 0 %
PDW BLD AUTO: 8.8 FL (ref 9.4–12.3)
PHOSPHATE SERPL-MCNC: 1.8 MG/DL (ref 2.4–4.7)
PLATELET # BLD AUTO: 361 K/UL (ref 150–369)
POTASSIUM SERPL-SCNC: 3.5 MEQ/L (ref 3.6–5.1)
RBC # BLD AUTO: 4.33 M/UL (ref 3.93–5.22)
SODIUM SERPL-SCNC: 134 MEQ/L (ref 136–144)
WBC # BLD AUTO: 8.01 K/UL (ref 3.8–10.5)

## 2022-02-28 PROCEDURE — 84100 ASSAY OF PHOSPHORUS: CPT

## 2022-02-28 PROCEDURE — 36415 COLL VENOUS BLD VENIPUNCTURE: CPT

## 2022-02-28 PROCEDURE — 83735 ASSAY OF MAGNESIUM: CPT

## 2022-02-28 PROCEDURE — 80048 BASIC METABOLIC PNL TOTAL CA: CPT

## 2022-02-28 PROCEDURE — 85025 COMPLETE CBC W/AUTO DIFF WBC: CPT

## 2022-03-02 ENCOUNTER — LAB REQUISITION (OUTPATIENT)
Dept: LAB | Facility: HOSPITAL | Age: 67
End: 2022-03-02
Payer: MEDICARE

## 2022-03-02 DIAGNOSIS — D50.8 OTHER IRON DEFICIENCY ANEMIAS: ICD-10-CM

## 2022-03-02 LAB
BASOPHILS # BLD: 0.04 K/UL (ref 0.01–0.1)
BASOPHILS NFR BLD: 0.5 %
DIFFERENTIAL METHOD BLD: ABNORMAL
EOSINOPHIL # BLD: 0.45 K/UL (ref 0.04–0.36)
EOSINOPHIL NFR BLD: 5.9 %
ERYTHROCYTE [DISTWIDTH] IN BLOOD BY AUTOMATED COUNT: 20 % (ref 11.7–14.4)
HCT VFR BLDCO AUTO: 36.4 % (ref 35–45)
HGB BLD-MCNC: 12.1 G/DL (ref 11.8–15.7)
IMM GRANULOCYTES # BLD AUTO: 0.09 K/UL (ref 0–0.08)
IMM GRANULOCYTES NFR BLD AUTO: 1.2 %
LYMPHOCYTES # BLD: 0.86 K/UL (ref 1.2–3.5)
LYMPHOCYTES NFR BLD: 11.2 %
MCH RBC QN AUTO: 26.2 PG (ref 28–33.2)
MCHC RBC AUTO-ENTMCNC: 33.2 G/DL (ref 32.2–35.5)
MCV RBC AUTO: 78.8 FL (ref 83–98)
MONOCYTES # BLD: 0.46 K/UL (ref 0.28–0.8)
MONOCYTES NFR BLD: 6 %
NEUTROPHILS # BLD: 5.79 K/UL (ref 1.7–7)
NEUTROPHILS # BLD: 5.79 K/UL (ref 1.7–7)
NEUTS SEG NFR BLD: 75.2 %
NRBC BLD-RTO: 0 %
PDW BLD AUTO: 9.5 FL (ref 9.4–12.3)
PHOSPHATE SERPL-MCNC: 1.7 MG/DL (ref 2.4–4.7)
PLATELET # BLD AUTO: 375 K/UL (ref 150–369)
RBC # BLD AUTO: 4.62 M/UL (ref 3.93–5.22)
WBC # BLD AUTO: 7.69 K/UL (ref 3.8–10.5)

## 2022-03-02 PROCEDURE — 84100 ASSAY OF PHOSPHORUS: CPT | Performed by: INTERNAL MEDICINE

## 2022-03-02 PROCEDURE — 85025 COMPLETE CBC W/AUTO DIFF WBC: CPT | Performed by: INTERNAL MEDICINE

## 2022-03-16 ENCOUNTER — TRANSCRIBE ORDERS (OUTPATIENT)
Dept: LAB | Facility: HOSPITAL | Age: 67
End: 2022-03-16

## 2022-03-16 ENCOUNTER — APPOINTMENT (OUTPATIENT)
Dept: LAB | Facility: HOSPITAL | Age: 67
End: 2022-03-16
Attending: INTERNAL MEDICINE
Payer: MEDICARE

## 2022-03-16 DIAGNOSIS — D50.8 OTHER IRON DEFICIENCY ANEMIAS: Primary | ICD-10-CM

## 2022-03-16 DIAGNOSIS — D50.8 OTHER IRON DEFICIENCY ANEMIAS: ICD-10-CM

## 2022-03-16 LAB
BASOPHILS # BLD: 0.03 K/UL (ref 0.01–0.1)
BASOPHILS NFR BLD: 0.5 %
DIFFERENTIAL METHOD BLD: ABNORMAL
EOSINOPHIL # BLD: 0.47 K/UL (ref 0.04–0.36)
EOSINOPHIL NFR BLD: 8.5 %
ERYTHROCYTE [DISTWIDTH] IN BLOOD BY AUTOMATED COUNT: 22.4 % (ref 11.7–14.4)
HCT VFR BLDCO AUTO: 31.3 % (ref 35–45)
HGB BLD-MCNC: 9.2 G/DL (ref 11.8–15.7)
IMM GRANULOCYTES # BLD AUTO: 0.05 K/UL (ref 0–0.08)
IMM GRANULOCYTES NFR BLD AUTO: 0.9 %
LYMPHOCYTES # BLD: 0.82 K/UL (ref 1.2–3.5)
LYMPHOCYTES NFR BLD: 14.7 %
MCH RBC QN AUTO: 25.9 PG (ref 28–33.2)
MCHC RBC AUTO-ENTMCNC: 29.4 G/DL (ref 32.2–35.5)
MCV RBC AUTO: 88.2 FL (ref 83–98)
MONOCYTES # BLD: 0.43 K/UL (ref 0.28–0.8)
MONOCYTES NFR BLD: 7.7 %
NEUTROPHILS # BLD: 3.76 K/UL (ref 1.7–7)
NEUTROPHILS # BLD: 3.76 K/UL (ref 1.7–7)
NEUTS SEG NFR BLD: 67.7 %
NRBC BLD-RTO: 0 %
PDW BLD AUTO: 8.6 FL (ref 9.4–12.3)
PHOSPHATE SERPL-MCNC: 1.6 MG/DL (ref 2.4–4.7)
PLATELET # BLD AUTO: 309 K/UL (ref 150–369)
RBC # BLD AUTO: 3.55 M/UL (ref 3.93–5.22)
WBC # BLD AUTO: 5.56 K/UL (ref 3.8–10.5)

## 2022-03-16 PROCEDURE — 36415 COLL VENOUS BLD VENIPUNCTURE: CPT

## 2022-03-16 PROCEDURE — 84100 ASSAY OF PHOSPHORUS: CPT

## 2022-03-16 PROCEDURE — 82728 ASSAY OF FERRITIN: CPT

## 2022-03-16 PROCEDURE — 83550 IRON BINDING TEST: CPT

## 2022-03-16 PROCEDURE — 85025 COMPLETE CBC W/AUTO DIFF WBC: CPT

## 2022-03-17 LAB
FERRITIN SERPL-MCNC: 214 NG/ML (ref 11–250)
IRON SATN MFR SERPL: 18 % (ref 15–45)
IRON SERPL-MCNC: 56 UG/DL (ref 35–150)
TIBC SERPL-MCNC: 304 UG/DL (ref 270–460)
UIBC SERPL-MCNC: 248 UG/DL (ref 180–360)

## 2022-03-21 ENCOUNTER — TELEMEDICINE (OUTPATIENT)
Dept: INTERNAL MEDICINE | Facility: CLINIC | Age: 67
End: 2022-03-21
Payer: MEDICARE

## 2022-03-21 DIAGNOSIS — D47.09 MASTOCYTOSIS: ICD-10-CM

## 2022-03-21 DIAGNOSIS — K82.4 GALLBLADDER POLYP: Primary | ICD-10-CM

## 2022-03-21 DIAGNOSIS — F41.9 ANXIETY: ICD-10-CM

## 2022-03-21 PROCEDURE — 99442 PR PHYS/QHP TELEPHONE EVALUATION 11-20 MIN: CPT | Mod: 95 | Performed by: INTERNAL MEDICINE

## 2022-03-21 RX ORDER — CLONAZEPAM 1 MG/1
1 TABLET ORAL NIGHTLY PRN
Qty: 30 TABLET | Refills: 1 | Status: SHIPPED | OUTPATIENT
Start: 2022-03-21 | End: 2022-06-01 | Stop reason: ALTCHOICE

## 2022-03-21 NOTE — PROGRESS NOTES
"      Verification of Patient Location:  The patient affirms they are currently located in the following state:[place]     Audio Only Telemedicine Visit    Request for Consent:  You and I are about to have a telemedicine check-in or visit. This is allowed because you are already my patient, and you have requested it.  This telemedicine visit will be billed to your health insurance or you, if you are self-insured.  You understand you will be responsible for any copayments or coinsurances that apply to your telemedicine visit.  Before starting our telemedicine visit, I am required to get your consent for this virtual check-in or visit by telemedicine. Do you consent?      Patient Response to Request for Consent: Yes    The following have been reviewed and updated as appropriate in this visit:            Visit Documentation:  This is timed telephone call to go over several issues.  She is concerned that her ultrasound of the abdomen well stating she has small gallbladder polyps did not give an actual measurement of the polyps.  Previously the largest 1 had measured 4 mm.  She is asking that we have radiology review this to give an actual measurement there is no mention in the report as to whether the polyps are larger or not.  She is not having any abdominal pain fever chills nausea or vomiting.  Also wanted to inform me that her evaluation at the [facility] for her mastocytosis did show an increased number of mast cells in the bone marrow.  She is not having currently any new symptoms but will be obtaining a second opinion on whether treatment is needed.  She may need records from [place] but will let me know what is needed.  Finally she wanted to discuss anxiety issues.  She did not feel comfortable on lorazepam felt to \"hung over.  She has tried clonazepam and seems to be much better with this.  I explained the clonazepam is longer acting less addicting over time.  Would like to keep use to a minimum in any case however. "  I will send in clonazepam to be used as needed.  0.5 mg twice a day.        Time Spent:  I spent 20 minutes on this date of service performing the following activities: obtaining history, entering orders, documenting, preparing for visit, providing counseling and education, communicating results and coordinating care.

## 2022-03-24 ENCOUNTER — TELEPHONE (OUTPATIENT)
Dept: INTERNAL MEDICINE | Facility: CLINIC | Age: 67
End: 2022-03-24

## 2022-03-24 NOTE — TELEPHONE ENCOUNTER
Please ask radiology to give a second reading on the patient's ultrasound of the gallbladder.  She would like a measurement on her gallbladder polyp.  Last year was 4 mm.  No measurement was stated on the current report

## 2022-03-24 NOTE — TELEPHONE ENCOUNTER
Called [facility] reading room ;  they will have the radiologist make an added note to report and fax to our office stating size of polyps; Dr Joan Lawton read report and she will be in on Friday 3/25  Spoke with angie in reading room

## 2022-03-30 ENCOUNTER — APPOINTMENT (OUTPATIENT)
Dept: LAB | Facility: HOSPITAL | Age: 67
End: 2022-03-30
Attending: INTERNAL MEDICINE
Payer: MEDICARE

## 2022-03-30 ENCOUNTER — TRANSCRIBE ORDERS (OUTPATIENT)
Dept: SCHEDULING | Age: 67
End: 2022-03-30

## 2022-03-30 DIAGNOSIS — D50.8 OTHER IRON DEFICIENCY ANEMIAS: Primary | ICD-10-CM

## 2022-03-30 DIAGNOSIS — D50.8 OTHER IRON DEFICIENCY ANEMIAS: ICD-10-CM

## 2022-03-30 LAB
ANION GAP SERPL CALC-SCNC: 10 MEQ/L (ref 3–15)
ANISOCYTOSIS BLD QL SMEAR: ABNORMAL
BASOPHILS # BLD: 0.02 K/UL (ref 0.01–0.1)
BASOPHILS NFR BLD: 0.3 %
BUN SERPL-MCNC: 24 MG/DL (ref 8–20)
CALCIUM SERPL-MCNC: 9.2 MG/DL (ref 8.9–10.3)
CHLORIDE SERPL-SCNC: 100 MEQ/L (ref 98–109)
CO2 SERPL-SCNC: 24 MEQ/L (ref 22–32)
CREAT SERPL-MCNC: 0.7 MG/DL (ref 0.6–1.1)
DIFFERENTIAL METHOD BLD: ABNORMAL
EOSINOPHIL # BLD: 0.33 K/UL (ref 0.04–0.36)
EOSINOPHIL NFR BLD: 4.5 %
ERYTHROCYTE [DISTWIDTH] IN BLOOD BY AUTOMATED COUNT: 16.5 % (ref 11.7–14.4)
FERRITIN SERPL-MCNC: 94 NG/ML (ref 11–250)
FOLATE SERPL-MCNC: >20 NG/ML
GFR SERPL CREATININE-BSD FRML MDRD: >60 ML/MIN/1.73M*2
GLUCOSE SERPL-MCNC: 90 MG/DL (ref 70–99)
HCT VFR BLDCO AUTO: 29.5 % (ref 35–45)
HGB BLD-MCNC: 9.4 G/DL (ref 11.8–15.7)
HYPOCHROMIA BLD QL SMEAR: ABNORMAL
IMM GRANULOCYTES # BLD AUTO: 0.08 K/UL (ref 0–0.08)
IMM GRANULOCYTES NFR BLD AUTO: 1.1 %
IRON SATN MFR SERPL: 24 % (ref 15–45)
IRON SERPL-MCNC: 75 UG/DL (ref 35–150)
LYMPHOCYTES # BLD: 0.92 K/UL (ref 1.2–3.5)
LYMPHOCYTES NFR BLD: 12.5 %
MCH RBC QN AUTO: 26.3 PG (ref 28–33.2)
MCHC RBC AUTO-ENTMCNC: 31.9 G/DL (ref 32.2–35.5)
MCV RBC AUTO: 82.6 FL (ref 83–98)
MONOCYTES # BLD: 0.56 K/UL (ref 0.28–0.8)
MONOCYTES NFR BLD: 7.6 %
NEUTROPHILS # BLD: 5.43 K/UL (ref 1.7–7)
NEUTROPHILS # BLD: 5.43 K/UL (ref 1.7–7)
NEUTS SEG NFR BLD: 74 %
NRBC BLD-RTO: 0 %
PDW BLD AUTO: 9.4 FL (ref 9.4–12.3)
PHOSPHATE SERPL-MCNC: 2.8 MG/DL (ref 2.4–4.7)
PLAT MORPH BLD: NORMAL
PLATELET # BLD AUTO: 302 K/UL (ref 150–369)
PLATELET # BLD EST: ABNORMAL 10*3/UL
POLYCHROMASIA BLD QL SMEAR: ABNORMAL
POTASSIUM SERPL-SCNC: 3.5 MEQ/L (ref 3.6–5.1)
RBC # BLD AUTO: 3.57 M/UL (ref 3.93–5.22)
RETICS #: 0.11 M/UL (ref 0–0.12)
RETICS/RBC NFR: 3 % (ref 0.6–2.8)
SODIUM SERPL-SCNC: 134 MEQ/L (ref 136–144)
TARGETS BLD QL SMEAR: ABNORMAL
TIBC SERPL-MCNC: 319 UG/DL (ref 270–460)
UIBC SERPL-MCNC: 244 UG/DL (ref 180–360)
VIT B12 SERPL-MCNC: >1500 PG/ML (ref 180–914)
WBC # BLD AUTO: 7.34 K/UL (ref 3.8–10.5)

## 2022-03-30 PROCEDURE — 82728 ASSAY OF FERRITIN: CPT

## 2022-03-30 PROCEDURE — 83550 IRON BINDING TEST: CPT

## 2022-03-30 PROCEDURE — 84100 ASSAY OF PHOSPHORUS: CPT

## 2022-03-30 PROCEDURE — 82607 VITAMIN B-12: CPT

## 2022-03-30 PROCEDURE — 82746 ASSAY OF FOLIC ACID SERUM: CPT | Mod: GA

## 2022-03-30 PROCEDURE — 80048 BASIC METABOLIC PNL TOTAL CA: CPT

## 2022-03-30 PROCEDURE — 36415 COLL VENOUS BLD VENIPUNCTURE: CPT

## 2022-03-30 PROCEDURE — 85025 COMPLETE CBC W/AUTO DIFF WBC: CPT

## 2022-03-30 PROCEDURE — 85045 AUTOMATED RETICULOCYTE COUNT: CPT

## 2022-04-13 ENCOUNTER — LAB REQUISITION (OUTPATIENT)
Dept: LAB | Facility: HOSPITAL | Age: 67
End: 2022-04-13
Payer: MEDICARE

## 2022-04-13 DIAGNOSIS — M85.9 DISORDER OF BONE DENSITY AND STRUCTURE, UNSPECIFIED: ICD-10-CM

## 2022-04-13 LAB
BASOPHILS # BLD: 0.02 K/UL (ref 0.01–0.1)
BASOPHILS NFR BLD: 0.3 %
DIFFERENTIAL METHOD BLD: ABNORMAL
EOSINOPHIL # BLD: 0.41 K/UL (ref 0.04–0.36)
EOSINOPHIL NFR BLD: 6.9 %
ERYTHROCYTE [DISTWIDTH] IN BLOOD BY AUTOMATED COUNT: 14.9 % (ref 11.7–14.4)
HCT VFR BLDCO AUTO: 27.8 % (ref 35–45)
HGB BLD-MCNC: 8.6 G/DL (ref 11.8–15.7)
IMM GRANULOCYTES # BLD AUTO: 0.03 K/UL (ref 0–0.08)
IMM GRANULOCYTES NFR BLD AUTO: 0.5 %
LYMPHOCYTES # BLD: 0.87 K/UL (ref 1.2–3.5)
LYMPHOCYTES NFR BLD: 14.7 %
MCH RBC QN AUTO: 25.7 PG (ref 28–33.2)
MCHC RBC AUTO-ENTMCNC: 30.9 G/DL (ref 32.2–35.5)
MCV RBC AUTO: 83.2 FL (ref 83–98)
MONOCYTES # BLD: 0.47 K/UL (ref 0.28–0.8)
MONOCYTES NFR BLD: 7.9 %
NEUTROPHILS # BLD: 4.13 K/UL (ref 1.7–7)
NEUTROPHILS # BLD: 4.13 K/UL (ref 1.7–7)
NEUTS SEG NFR BLD: 69.7 %
NRBC BLD-RTO: 0 %
PDW BLD AUTO: 9 FL (ref 9.4–12.3)
PLATELET # BLD AUTO: 319 K/UL (ref 150–369)
RBC # BLD AUTO: 3.34 M/UL (ref 3.93–5.22)
WBC # BLD AUTO: 5.93 K/UL (ref 3.8–10.5)

## 2022-04-13 PROCEDURE — 85025 COMPLETE CBC W/AUTO DIFF WBC: CPT | Performed by: INTERNAL MEDICINE

## 2022-04-13 PROCEDURE — 83520 IMMUNOASSAY QUANT NOS NONAB: CPT | Performed by: INTERNAL MEDICINE

## 2022-04-13 PROCEDURE — 83088 ASSAY OF HISTAMINE: CPT | Performed by: INTERNAL MEDICINE

## 2022-04-18 LAB — TRYPTASE SERPL-MCNC: 39.7 MCG/L

## 2022-04-19 LAB — HISTAMINE SERPL-MCNC: <1.5 NG/ML

## 2022-04-29 ENCOUNTER — TRANSCRIBE ORDERS (OUTPATIENT)
Dept: SCHEDULING | Age: 67
End: 2022-04-29

## 2022-04-29 DIAGNOSIS — Z12.31 ENCOUNTER FOR SCREENING MAMMOGRAM FOR MALIGNANT NEOPLASM OF BREAST: Primary | ICD-10-CM

## 2022-05-02 ENCOUNTER — TRANSCRIBE ORDERS (OUTPATIENT)
Dept: SCHEDULING | Age: 67
End: 2022-05-02

## 2022-05-02 ENCOUNTER — LAB REQUISITION (OUTPATIENT)
Dept: LAB | Facility: HOSPITAL | Age: 67
End: 2022-05-02
Payer: MEDICARE

## 2022-05-02 DIAGNOSIS — D50.8 OTHER IRON DEFICIENCY ANEMIAS: ICD-10-CM

## 2022-05-02 DIAGNOSIS — D50.8 OTHER IRON DEFICIENCY ANEMIAS: Primary | ICD-10-CM

## 2022-05-02 LAB
BASOPHILS # BLD: 0.03 K/UL (ref 0.01–0.1)
BASOPHILS NFR BLD: 0.5 %
DIFFERENTIAL METHOD BLD: ABNORMAL
EOSINOPHIL # BLD: 0.39 K/UL (ref 0.04–0.36)
EOSINOPHIL NFR BLD: 7.1 %
ERYTHROCYTE [DISTWIDTH] IN BLOOD BY AUTOMATED COUNT: 14.4 % (ref 11.7–14.4)
HCT VFR BLDCO AUTO: 31.2 % (ref 35–45)
HGB BLD-MCNC: 9.7 G/DL (ref 11.8–15.7)
IMM GRANULOCYTES # BLD AUTO: 0.02 K/UL (ref 0–0.08)
IMM GRANULOCYTES NFR BLD AUTO: 0.4 %
LYMPHOCYTES # BLD: 0.9 K/UL (ref 1.2–3.5)
LYMPHOCYTES NFR BLD: 16.4 %
MCH RBC QN AUTO: 24.7 PG (ref 28–33.2)
MCHC RBC AUTO-ENTMCNC: 31.1 G/DL (ref 32.2–35.5)
MCV RBC AUTO: 79.6 FL (ref 83–98)
MONOCYTES # BLD: 0.47 K/UL (ref 0.28–0.8)
MONOCYTES NFR BLD: 8.5 %
NEUTROPHILS # BLD: 3.69 K/UL (ref 1.7–7)
NEUTROPHILS # BLD: 3.69 K/UL (ref 1.7–7)
NEUTS SEG NFR BLD: 67.1 %
NRBC BLD-RTO: 0 %
PDW BLD AUTO: 9.3 FL (ref 9.4–12.3)
PLATELET # BLD AUTO: 341 K/UL (ref 150–369)
RBC # BLD AUTO: 3.92 M/UL (ref 3.93–5.22)
WBC # BLD AUTO: 5.5 K/UL (ref 3.8–10.5)

## 2022-05-02 PROCEDURE — 85025 COMPLETE CBC W/AUTO DIFF WBC: CPT | Performed by: INTERNAL MEDICINE

## 2022-05-23 ENCOUNTER — HOSPITAL ENCOUNTER (OUTPATIENT)
Dept: RADIOLOGY | Age: 67
Discharge: HOME | End: 2022-05-23
Attending: INTERNAL MEDICINE
Payer: MEDICARE

## 2022-05-23 DIAGNOSIS — D50.8 OTHER IRON DEFICIENCY ANEMIAS: ICD-10-CM

## 2022-05-23 DIAGNOSIS — Z12.31 ENCOUNTER FOR SCREENING MAMMOGRAM FOR MALIGNANT NEOPLASM OF BREAST: ICD-10-CM

## 2022-05-23 PROCEDURE — 77067 SCR MAMMO BI INCL CAD: CPT

## 2022-05-25 ENCOUNTER — LAB REQUISITION (OUTPATIENT)
Dept: LAB | Facility: HOSPITAL | Age: 67
End: 2022-05-25
Payer: MEDICARE

## 2022-05-25 DIAGNOSIS — D50.8 OTHER IRON DEFICIENCY ANEMIAS: ICD-10-CM

## 2022-05-25 LAB
BASOPHILS # BLD: 0.03 K/UL (ref 0.01–0.1)
BASOPHILS NFR BLD: 0.5 %
DIFFERENTIAL METHOD BLD: ABNORMAL
EOSINOPHIL # BLD: 0.51 K/UL (ref 0.04–0.36)
EOSINOPHIL NFR BLD: 7.8 %
ERYTHROCYTE [DISTWIDTH] IN BLOOD BY AUTOMATED COUNT: 14.7 % (ref 11.7–14.4)
FERRITIN SERPL-MCNC: 20 NG/ML (ref 11–250)
HCT VFR BLDCO AUTO: 29.1 % (ref 35–45)
HGB BLD-MCNC: 8.9 G/DL (ref 11.8–15.7)
IMM GRANULOCYTES # BLD AUTO: 0.04 K/UL (ref 0–0.08)
IMM GRANULOCYTES NFR BLD AUTO: 0.6 %
IRON SATN MFR SERPL: 9 % (ref 15–45)
IRON SERPL-MCNC: 36 UG/DL (ref 35–150)
LYMPHOCYTES # BLD: 0.98 K/UL (ref 1.2–3.5)
LYMPHOCYTES NFR BLD: 15 %
MCH RBC QN AUTO: 23.2 PG (ref 28–33.2)
MCHC RBC AUTO-ENTMCNC: 30.6 G/DL (ref 32.2–35.5)
MCV RBC AUTO: 76 FL (ref 83–98)
MONOCYTES # BLD: 0.56 K/UL (ref 0.28–0.8)
MONOCYTES NFR BLD: 8.6 %
NEUTROPHILS # BLD: 4.4 K/UL (ref 1.7–7)
NEUTROPHILS # BLD: 4.4 K/UL (ref 1.7–7)
NEUTS SEG NFR BLD: 67.5 %
NRBC BLD-RTO: 0 %
PDW BLD AUTO: 9.5 FL (ref 9.4–12.3)
PLATELET # BLD AUTO: 397 K/UL (ref 150–369)
RBC # BLD AUTO: 3.83 M/UL (ref 3.93–5.22)
TIBC SERPL-MCNC: 403 UG/DL (ref 270–460)
UIBC SERPL-MCNC: 367 UG/DL (ref 180–360)
WBC # BLD AUTO: 6.52 K/UL (ref 3.8–10.5)

## 2022-05-25 PROCEDURE — 82728 ASSAY OF FERRITIN: CPT | Performed by: INTERNAL MEDICINE

## 2022-05-25 PROCEDURE — 85025 COMPLETE CBC W/AUTO DIFF WBC: CPT | Performed by: INTERNAL MEDICINE

## 2022-05-25 PROCEDURE — 83550 IRON BINDING TEST: CPT | Performed by: INTERNAL MEDICINE

## 2022-05-31 ENCOUNTER — TELEPHONE (OUTPATIENT)
Dept: INTERNAL MEDICINE | Facility: CLINIC | Age: 67
End: 2022-05-31
Payer: MEDICARE

## 2022-06-01 ENCOUNTER — TELEMEDICINE (OUTPATIENT)
Dept: INTERNAL MEDICINE | Facility: CLINIC | Age: 67
End: 2022-06-01
Payer: MEDICARE

## 2022-06-01 DIAGNOSIS — E61.1 IRON DEFICIENCY: ICD-10-CM

## 2022-06-01 DIAGNOSIS — R53.83 FATIGUE, UNSPECIFIED TYPE: ICD-10-CM

## 2022-06-01 DIAGNOSIS — D47.09 MASTOCYTOSIS: ICD-10-CM

## 2022-06-01 DIAGNOSIS — F90.2 ATTENTION DEFICIT HYPERACTIVITY DISORDER (ADHD), COMBINED TYPE: Primary | ICD-10-CM

## 2022-06-01 PROCEDURE — 99213 OFFICE O/P EST LOW 20 MIN: CPT | Mod: 95 | Performed by: INTERNAL MEDICINE

## 2022-06-01 RX ORDER — LISDEXAMFETAMINE DIMESYLATE 30 MG/1
30 CAPSULE ORAL EVERY MORNING
Qty: 30 CAPSULE | Refills: 0 | Status: SHIPPED | OUTPATIENT
Start: 2022-06-01 | End: 2022-06-21

## 2022-06-01 ASSESSMENT — ENCOUNTER SYMPTOMS
DIARRHEA: 0
ABDOMINAL PAIN: 0
SHORTNESS OF BREATH: 0
DIZZINESS: 0
FATIGUE: 1
FEVER: 0
CHILLS: 0
ANAL BLEEDING: 1
LIGHT-HEADEDNESS: 0
CHEST TIGHTNESS: 0
CONSTIPATION: 0
UNEXPECTED WEIGHT CHANGE: 0
PALPITATIONS: 0

## 2022-06-01 NOTE — ASSESSMENT & PLAN NOTE
He has previously tried Adderall and Ritalin with decreased effectiveness.  We will try low-dose of Vyvanse and see how she does with this.  Advised her to call me to let me know how she is doing or if any problems

## 2022-06-01 NOTE — ASSESSMENT & PLAN NOTE
This is secondary to her anemia and iron deficiency.  We will see how her counts do after she has better control of her hemorrhoidal bleeding.  However function is normal.

## 2022-06-01 NOTE — PROGRESS NOTES
Verification of Patient Location:  The patient affirms they are currently located in the following state: [place]    Request for Consent:    Audio and Video Encounter   Hello, my name is Ashley JANNETTE Omalley MD.  Before we proceed, can you please verify your identification by telling me your full name and date of birth?  Can you tell me who is in the room with you?    You and I are about to have a telemedicine check-in or visit because you have requested it.  This is a live video-conference.  I am a real person, speaking to you in real time.  There is no one else with me on the video-conference.  However, when we use ([employer], momondo, etc) it is important for you to know that the video-conference may not be secure or private.  I am not recording this conversation and I am asking you not to record it.  This telemedicine visit will be billed to your health insurance or you, if you are self-insured.  You understand you will be responsible for any copayments or coinsurances that apply to your telemedicine visit.  Communication platform used for this encounter:  [employer]     Before starting our telemedicine visit, I am required to get your consent for this virtual check-in or visit by telemedicine. Do you consent?      Patient Response to Request for Consent:  Yes      Visit Documentation:  Subjective     Patient ID: Ritu Ramirez is a 66 y.o. female.  1955      HPI is a telemedicine video visit via [employer]. She has been feeling very tired. Has been having trouble focusing.  Has a history of ADD and has been on ritalin and adderall. She stopped this in her fifties due to the concerns over the stimulant effect.  Feels now she is not focusing well.  Also is quite fatigued.  She is anemic and iron deficient.  Has had a history of bleeding hemorrhoids and will be getting usually banded.  She is getting iron infusions however her hemoglobin has not improved.  The hope is with controlling the rectal bleeding  from hemorrhoids hemoglobin will improve somewhat.  May need to consider capsule evaluation of her GI tract-last colonoscopy was in 2018 and she did have noted AVM.  If her iron deficiency is persisting.  She is also pursuing evaluation in [place] for her mastocytosis to consider additional treatments.  She follows with Dr. Ani Calhoun of hematology oncology and at the [facility] for her mastocytosis.  Is questioning whether she could also use compounded naltrexone for her fatigue and achiness.  I have never prescribed this we will have to have her send me information on it.    The following have been reviewed and updated as appropriate in this visit:   Allergies  Meds  Problems         Review of Systems   Constitutional: Positive for fatigue. Negative for chills, fever and unexpected weight change.   Respiratory: Negative for chest tightness and shortness of breath.    Cardiovascular: Negative for chest pain, palpitations and leg swelling.   Gastrointestinal: Positive for anal bleeding. Negative for abdominal pain, constipation and diarrhea.   Endocrine: Negative for cold intolerance and heat intolerance.   Neurological: Negative for dizziness and light-headedness.         Assessment/Plan   Diagnoses and all orders for this visit:    Attention deficit hyperactivity disorder (ADHD), combined type (Primary)  Assessment & Plan:  He has previously tried Adderall and Ritalin with decreased effectiveness.  We will try low-dose of Vyvanse and see how she does with this.  Advised her to call me to let me know how she is doing or if any problems      Iron deficiency  Assessment & Plan:  L previously secondary to her continued hemorrhoidal bleeding.  We will see how she does once he is off banded.  Continue with iron infusions for now.  May need to consider capsule study to make sure there is no bleeding from her known AVMs with her iron deficiency persists.      Fatigue, unspecified type  Assessment & Plan:  This is  secondary to her anemia and iron deficiency.  We will see how her counts do after she has better control of her hemorrhoidal bleeding.  However function is normal.      Mastocytosis  Assessment & Plan:  We will continue to follow at [facility] and I will await recommendations from physicians in [place] when she has an upcoming appointment      Other orders  -     lisdexamfetamine (VYVANSE) 30 mg capsule; Take 1 capsule (30 mg total) by mouth every morning.      Time Spent:  I spent 24 minutes on this date of service performing the following activities: obtaining history, entering orders, documenting, providing counseling and education and independently reviewing study/studies.

## 2022-06-01 NOTE — ASSESSMENT & PLAN NOTE
L previously secondary to her continued hemorrhoidal bleeding.  We will see how she does once he is off banded.  Continue with iron infusions for now.  May need to consider capsule study to make sure there is no bleeding from her known AVMs with her iron deficiency persists.

## 2022-06-01 NOTE — ASSESSMENT & PLAN NOTE
We will continue to follow at [facility] and I will await recommendations from physicians in [place] when she has an upcoming appointment

## 2022-06-06 ENCOUNTER — LAB REQUISITION (OUTPATIENT)
Dept: LAB | Facility: HOSPITAL | Age: 67
End: 2022-06-06
Payer: MEDICARE

## 2022-06-06 DIAGNOSIS — D50.8 OTHER IRON DEFICIENCY ANEMIAS: ICD-10-CM

## 2022-06-06 LAB
BASOPHILS # BLD: 0.02 K/UL (ref 0.01–0.1)
BASOPHILS NFR BLD: 0.3 %
DIFFERENTIAL METHOD BLD: ABNORMAL
EOSINOPHIL # BLD: 0.42 K/UL (ref 0.04–0.36)
EOSINOPHIL NFR BLD: 7.1 %
ERYTHROCYTE [DISTWIDTH] IN BLOOD BY AUTOMATED COUNT: 16.2 % (ref 11.7–14.4)
HCT VFR BLDCO AUTO: 23.9 % (ref 35–45)
HGB BLD-MCNC: 7.1 G/DL (ref 11.8–15.7)
IMM GRANULOCYTES # BLD AUTO: 0.02 K/UL (ref 0–0.08)
IMM GRANULOCYTES NFR BLD AUTO: 0.3 %
LYMPHOCYTES # BLD: 1 K/UL (ref 1.2–3.5)
LYMPHOCYTES NFR BLD: 17 %
MCH RBC QN AUTO: 21.8 PG (ref 28–33.2)
MCHC RBC AUTO-ENTMCNC: 29.7 G/DL (ref 32.2–35.5)
MCV RBC AUTO: 73.3 FL (ref 83–98)
MONOCYTES # BLD: 0.54 K/UL (ref 0.28–0.8)
MONOCYTES NFR BLD: 9.2 %
NEUTROPHILS # BLD: 3.89 K/UL (ref 1.7–7)
NEUTROPHILS # BLD: 3.89 K/UL (ref 1.7–7)
NEUTS SEG NFR BLD: 66.1 %
NRBC BLD-RTO: 0 %
PDW BLD AUTO: 9.3 FL (ref 9.4–12.3)
PLATELET # BLD AUTO: 385 K/UL (ref 150–369)
RBC # BLD AUTO: 3.26 M/UL (ref 3.93–5.22)
WBC # BLD AUTO: 5.89 K/UL (ref 3.8–10.5)

## 2022-06-06 PROCEDURE — 85025 COMPLETE CBC W/AUTO DIFF WBC: CPT | Performed by: INTERNAL MEDICINE

## 2022-06-08 ENCOUNTER — TRANSCRIBE ORDERS (OUTPATIENT)
Dept: SCHEDULING | Age: 67
End: 2022-06-08

## 2022-06-08 ENCOUNTER — LAB REQUISITION (OUTPATIENT)
Dept: LAB | Facility: HOSPITAL | Age: 67
End: 2022-06-08
Payer: MEDICARE

## 2022-06-08 ENCOUNTER — APPOINTMENT (OUTPATIENT)
Dept: LAB | Facility: HOSPITAL | Age: 67
End: 2022-06-08
Attending: INTERNAL MEDICINE
Payer: MEDICARE

## 2022-06-08 DIAGNOSIS — D47.01 CUTANEOUS MASTOCYTOSIS: Primary | ICD-10-CM

## 2022-06-08 DIAGNOSIS — D50.8 OTHER IRON DEFICIENCY ANEMIAS: ICD-10-CM

## 2022-06-08 DIAGNOSIS — D47.01 CUTANEOUS MASTOCYTOSIS: ICD-10-CM

## 2022-06-08 LAB
ABO + RH BLD: NORMAL
BASOPHILS # BLD: 0.05 K/UL (ref 0.01–0.1)
BASOPHILS NFR BLD: 0.7 %
BLD GP AB SCN SERPL QL: NEGATIVE
D AG BLD QL: POSITIVE
DIFFERENTIAL METHOD BLD: ABNORMAL
EOSINOPHIL # BLD: 0.36 K/UL (ref 0.04–0.36)
EOSINOPHIL NFR BLD: 4.7 %
ERYTHROCYTE [DISTWIDTH] IN BLOOD BY AUTOMATED COUNT: 17.3 % (ref 11.7–14.4)
HCT VFR BLDCO AUTO: 25.5 % (ref 35–45)
HGB BLD-MCNC: 7.6 G/DL (ref 11.8–15.7)
IMM GRANULOCYTES # BLD AUTO: 0.08 K/UL (ref 0–0.08)
IMM GRANULOCYTES NFR BLD AUTO: 1 %
LABORATORY COMMENT REPORT: NORMAL
LYMPHOCYTES # BLD: 1.23 K/UL (ref 1.2–3.5)
LYMPHOCYTES NFR BLD: 16 %
MCH RBC QN AUTO: 21.6 PG (ref 28–33.2)
MCHC RBC AUTO-ENTMCNC: 29.8 G/DL (ref 32.2–35.5)
MCV RBC AUTO: 72.4 FL (ref 83–98)
MONOCYTES # BLD: 0.63 K/UL (ref 0.28–0.8)
MONOCYTES NFR BLD: 8.2 %
NEUTROPHILS # BLD: 5.34 K/UL (ref 1.7–7)
NEUTROPHILS # BLD: 5.34 K/UL (ref 1.7–7)
NEUTS SEG NFR BLD: 69.4 %
NRBC BLD-RTO: 0.4 %
PDW BLD AUTO: 8.6 FL (ref 9.4–12.3)
PLATELET # BLD AUTO: 429 K/UL (ref 150–369)
RBC # BLD AUTO: 3.52 M/UL (ref 3.93–5.22)
SPECIMEN EXP DATE BLD: NORMAL
WBC # BLD AUTO: 7.69 K/UL (ref 3.8–10.5)

## 2022-06-08 PROCEDURE — 86900 BLOOD TYPING SEROLOGIC ABO: CPT

## 2022-06-08 PROCEDURE — 36415 COLL VENOUS BLD VENIPUNCTURE: CPT

## 2022-06-08 PROCEDURE — 86920 COMPATIBILITY TEST SPIN: CPT | Mod: 91

## 2022-06-08 PROCEDURE — 86901 BLOOD TYPING SEROLOGIC RH(D): CPT

## 2022-06-08 PROCEDURE — 85025 COMPLETE CBC W/AUTO DIFF WBC: CPT

## 2022-06-08 PROCEDURE — 86850 RBC ANTIBODY SCREEN: CPT

## 2022-06-10 ENCOUNTER — HOSPITAL ENCOUNTER (OUTPATIENT)
Dept: INPATIENT UNIT | Facility: HOSPITAL | Age: 67
Discharge: HOME | End: 2022-06-10
Attending: NURSE PRACTITIONER
Payer: MEDICARE

## 2022-06-10 DIAGNOSIS — D50.9 IRON DEFICIENCY ANEMIA, UNSPECIFIED IRON DEFICIENCY ANEMIA TYPE: ICD-10-CM

## 2022-06-10 PROCEDURE — 36430 TRANSFUSION BLD/BLD COMPNT: CPT

## 2022-06-10 PROCEDURE — P9016 RBC LEUKOCYTES REDUCED: HCPCS

## 2022-06-10 PROCEDURE — 30233N1 TRANSFUSION OF NONAUTOLOGOUS RED BLOOD CELLS INTO PERIPHERAL VEIN, PERCUTANEOUS APPROACH: ICD-10-PCS | Performed by: NURSE PRACTITIONER

## 2022-06-10 RX ORDER — SODIUM CHLORIDE 9 MG/ML
5 INJECTION, SOLUTION INTRAVENOUS AS NEEDED
Status: DISCONTINUED | OUTPATIENT
Start: 2022-06-10 | End: 2022-06-11 | Stop reason: HOSPADM

## 2022-06-10 RX ORDER — DIPHENHYDRAMINE HCL 50 MG/ML
12.5 VIAL (ML) INJECTION ONCE AS NEEDED
Status: DISCONTINUED | OUTPATIENT
Start: 2022-06-10 | End: 2022-06-11 | Stop reason: HOSPADM

## 2022-06-10 RX ORDER — HYDROCHLOROTHIAZIDE 25 MG/1
25 TABLET ORAL
COMMUNITY
Start: 2022-04-21 | End: 2022-08-16 | Stop reason: SDUPTHER

## 2022-06-10 RX ORDER — FAMOTIDINE 10 MG/ML
20 INJECTION INTRAVENOUS ONCE AS NEEDED
Status: DISCONTINUED | OUTPATIENT
Start: 2022-06-10 | End: 2022-06-11 | Stop reason: HOSPADM

## 2022-06-10 ASSESSMENT — ENCOUNTER SYMPTOMS
DEPRESSION: 0
LOSS OF SENSATION IN FEET: 0
OCCASIONAL FEELINGS OF UNSTEADINESS: 0

## 2022-06-12 LAB
CROSSMATCH: NORMAL
CROSSMATCH: NORMAL
ISBT CODE: 5100
ISBT CODE: 5100
PRODUCT CODE: NORMAL
PRODUCT CODE: NORMAL
PRODUCT STATUS: NORMAL
PRODUCT STATUS: NORMAL
SPECIMEN EXP DATE BLD: NORMAL
SPECIMEN EXP DATE BLD: NORMAL
UNIT ABO: NORMAL
UNIT ABO: NORMAL
UNIT ID: NORMAL
UNIT ID: NORMAL
UNIT RH: POSITIVE
UNIT RH: POSITIVE

## 2022-06-13 ENCOUNTER — LAB REQUISITION (OUTPATIENT)
Dept: LAB | Facility: HOSPITAL | Age: 67
End: 2022-06-13
Payer: MEDICARE

## 2022-06-13 VITALS
OXYGEN SATURATION: 95 % | HEART RATE: 64 BPM | DIASTOLIC BLOOD PRESSURE: 72 MMHG | RESPIRATION RATE: 16 BRPM | TEMPERATURE: 96.6 F | SYSTOLIC BLOOD PRESSURE: 116 MMHG

## 2022-06-13 DIAGNOSIS — D50.8 OTHER IRON DEFICIENCY ANEMIAS: ICD-10-CM

## 2022-06-13 LAB
BASOPHILS # BLD: 0.02 K/UL (ref 0.01–0.1)
BASOPHILS NFR BLD: 0.3 %
DIFFERENTIAL METHOD BLD: ABNORMAL
EOSINOPHIL # BLD: 0.3 K/UL (ref 0.04–0.36)
EOSINOPHIL NFR BLD: 4.6 %
ERYTHROCYTE [DISTWIDTH] IN BLOOD BY AUTOMATED COUNT: 18.6 % (ref 11.7–14.4)
HCT VFR BLDCO AUTO: 36.2 % (ref 35–45)
HGB BLD-MCNC: 11.2 G/DL (ref 11.8–15.7)
IMM GRANULOCYTES # BLD AUTO: 0.04 K/UL (ref 0–0.08)
IMM GRANULOCYTES NFR BLD AUTO: 0.6 %
LYMPHOCYTES # BLD: 0.91 K/UL (ref 1.2–3.5)
LYMPHOCYTES NFR BLD: 14 %
MCH RBC QN AUTO: 23.4 PG (ref 28–33.2)
MCHC RBC AUTO-ENTMCNC: 30.9 G/DL (ref 32.2–35.5)
MCV RBC AUTO: 75.7 FL (ref 83–98)
MONOCYTES # BLD: 0.57 K/UL (ref 0.28–0.8)
MONOCYTES NFR BLD: 8.8 %
NEUTROPHILS # BLD: 4.65 K/UL (ref 1.7–7)
NEUTROPHILS # BLD: 4.65 K/UL (ref 1.7–7)
NEUTS SEG NFR BLD: 71.7 %
NRBC BLD-RTO: 0 %
PDW BLD AUTO: 9 FL (ref 9.4–12.3)
PLATELET # BLD AUTO: 388 K/UL (ref 150–369)
RBC # BLD AUTO: 4.78 M/UL (ref 3.93–5.22)
WBC # BLD AUTO: 6.49 K/UL (ref 3.8–10.5)

## 2022-06-13 PROCEDURE — 85025 COMPLETE CBC W/AUTO DIFF WBC: CPT | Performed by: INTERNAL MEDICINE

## 2022-06-15 ENCOUNTER — OFFICE VISIT (OUTPATIENT)
Dept: COLORECTAL SURGERY | Facility: CLINIC | Age: 67
End: 2022-06-15
Payer: MEDICARE

## 2022-06-15 VITALS — BODY MASS INDEX: 22.66 KG/M2 | HEIGHT: 61 IN | WEIGHT: 120 LBS

## 2022-06-15 DIAGNOSIS — K64.1 SECOND DEGREE HEMORRHOIDS: Primary | ICD-10-CM

## 2022-06-15 PROCEDURE — G8756 NO BP MEASURE DOC: HCPCS | Performed by: COLON & RECTAL SURGERY

## 2022-06-15 PROCEDURE — 46221 LIGATION OF HEMORRHOID(S): CPT | Performed by: COLON & RECTAL SURGERY

## 2022-06-15 PROCEDURE — 99214 OFFICE O/P EST MOD 30 MIN: CPT | Mod: 25 | Performed by: COLON & RECTAL SURGERY

## 2022-06-15 NOTE — ASSESSMENT & PLAN NOTE
Patient has significant second-degree internal hemorrhoids seen in the right anterior, right posterior, right and left lateral positions.  I applied rubber band ligation to the right anterior and right posterior hemorrhoids today.  She tolerated this well and postprocedural instructions were given.  We will see her back in 2 months time at which time we will consider either additional banding if necessary, and if there is no effect from band ligation, we will consider surgical intervention.

## 2022-06-15 NOTE — PATIENT INSTRUCTIONS
Information About Rubber Band Ligation of Internal Hemorrhoids  Treatment Options:     The treatment options for enlarged, symptomatic internal hemorrhoids include surgical excision, infra-red coagulation, surgical stapling procedures, and Band Ligation. Dr. Aguirre has determined that your hemorrhoids will be treated with rubber band ligation.    How it works:   Rubber band ligation of the internal hemorrhoids consists of placing a specially designed surgical -grade band around the neck of the hemorrhoid, which lies just inside the anal opening. This effectively chokes off the blood supply to the hemorrhoid, and over a 1 to 2 week period, the hemorrhoid will disappear.    Number of Treatments:   Most people have 3 main hemorrhoid complexes. Because of this treatment of hemorrhoids by The Band Ligation Method often require more than one treatment to be effective..    Safety:   In general, Band Ligation is regarded as a safe office procedure. It is relatively pain free and no anesthesia or painful shots are needed. It produces very good results with minimal complications. However, there have been extremely rare reports of life threatening infection and even death related to rubber band ligation. This risk is very small, and certainly less than following surgical removal of hemorrhoids in the operating room.    What to expect:   Band ligation is generally a painless procedure which is well tolerated by the majority of patients. It can be done comfortably in your doctor's office. The mild discomfort-sometimes described as a pressure sensation- is typically self limited, but Extra Strength Tylenol or Ibuprofen may be taken for temporary relief Never take aspirin or medications which contain aspirin.    Frequently Asked Questions     I had band ligation of my hemorrhoids and now I have an aching pain, is that normal?    An aching or pressure sensation in the rectum or near the anus during the first few days after band  "ligation is normal and can often be relieved by sitting in a tub of warm water. Warm water baths are an excellent way to help the muscles around the rectum relax. If the aching persists for more than 3 days, call your doctor.    I saw some blood in the toilet when I tried to have a bowel movement after band ligation of my hemorrhoids, is that ok?    It's normal to experience some minor bleeding for the first few days after rubber band is applied and again when the hemorrhoid tissue falls off 10 - 14 days later. So long as the bleeding is not excessive, there is no need to be alarmed. Of course, if you have significant bleeding, notify your doctor immediately.    I have a little pain \"down there\" and was wondering if I could take some aspirin?  No. Aspirin is a blood thinner and can lead to bleeding after the procedure, and for this reason Tylenol is recommended instead of aspirin. It is also good to use Ibuprofen       I'm usually constipated, will that effect the healing, and what can I take to reduce my constipation?    We suggest that you attempt to keep your stools soft to avoid straining during the time of healing, following band ligation. We recommend a bulk forming natural laxative such as Konsyl, Benefiber, Citrucel or Metamucil. All of these supplements are available over the counter at most local pharmacies. The powder form is preferred over the pill form. You should strive to obtain 35 grams of fiber a day. It is also helpful to drink at least 6 large glassed of water, juice, or gatorade daily to help facilitate the action of the natural laxative.    When should I contact my Doctor?    Call your doctor's office at [phone] if you have any of these signs or symptoms:  Pain which persists or becomes worse after the first 72 hours  Temperature higher than 101 degrees F  Excessive bleeding    I didn't see the ligation band fall off, am I ok?    Yes. The band is only 2mm wide. Chances are you won't see it. " "    I'm running a low grade temp, should I be concerned?    Sometimes minor anorectal procedures like band ligation, can result in bacteria transiently going into the blood stream which may result in a low grade temp, and usually less 100.5 F. This is normal but a high temp 101 F should be addressed immediately.    Isn't rubber band ligation less effective than the new Infra Red Beam Coagulation?    No. Infra red targets a different kind of internal hemorrhoid than does Band ligation. Therefore, the two approaches are not used to treat the same type of hemorrhoid and they can't be compared of that basis.    Will I need more Band Ligation treatments?    Possibly. Shrinking one hemorrhoid complex with band ligation can lead the other complexes to shrink. However, in practice 2 to 4 treatments are sometimes needed to fix the problem completely.      I have hemorrhoids on the outside, can they be Band Ligated?    No. Unfortunately, Band ligation only works for internal hemorrhoids  Hemorrhoids    Did you know...    Hemorrhoids are one of the most common ailments known.  More than half the population will develop hemorrhoids, usually after age 30.  Millions of Americans currently suffer from hemorrhoids.  The average person suffers in silence for a long period before seeking medical care.  Today's treatment methods make some types of hemorrhoid removal much less painful. What are hemorrhoids?    Often described as \"varicose veins of the anus and rectum\", hemorrhoids are enlarged, bulging blood vessels in and about the anus and lower rectum. There are two types of  hemorrhoids: external and internal, which refer to their location.    External (outside) hemorrhoids develop near the anus and are covered by very sensitive skin. These are usually painless. However, if a blood clot (thrombosis) develops in an external hemorrhoid, it becomes a painful, hard lump. The external hemorrhoid may bleed if it ruptures.    Internal " "(inside) hemorrhoids develop within the anus beneath the lining. Painless bleeding and protrusion during bowel movements are the most common symptom. However, an internal hemorrhoid can cause severe pain if it is completely \"prolapsed\" - protrudes from the anal opening and cannot be pushed back inside.    What causes hemorrhoids?    An exact cause is unknown; however, the upright posture of humans alone forces a great deal of pressure on the rectal veins, which sometimes causes them to bulge. Other contributing factors include:    Aging  Chronic constipation or diarrhea  Pregnancy  Heredity  Straining during bowel movements  Faulty bowel function due to overuse of laxatives or enemas  Spending long periods of time (e.g., reading) on the toilet    Whatever the cause, the tissues supporting the vessels stretch. As a result, the vessels dilate; their walls become thin and bleed. If the stretching and pressure continue, the weakened vessels protrude.            What are the symptoms?  If you notice any of the following, you could have hemorrhoids:  Bleeding during bowel movements  Protrusion during bowel movements  Itching in the anal area  Pain  Sensitive lump(s)    How are hemorrhoids treated?    Mild symptoms can be relieved frequently by increasing the amount of fiber (e.g., fruits, vegetables, breads and cereals) and fluids in the diet. Eliminating excessive straining reduces the pressure on hemorrhoids and helps prevent them from protruding. A sitz bath - sitting in plain warm water for about 10 minutes - can also provide some relief .  With these measures, the pain and swelling of most symptomatic hemorrhoids will decrease in two to seven days, and the firm lump should recede within four to six weeks. In cases of severe or persistent pain from a thrombosed hemorrhoid, your physician may elect to remove the hemorrhoid containing the clot with a small incision. Performed under local anesthesia as an outpatient, " this procedure generally provides relief.    Severe hemorrhoids may require special treatment, much of which can be performed on an outpatient basis.    Ligation -- the rubber band treatment - works effectively on internal hemorrhoids that protrude with bowel movements. A small rubber band is placed over the hemorrhoid, cutting off its blood supply. The hemorrhoid and the band fall off in a few days and the wound usually heals in a week or two. This procedure sometimes produces mild discomfort and bleeding and may need to be repeated for a full effect.    Injection and Coagulation can also be used on bleeding hemorrhoids that do not protrude. Both methods are relatively painless and cause the hemorrhoid to shrivel up.    Hemorrhoid stapling -- this is a technique that uses a special device to internally staple and excise    internal hemorrhoidal tissue. The stapling method may lead to shrinkage of but does not remove external hemorrhoids. This procedure is generally more painful that rubber band ligation and less painful than hemorroidectomy.    - Hemorrhoidectomy -- surgery to remove the hemorrhoids - is the most complete method for removal of internal and external hemorrhoids. It is necessary when (1) clots repeatedly form in external hemorrhoids; (2) ligation fails to treat internal hemorrhoids; (3) the protruding hemorrhoid cannot be reduced; or (4) there is persistent bleeding. A hemorrhoidectomy removes excessive tissue that causes the bleeding and protrusion. It is done under anesthesia using either sutures or staplers, and may, depending upon circumstances, require hospitalization and a period of inactivity. Laser hemorrhoidectomies do not offer any advantage over standard operative techniques. They are also quite expensive, and contrary to popular belief, are no less painful.  Rubber Band Ligation of Internal Hemorrhoids:Bulging, bleeding, internal hemorrhoid Rubber band applied at the base of the  hemorrhoid  About 7 days later, the banded hemorrhoid has fallen off leaving a small scar at its base (arrow)    THD (trans-anal Hemorrhoidal Dearterialization) - This is a procedure that is done   To treat hemorrhoids operatively but without excision. This allows for a speedier recovery with less pain and no wounds to heal. It is an excellent option for many patients' hemorrhoids but not for all. Typically the decision is made at the time of surgery as to which method to use when treating your hemorrhoids in a personalized way aimed at being as minimally invasive as possible.     Do hemorrhoids lead to cancer?    No. There is no relationship between hemorrhoids and cancer. However, the symptoms of hemorrhoids, particularly bleeding, are similar to those of colorectal cancer and other diseases of the digestive system. Therefore, it is important that all symptoms are investigated by a physician specially trained in treating diseases of the colon and rectum and that everyone 50 years or older undergo screening tests for colorectal cancer. Do not rely on over-the-counter medications or other self-treatments. See a colorectal surgeon first so your symptoms can be properly evaluated and effective treatment prescribed.    What is a colon and rectal surgeon?    Colon and rectal surgeons are experts in the surgical and non-surgical treatment of diseases of the colon, rectum and anus. They have completed advanced surgical training in the treatment of these diseases as well as full general surgical training. Board-certified colon and rectal surgeons complete residencies in general surgery and colon and rectal surgery, and pass intensive examinations conducted by the American Board of Surgery and the American Board of Colon and Rectal Surgery. They are well-versed in the treatment of both benign and malignant diseases of the colon, rectum and anus and are able to perform routine screening examinations and surgically treat  "conditions if indicated to do so.    ©2012 American Society of Colon & Rectal Surgeons  HIgh Fiber Diet    Fiber goal:  30 - 35 grams of fiber per day  This is equivalent to 10 apples or 5 cups of white rice or 2 1/2 cups of douglas beans. Most of us don't eat this way, so we need to take a supplement.    Types of fiber  Insoluble fiber does not dissolve in water.    It is found in wheat, rye, bran, and other grains, and it is the fiber found in most vegetables.  Psyllium is a common source of insoluble fiber in fiber supplements.  As a supplement, this makes a thick solution which should be drunk at once, not sipped on from the cup.  Insoluble fiber is the best stool bulking agent and is less likely to cause gas because the intestinal bacteria do not digest it so they do not produce more gas.  Examples of insoluble fiber products (each has 5 grams of fiber per teaspoon)  Konsyl: Fiber source is psyllium. It is unflavored, so it needs to be mixed with juice or another flavored non-carbonated beverage rather than water.  Citrucel: Fiber source is hemicelluloses. It is flavored  Metamucil: Fiber source is psyllium (and some insoluble fiber). It is flavored. Get the \"original texture\".  FiberCon: Fiber source is polycarbophil    Soluble fiber dissolves in water and forms a gelatinous substance in the bowel.  It is found in oatmeal, oat bran, fruit, barley, and legumes (beans).  It is digested by intestinal bacteria and therefore is more likely to increase intestinal gas than insoluble fiber.  Soluble fiber appears to bind cholesterol which is then passed in the stool.  It enough insoluble fiber is eaten, the blood cholesterol can be lowered by 10-15%.  Examples of insoluble fiber products  Benefiber: Flavorless    Fiber Facts  Bulking agents (not laxatives).  Although fiber supplements are sold in the laxative section of the pharmacy, they are stool bulking agents, not laxatives.  Because they help retain water in the " stool, they work like a sponge in the bowel.  For the person with dry, hard, small bowel movements, the extra water will keep the stool softer and larger and make it easier to pass.  For the person with loose stool or seepage of fecal material between bowel movements, the extra fluid will be sopped up, resulting in a firmer bowel movement and less seepage.  Better stool consistency.  The consistency of the bulky, fiber-rich stool is not pasty like some laxatives cause and is often easier to clean up since it sticks to itself rather than you.  Benefits beyond a good BM.  There is good data in the medical literature that high fiber diets can:  Reduce hemorrhoid problems  Protect against development of diverticulitis and prevent progression of diverticulosis    Introducing Fiber  Increasing fiber suddenly can cause a feeling of bloating or excess gas.  Start with a teaspoon a day for the first week, two teaspoons per day (same glass) in second week, and heaping tablespoon in third week.  Drink at least 64 ounces of water daily. Eight, 8 ounces glasses is ideal.      Dietary Sources of Fiber  Don’t want to take a fiber supplement?  Good sources of fiber in the diet include  Whole grain foods (such as bran cereals) and breads (those made with whole -wheat grains).  Fresh fruits (including the skin and the pulp).  Dried or stewed fruits (such as prunes, raisins, or apricots).  Root vegetables (such as carrots, turnips or potatoes)  Raw or fresh vegetables (such as cabbage - lettuce is actually low in fiber)  Foods that are rich in fiber are often low in fat - so there are two benefits to eating them.  Most packaged foods list the fiber content in grams per serving (and the serving size will also be listed).  Meats and dairy products are not good sources of fiber.       Serving Size Grams  Fiber  Serving  Size Grams  Fiber   Cereals   Vegetables     Fiber One  Bran Cereal 1/2 cup 14 Broccoli 1/2 cup 2.2   [employer]  Organic High Fiber Hot Cereal 1/3 cup 10 Brussel  Sprouts 1/2 cup 2.3   Kashi  Original Go  Lean 1 cup 10 Carrots 1/2 cup 2.3   All-Bran 1/3 cup 8.5 Corn,  canned 1/2 cup 2.3   Bran Buds 1/3 cup 7.9 Parsnip 1/2 cup 2.7   100% Bran 3/4 cup 8.4 Peas 1/2 cup 3.6   Raisin Bran 3/4 cup 4.0 Potato, with  skin 1 2.5   Shredded 2/3 cup 2.6 Spinach 1/2 cup 2.1           Wheat        Total 1 cup 2.0      Wheat Chex 2/3 cup 2.1      Wheat germ 1/4 cup 3.4 Fruits        Apple, with  skin 1 3.5   Seeds   Apple,  without skin 1 2.7   Ulices Seeds 1 oz 10 g Banana 1 2.4   Flax seeds, whole 1  Tablespoon 10.3 Blueberries 1/2 cup 2.0      Orange 1 2.6   Nuts   Peach, with  skin 1 1.9   Almonds 10 nuts 1.1 Pear, with  skin 1/2 large 3.1   Peanuts 10 nuts 1.4 Prunes 3 3.0      Raisins 1/4 cup 3.1   Breads   Raspberries 1/2 cup 3.1   Bran Muffin 1 2.5 Strawberries 1 cup 3.0   Crisp Rye  Bread 2 2.0      Whole  Wheat  Bread 1 slice 1.4 Legumes  (Beans)        Baked  Beans 1/2 cup 8.9   Pasta &  Rice   Dried Beans 1/2 cup 4.7   Brown  Rice 1/2 cup 1.0 Kidney  Beans 1/2 cup 7.3   Macaroni or  Spaghetti 1 cup 1.0 Lentils 1/2 cup 3.7   Spaghetti, Whole Wheat 1 cup 3.9 Pena Beans 1/2 cup 4.5      Navy Beans 1/2 cup 6.0     Other Resources/Fiber Calculators  (.   USDA National Nutrient Data Base  (http://www.nal.usda.gov/fnic/foodcomp/search/)  (.   [employer] Popular Food Brands Nutrition Facts (http://www.[employer]/f ood-brands/popular)

## 2022-06-15 NOTE — PROGRESS NOTES
HISTORY & PHYSICAL EXAM      CC: Rectal bleeding and hemorrhoids    HPI: Patient is a very nice 66-year-old woman who has a longstanding history of rectal bleeding and hemorrhoids treated by Dr. Corcoran with multiple injection therapy treatments.  She recently had significant bleeding which caused her to be transfused 2 units of blood for hemoglobin of 7.  Her normal hemoglobin is between 10 and 12 and she has a history of systemic mastocytosis.  She has been dealing with bleeding on and off for quite some time however most recently been significant.  She notes that she has not fully emptied which was her bowels and has to go back in 3-4 times in the morning in order to fully empty.  Is been going on for quite some time.  Her last colonoscopy was last year.  She notes she gets bloated occasionally and has constipation for which she is been on multiple agents which when she is on these agents she does not have to strain to move her bowels.  When she sees blood its blood the paper in the bowl and she is  3 para 3 children age 38, 36, and 30 all vaginal deliveries.  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 lives at home with her partner Gagandeep    Past Medical History:   Diagnosis Date   • Anemia    • Anxiety    • Hypertension    • Iron deficiency    • Lipid disorder    • Systemic mastocytosis    • Vitamin D deficiency      Past Surgical History:   Procedure Laterality Date   • AUGMENTATION MAMMAPLASTY Bilateral    • COLONOSCOPY     • PATELLA SURGERY     • REDUCTION MAMMAPLASTY         Current Outpatient Medications:   •  amLODIPine 5 mg tablet, Take 5 mg by mouth daily., Disp: , Rfl:   •  B complex-vitamin C-folic acid 400 mcg tablet tablet, Take 1 tablet by mouth daily., Disp: , Rfl:   •  biotin 1 mg capsule, Take 1 capsule by mouth daily. Dose unknown  , Disp: , Rfl:   •  bisacodyL (DULCOLAX) 5 mg EC tablet, Take 5 mg by mouth daily., Disp: , Rfl:   •  calcium carbonate  (CALCIUM 500 ORAL), Take 500 mg by mouth daily. Dose unknown  , Disp: , Rfl:   •  cholecalciferol, vitamin D3, 1,000 unit capsule, Take 1,000 Units by mouth daily.  , Disp: , Rfl:   •  escitalopram (LEXAPRO) 10 mg tablet, TAKE 1 TABLET BY MOUTH EVERY DAY, Disp: 90 tablet, Rfl: 3  •  hydrochlorothiazide (HYDRODIURIL) 25 mg tablet, Take 25 mg by mouth once daily., Disp: , Rfl:   •  lisdexamfetamine (VYVANSE) 30 mg capsule, Take 1 capsule (30 mg total) by mouth every morning., Disp: 30 capsule, Rfl: 0  •  rosuvastatin 10 mg tablet, Take 1 tablet (10 mg total) by mouth once daily., Disp: 30 tablet, Rfl: 11   Allergies   Allergen Reactions   • Anesthetics - Amide Type - Select Amino Amides Anaphylaxis     Other reaction(s): Anaphylaxis   • Iodinated Contrast Media      Other reaction(s): Anaphylaxis   • Penicillins Hives   • Clarithromycin      Other reaction(s): Rash   • Erythromycin Base      Other reaction(s): Anaphylaxis   • Nsaids (Non-Steroidal Anti-Inflammatory Drug)      Other reaction(s): Anaphylaxis     Social History     Socioeconomic History   • Marital status: Single     Spouse name: Not on file   • Number of children: Not on file   • Years of education: Not on file   • Highest education level: Not on file   Occupational History   • Not on file   Tobacco Use   • Smoking status: Former Smoker     Packs/day: 0.25     Quit date:      Years since quittin.4   • Smokeless tobacco: Never Used   Vaping Use   • Vaping Use: Never used   Substance and Sexual Activity   • Alcohol use: Yes     Alcohol/week: 1.0 standard drink     Types: 1 Glasses of wine per week     Comment: Social   • Drug use: No   • Sexual activity: Defer   Other Topics Concern   • Not on file   Social History Narrative   • Not on file     Social Determinants of Health     Financial Resource Strain: Not on file   Food Insecurity: No Food Insecurity   • Worried About Running Out of Food in the Last Year: Never true   • Ran Out of Food in the  Last Year: Never true   Transportation Needs: Not on file   Physical Activity: Not on file   Stress: Not on file   Social Connections: Not on file   Intimate Partner Violence: Not on file   Housing Stability: Not on file      Family History   Problem Relation Age of Onset   • Heart disease Biological Mother    • Prostate cancer Biological Father        REVIEW OF SYSTEMS: Complete review of systems was performed.  Pertinent positive include: Incisional as tired; respiratory shortness of breath; GI-bloating, diarrhea, constipation, anal rectal bleeding; genitourinary does reach menopause; hematologic/lymphatic denies bleeding problems.  All other systems were reviewed and are negative except as noted with in HPI.    PHYSICAL EXAM:  GEN: On examination the patient is resting comfortably.  NEURO/PSYCH: Alert and oriented ×3  HEENT: Eyes: Sclera anicteric  CHEST: Equal rise and fall the chest.  No tenderness bilaterally.  SKIN: Warm and moist  NODES: No groin or cervical lymphadenopathy  EXT: No palpable edema of the bilateral lower extremities.  No clubbing.  GI: Abdomen soft, nontender, nondistended.  No palpable liver or spleen edge.  No ventral hernias, mass, or tenderness.  RECTAL: Perianal skin demonstrates external hemorrhoid circumferentially without thrombosis.  Digital exam reveals normal sphincter tone with no masses palpable.  Diagnosed anoscopy: Demonstrates multiple and large internal hemorrhoid second-degree nature with stigmata of bleeding.  Procedure: Informed consent was obtained and the rubber band ligation was performed in the right anterior posterior positions.  Patient tolerated this well and postprocedural instructions were given.      ASSESSMENT/PLAN:     Second degree hemorrhoids  Patient has significant second-degree internal hemorrhoids seen in the right anterior, right posterior, right and left lateral positions.  I applied rubber band ligation to the right anterior and right posterior  hemorrhoids today.  She tolerated this well and postprocedural instructions were given.  We will see her back in 2 months time at which time we will consider either additional banding if necessary, and if there is no effect from band ligation, we will consider surgical intervention.

## 2022-06-15 NOTE — LETTER
Ana 15, 2022     Ashley Omalley MD  [address] [facility] [address]    Patient: Ritu Ramirez  YOB: 1955  Date of Visit: 6/15/2022      Dear Dr. Sirisha Omalley:    Thank you for referring Ritu Ramirez to me for evaluation. Below are my notes for this consultation.    If you have questions, please do not hesitate to call me. I look forward to following your patient along with you.         Sincerely,        Abelino Matos MD        CC: MD Lizzette Nicole MD Timothy A Shapiro, MD Philip Y Pearson, MD Jonathan I Gotfried, MD Schoonyoung, Abelino CANNON MD  6/15/2022 11:23 AM  Signed  HISTORY & PHYSICAL EXAM      CC: Rectal bleeding and hemorrhoids    HPI: Patient is a very nice 66-year-old woman who has a longstanding history of rectal bleeding and hemorrhoids treated by Dr. Corcoran with multiple injection therapy treatments.  She recently had significant bleeding which caused her to be transfused 2 units of blood for hemoglobin of 7.  Her normal hemoglobin is between 10 and 12 and she has a history of systemic mastocytosis.  She has been dealing with bleeding on and off for quite some time however most recently been significant.  She notes that she has not fully emptied which was her bowels and has to go back in 3-4 times in the morning in order to fully empty.  Is been going on for quite some time.  Her last colonoscopy was last year.  She notes she gets bloated occasionally and has constipation for which she is been on multiple agents which when she is on these agents she does not have to strain to move her bowels.  When she sees blood its blood the paper in the bowl and she is  3 para 3 children age 38, 36, and 30 all vaginal deliveries.  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 lives at home with her partner Gagandeep    Past Medical History:   Diagnosis Date   • Anemia    • Anxiety    • Hypertension    • Iron  deficiency    • Lipid disorder    • Systemic mastocytosis    • Vitamin D deficiency      Past Surgical History:   Procedure Laterality Date   • AUGMENTATION MAMMAPLASTY Bilateral    • COLONOSCOPY     • PATELLA SURGERY     • REDUCTION MAMMAPLASTY         Current Outpatient Medications:   •  amLODIPine 5 mg tablet, Take 5 mg by mouth daily., Disp: , Rfl:   •  B complex-vitamin C-folic acid 400 mcg tablet tablet, Take 1 tablet by mouth daily., Disp: , Rfl:   •  biotin 1 mg capsule, Take 1 capsule by mouth daily. Dose unknown  , Disp: , Rfl:   •  bisacodyL (DULCOLAX) 5 mg EC tablet, Take 5 mg by mouth daily., Disp: , Rfl:   •  calcium carbonate (CALCIUM 500 ORAL), Take 500 mg by mouth daily. Dose unknown  , Disp: , Rfl:   •  cholecalciferol, vitamin D3, 1,000 unit capsule, Take 1,000 Units by mouth daily.  , Disp: , Rfl:   •  escitalopram (LEXAPRO) 10 mg tablet, TAKE 1 TABLET BY MOUTH EVERY DAY, Disp: 90 tablet, Rfl: 3  •  hydrochlorothiazide (HYDRODIURIL) 25 mg tablet, Take 25 mg by mouth once daily., Disp: , Rfl:   •  lisdexamfetamine (VYVANSE) 30 mg capsule, Take 1 capsule (30 mg total) by mouth every morning., Disp: 30 capsule, Rfl: 0  •  rosuvastatin 10 mg tablet, Take 1 tablet (10 mg total) by mouth once daily., Disp: 30 tablet, Rfl: 11   Allergies   Allergen Reactions   • Anesthetics - Amide Type - Select Amino Amides Anaphylaxis     Other reaction(s): Anaphylaxis   • Iodinated Contrast Media      Other reaction(s): Anaphylaxis   • Penicillins Hives   • Clarithromycin      Other reaction(s): Rash   • Erythromycin Base      Other reaction(s): Anaphylaxis   • Nsaids (Non-Steroidal Anti-Inflammatory Drug)      Other reaction(s): Anaphylaxis     Social History     Socioeconomic History   • Marital status: Single     Spouse name: Not on file   • Number of children: Not on file   • Years of education: Not on file   • Highest education level: Not on file   Occupational History   • Not on file   Tobacco Use   • Smoking  status: Former Smoker     Packs/day: 0.25     Quit date:      Years since quittin.4   • Smokeless tobacco: Never Used   Vaping Use   • Vaping Use: Never used   Substance and Sexual Activity   • Alcohol use: Yes     Alcohol/week: 1.0 standard drink     Types: 1 Glasses of wine per week     Comment: Social   • Drug use: No   • Sexual activity: Defer   Other Topics Concern   • Not on file   Social History Narrative   • Not on file     Social Determinants of Health     Financial Resource Strain: Not on file   Food Insecurity: No Food Insecurity   • Worried About Running Out of Food in the Last Year: Never true   • Ran Out of Food in the Last Year: Never true   Transportation Needs: Not on file   Physical Activity: Not on file   Stress: Not on file   Social Connections: Not on file   Intimate Partner Violence: Not on file   Housing Stability: Not on file      Family History   Problem Relation Age of Onset   • Heart disease Biological Mother    • Prostate cancer Biological Father        REVIEW OF SYSTEMS: Complete review of systems was performed.  Pertinent positive include: Incisional as tired; respiratory shortness of breath; GI-bloating, diarrhea, constipation, anal rectal bleeding; genitourinary does reach menopause; hematologic/lymphatic denies bleeding problems.  All other systems were reviewed and are negative except as noted with in HPI.    PHYSICAL EXAM:  GEN: On examination the patient is resting comfortably.  NEURO/PSYCH: Alert and oriented ×3  HEENT: Eyes: Sclera anicteric  CHEST: Equal rise and fall the chest.  No tenderness bilaterally.  SKIN: Warm and moist  NODES: No groin or cervical lymphadenopathy  EXT: No palpable edema of the bilateral lower extremities.  No clubbing.  GI: Abdomen soft, nontender, nondistended.  No palpable liver or spleen edge.  No ventral hernias, mass, or tenderness.  RECTAL: Perianal skin demonstrates external hemorrhoid circumferentially without thrombosis.  Digital exam  reveals normal sphincter tone with no masses palpable.  Diagnosed anoscopy: Demonstrates multiple and large internal hemorrhoid second-degree nature with stigmata of bleeding.  Procedure: Informed consent was obtained and the rubber band ligation was performed in the right anterior posterior positions.  Patient tolerated this well and postprocedural instructions were given.      ASSESSMENT/PLAN:     Second degree hemorrhoids  Patient has significant second-degree internal hemorrhoids seen in the right anterior, right posterior, right and left lateral positions.  I applied rubber band ligation to the right anterior and right posterior hemorrhoids today.  She tolerated this well and postprocedural instructions were given.  We will see her back in 2 months time at which time we will consider either additional banding if necessary, and if there is no effect from band ligation, we will consider surgical intervention.

## 2022-06-20 ENCOUNTER — LAB REQUISITION (OUTPATIENT)
Dept: LAB | Facility: HOSPITAL | Age: 67
End: 2022-06-20
Payer: MEDICARE

## 2022-06-20 DIAGNOSIS — D50.8 OTHER IRON DEFICIENCY ANEMIAS: ICD-10-CM

## 2022-06-20 LAB
ALBUMIN SERPL-MCNC: 4.6 G/DL (ref 3.4–5)
ALP SERPL-CCNC: 80 IU/L (ref 35–126)
ALT SERPL-CCNC: 25 IU/L (ref 11–54)
ANION GAP SERPL CALC-SCNC: 10 MEQ/L (ref 3–15)
AST SERPL-CCNC: 25 IU/L (ref 15–41)
BASOPHILS # BLD: 0.03 K/UL (ref 0.01–0.1)
BASOPHILS NFR BLD: 0.4 %
BILIRUB SERPL-MCNC: 0.9 MG/DL (ref 0.3–1.2)
BUN SERPL-MCNC: 17 MG/DL (ref 8–20)
CALCIUM SERPL-MCNC: 10.2 MG/DL (ref 8.9–10.3)
CHLORIDE SERPL-SCNC: 97 MEQ/L (ref 98–109)
CO2 SERPL-SCNC: 31 MEQ/L (ref 22–32)
CREAT SERPL-MCNC: 0.6 MG/DL (ref 0.6–1.1)
DIFFERENTIAL METHOD BLD: ABNORMAL
EOSINOPHIL # BLD: 0.24 K/UL (ref 0.04–0.36)
EOSINOPHIL NFR BLD: 3.3 %
ERYTHROCYTE [DISTWIDTH] IN BLOOD BY AUTOMATED COUNT: 19.5 % (ref 11.7–14.4)
GFR SERPL CREATININE-BSD FRML MDRD: >60 ML/MIN/1.73M*2
GLUCOSE SERPL-MCNC: 126 MG/DL (ref 70–99)
HCT VFR BLDCO AUTO: 37.4 % (ref 35–45)
HGB BLD-MCNC: 11.3 G/DL (ref 11.8–15.7)
IMM GRANULOCYTES # BLD AUTO: 0.04 K/UL (ref 0–0.08)
IMM GRANULOCYTES NFR BLD AUTO: 0.6 %
LYMPHOCYTES # BLD: 0.82 K/UL (ref 1.2–3.5)
LYMPHOCYTES NFR BLD: 11.3 %
MCH RBC QN AUTO: 22.9 PG (ref 28–33.2)
MCHC RBC AUTO-ENTMCNC: 30.2 G/DL (ref 32.2–35.5)
MCV RBC AUTO: 75.7 FL (ref 83–98)
MONOCYTES # BLD: 0.56 K/UL (ref 0.28–0.8)
MONOCYTES NFR BLD: 7.7 %
NEUTROPHILS # BLD: 5.58 K/UL (ref 1.7–7)
NEUTROPHILS # BLD: 5.58 K/UL (ref 1.7–7)
NEUTS SEG NFR BLD: 76.7 %
NRBC BLD-RTO: 0 %
PDW BLD AUTO: 9.2 FL (ref 9.4–12.3)
PLATELET # BLD AUTO: 289 K/UL (ref 150–369)
POTASSIUM SERPL-SCNC: 3.1 MEQ/L (ref 3.6–5.1)
PROT SERPL-MCNC: 7.1 G/DL (ref 6–8.2)
RBC # BLD AUTO: 4.94 M/UL (ref 3.93–5.22)
SODIUM SERPL-SCNC: 138 MEQ/L (ref 136–144)
WBC # BLD AUTO: 7.27 K/UL (ref 3.8–10.5)

## 2022-06-20 PROCEDURE — 85025 COMPLETE CBC W/AUTO DIFF WBC: CPT | Performed by: INTERNAL MEDICINE

## 2022-06-20 PROCEDURE — 80053 COMPREHEN METABOLIC PANEL: CPT | Performed by: INTERNAL MEDICINE

## 2022-06-21 RX ORDER — LISDEXAMFETAMINE DIMESYLATE 60 MG/1
60 CAPSULE ORAL EVERY MORNING
Qty: 30 CAPSULE | Refills: 0 | Status: SHIPPED | OUTPATIENT
Start: 2022-06-21 | End: 2022-07-19 | Stop reason: ALTCHOICE

## 2022-07-06 ENCOUNTER — LAB REQUISITION (OUTPATIENT)
Dept: LAB | Facility: HOSPITAL | Age: 67
End: 2022-07-06
Payer: MEDICARE

## 2022-07-06 DIAGNOSIS — D50.8 OTHER IRON DEFICIENCY ANEMIAS: ICD-10-CM

## 2022-07-06 LAB
ANION GAP SERPL CALC-SCNC: 9 MEQ/L (ref 3–15)
BASOPHILS # BLD: 0.03 K/UL (ref 0.01–0.1)
BASOPHILS NFR BLD: 0.4 %
BUN SERPL-MCNC: 13 MG/DL (ref 8–20)
CALCIUM SERPL-MCNC: 9.7 MG/DL (ref 8.9–10.3)
CHLORIDE SERPL-SCNC: 100 MEQ/L (ref 98–109)
CO2 SERPL-SCNC: 27 MEQ/L (ref 22–32)
CREAT SERPL-MCNC: 0.7 MG/DL (ref 0.6–1.1)
DIFFERENTIAL METHOD BLD: ABNORMAL
EOSINOPHIL # BLD: 0.28 K/UL (ref 0.04–0.36)
EOSINOPHIL NFR BLD: 3.7 %
ERYTHROCYTE [DISTWIDTH] IN BLOOD BY AUTOMATED COUNT: 20.5 % (ref 11.7–14.4)
FERRITIN SERPL-MCNC: 45 NG/ML (ref 11–250)
GFR SERPL CREATININE-BSD FRML MDRD: >60 ML/MIN/1.73M*2
GLUCOSE SERPL-MCNC: 111 MG/DL (ref 70–99)
HCT VFR BLDCO AUTO: 35 % (ref 35–45)
HGB BLD-MCNC: 10.7 G/DL (ref 11.8–15.7)
IMM GRANULOCYTES # BLD AUTO: 0.03 K/UL (ref 0–0.08)
IMM GRANULOCYTES NFR BLD AUTO: 0.4 %
IRON SATN MFR SERPL: 6 % (ref 15–45)
IRON SERPL-MCNC: 23 UG/DL (ref 35–150)
LYMPHOCYTES # BLD: 0.79 K/UL (ref 1.2–3.5)
LYMPHOCYTES NFR BLD: 10.5 %
MCH RBC QN AUTO: 23.1 PG (ref 28–33.2)
MCHC RBC AUTO-ENTMCNC: 30.6 G/DL (ref 32.2–35.5)
MCV RBC AUTO: 75.4 FL (ref 83–98)
MONOCYTES # BLD: 0.42 K/UL (ref 0.28–0.8)
MONOCYTES NFR BLD: 5.6 %
NEUTROPHILS # BLD: 5.98 K/UL (ref 1.7–7)
NEUTROPHILS # BLD: 5.98 K/UL (ref 1.7–7)
NEUTS SEG NFR BLD: 79.4 %
NRBC BLD-RTO: 0 %
PDW BLD AUTO: 8.6 FL (ref 9.4–12.3)
PLATELET # BLD AUTO: 429 K/UL (ref 150–369)
POTASSIUM SERPL-SCNC: 3.4 MEQ/L (ref 3.6–5.1)
RBC # BLD AUTO: 4.64 M/UL (ref 3.93–5.22)
SODIUM SERPL-SCNC: 136 MEQ/L (ref 136–144)
TIBC SERPL-MCNC: 354 UG/DL (ref 270–460)
UIBC SERPL-MCNC: 331 UG/DL (ref 180–360)
WBC # BLD AUTO: 7.53 K/UL (ref 3.8–10.5)

## 2022-07-06 PROCEDURE — 85025 COMPLETE CBC W/AUTO DIFF WBC: CPT | Performed by: INTERNAL MEDICINE

## 2022-07-06 PROCEDURE — 82728 ASSAY OF FERRITIN: CPT | Performed by: INTERNAL MEDICINE

## 2022-07-06 PROCEDURE — 83540 ASSAY OF IRON: CPT | Performed by: INTERNAL MEDICINE

## 2022-07-06 PROCEDURE — 80048 BASIC METABOLIC PNL TOTAL CA: CPT | Performed by: INTERNAL MEDICINE

## 2022-07-11 ENCOUNTER — LAB REQUISITION (OUTPATIENT)
Dept: LAB | Facility: HOSPITAL | Age: 67
End: 2022-07-11
Payer: MEDICARE

## 2022-07-11 DIAGNOSIS — D64.9 ANEMIA, UNSPECIFIED: ICD-10-CM

## 2022-07-11 LAB
BASOPHILS # BLD: 0.02 K/UL (ref 0.01–0.1)
BASOPHILS NFR BLD: 0.3 %
DIFFERENTIAL METHOD BLD: ABNORMAL
EOSINOPHIL # BLD: 0.27 K/UL (ref 0.04–0.36)
EOSINOPHIL NFR BLD: 4.5 %
ERYTHROCYTE [DISTWIDTH] IN BLOOD BY AUTOMATED COUNT: 20.3 % (ref 11.7–14.4)
HCT VFR BLDCO AUTO: 33.3 % (ref 35–45)
HGB BLD-MCNC: 10.4 G/DL (ref 11.8–15.7)
IMM GRANULOCYTES # BLD AUTO: 0.03 K/UL (ref 0–0.08)
IMM GRANULOCYTES NFR BLD AUTO: 0.5 %
LYMPHOCYTES # BLD: 0.77 K/UL (ref 1.2–3.5)
LYMPHOCYTES NFR BLD: 12.8 %
MCH RBC QN AUTO: 23.4 PG (ref 28–33.2)
MCHC RBC AUTO-ENTMCNC: 31.2 G/DL (ref 32.2–35.5)
MCV RBC AUTO: 75 FL (ref 83–98)
MONOCYTES # BLD: 0.63 K/UL (ref 0.28–0.8)
MONOCYTES NFR BLD: 10.5 %
NEUTROPHILS # BLD: 4.29 K/UL (ref 1.7–7)
NEUTROPHILS # BLD: 4.29 K/UL (ref 1.7–7)
NEUTS SEG NFR BLD: 71.4 %
NRBC BLD-RTO: 0 %
PDW BLD AUTO: 8.9 FL (ref 9.4–12.3)
PLATELET # BLD AUTO: 328 K/UL (ref 150–369)
RBC # BLD AUTO: 4.44 M/UL (ref 3.93–5.22)
WBC # BLD AUTO: 6.01 K/UL (ref 3.8–10.5)

## 2022-07-11 PROCEDURE — 85025 COMPLETE CBC W/AUTO DIFF WBC: CPT | Performed by: INTERNAL MEDICINE

## 2022-07-19 RX ORDER — DEXTROAMPHETAMINE SACCHARATE, AMPHETAMINE ASPARTATE, DEXTROAMPHETAMINE SULFATE AND AMPHETAMINE SULFATE 2.5; 2.5; 2.5; 2.5 MG/1; MG/1; MG/1; MG/1
TABLET ORAL
Qty: 90 TABLET | Refills: 0 | Status: SHIPPED | OUTPATIENT
Start: 2022-07-19 | End: 2022-07-29 | Stop reason: ALTCHOICE

## 2022-07-20 ENCOUNTER — TELEPHONE (OUTPATIENT)
Dept: INTERNAL MEDICINE | Facility: CLINIC | Age: 67
End: 2022-07-20
Payer: MEDICARE

## 2022-07-20 RX ORDER — ESCITALOPRAM OXALATE 10 MG/1
TABLET ORAL
Qty: 90 TABLET | Refills: 3 | Status: SHIPPED | OUTPATIENT
Start: 2022-07-20 | End: 2023-08-04

## 2022-07-20 NOTE — TELEPHONE ENCOUNTER
Medicine Refill Request    Last Office: 1/17/2022   Last Consult Visit: Visit date not found  Last Telemedicine Visit: 6/1/2022 Ashley Nazario MD    Next Appointment: 8/31/2022      Current Outpatient Medications:   •  amLODIPine 5 mg tablet, Take 5 mg by mouth daily., Disp: , Rfl:   •  B complex-vitamin C-folic acid 400 mcg tablet tablet, Take 1 tablet by mouth daily., Disp: , Rfl:   •  biotin 1 mg capsule, Take 1 capsule by mouth daily. Dose unknown  , Disp: , Rfl:   •  bisacodyL (DULCOLAX) 5 mg EC tablet, Take 5 mg by mouth daily., Disp: , Rfl:   •  calcium carbonate (CALCIUM 500 ORAL), Take 500 mg by mouth daily. Dose unknown  , Disp: , Rfl:   •  cholecalciferol, vitamin D3, 1,000 unit capsule, Take 1,000 Units by mouth daily.  , Disp: , Rfl:   •  dextroamphetamine-amphetamine (AdderalL) 10 mg tablet, Two tablets po in the AM and one in the afternoon, Disp: 90 tablet, Rfl: 0  •  escitalopram (LEXAPRO) 10 mg tablet, TAKE 1 TABLET BY MOUTH EVERY DAY, Disp: 90 tablet, Rfl: 3  •  hydrochlorothiazide (HYDRODIURIL) 25 mg tablet, Take 25 mg by mouth once daily., Disp: , Rfl:   •  rosuvastatin 10 mg tablet, Take 1 tablet (10 mg total) by mouth once daily., Disp: 30 tablet, Rfl: 11      BP Readings from Last 3 Encounters:   06/10/22 116/72   02/23/22 110/64   01/17/22 120/80       Recent Lab results:  Lab Results   Component Value Date    CHOL 150 02/07/2022   ,   Lab Results   Component Value Date    HDL 71 02/07/2022   ,   Lab Results   Component Value Date    LDLCALC 62 02/07/2022   ,   Lab Results   Component Value Date    TRIG 85 02/07/2022        Lab Results   Component Value Date    GLUCOSE 111 (H) 07/06/2022   ,   Lab Results   Component Value Date    HGBA1C 4.7 (L) 08/11/2020         Lab Results   Component Value Date    CREATININE 0.7 07/06/2022       Lab Results   Component Value Date    TSH 1.53 02/07/2022

## 2022-07-20 NOTE — TELEPHONE ENCOUNTER
Pt's Adderall Rx requires a statement of medical necessity by the pharmacy. If you write the letter I can fax to the pharmacy. Thank you.

## 2022-07-25 ENCOUNTER — LAB REQUISITION (OUTPATIENT)
Dept: LAB | Facility: HOSPITAL | Age: 67
End: 2022-07-25
Payer: MEDICARE

## 2022-07-25 DIAGNOSIS — D50.8 OTHER IRON DEFICIENCY ANEMIAS: ICD-10-CM

## 2022-07-25 LAB
ALBUMIN SERPL-MCNC: 4.2 G/DL (ref 3.4–5)
ALP SERPL-CCNC: 74 IU/L (ref 35–126)
ALT SERPL-CCNC: 26 IU/L (ref 11–54)
ANION GAP SERPL CALC-SCNC: 6 MEQ/L (ref 3–15)
AST SERPL-CCNC: 23 IU/L (ref 15–41)
BASOPHILS # BLD: 0.03 K/UL (ref 0.01–0.1)
BASOPHILS NFR BLD: 0.6 %
BILIRUB SERPL-MCNC: 0.3 MG/DL (ref 0.3–1.2)
BUN SERPL-MCNC: 20 MG/DL (ref 8–20)
CALCIUM SERPL-MCNC: 9.8 MG/DL (ref 8.9–10.3)
CHLORIDE SERPL-SCNC: 104 MEQ/L (ref 98–109)
CO2 SERPL-SCNC: 31 MEQ/L (ref 22–32)
CREAT SERPL-MCNC: 0.6 MG/DL (ref 0.6–1.1)
DIFFERENTIAL METHOD BLD: ABNORMAL
EOSINOPHIL # BLD: 0.24 K/UL (ref 0.04–0.36)
EOSINOPHIL NFR BLD: 4.7 %
ERYTHROCYTE [DISTWIDTH] IN BLOOD BY AUTOMATED COUNT: 20.2 % (ref 11.7–14.4)
GFR SERPL CREATININE-BSD FRML MDRD: >60 ML/MIN/1.73M*2
GLUCOSE SERPL-MCNC: 121 MG/DL (ref 70–99)
HCT VFR BLDCO AUTO: 34.5 % (ref 35–45)
HGB BLD-MCNC: 10.6 G/DL (ref 11.8–15.7)
IMM GRANULOCYTES # BLD AUTO: 0.06 K/UL (ref 0–0.08)
IMM GRANULOCYTES NFR BLD AUTO: 1.2 %
LYMPHOCYTES # BLD: 0.7 K/UL (ref 1.2–3.5)
LYMPHOCYTES NFR BLD: 13.8 %
MCH RBC QN AUTO: 24 PG (ref 28–33.2)
MCHC RBC AUTO-ENTMCNC: 30.7 G/DL (ref 32.2–35.5)
MCV RBC AUTO: 78.1 FL (ref 83–98)
MONOCYTES # BLD: 0.39 K/UL (ref 0.28–0.8)
MONOCYTES NFR BLD: 7.7 %
NEUTROPHILS # BLD: 3.65 K/UL (ref 1.7–7)
NEUTROPHILS # BLD: 3.65 K/UL (ref 1.7–7)
NEUTS SEG NFR BLD: 72 %
NRBC BLD-RTO: 0 %
PDW BLD AUTO: 9.5 FL (ref 9.4–12.3)
PHOSPHATE SERPL-MCNC: 3.4 MG/DL (ref 2.4–4.7)
PLATELET # BLD AUTO: 348 K/UL (ref 150–369)
POTASSIUM SERPL-SCNC: 3.3 MEQ/L (ref 3.6–5.1)
PROT SERPL-MCNC: 6.4 G/DL (ref 6–8.2)
RBC # BLD AUTO: 4.42 M/UL (ref 3.93–5.22)
SODIUM SERPL-SCNC: 141 MEQ/L (ref 136–144)
WBC # BLD AUTO: 5.07 K/UL (ref 3.8–10.5)

## 2022-07-25 PROCEDURE — 85025 COMPLETE CBC W/AUTO DIFF WBC: CPT | Performed by: INTERNAL MEDICINE

## 2022-07-25 PROCEDURE — 84100 ASSAY OF PHOSPHORUS: CPT | Performed by: INTERNAL MEDICINE

## 2022-07-25 PROCEDURE — 80053 COMPREHEN METABOLIC PANEL: CPT | Performed by: INTERNAL MEDICINE

## 2022-07-29 RX ORDER — LISDEXAMFETAMINE DIMESYLATE 60 MG/1
60 CAPSULE ORAL EVERY MORNING
Qty: 30 CAPSULE | Refills: 0 | Status: SHIPPED | OUTPATIENT
Start: 2022-07-29 | End: 2022-09-08 | Stop reason: SDUPTHER

## 2022-08-12 ENCOUNTER — TRANSCRIBE ORDERS (OUTPATIENT)
Dept: SCHEDULING | Age: 67
End: 2022-08-12

## 2022-08-12 ENCOUNTER — APPOINTMENT (OUTPATIENT)
Dept: LAB | Facility: HOSPITAL | Age: 67
End: 2022-08-12
Attending: INTERNAL MEDICINE
Payer: MEDICARE

## 2022-08-12 DIAGNOSIS — Q82.2 CONGENITAL CUTANEOUS MASTOCYTOSIS: ICD-10-CM

## 2022-08-12 DIAGNOSIS — E83.39 OTHER DISORDERS OF PHOSPHORUS METABOLISM: ICD-10-CM

## 2022-08-12 DIAGNOSIS — D50.8 OTHER IRON DEFICIENCY ANEMIAS: ICD-10-CM

## 2022-08-12 DIAGNOSIS — D50.8 OTHER IRON DEFICIENCY ANEMIAS: Primary | ICD-10-CM

## 2022-08-12 LAB
ALBUMIN SERPL-MCNC: 4.1 G/DL (ref 3.4–5)
ALP SERPL-CCNC: 76 IU/L (ref 35–126)
ALT SERPL-CCNC: 36 IU/L (ref 11–54)
ANION GAP SERPL CALC-SCNC: 5 MEQ/L (ref 3–15)
AST SERPL-CCNC: 27 IU/L (ref 15–41)
BASOPHILS # BLD: 0.02 K/UL (ref 0.01–0.1)
BASOPHILS NFR BLD: 0.3 %
BILIRUB SERPL-MCNC: 0.4 MG/DL (ref 0.3–1.2)
BUN SERPL-MCNC: 19 MG/DL (ref 8–20)
CALCIUM SERPL-MCNC: 9.4 MG/DL (ref 8.9–10.3)
CHLORIDE SERPL-SCNC: 105 MEQ/L (ref 98–109)
CO2 SERPL-SCNC: 30 MEQ/L (ref 22–32)
CREAT SERPL-MCNC: 0.5 MG/DL (ref 0.6–1.1)
DIFFERENTIAL METHOD BLD: ABNORMAL
EOSINOPHIL # BLD: 0.26 K/UL (ref 0.04–0.36)
EOSINOPHIL NFR BLD: 4.4 %
ERYTHROCYTE [DISTWIDTH] IN BLOOD BY AUTOMATED COUNT: 19.2 % (ref 11.7–14.4)
FERRITIN SERPL-MCNC: 378 NG/ML (ref 11–250)
GFR SERPL CREATININE-BSD FRML MDRD: >60 ML/MIN/1.73M*2
GLUCOSE SERPL-MCNC: 110 MG/DL (ref 70–99)
HCT VFR BLDCO AUTO: 31 % (ref 35–45)
HGB BLD-MCNC: 9.8 G/DL (ref 11.8–15.7)
IMM GRANULOCYTES # BLD AUTO: 0.06 K/UL (ref 0–0.08)
IMM GRANULOCYTES NFR BLD AUTO: 1 %
IRON SATN MFR SERPL: 24 % (ref 15–45)
IRON SERPL-MCNC: 67 UG/DL (ref 35–150)
LYMPHOCYTES # BLD: 0.67 K/UL (ref 1.2–3.5)
LYMPHOCYTES NFR BLD: 11.3 %
MCH RBC QN AUTO: 25.1 PG (ref 28–33.2)
MCHC RBC AUTO-ENTMCNC: 31.6 G/DL (ref 32.2–35.5)
MCV RBC AUTO: 79.5 FL (ref 83–98)
MONOCYTES # BLD: 0.39 K/UL (ref 0.28–0.8)
MONOCYTES NFR BLD: 6.6 %
NEUTROPHILS # BLD: 4.54 K/UL (ref 1.7–7)
NEUTROPHILS # BLD: 4.54 K/UL (ref 1.7–7)
NEUTS SEG NFR BLD: 76.4 %
NRBC BLD-RTO: 0 %
PDW BLD AUTO: 8.3 FL (ref 9.4–12.3)
PHOSPHATE SERPL-MCNC: 2.7 MG/DL (ref 2.4–4.7)
PLATELET # BLD AUTO: 246 K/UL (ref 150–369)
POTASSIUM SERPL-SCNC: 3.7 MEQ/L (ref 3.6–5.1)
PROT SERPL-MCNC: 6.1 G/DL (ref 6–8.2)
RBC # BLD AUTO: 3.9 M/UL (ref 3.93–5.22)
SODIUM SERPL-SCNC: 140 MEQ/L (ref 136–144)
TIBC SERPL-MCNC: 276 UG/DL (ref 270–460)
UIBC SERPL-MCNC: 209 UG/DL (ref 180–360)
WBC # BLD AUTO: 5.94 K/UL (ref 3.8–10.5)

## 2022-08-12 PROCEDURE — 82728 ASSAY OF FERRITIN: CPT

## 2022-08-12 PROCEDURE — 83550 IRON BINDING TEST: CPT

## 2022-08-12 PROCEDURE — 85025 COMPLETE CBC W/AUTO DIFF WBC: CPT

## 2022-08-12 PROCEDURE — 84100 ASSAY OF PHOSPHORUS: CPT

## 2022-08-12 PROCEDURE — 80053 COMPREHEN METABOLIC PANEL: CPT

## 2022-08-12 PROCEDURE — 36415 COLL VENOUS BLD VENIPUNCTURE: CPT

## 2022-08-16 RX ORDER — AMLODIPINE BESYLATE 5 MG/1
5 TABLET ORAL DAILY
Qty: 90 TABLET | Refills: 1 | Status: SHIPPED | OUTPATIENT
Start: 2022-08-16 | End: 2023-04-12 | Stop reason: SDUPTHER

## 2022-08-16 RX ORDER — HYDROCHLOROTHIAZIDE 25 MG/1
25 TABLET ORAL
Qty: 90 TABLET | Refills: 1 | Status: SHIPPED | OUTPATIENT
Start: 2022-08-16 | End: 2022-11-09 | Stop reason: SDUPTHER

## 2022-09-07 RX ORDER — LISDEXAMFETAMINE DIMESYLATE 60 MG/1
60 CAPSULE ORAL EVERY MORNING
Qty: 30 CAPSULE | Refills: 0 | Status: CANCELLED | OUTPATIENT
Start: 2022-09-07 | End: 2022-10-07

## 2022-09-07 NOTE — TELEPHONE ENCOUNTER
----- Message from Ritu Ramirez sent at 9/7/2022 12:42 PM EDT -----  Regarding: Vyvanse   Hello,        [place] on [place] and I have no Vyvanse left, could you please write me a prescription for Vyvanse 60 milligrams and send it to my pharmacy - [facility] in [place].  Thank you,  Ritu Ramirez

## 2022-09-08 DIAGNOSIS — D47.02 SYSTEMIC MASTOCYTOSIS: Primary | ICD-10-CM

## 2022-09-08 RX ORDER — LISDEXAMFETAMINE DIMESYLATE 60 MG/1
60 CAPSULE ORAL EVERY MORNING
Qty: 30 CAPSULE | Refills: 0 | Status: SHIPPED | OUTPATIENT
Start: 2022-09-08 | End: 2022-10-24 | Stop reason: SDUPTHER

## 2022-09-26 ENCOUNTER — APPOINTMENT (OUTPATIENT)
Dept: LAB | Facility: HOSPITAL | Age: 67
End: 2022-09-26
Attending: INTERNAL MEDICINE
Payer: MEDICARE

## 2022-09-26 ENCOUNTER — TRANSCRIBE ORDERS (OUTPATIENT)
Dept: SCHEDULING | Age: 67
End: 2022-09-26

## 2022-09-26 DIAGNOSIS — D50.8 OTHER IRON DEFICIENCY ANEMIAS: ICD-10-CM

## 2022-09-26 DIAGNOSIS — D50.8 OTHER IRON DEFICIENCY ANEMIAS: Primary | ICD-10-CM

## 2022-09-26 LAB
ANION GAP SERPL CALC-SCNC: 8 MEQ/L (ref 3–15)
BASOPHILS # BLD: 0.03 K/UL (ref 0.01–0.1)
BASOPHILS NFR BLD: 0.5 %
BUN SERPL-MCNC: 16 MG/DL (ref 8–20)
CALCIUM SERPL-MCNC: 9.2 MG/DL (ref 8.9–10.3)
CHLORIDE SERPL-SCNC: 96 MEQ/L (ref 98–109)
CO2 SERPL-SCNC: 32 MEQ/L (ref 22–32)
CREAT SERPL-MCNC: 0.5 MG/DL (ref 0.6–1.1)
DIFFERENTIAL METHOD BLD: ABNORMAL
EOSINOPHIL # BLD: 0.34 K/UL (ref 0.04–0.36)
EOSINOPHIL NFR BLD: 6 %
ERYTHROCYTE [DISTWIDTH] IN BLOOD BY AUTOMATED COUNT: 14.8 % (ref 11.7–14.4)
FERRITIN SERPL-MCNC: 80 NG/ML (ref 11–250)
GFR SERPL CREATININE-BSD FRML MDRD: >60 ML/MIN/1.73M*2
GLUCOSE SERPL-MCNC: 105 MG/DL (ref 70–99)
HCT VFR BLDCO AUTO: 31.8 % (ref 35–45)
HGB BLD-MCNC: 10.1 G/DL (ref 11.8–15.7)
IMM GRANULOCYTES # BLD AUTO: 0.05 K/UL (ref 0–0.08)
IMM GRANULOCYTES NFR BLD AUTO: 0.9 %
IRON SATN MFR SERPL: 14 % (ref 15–45)
IRON SERPL-MCNC: 45 UG/DL (ref 35–150)
LYMPHOCYTES # BLD: 0.71 K/UL (ref 1.2–3.5)
LYMPHOCYTES NFR BLD: 12.5 %
MCH RBC QN AUTO: 26.2 PG (ref 28–33.2)
MCHC RBC AUTO-ENTMCNC: 31.8 G/DL (ref 32.2–35.5)
MCV RBC AUTO: 82.6 FL (ref 83–98)
MONOCYTES # BLD: 0.48 K/UL (ref 0.28–0.8)
MONOCYTES NFR BLD: 8.4 %
NEUTROPHILS # BLD: 4.08 K/UL (ref 1.7–7)
NEUTROPHILS # BLD: 4.08 K/UL (ref 1.7–7)
NEUTS SEG NFR BLD: 71.7 %
NRBC BLD-RTO: 0 %
PDW BLD AUTO: 8.8 FL (ref 9.4–12.3)
PLATELET # BLD AUTO: 308 K/UL (ref 150–369)
POTASSIUM SERPL-SCNC: 3.4 MEQ/L (ref 3.6–5.1)
RBC # BLD AUTO: 3.85 M/UL (ref 3.93–5.22)
SODIUM SERPL-SCNC: 136 MEQ/L (ref 136–144)
TIBC SERPL-MCNC: 325 UG/DL (ref 270–460)
UIBC SERPL-MCNC: 280 UG/DL (ref 180–360)
WBC # BLD AUTO: 5.69 K/UL (ref 3.8–10.5)

## 2022-09-26 PROCEDURE — 83520 IMMUNOASSAY QUANT NOS NONAB: CPT

## 2022-09-26 PROCEDURE — 83088 ASSAY OF HISTAMINE: CPT

## 2022-09-26 PROCEDURE — 85025 COMPLETE CBC W/AUTO DIFF WBC: CPT

## 2022-09-26 PROCEDURE — 80048 BASIC METABOLIC PNL TOTAL CA: CPT

## 2022-09-26 PROCEDURE — 82728 ASSAY OF FERRITIN: CPT

## 2022-09-26 PROCEDURE — 36415 COLL VENOUS BLD VENIPUNCTURE: CPT

## 2022-09-26 PROCEDURE — 83540 ASSAY OF IRON: CPT

## 2022-09-29 LAB — TRYPTASE SERPL-MCNC: 46.2 MCG/L

## 2022-09-30 LAB — HISTAMINE SERPL-MCNC: <1.5 NG/ML

## 2022-10-10 ENCOUNTER — LAB REQUISITION (OUTPATIENT)
Dept: LAB | Facility: HOSPITAL | Age: 67
End: 2022-10-10
Payer: MEDICARE

## 2022-10-10 DIAGNOSIS — D47.01 CUTANEOUS MASTOCYTOSIS: ICD-10-CM

## 2022-10-10 LAB
BASOPHILS # BLD: 0.04 K/UL (ref 0.01–0.1)
BASOPHILS NFR BLD: 0.7 %
DIFFERENTIAL METHOD BLD: ABNORMAL
EOSINOPHIL # BLD: 0.36 K/UL (ref 0.04–0.36)
EOSINOPHIL NFR BLD: 6.2 %
ERYTHROCYTE [DISTWIDTH] IN BLOOD BY AUTOMATED COUNT: 13.4 % (ref 11.7–14.4)
HCT VFR BLDCO AUTO: 32.8 % (ref 35–45)
HGB BLD-MCNC: 10.4 G/DL (ref 11.8–15.7)
IMM GRANULOCYTES # BLD AUTO: 0.05 K/UL (ref 0–0.08)
IMM GRANULOCYTES NFR BLD AUTO: 0.9 %
LYMPHOCYTES # BLD: 0.92 K/UL (ref 1.2–3.5)
LYMPHOCYTES NFR BLD: 15.9 %
MCH RBC QN AUTO: 25.7 PG (ref 28–33.2)
MCHC RBC AUTO-ENTMCNC: 31.7 G/DL (ref 32.2–35.5)
MCV RBC AUTO: 81.2 FL (ref 83–98)
MONOCYTES # BLD: 0.55 K/UL (ref 0.28–0.8)
MONOCYTES NFR BLD: 9.5 %
NEUTROPHILS # BLD: 3.87 K/UL (ref 1.7–7)
NEUTROPHILS # BLD: 3.87 K/UL (ref 1.7–7)
NEUTS SEG NFR BLD: 66.8 %
NRBC BLD-RTO: 0 %
PDW BLD AUTO: 9.1 FL (ref 9.4–12.3)
PLATELET # BLD AUTO: 318 K/UL (ref 150–369)
RBC # BLD AUTO: 4.04 M/UL (ref 3.93–5.22)
WBC # BLD AUTO: 5.79 K/UL (ref 3.8–10.5)

## 2022-10-10 PROCEDURE — 85025 COMPLETE CBC W/AUTO DIFF WBC: CPT | Performed by: INTERNAL MEDICINE

## 2022-10-17 ENCOUNTER — LAB REQUISITION (OUTPATIENT)
Dept: LAB | Facility: HOSPITAL | Age: 67
End: 2022-10-17
Payer: MEDICARE

## 2022-10-17 DIAGNOSIS — D47.01 CUTANEOUS MASTOCYTOSIS: ICD-10-CM

## 2022-10-17 LAB
BASOPHILS # BLD: 0.03 K/UL (ref 0.01–0.1)
BASOPHILS NFR BLD: 0.4 %
DIFFERENTIAL METHOD BLD: ABNORMAL
EOSINOPHIL # BLD: 0.36 K/UL (ref 0.04–0.36)
EOSINOPHIL NFR BLD: 5.3 %
ERYTHROCYTE [DISTWIDTH] IN BLOOD BY AUTOMATED COUNT: 13.9 % (ref 11.7–14.4)
HCT VFR BLDCO AUTO: 29.8 % (ref 35–45)
HGB BLD-MCNC: 9.3 G/DL (ref 11.8–15.7)
IMM GRANULOCYTES # BLD AUTO: 0.18 K/UL (ref 0–0.08)
IMM GRANULOCYTES NFR BLD AUTO: 2.7 %
LYMPHOCYTES # BLD: 1.01 K/UL (ref 1.2–3.5)
LYMPHOCYTES NFR BLD: 14.9 %
MCH RBC QN AUTO: 25.7 PG (ref 28–33.2)
MCHC RBC AUTO-ENTMCNC: 31.2 G/DL (ref 32.2–35.5)
MCV RBC AUTO: 82.3 FL (ref 83–98)
MONOCYTES # BLD: 0.57 K/UL (ref 0.28–0.8)
MONOCYTES NFR BLD: 8.4 %
NEUTROPHILS # BLD: 4.64 K/UL (ref 1.7–7)
NEUTROPHILS # BLD: 4.64 K/UL (ref 1.7–7)
NEUTS SEG NFR BLD: 68.3 %
NRBC BLD-RTO: 0 %
PDW BLD AUTO: 8.7 FL (ref 9.4–12.3)
PLATELET # BLD AUTO: 328 K/UL (ref 150–369)
RBC # BLD AUTO: 3.62 M/UL (ref 3.93–5.22)
WBC # BLD AUTO: 6.79 K/UL (ref 3.8–10.5)

## 2022-10-17 PROCEDURE — 85025 COMPLETE CBC W/AUTO DIFF WBC: CPT | Performed by: INTERNAL MEDICINE

## 2022-10-20 ENCOUNTER — TRANSCRIBE ORDERS (OUTPATIENT)
Dept: SCHEDULING | Age: 67
End: 2022-10-20

## 2022-10-20 ENCOUNTER — HOSPITAL ENCOUNTER (OUTPATIENT)
Dept: RADIOLOGY | Age: 67
Discharge: HOME | End: 2022-10-20
Attending: INTERNAL MEDICINE
Payer: MEDICARE

## 2022-10-20 DIAGNOSIS — Q82.2 CONGENITAL CUTANEOUS MASTOCYTOSIS: ICD-10-CM

## 2022-10-20 DIAGNOSIS — D47.02 SYSTEMIC MASTOCYTOSIS: ICD-10-CM

## 2022-10-20 DIAGNOSIS — K82.4 CHOLESTEROLOSIS OF GALLBLADDER: Primary | ICD-10-CM

## 2022-10-20 PROCEDURE — 76705 ECHO EXAM OF ABDOMEN: CPT

## 2022-10-21 ENCOUNTER — TELEPHONE (OUTPATIENT)
Dept: CARDIOLOGY | Facility: CLINIC | Age: 67
End: 2022-10-21
Payer: MEDICARE

## 2022-10-24 ENCOUNTER — OFFICE VISIT (OUTPATIENT)
Dept: CARDIOLOGY | Facility: CLINIC | Age: 67
End: 2022-10-24
Payer: MEDICARE

## 2022-10-24 VITALS
RESPIRATION RATE: 18 BRPM | OXYGEN SATURATION: 98 % | HEIGHT: 61 IN | WEIGHT: 120 LBS | DIASTOLIC BLOOD PRESSURE: 70 MMHG | HEART RATE: 64 BPM | SYSTOLIC BLOOD PRESSURE: 110 MMHG | BODY MASS INDEX: 22.66 KG/M2

## 2022-10-24 DIAGNOSIS — R06.09 OTHER FORM OF DYSPNEA: ICD-10-CM

## 2022-10-24 DIAGNOSIS — E78.5 DYSLIPIDEMIA: Primary | ICD-10-CM

## 2022-10-24 DIAGNOSIS — I10 PRIMARY HYPERTENSION: ICD-10-CM

## 2022-10-24 PROCEDURE — G8752 SYS BP LESS 140: HCPCS | Performed by: INTERNAL MEDICINE

## 2022-10-24 PROCEDURE — G8754 DIAS BP LESS 90: HCPCS | Performed by: INTERNAL MEDICINE

## 2022-10-24 PROCEDURE — 99214 OFFICE O/P EST MOD 30 MIN: CPT | Performed by: INTERNAL MEDICINE

## 2022-10-24 PROCEDURE — 93000 ELECTROCARDIOGRAM COMPLETE: CPT | Performed by: INTERNAL MEDICINE

## 2022-10-24 RX ORDER — POTASSIUM CHLORIDE 20 MEQ/1
20 TABLET, EXTENDED RELEASE ORAL DAILY
COMMUNITY
End: 2023-05-26

## 2022-10-24 RX ORDER — LISDEXAMFETAMINE DIMESYLATE 60 MG/1
60 CAPSULE ORAL EVERY MORNING
Qty: 30 CAPSULE | Refills: 0 | Status: SHIPPED | OUTPATIENT
Start: 2022-10-24 | End: 2022-12-06 | Stop reason: SDUPTHER

## 2022-10-24 NOTE — PROGRESS NOTES
Cardiology Note       Reason for visit:   Chief Complaint   Patient presents with    Elevated coronary artery calcium score      HPI   Ritu Ramirez is a 67 y.o. female who presents for  ***      Past Medical History:   Diagnosis Date    Anemia     Anxiety     Hypertension     Iron deficiency     Lipid disorder     Systemic mastocytosis     Vitamin D deficiency      Past Surgical History:   Procedure Laterality Date    AUGMENTATION MAMMAPLASTY Bilateral     COLONOSCOPY      PATELLA SURGERY      REDUCTION MAMMAPLASTY       Social History     Socioeconomic History    Marital status: Single     Spouse name: None    Number of children: None    Years of education: None    Highest education level: None   Tobacco Use    Smoking status: Former     Packs/day: 0.25     Types: Cigarettes     Quit date:      Years since quittin.8    Smokeless tobacco: Never   Vaping Use    Vaping Use: Never used   Substance and Sexual Activity    Alcohol use: Yes     Alcohol/week: 1.0 standard drink     Types: 1 Glasses of wine per week     Comment: Social    Drug use: No    Sexual activity: Defer     Social Determinants of Health     Food Insecurity: No Food Insecurity    Worried About Running Out of Food in the Last Year: Never true    Ran Out of Food in the Last Year: Never true     Family History   Problem Relation Age of Onset    Heart disease Biological Mother     Prostate cancer Biological Father      Anesthetics - amide type - select amino amides, Iodinated contrast media, Penicillins, Clarithromycin, Erythromycin base, and Nsaids (non-steroidal anti-inflammatory drug)  Current Outpatient Medications   Medication Sig Dispense Refill    amLODIPine (NORVASC) 5 mg tablet Take 1 tablet (5 mg total) by mouth daily. 90 tablet 1    B complex-vitamin C-folic acid 400 mcg tablet tablet Take 1 tablet by mouth daily.      biotin 1 mg capsule Take 1 capsule by mouth daily. Dose unknown        bisacodyL  "(DULCOLAX) 5 mg EC tablet Take 5 mg by mouth daily.      calcium carbonate (CALCIUM 500 ORAL) Take 500 mg by mouth daily. Dose unknown        cholecalciferol, vitamin D3, 1,000 unit capsule Take 1,000 Units by mouth daily.        escitalopram (LEXAPRO) 10 mg tablet TAKE 1 TABLET BY MOUTH EVERY DAY 90 tablet 3    hydrochlorothiazide (HYDRODIURIL) 25 mg tablet Take 1 tablet (25 mg total) by mouth once daily. 90 tablet 1    lisdexamfetamine (VYVANSE) 60 mg capsule Take 1 capsule (60 mg total) by mouth every morning. 30 capsule 0    rosuvastatin 10 mg tablet Take 1 tablet (10 mg total) by mouth once daily. 30 tablet 11     No current facility-administered medications for this visit.       ROS  Objective   Vitals:    10/24/22 1408   Pulse: 64   Resp: 18   SpO2: 98%   Weight: 54.4 kg (120 lb)   Height: 1.549 m (5' 1\")     Weight: 54.4 kg (120 lb)     Physical Exam:    General Appearance:  Alert, no distress   Head:  Normocephalic, without obvious abnormality, atraumatic   Eyes:  Conjunctiva/corneas clear, EOM's intact   Neck: No thyroid enlargement. No JVD. No bruits    Lungs:   Clear to auscultation bilaterally, respirations unlabored, no rales, no wheezing   Heart:  Regular rhythm, S1 and S2 normal, no murmur, rub or gallop   Abdomen:   Soft, non-tender, no masses, no organomegaly   Vascular: Pulses 2+ and symmetric all extremities, no carotid bruit or jugular vein distention   Musculoskeletal:  Skin: No injury or deformity  Skin color, texture, turgor normal, no rashes or lesions, no cyanosis or edema   Extremities: Extremities normal, atraumatic, pulses normal   Behavior/Emotional: Appropriate, cooperative       Lab Results   Component Value Date    WBC 6.79 10/17/2022    HGB 9.3 (L) 10/17/2022     10/17/2022    CHOL 150 02/07/2022    TRIG 85 02/07/2022    HDL 71 02/07/2022    LDLCALC 62 02/07/2022    ALT 36 08/12/2022    AST 27 08/12/2022     09/26/2022    K 3.4 (L) 09/26/2022    CREATININE 0.5 (L) " 09/26/2022    TSH 1.53 02/07/2022    INR 1.0 05/07/2021    HGBA1C 4.7 (L) 08/11/2020          ECG   {Findings; ecg normal:14639}  ***   ASSESSMENT/PLAN    Problem List Items Addressed This Visit        High    Primary hypertension - Primary    Overview     2017 echocardiogram: Normal structural heart         Relevant Orders    ECG 12 LEAD-OFFICE PERFORMED (Completed)        No follow-ups on file.    Bright Selby MD  10/24/2022

## 2022-10-24 NOTE — PROGRESS NOTES
Cardiology Note       Reason for visit:   Chief Complaint   Patient presents with    Elevated coronary artery calcium score      HPI   Ritu Ramirez is a 67 y.o. female who presents for Cardiac follow-up.  She was last seen in the office in February.  Since then she denies illness or hospitalizations.    She has noticed Some dyspnea on exertion.  It does not wake her from her sleep.  Does not occur at rest.  She did 1 episode of palpitations last month while out in [place].  The episode lasted less than 5 minutes.  She denies chest pain, lightheadedness or syncope.  She has no lower extremity edema.    She continues to follow-up with Dr. Ani Calhoun Regarding her history of mastocytosis.  Showed hemoglobin of 9.3 on  with a potassium of 3.4 and a creatinine of 0.5 on .    She exercises by lifting weights twice a week.  She also walks.  She plays [employer] 5 days a week. Her weight is stable.      Past Medical History:   Diagnosis Date    Anemia     Anxiety     Hypertension     Iron deficiency     Lipid disorder     Systemic mastocytosis     Vitamin D deficiency      Past Surgical History:   Procedure Laterality Date    AUGMENTATION MAMMAPLASTY Bilateral     COLONOSCOPY      PATELLA SURGERY      REDUCTION MAMMAPLASTY       Social History     Socioeconomic History    Marital status: Single     Spouse name: None    Number of children: None    Years of education: None    Highest education level: None   Tobacco Use    Smoking status: Former     Packs/day: 0.25     Types: Cigarettes     Quit date:      Years since quittin.8    Smokeless tobacco: Never   Vaping Use    Vaping Use: Never used   Substance and Sexual Activity    Alcohol use: Yes     Alcohol/week: 1.0 standard drink     Types: 1 Glasses of wine per week     Comment: Social    Drug use: No    Sexual activity: Defer     Social Determinants of Health     Food Insecurity: No Food Insecurity    Worried About  Running Out of Food in the Last Year: Never true    Ran Out of Food in the Last Year: Never true     Family History   Problem Relation Age of Onset    Heart disease Biological Mother     Prostate cancer Biological Father      Anesthetics - amide type - select amino amides, Iodinated contrast media, Penicillins, Clarithromycin, Erythromycin base, and Nsaids (non-steroidal anti-inflammatory drug)  Current Outpatient Medications   Medication Sig Dispense Refill    amLODIPine (NORVASC) 5 mg tablet Take 1 tablet (5 mg total) by mouth daily. 90 tablet 1    B complex-vitamin C-folic acid 400 mcg tablet tablet Take 1 tablet by mouth daily.      biotin 1 mg capsule Take 1 capsule by mouth daily. Dose unknown        bisacodyL (DULCOLAX) 5 mg EC tablet Take 5 mg by mouth daily.      calcium carbonate (CALCIUM 500 ORAL) Take 500 mg by mouth daily. Dose unknown        cholecalciferol, vitamin D3, 1,000 unit capsule Take 1,000 Units by mouth daily.        escitalopram (LEXAPRO) 10 mg tablet TAKE 1 TABLET BY MOUTH EVERY DAY 90 tablet 3    hydrochlorothiazide (HYDRODIURIL) 25 mg tablet Take 1 tablet (25 mg total) by mouth once daily. 90 tablet 1    lisdexamfetamine (VYVANSE) 60 mg capsule Take 1 capsule (60 mg total) by mouth every morning. 30 capsule 0    potassium chloride (KLOR-CON M) 20 mEq CR tablet Take 20 mEq by mouth daily.      rosuvastatin 10 mg tablet Take 1 tablet (10 mg total) by mouth once daily. 30 tablet 11     No current facility-administered medications for this visit.       ROS  Objective   Vitals:    10/24/22 1427   BP: 110/70   Pulse:    Resp:    SpO2:        Physical Exam:    General Appearance:  Alert, no distress   Head:  Normocephalic, without obvious abnormality, atraumatic   Eyes:  Conjunctiva/corneas clear, EOM's intact   Neck: No thyroid enlargement. No JVD. No bruits    Lungs:   Clear to auscultation bilaterally, respirations unlabored, no rales, no wheezing   Heart:  Regular rhythm, S1  and S2 normal, no murmur, rub or gallop   Abdomen:   Soft, non-tender, no masses, no organomegaly   Vascular: Pulses 2+ and symmetric all extremities, no carotid bruit or jugular vein distention   Musculoskeletal:  Skin: No injury or deformity  Skin color, texture, turgor normal, no rashes or lesions, no cyanosis or edema   Extremities: Extremities normal, atraumatic, pulses normal   Behavior/Emotional: Appropriate, cooperative       Lab Results   Component Value Date    WBC 6.79 10/17/2022    HGB 9.3 (L) 10/17/2022     10/17/2022    CHOL 150 02/07/2022    TRIG 85 02/07/2022    HDL 71 02/07/2022    ALT 36 08/12/2022    AST 27 08/12/2022     09/26/2022    K 3.4 (L) 09/26/2022    CREATININE 0.5 (L) 09/26/2022    TSH 1.53 02/07/2022    INR 1.0 05/07/2021    HGBA1C 4.7 (L) 08/11/2020          ECG   sinus rhythm  83   ASSESSMENT/PLAN    Hypokalemia  Potassium was 3.4 in September 26.  She does take 20 mill equivalents of potassium. Her labs are followed by her hematologist.    Systemic mastocytosis  Per Dr. Ani Calhoun.    Dyslipidemia  She remains on rosuvastatin with good tolerance.  She will check a lipid panel.  We will call her with the results.    Elevated coronary artery calcium score  6/2019 score: 128 (90th%)  2/12/2020 SE--11:16, 92%mPHR, negative    She has noted some dyspnea on exertion.  We will check a stress echocardiogram.  We will call her with the results. She will follow-up in the office in June.  She knows to contact us prior to then with any signs or symptoms of concern.    Sinus bradycardia  She has the [employer] jeff.  Her heart rate is appropriate in the office today.    Primary hypertension  2017 echocardiogram: Normal structural heart    Blood pressure is under reasonable control today.  She knows to limit salt.    Iron deficiency anemia  Hemoglobin 9.3 on October 17, 2022.    Palpitations  These are stable at present.     By signing my name below, Nora SHARP, attest that  this documentation has been prepared under the direction and in the presence of Bright Selby MD.    10/24/2022 2:49 PM      I, Bright Selby MD, personally performed the services described in this documentation as described by Nora Hoffman in my presence, and it is accurate and complete.        Bright Selby M.D., [facility]         Return in about 8 months (around 6/24/2023).    MELIZA Caba  10/24/2022

## 2022-10-24 NOTE — ASSESSMENT & PLAN NOTE
She remains on rosuvastatin with good tolerance.  She will check a lipid panel.  We will call her with the results.

## 2022-10-24 NOTE — TELEPHONE ENCOUNTER
Medicine Refill Request    Last Office: 1/17/2022   Last Consult Visit: Visit date not found  Last Telemedicine Visit: 6/1/2022 Ashley Nazario MD    Next Appointment: 4/12/2023      Current Outpatient Medications:     amLODIPine (NORVASC) 5 mg tablet, Take 1 tablet (5 mg total) by mouth daily., Disp: 90 tablet, Rfl: 1    B complex-vitamin C-folic acid 400 mcg tablet tablet, Take 1 tablet by mouth daily., Disp: , Rfl:     biotin 1 mg capsule, Take 1 capsule by mouth daily. Dose unknown  , Disp: , Rfl:     bisacodyL (DULCOLAX) 5 mg EC tablet, Take 5 mg by mouth daily., Disp: , Rfl:     calcium carbonate (CALCIUM 500 ORAL), Take 500 mg by mouth daily. Dose unknown  , Disp: , Rfl:     cholecalciferol, vitamin D3, 1,000 unit capsule, Take 1,000 Units by mouth daily.  , Disp: , Rfl:     escitalopram (LEXAPRO) 10 mg tablet, TAKE 1 TABLET BY MOUTH EVERY DAY, Disp: 90 tablet, Rfl: 3    hydrochlorothiazide (HYDRODIURIL) 25 mg tablet, Take 1 tablet (25 mg total) by mouth once daily., Disp: 90 tablet, Rfl: 1    lisdexamfetamine (VYVANSE) 60 mg capsule, Take 1 capsule (60 mg total) by mouth every morning., Disp: 30 capsule, Rfl: 0    rosuvastatin 10 mg tablet, Take 1 tablet (10 mg total) by mouth once daily., Disp: 30 tablet, Rfl: 11      BP Readings from Last 3 Encounters:   06/10/22 116/72   02/23/22 110/64   01/17/22 120/80       Recent Lab results:  Lab Results   Component Value Date    CHOL 150 02/07/2022   ,   Lab Results   Component Value Date    HDL 71 02/07/2022   ,   Lab Results   Component Value Date    LDLCALC 62 02/07/2022   ,   Lab Results   Component Value Date    TRIG 85 02/07/2022        Lab Results   Component Value Date    GLUCOSE 105 (H) 09/26/2022   ,   Lab Results   Component Value Date    HGBA1C 4.7 (L) 08/11/2020         Lab Results   Component Value Date    CREATININE 0.5 (L) 09/26/2022       Lab Results   Component Value Date    TSH 1.53 02/07/2022

## 2022-10-24 NOTE — ASSESSMENT & PLAN NOTE
2017 echocardiogram: Normal structural heart    Blood pressure is under reasonable control today.  She knows to limit salt.

## 2022-10-24 NOTE — ASSESSMENT & PLAN NOTE
6/2019 score: 128 (90th%)  2/12/2020 SE--11:16, 92%mPHR, negative    She has noted some dyspnea on exertion.  We will check a stress echocardiogram.  We will call her with the results. She will follow-up in the office in June.  She knows to contact us prior to then with any signs or symptoms of concern.

## 2022-10-24 NOTE — LETTER
October 24, 2022     Ashley Omalley MD  [address] [facility] [address]    Patient: Ritu Ramirez  YOB: 1955  Date of Visit: 10/24/2022      Dear Dr. Sirisha Omalley:    Thank you for referring Ritu Ramirez to me for evaluation. Below are my notes for this consultation.    If you have questions, please do not hesitate to call me. I look forward to following your patient along with you.         Sincerely,        Bright Selby MD        CC: No Recipients  Bright Selby MD  10/24/2022  5:11 PM  Signed     Cardiology Note       Reason for visit:   Chief Complaint   Patient presents with    Elevated coronary artery calcium score      HPI    Ritu Ramirze is a 67 y.o. female who presents for Cardiac follow-up.  She was last seen in the office in February.  Since then she denies illness or hospitalizations.    She has noticed Some dyspnea on exertion.  It does not wake her from her sleep.  Does not occur at rest.  She did 1 episode of palpitations last month while out in [place].  The episode lasted less than 5 minutes.  She denies chest pain, lightheadedness or syncope.  She has no lower extremity edema.    She continues to follow-up with Dr. Ani Calhoun Regarding her history of mastocytosis.  Showed hemoglobin of 9.3 on October 17 with a potassium of 3.4 and a creatinine of 0.5 on September 26.    She exercises by lifting weights twice a week.  She also walks.  She plays [employer] 5 days a week. Her weight is stable.      Past Medical History:   Diagnosis Date    Anemia     Anxiety     Hypertension     Iron deficiency     Lipid disorder     Systemic mastocytosis     Vitamin D deficiency      Past Surgical History:   Procedure Laterality Date    AUGMENTATION MAMMAPLASTY Bilateral     COLONOSCOPY      PATELLA SURGERY      REDUCTION MAMMAPLASTY       Social History     Socioeconomic History    Marital status: Single     Spouse name: None    Number of children:  None    Years of education: None    Highest education level: None   Tobacco Use    Smoking status: Former     Packs/day: 0.25     Types: Cigarettes     Quit date:      Years since quittin.8    Smokeless tobacco: Never   Vaping Use    Vaping Use: Never used   Substance and Sexual Activity    Alcohol use: Yes     Alcohol/week: 1.0 standard drink     Types: 1 Glasses of wine per week     Comment: Social    Drug use: No    Sexual activity: Defer     Social Determinants of Health     Food Insecurity: No Food Insecurity    Worried About Running Out of Food in the Last Year: Never true    Ran Out of Food in the Last Year: Never true     Family History   Problem Relation Age of Onset    Heart disease Biological Mother     Prostate cancer Biological Father      Anesthetics - amide type - select amino amides, Iodinated contrast media, Penicillins, Clarithromycin, Erythromycin base, and Nsaids (non-steroidal anti-inflammatory drug)  Current Outpatient Medications   Medication Sig Dispense Refill    amLODIPine (NORVASC) 5 mg tablet Take 1 tablet (5 mg total) by mouth daily. 90 tablet 1    B complex-vitamin C-folic acid 400 mcg tablet tablet Take 1 tablet by mouth daily.      biotin 1 mg capsule Take 1 capsule by mouth daily. Dose unknown        bisacodyL (DULCOLAX) 5 mg EC tablet Take 5 mg by mouth daily.      calcium carbonate (CALCIUM 500 ORAL) Take 500 mg by mouth daily. Dose unknown        cholecalciferol, vitamin D3, 1,000 unit capsule Take 1,000 Units by mouth daily.        escitalopram (LEXAPRO) 10 mg tablet TAKE 1 TABLET BY MOUTH EVERY DAY 90 tablet 3    hydrochlorothiazide (HYDRODIURIL) 25 mg tablet Take 1 tablet (25 mg total) by mouth once daily. 90 tablet 1    lisdexamfetamine (VYVANSE) 60 mg capsule Take 1 capsule (60 mg total) by mouth every morning. 30 capsule 0    potassium chloride (KLOR-CON M) 20 mEq CR tablet Take 20 mEq by mouth daily.      rosuvastatin 10 mg tablet Take 1  tablet (10 mg total) by mouth once daily. 30 tablet 11     No current facility-administered medications for this visit.       ROS  Objective    Vitals:    10/24/22 1427   BP: 110/70   Pulse:    Resp:    SpO2:        Physical Exam:    General Appearance:  Alert, no distress   Head:  Normocephalic, without obvious abnormality, atraumatic   Eyes:  Conjunctiva/corneas clear, EOM's intact   Neck: No thyroid enlargement. No JVD. No bruits    Lungs:   Clear to auscultation bilaterally, respirations unlabored, no rales, no wheezing   Heart:  Regular rhythm, S1 and S2 normal, no murmur, rub or gallop   Abdomen:   Soft, non-tender, no masses, no organomegaly   Vascular: Pulses 2+ and symmetric all extremities, no carotid bruit or jugular vein distention   Musculoskeletal:  Skin: No injury or deformity  Skin color, texture, turgor normal, no rashes or lesions, no cyanosis or edema   Extremities: Extremities normal, atraumatic, pulses normal   Behavior/Emotional: Appropriate, cooperative       Lab Results   Component Value Date    WBC 6.79 10/17/2022    HGB 9.3 (L) 10/17/2022     10/17/2022    CHOL 150 02/07/2022    TRIG 85 02/07/2022    HDL 71 02/07/2022    ALT 36 08/12/2022    AST 27 08/12/2022     09/26/2022    K 3.4 (L) 09/26/2022    CREATININE 0.5 (L) 09/26/2022    TSH 1.53 02/07/2022    INR 1.0 05/07/2021    HGBA1C 4.7 (L) 08/11/2020          ECG   sinus rhythm  83   ASSESSMENT/PLAN    Hypokalemia  Potassium was 3.4 in September 26.  She does take 20 mill equivalents of potassium. Her labs are followed by her hematologist.    Systemic mastocytosis  Per Dr. nAi Calhoun.    Dyslipidemia  She remains on rosuvastatin with good tolerance.  She will check a lipid panel.  We will call her with the results.    Elevated coronary artery calcium score  6/2019 score: 128 (90th%)  2/12/2020 SE--11:16, 92%mPHR, negative    She has noted some dyspnea on exertion.  We will check a stress echocardiogram.  We will call her  with the results. She will follow-up in the office in June.  She knows to contact us prior to then with any signs or symptoms of concern.    Sinus bradycardia  She has the [employer] jeff.  Her heart rate is appropriate in the office today.    Primary hypertension  2017 echocardiogram: Normal structural heart    Blood pressure is under reasonable control today.  She knows to limit salt.    Iron deficiency anemia  Hemoglobin 9.3 on October 17, 2022.    Palpitations  These are stable at present.     By signing my name below, I, Nora Hoffman, attest that this documentation has been prepared under the direction and in the presence of Bright Selby MD.    10/24/2022 2:49 PM      I, Bright Selby MD, personally performed the services described in this documentation as described by Nora Hoffman in my presence, and it is accurate and complete.        Bright Selby M.D., [facility]         Return in about 8 months (around 6/24/2023).    MELIZA Caba  10/24/2022

## 2022-10-24 NOTE — ASSESSMENT & PLAN NOTE
Potassium was 3.4 in September 26.  She does take 20 mill equivalents of potassium. Her labs are followed by her hematologist.

## 2022-10-26 ENCOUNTER — DOCUMENTATION (OUTPATIENT)
Dept: COLORECTAL SURGERY | Facility: CLINIC | Age: 67
End: 2022-10-26
Payer: MEDICARE

## 2022-10-26 NOTE — PROGRESS NOTES
1       2             3              4             5     LAST APPOINTMENT: 06/15/2022     TIME ARRIVED:     TIME ROOMED:     VISIT TYPE:   NP       POV     EPV     DISC:             YES         NO                                   PREPPED:  YES  NO     LABS:  [facility]  [facility]  [facility]  OTHER      PHARMACY ENTERED:  YES   NO     DIAGNOSIS:  Second degree hemorrhoids    SCHEDULED SURGERY    DATE OF SURGERY    PATHOLOGY STAGE:   RADIATION :    Yes   No                                           DOSAGE:      LOCATION:   Anterior   Right Lateral   Left Lateral   Posterior   Circumferential      LEVEL:                        SIZE:     CT SCAN:   FFC:    FFS:    CEA:    PET:    MRI:      OTHER:       FIBER:  NO  METAMUCIL  KONSYL  CITYRUCEL   FIBERCON  BENEFIBER OTHER  LOMOTIL:    NO    1  VS   2    QD    BID    TID    QID  ZANTAC:       Y / N  AMPHOGEL:    Y / N                                                                          START / STOP                                                 RAD ONC:                              N / A        MED ONC:                             N / A

## 2022-10-27 ENCOUNTER — OFFICE VISIT (OUTPATIENT)
Dept: COLORECTAL SURGERY | Facility: CLINIC | Age: 67
End: 2022-10-27
Payer: MEDICARE

## 2022-10-27 VITALS — BODY MASS INDEX: 22.47 KG/M2 | WEIGHT: 119 LBS | HEIGHT: 61 IN

## 2022-10-27 DIAGNOSIS — K64.1 SECOND DEGREE HEMORRHOIDS: Primary | ICD-10-CM

## 2022-10-27 PROCEDURE — G8756 NO BP MEASURE DOC: HCPCS | Performed by: COLON & RECTAL SURGERY

## 2022-10-27 PROCEDURE — 99213 OFFICE O/P EST LOW 20 MIN: CPT | Mod: 25 | Performed by: COLON & RECTAL SURGERY

## 2022-10-27 NOTE — LETTER
November 1, 2022     Ashley Omalley MD  [address] [facility] [address]    Patient: Ritu Ramirez  YOB: 1955  Date of Visit: 10/27/2022      Dear Dr. Sirisha Omalley:    Thank you for referring Ritu Ramirez to me for evaluation. Below are my notes for this consultation.    If you have questions, please do not hesitate to call me. I look forward to following your patient along with you.         Sincerely,        Abelino Matos MD        CC: MD Shawna Wolf, Abelino CANNON MD  11/1/2022  9:52 PM  Signed  HISTORY & PHYSICAL EXAM    HPI: The patient is here be seen today for follow-up.  She notes that about a month ago she had a significant amount of blood with her bowels and now she notes bleeding fairly regularly.  She has been having blood mixed with the school and is now darker and a lot of blood compared to previous.  She is preparing to have a capsule endoscopy.    Past Medical History:   Diagnosis Date    Anemia     Anxiety     Hypertension     Iron deficiency     Lipid disorder     Systemic mastocytosis     Vitamin D deficiency      Past Surgical History:   Procedure Laterality Date    AUGMENTATION MAMMAPLASTY Bilateral     COLONOSCOPY      PATELLA SURGERY      REDUCTION MAMMAPLASTY         Current Outpatient Medications:     amLODIPine (NORVASC) 5 mg tablet, Take 1 tablet (5 mg total) by mouth daily., Disp: 90 tablet, Rfl: 1    B complex-vitamin C-folic acid 400 mcg tablet tablet, Take 1 tablet by mouth daily., Disp: , Rfl:     biotin 1 mg capsule, Take 1 capsule by mouth daily. Dose unknown  , Disp: , Rfl:     bisacodyL (DULCOLAX) 5 mg EC tablet, Take 5 mg by mouth daily., Disp: , Rfl:     calcium carbonate (CALCIUM 500 ORAL), Take 500 mg by mouth daily. Dose unknown  , Disp: , Rfl:     cholecalciferol, vitamin D3, 1,000 unit capsule, Take 1,000 Units by mouth daily.  , Disp: , Rfl:     escitalopram (LEXAPRO) 10 mg tablet, TAKE 1 TABLET  BY MOUTH EVERY DAY, Disp: 90 tablet, Rfl: 3    hydrochlorothiazide (HYDRODIURIL) 25 mg tablet, Take 1 tablet (25 mg total) by mouth once daily., Disp: 90 tablet, Rfl: 1    lisdexamfetamine (VYVANSE) 60 mg capsule, Take 1 capsule (60 mg total) by mouth every morning., Disp: 30 capsule, Rfl: 0    potassium chloride (KLOR-CON M) 20 mEq CR tablet, Take 20 mEq by mouth daily., Disp: , Rfl:     rosuvastatin 10 mg tablet, Take 1 tablet (10 mg total) by mouth once daily., Disp: 30 tablet, Rfl: 11   Allergies   Allergen Reactions    Anesthetics - Amide Type - Select Amino Amides Anaphylaxis     Other reaction(s): Anaphylaxis    Iodinated Contrast Media      Other reaction(s): Anaphylaxis    Penicillins Hives    Clarithromycin      Other reaction(s): Rash    Erythromycin Base      Other reaction(s): Anaphylaxis    Nsaids (Non-Steroidal Anti-Inflammatory Drug)      Other reaction(s): Anaphylaxis     Social History     Socioeconomic History    Marital status: Single     Spouse name: Not on file    Number of children: Not on file    Years of education: Not on file    Highest education level: Not on file   Occupational History    Not on file   Tobacco Use    Smoking status: Former     Packs/day: 0.25     Types: Cigarettes     Quit date:      Years since quittin.8    Smokeless tobacco: Never   Vaping Use    Vaping Use: Never used   Substance and Sexual Activity    Alcohol use: Yes     Alcohol/week: 1.0 standard drink     Types: 1 Glasses of wine per week     Comment: Social    Drug use: No    Sexual activity: Defer   Other Topics Concern    Not on file   Social History Narrative    Not on file     Social Determinants of Health     Financial Resource Strain: Not on file   Food Insecurity: No Food Insecurity    Worried About Running Out of Food in the Last Year: Never true    Ran Out of Food in the Last Year: Never true   Transportation Needs: Not on file   Physical Activity: Not on file   Stress:  Not on file   Social Connections: Not on file   Intimate Partner Violence: Not on file   Housing Stability: Not on file      Family History   Problem Relation Age of Onset    Heart disease Biological Mother     Prostate cancer Biological Father        REVIEW OF SYSTEMS: Unchanged    PHYSICAL EXAM:  GEN: On examination the patient is resting comfortably.  NEURO/PSYCH: Alert and oriented ×3  HEENT: Eyes: Sclera anicteric  CHEST: Equal rise and fall the chest.  No tenderness bilaterally.  SKIN: Warm and moist  NODES: No groin or cervical lymphadenopathy  EXT: No palpable edema of the bilateral lower extremities.  No clubbing.  GI: Abdomen soft, nontender, nondistended.  No palpable liver or spleen edge.  No ventral hernias, mass, or tenderness.  RECTAL: Perianal skin is normal.  Visual exam reveals normal sphincter tone no mass or palpable.  Newly diagnosed anoscopy: Demonstrates significantly large internal hemorrhoids circumferentially.      ASSESSMENT/PLAN:     Second degree hemorrhoids  Patient has persistent hemorrhoids relatively resistant to band ligation.  I think at this point it is best to treat her with medical therapy and I will refer her to Dr. Corcoran for consideration of sclerotherapy.  If this is unsuccessful, I feel the neck step would be hemorrhoidectomy.  The patient takes this under advisement.

## 2022-11-02 NOTE — ASSESSMENT & PLAN NOTE
Patient has persistent hemorrhoids relatively resistant to band ligation.  I think at this point it is best to treat her with medical therapy and I will refer her to Dr. Corcoran for consideration of sclerotherapy.  If this is unsuccessful, I feel the neck step would be hemorrhoidectomy.  The patient takes this under advisement.

## 2022-11-02 NOTE — PROGRESS NOTES
HISTORY & PHYSICAL EXAM    HPI: The patient is here be seen today for follow-up.  She notes that about a month ago she had a significant amount of blood with her bowels and now she notes bleeding fairly regularly.  She has been having blood mixed with the school and is now darker and a lot of blood compared to previous.  She is preparing to have a capsule endoscopy.    Past Medical History:   Diagnosis Date    Anemia     Anxiety     Hypertension     Iron deficiency     Lipid disorder     Systemic mastocytosis     Vitamin D deficiency      Past Surgical History:   Procedure Laterality Date    AUGMENTATION MAMMAPLASTY Bilateral     COLONOSCOPY      PATELLA SURGERY      REDUCTION MAMMAPLASTY         Current Outpatient Medications:     amLODIPine (NORVASC) 5 mg tablet, Take 1 tablet (5 mg total) by mouth daily., Disp: 90 tablet, Rfl: 1    B complex-vitamin C-folic acid 400 mcg tablet tablet, Take 1 tablet by mouth daily., Disp: , Rfl:     biotin 1 mg capsule, Take 1 capsule by mouth daily. Dose unknown  , Disp: , Rfl:     bisacodyL (DULCOLAX) 5 mg EC tablet, Take 5 mg by mouth daily., Disp: , Rfl:     calcium carbonate (CALCIUM 500 ORAL), Take 500 mg by mouth daily. Dose unknown  , Disp: , Rfl:     cholecalciferol, vitamin D3, 1,000 unit capsule, Take 1,000 Units by mouth daily.  , Disp: , Rfl:     escitalopram (LEXAPRO) 10 mg tablet, TAKE 1 TABLET BY MOUTH EVERY DAY, Disp: 90 tablet, Rfl: 3    hydrochlorothiazide (HYDRODIURIL) 25 mg tablet, Take 1 tablet (25 mg total) by mouth once daily., Disp: 90 tablet, Rfl: 1    lisdexamfetamine (VYVANSE) 60 mg capsule, Take 1 capsule (60 mg total) by mouth every morning., Disp: 30 capsule, Rfl: 0    potassium chloride (KLOR-CON M) 20 mEq CR tablet, Take 20 mEq by mouth daily., Disp: , Rfl:     rosuvastatin 10 mg tablet, Take 1 tablet (10 mg total) by mouth once daily., Disp: 30 tablet, Rfl: 11   Allergies   Allergen Reactions    Anesthetics - Amide Type -  Select Amino Amides Anaphylaxis     Other reaction(s): Anaphylaxis    Iodinated Contrast Media      Other reaction(s): Anaphylaxis    Penicillins Hives    Clarithromycin      Other reaction(s): Rash    Erythromycin Base      Other reaction(s): Anaphylaxis    Nsaids (Non-Steroidal Anti-Inflammatory Drug)      Other reaction(s): Anaphylaxis     Social History     Socioeconomic History    Marital status: Single     Spouse name: Not on file    Number of children: Not on file    Years of education: Not on file    Highest education level: Not on file   Occupational History    Not on file   Tobacco Use    Smoking status: Former     Packs/day: 0.25     Types: Cigarettes     Quit date:      Years since quittin.8    Smokeless tobacco: Never   Vaping Use    Vaping Use: Never used   Substance and Sexual Activity    Alcohol use: Yes     Alcohol/week: 1.0 standard drink     Types: 1 Glasses of wine per week     Comment: Social    Drug use: No    Sexual activity: Defer   Other Topics Concern    Not on file   Social History Narrative    Not on file     Social Determinants of Health     Financial Resource Strain: Not on file   Food Insecurity: No Food Insecurity    Worried About Running Out of Food in the Last Year: Never true    Ran Out of Food in the Last Year: Never true   Transportation Needs: Not on file   Physical Activity: Not on file   Stress: Not on file   Social Connections: Not on file   Intimate Partner Violence: Not on file   Housing Stability: Not on file      Family History   Problem Relation Age of Onset    Heart disease Biological Mother     Prostate cancer Biological Father        REVIEW OF SYSTEMS: Unchanged    PHYSICAL EXAM:  GEN: On examination the patient is resting comfortably.  NEURO/PSYCH: Alert and oriented ×3  HEENT: Eyes: Sclera anicteric  CHEST: Equal rise and fall the chest.  No tenderness bilaterally.  SKIN: Warm and moist  NODES: No groin or cervical  lymphadenopathy  EXT: No palpable edema of the bilateral lower extremities.  No clubbing.  GI: Abdomen soft, nontender, nondistended.  No palpable liver or spleen edge.  No ventral hernias, mass, or tenderness.  RECTAL: Perianal skin is normal.  Visual exam reveals normal sphincter tone no mass or palpable.  Newly diagnosed anoscopy: Demonstrates significantly large internal hemorrhoids circumferentially.      ASSESSMENT/PLAN:     Second degree hemorrhoids  Patient has persistent hemorrhoids relatively resistant to band ligation.  I think at this point it is best to treat her with medical therapy and I will refer her to Dr. Corcoran for consideration of sclerotherapy.  If this is unsuccessful, I feel the neck step would be hemorrhoidectomy.  The patient takes this under advisement.

## 2022-11-09 ENCOUNTER — TRANSCRIBE ORDERS (OUTPATIENT)
Dept: SCHEDULING | Age: 67
End: 2022-11-09

## 2022-11-09 ENCOUNTER — APPOINTMENT (OUTPATIENT)
Dept: LAB | Facility: HOSPITAL | Age: 67
End: 2022-11-09
Attending: INTERNAL MEDICINE
Payer: MEDICARE

## 2022-11-09 DIAGNOSIS — K82.4 CHOLESTEROLOSIS OF GALLBLADDER: ICD-10-CM

## 2022-11-09 DIAGNOSIS — K58.1 IRRITABLE BOWEL SYNDROME WITH CONSTIPATION: Primary | ICD-10-CM

## 2022-11-09 DIAGNOSIS — K58.1 IRRITABLE BOWEL SYNDROME WITH CONSTIPATION: ICD-10-CM

## 2022-11-09 DIAGNOSIS — D50.8 OTHER IRON DEFICIENCY ANEMIAS: ICD-10-CM

## 2022-11-09 LAB
ANION GAP SERPL CALC-SCNC: 7 MEQ/L (ref 3–15)
BASOPHILS # BLD: 0.03 K/UL (ref 0.01–0.1)
BASOPHILS NFR BLD: 0.5 %
BUN SERPL-MCNC: 15 MG/DL (ref 8–20)
CALCIUM SERPL-MCNC: 9.1 MG/DL (ref 8.9–10.3)
CHLORIDE SERPL-SCNC: 99 MEQ/L (ref 98–109)
CO2 SERPL-SCNC: 29 MEQ/L (ref 22–32)
CREAT SERPL-MCNC: 0.4 MG/DL (ref 0.6–1.1)
DIFFERENTIAL METHOD BLD: ABNORMAL
EOSINOPHIL # BLD: 0.36 K/UL (ref 0.04–0.36)
EOSINOPHIL NFR BLD: 6.1 %
ERYTHROCYTE [DISTWIDTH] IN BLOOD BY AUTOMATED COUNT: 14 % (ref 11.7–14.4)
FERRITIN SERPL-MCNC: 31 NG/ML (ref 11–250)
FOLATE SERPL-MCNC: >20 NG/ML
GFR SERPL CREATININE-BSD FRML MDRD: >60 ML/MIN/1.73M*2
GLUCOSE SERPL-MCNC: 99 MG/DL (ref 70–99)
HCT VFR BLDCO AUTO: 31.6 % (ref 35–45)
HGB BLD-MCNC: 9.5 G/DL (ref 11.8–15.7)
IMM GRANULOCYTES # BLD AUTO: 0.05 K/UL (ref 0–0.08)
IMM GRANULOCYTES NFR BLD AUTO: 0.9 %
IRON SATN MFR SERPL: 6 % (ref 15–45)
IRON SERPL-MCNC: 24 UG/DL (ref 35–150)
LYMPHOCYTES # BLD: 0.82 K/UL (ref 1.2–3.5)
LYMPHOCYTES NFR BLD: 13.9 %
MCH RBC QN AUTO: 23.9 PG (ref 28–33.2)
MCHC RBC AUTO-ENTMCNC: 30.1 G/DL (ref 32.2–35.5)
MCV RBC AUTO: 79.4 FL (ref 83–98)
MONOCYTES # BLD: 0.51 K/UL (ref 0.28–0.8)
MONOCYTES NFR BLD: 8.7 %
NEUTROPHILS # BLD: 4.11 K/UL (ref 1.7–7)
NEUTROPHILS # BLD: 4.11 K/UL (ref 1.7–7)
NEUTS SEG NFR BLD: 69.9 %
NRBC BLD-RTO: 0 %
PDW BLD AUTO: 8.9 FL (ref 9.4–12.3)
PLATELET # BLD AUTO: 319 K/UL (ref 150–369)
POTASSIUM SERPL-SCNC: 3.7 MEQ/L (ref 3.6–5.1)
RBC # BLD AUTO: 3.98 M/UL (ref 3.93–5.22)
SODIUM SERPL-SCNC: 135 MEQ/L (ref 136–144)
TIBC SERPL-MCNC: 388 UG/DL (ref 270–460)
UIBC SERPL-MCNC: 364 UG/DL (ref 180–360)
VIT B12 SERPL-MCNC: >1500 PG/ML (ref 180–914)
WBC # BLD AUTO: 5.88 K/UL (ref 3.8–10.5)

## 2022-11-09 PROCEDURE — 83540 ASSAY OF IRON: CPT

## 2022-11-09 PROCEDURE — 82746 ASSAY OF FOLIC ACID SERUM: CPT

## 2022-11-09 PROCEDURE — 80048 BASIC METABOLIC PNL TOTAL CA: CPT

## 2022-11-09 PROCEDURE — 82728 ASSAY OF FERRITIN: CPT

## 2022-11-09 PROCEDURE — 82607 VITAMIN B-12: CPT | Mod: GA

## 2022-11-09 PROCEDURE — 85025 COMPLETE CBC W/AUTO DIFF WBC: CPT

## 2022-11-09 PROCEDURE — 36415 COLL VENOUS BLD VENIPUNCTURE: CPT

## 2022-11-11 ENCOUNTER — TRANSCRIBE ORDERS (OUTPATIENT)
Dept: SCHEDULING | Age: 67
End: 2022-11-11

## 2022-11-11 ENCOUNTER — HOSPITAL ENCOUNTER (OUTPATIENT)
Dept: RADIOLOGY | Age: 67
Discharge: HOME | End: 2022-11-11
Attending: INTERNAL MEDICINE
Payer: MEDICARE

## 2022-11-11 DIAGNOSIS — R10.84 GENERALIZED ABDOMINAL PAIN: ICD-10-CM

## 2022-11-11 DIAGNOSIS — R10.84 GENERALIZED ABDOMINAL PAIN: Primary | ICD-10-CM

## 2022-11-11 PROCEDURE — 74018 RADEX ABDOMEN 1 VIEW: CPT

## 2022-11-25 ENCOUNTER — TELEMEDICINE (OUTPATIENT)
Dept: INTERNAL MEDICINE | Facility: CLINIC | Age: 67
End: 2022-11-25
Payer: MEDICARE

## 2022-11-25 DIAGNOSIS — J06.9 ACUTE URI: Primary | ICD-10-CM

## 2022-11-25 DIAGNOSIS — U07.1 COVID-19: ICD-10-CM

## 2022-11-25 PROCEDURE — 99214 OFFICE O/P EST MOD 30 MIN: CPT | Mod: 95 | Performed by: NURSE PRACTITIONER

## 2022-11-25 RX ORDER — DOXYCYCLINE HYCLATE 100 MG
100 TABLET ORAL 2 TIMES DAILY
Qty: 14 TABLET | Refills: 0 | Status: SHIPPED | OUTPATIENT
Start: 2022-11-25 | End: 2022-12-05

## 2022-11-25 ASSESSMENT — ENCOUNTER SYMPTOMS
ABDOMINAL PAIN: 0
EYE REDNESS: 0
HEADACHES: 0
VOMITING: 0
CHILLS: 1
EYE PAIN: 0
SINUS PRESSURE: 1
SHORTNESS OF BREATH: 0
NAUSEA: 0
PALPITATIONS: 0
DIARRHEA: 0
COUGH: 1
EYE DISCHARGE: 0
SINUS PAIN: 1
SORE THROAT: 1
WHEEZING: 0
ABDOMINAL DISTENTION: 0
FEVER: 0
EYE ITCHING: 0

## 2022-11-25 NOTE — PROGRESS NOTES
Verification of Patient Location:  The patient affirms they are currently located in the following state: [place]    Request for Consent:    Audio and Video Encounter   Wiliam, my name is MELIZA Rob.  Before we proceed, can you please verify your identification by telling me your full name and date of birth?  Can you tell me who is in the room with you?    You and I are about to have a telemedicine check-in or visit because you have requested it.  This is a live video-conference.  I am a real person, speaking to you in real time.  There is no one else with me on the video-conference. I am not recording this conversation and I am asking you not to record it.  This telemedicine visit will be billed to your health insurance or you, if you are self-insured.  You understand you will be responsible for any copayments or coinsurances that apply to your telemedicine visit.  Communication platform used for this encounter:  [employer] Video Visit (Epic Video Client)       Before starting our telemedicine visit, I am required to get your consent for this virtual check-in or visit by telemedicine. Do you consent?      Patient Response to Request for Consent:  Yes      Visit Documentation:  Subjective     Patient ID: Ritu Ramirez is a 67 y.o. female.  1955      HPI  Patient states that she was away over the weekend with friends.  Upon return she and her friends were not feeling well.  She did a COVID test that was negative earlier in the week but on Tuesday evening she did a home test and it was positive she confirmed it at [facility] urgent care with a second rapid PCR test that was positive.  She states that her symptoms started with fever and chills but the fever is resolved.  She is complaining that it is painful to swallow, nasal drip, ear pressure, sinus congestion, and headache is resolved.  She states her nasal discharge is yellow.  She is coughing yellow to green phlegm.    The following have been reviewed  and updated as appropriate in this visit:   Allergies  Meds  Problems       Review of Systems   Constitutional: Positive for chills. Negative for fever. Unexpected weight change: resolved.   HENT: Positive for congestion, ear discharge, ear pain, postnasal drip, sinus pressure, sinus pain and sore throat.    Eyes: Negative for pain, discharge, redness and itching.   Respiratory: Positive for cough. Negative for shortness of breath and wheezing.    Cardiovascular: Negative for chest pain and palpitations.   Gastrointestinal: Negative for abdominal distention, abdominal pain (resolved), diarrhea, nausea and vomiting.   Neurological: Negative for headaches.         Assessment/Plan   Diagnoses and all orders for this visit:    Acute URI (Primary)  Assessment & Plan:  Patient will start doxycycline 100 mg p.o. twice daily for 10 days.  Increase fluid, rest Tylenol/Advil as needed.  Patient will see when her significant other wakes up if he is feeling up to driving her over to have a rapid strep test completed.  She states that he has COVID as well.      COVID-19  Assessment & Plan:  Patient tested positive for COVID 2 days ago but her symptoms started 5 days ago.  She will increase fluids, rest and Tylenol and Advil as needed.  Discussed with her that since some of her symptoms of COVID have resolved earlier in the week it would be more beneficial to treat her sinus infection then start her on paxlovid.  She is in agreement.  She has multiple medical allergies that were reviewed prior to deciding on an antibiotic.  Notify the office on Monday 11/28 if her symptoms are not resolving.      Other orders  -     doxycycline hyclate (VIBRA-TABS) 100 mg tablet; Take 1 tablet (100 mg total) by mouth 2 (two) times a day for 10 days.      Time Spent:  I spent 18 minutes on this date of service performing the following activities: obtaining history, reviewing allergies, reviewing medications, reviewing past medical history,  ordering prescriptions, creating a plan and documentation..

## 2022-11-25 NOTE — ASSESSMENT & PLAN NOTE
Patient will start doxycycline 100 mg p.o. twice daily for 10 days.  Increase fluid, rest Tylenol/Advil as needed.  Patient will see when her significant other wakes up if he is feeling up to driving her over to have a rapid strep test completed.  She states that he has COVID as well.

## 2022-11-25 NOTE — ASSESSMENT & PLAN NOTE
Patient tested positive for COVID 2 days ago but her symptoms started 5 days ago.  She will increase fluids, rest and Tylenol and Advil as needed.  Discussed with her that since some of her symptoms of COVID have resolved earlier in the week it would be more beneficial to treat her sinus infection then start her on paxlovid.  She is in agreement.  She has multiple medical allergies that were reviewed prior to deciding on an antibiotic.  Notify the office on Monday 11/28 if her symptoms are not resolving.

## 2022-11-29 ENCOUNTER — LAB REQUISITION (OUTPATIENT)
Dept: LAB | Facility: HOSPITAL | Age: 67
End: 2022-11-29
Payer: MEDICARE

## 2022-11-29 DIAGNOSIS — D50.9 IRON DEFICIENCY ANEMIA, UNSPECIFIED: ICD-10-CM

## 2022-11-29 LAB
BASOPHILS # BLD: 0.02 K/UL (ref 0.01–0.1)
BASOPHILS NFR BLD: 0.3 %
DIFFERENTIAL METHOD BLD: ABNORMAL
EOSINOPHIL # BLD: 0.27 K/UL (ref 0.04–0.36)
EOSINOPHIL NFR BLD: 4.6 %
ERYTHROCYTE [DISTWIDTH] IN BLOOD BY AUTOMATED COUNT: 14.7 % (ref 11.7–14.4)
HCT VFR BLDCO AUTO: 35.6 % (ref 35–45)
HGB BLD-MCNC: 10.8 G/DL (ref 11.8–15.7)
IMM GRANULOCYTES # BLD AUTO: 0.11 K/UL (ref 0–0.08)
IMM GRANULOCYTES NFR BLD AUTO: 1.9 %
LYMPHOCYTES # BLD: 1.01 K/UL (ref 1.2–3.5)
LYMPHOCYTES NFR BLD: 17.2 %
MCH RBC QN AUTO: 21.9 PG (ref 28–33.2)
MCHC RBC AUTO-ENTMCNC: 30.3 G/DL (ref 32.2–35.5)
MCV RBC AUTO: 72.1 FL (ref 83–98)
MONOCYTES # BLD: 0.63 K/UL (ref 0.28–0.8)
MONOCYTES NFR BLD: 10.7 %
NEUTROPHILS # BLD: 3.84 K/UL (ref 1.7–7)
NEUTROPHILS # BLD: 3.84 K/UL (ref 1.7–7)
NEUTS SEG NFR BLD: 65.3 %
NRBC BLD-RTO: 0 %
PDW BLD AUTO: 8.9 FL (ref 9.4–12.3)
PLATELET # BLD AUTO: 487 K/UL (ref 150–369)
RBC # BLD AUTO: 4.94 M/UL (ref 3.93–5.22)
WBC # BLD AUTO: 5.88 K/UL (ref 3.8–10.5)

## 2022-11-29 PROCEDURE — 85025 COMPLETE CBC W/AUTO DIFF WBC: CPT | Performed by: INTERNAL MEDICINE

## 2022-12-02 ENCOUNTER — TELEPHONE (OUTPATIENT)
Dept: INTERNAL MEDICINE | Facility: CLINIC | Age: 67
End: 2022-12-02
Payer: MEDICARE

## 2022-12-02 NOTE — TELEPHONE ENCOUNTER
Spoke with patient on the phone.  Discussed with her that she finished the doxycycline for her sinus infection and her COVID isolation should be completed.  She still feeling congestion in her ears and in her sinuses.  Recommended that she try using a sinus rinse and Mucinex every 12 hours.  I also encouraged her that if she still feeling congested on Monday that she should call the office to schedule an appointment for Monday.MELIZA Rob

## 2022-12-02 NOTE — TELEPHONE ENCOUNTER
The patient called today stating that she had a telemedicine visit with Shauna Elils as she had COVID and was also given doxycycline for a sinun infection. The patient stated two days later she went to urgent care because she was still feeling bad and tested for strep but negative.  They told her to continue with doxycycline. The patient finished her doxy course today and stated that she is still very congested in her sinuses.  The patient wants to know if there is something else that can recommend.

## 2022-12-04 NOTE — PROGRESS NOTES
Chief Complaint:   Chief Complaint   Patient presents with   • Hemorrhoids       HPI     Patient is a 67 y.o. female who presents with the following:      Internal bleeding hems:  20 - injected large angry int hems   - [facility] - injected in hospital for HGB 6  21 - injected again  21 - injected again    06/15/22 - Shawna - rubber banded   10/27/22 - Shawna - suggested surgery  --    She bled a lot after being rubber banded by Dr. Matos. She returned to Dr. Matos in Oct '22 who suggested she have surgery. Since then she has not had any bleeding and her hgb is up to 11.3 (uo from 10.8 in November and 9.3 in October).     She reports she is ready for surgery if her bleeding returns.     Medical History:   Past Medical History:   Diagnosis Date   • Anemia    • Anxiety    • Hypertension    • Iron deficiency    • Lipid disorder    • Systemic mastocytosis    • Vitamin D deficiency        Surgical History:   Past Surgical History:   Procedure Laterality Date   • AUGMENTATION MAMMAPLASTY Bilateral    • COLONOSCOPY     • PATELLA SURGERY     • REDUCTION MAMMAPLASTY         Social History:   Social History     Socioeconomic History   • Marital status: Single     Spouse name: Not on file   • Number of children: Not on file   • Years of education: Not on file   • Highest education level: Not on file   Occupational History   • Not on file   Tobacco Use   • Smoking status: Former     Packs/day: 0.25     Types: Cigarettes     Quit date:      Years since quittin.9   • Smokeless tobacco: Never   Vaping Use   • Vaping Use: Never used   Substance and Sexual Activity   • Alcohol use: Yes     Alcohol/week: 1.0 standard drink     Types: 1 Glasses of wine per week     Comment: Social   • Drug use: No   • Sexual activity: Defer   Other Topics Concern   • Not on file   Social History Narrative   • Not on file     Social Determinants of Health     Financial Resource Strain: Not on file   Food  Insecurity: No Food Insecurity   • Worried About Running Out of Food in the Last Year: Never true   • Ran Out of Food in the Last Year: Never true   Transportation Needs: Not on file   Physical Activity: Not on file   Stress: Not on file   Social Connections: Not on file   Intimate Partner Violence: Not on file   Housing Stability: Not on file       Family History:   Family History   Problem Relation Age of Onset   • Heart disease Biological Mother    • Prostate cancer Biological Father        Allergies:   Anesthetics - amide type - select amino amides, Iodinated contrast media, Penicillins, Clarithromycin, Erythromycin base, and Nsaids (non-steroidal anti-inflammatory drug)    Current Medications:      Current Outpatient Medications:   •  amLODIPine (NORVASC) 5 mg tablet, Take 1 tablet (5 mg total) by mouth daily., Disp: 90 tablet, Rfl: 1  •  B complex-vitamin C-folic acid 400 mcg tablet tablet, Take 1 tablet by mouth daily., Disp: , Rfl:   •  biotin 1 mg capsule, Take 1 capsule by mouth daily. Dose unknown  , Disp: , Rfl:   •  bisacodyL (DULCOLAX) 5 mg EC tablet, Take 5 mg by mouth daily., Disp: , Rfl:   •  calcium carbonate (CALCIUM 500 ORAL), Take 500 mg by mouth daily. Dose unknown  , Disp: , Rfl:   •  cholecalciferol, vitamin D3, 1,000 unit capsule, Take 1,000 Units by mouth daily.  , Disp: , Rfl:   •  escitalopram (LEXAPRO) 10 mg tablet, TAKE 1 TABLET BY MOUTH EVERY DAY, Disp: 90 tablet, Rfl: 3  •  fluticasone propionate (FLONASE) 50 mcg/actuation nasal spray, Administer 2 sprays into each nostril daily., Disp: , Rfl:   •  hydrochlorothiazide (HYDRODIURIL) 25 mg tablet, Take 1 tablet (25 mg total) by mouth once daily., Disp: 90 tablet, Rfl: 3  •  lisdexamfetamine (VYVANSE) 60 mg capsule, Take 1 capsule (60 mg total) by mouth every morning., Disp: 30 capsule, Rfl: 0  •  neomycin-polymyxin-hydrocortisone (CORTISPORIN) otic solution, Administer 3 drops into the right ear 4 (four) times a day for 10 days., Disp:  10 mL, Rfl: 0  •  potassium chloride (KLOR-CON M) 20 mEq CR tablet, Take 20 mEq by mouth daily., Disp: , Rfl:   •  rosuvastatin 10 mg tablet, Take 1 tablet (10 mg total) by mouth once daily., Disp: 30 tablet, Rfl: 11    Review of Systems  All 14 systems reviewed and the findings are not pertinent to the current problem.    Objective     Vital Signs  There were no vitals filed for this visit.  Body mass index is 22.11 kg/m².      Physical Exam  General Appearance:  Well developed.  Well nourished.  In no acute distress.    Anorectal Examination:    No pilonidal sinus was observed.   No pilonidal cyst was observed.   Perianal skin was not erythematous.  No perianal wart was observed.  No anal fissure was observed.   Anus did not have a fistula.   Anal sphincter tone was normal.    The patient had large internal hemorrhoids.      No external anal skin tags were observed.   Anus had no mass.  Rectum:  No rectal abscess was observed.   Rectal prolapse was not observed.   No rectal fistula was observed.   Rectal exam was not tender.   No rectal fluctuance was observed.  Rectal exam showed no mass.   Normal richard-anal skin with no lesions or dermatitis      Problem List Items Addressed This Visit        Circulatory    Internal hemorrhoid, bleeding - Primary     This patient has had years of bleeding from internal hemorrhoids. She has actually not had bleeding since the end of October but is ready for surgery when and if the bleeding returns.     The hemorrhoids are so large and chronic that office based therapy has not been able to improve the symptoms.  Surgical excision is the next logical option.                  DOYLE Lee 12/7/2022 10:57 AM

## 2022-12-05 ENCOUNTER — OFFICE VISIT (OUTPATIENT)
Dept: INTERNAL MEDICINE | Facility: CLINIC | Age: 67
End: 2022-12-05
Payer: MEDICARE

## 2022-12-05 VITALS
OXYGEN SATURATION: 98 % | HEIGHT: 61 IN | HEART RATE: 88 BPM | DIASTOLIC BLOOD PRESSURE: 60 MMHG | WEIGHT: 117 LBS | SYSTOLIC BLOOD PRESSURE: 90 MMHG | TEMPERATURE: 97.9 F | BODY MASS INDEX: 22.09 KG/M2

## 2022-12-05 DIAGNOSIS — D47.02 SYSTEMIC MASTOCYTOSIS: Primary | ICD-10-CM

## 2022-12-05 DIAGNOSIS — H60.311 ACUTE DIFFUSE OTITIS EXTERNA OF RIGHT EAR: ICD-10-CM

## 2022-12-05 PROCEDURE — G8752 SYS BP LESS 140: HCPCS | Performed by: NURSE PRACTITIONER

## 2022-12-05 PROCEDURE — G8754 DIAS BP LESS 90: HCPCS | Performed by: NURSE PRACTITIONER

## 2022-12-05 PROCEDURE — 99214 OFFICE O/P EST MOD 30 MIN: CPT | Performed by: NURSE PRACTITIONER

## 2022-12-05 RX ORDER — NEOMYCIN SULFATE, POLYMYXIN B SULFATE, HYDROCORTISONE 3.5; 10000; 1 MG/ML; [USP'U]/ML; MG/ML
3 SOLUTION/ DROPS AURICULAR (OTIC) 4 TIMES DAILY
Qty: 10 ML | Refills: 0 | Status: SHIPPED | OUTPATIENT
Start: 2022-12-05 | End: 2022-12-15

## 2022-12-05 RX ORDER — EPINEPHRINE 0.3 MG/.3ML
0.3 INJECTION SUBCUTANEOUS ONCE
Status: SHIPPED | OUTPATIENT
Start: 2022-12-05 | End: 2022-12-06

## 2022-12-05 ASSESSMENT — ENCOUNTER SYMPTOMS
COUGH: 0
ABDOMINAL PAIN: 0
SINUS PAIN: 0
EYE REDNESS: 0
SINUS PRESSURE: 0
ABDOMINAL DISTENTION: 0
SHORTNESS OF BREATH: 0
CHILLS: 0
HEADACHES: 0
EYE ITCHING: 0
PALPITATIONS: 0
NAUSEA: 0
SORE THROAT: 0
FATIGUE: 1
EYE PAIN: 0
FEVER: 0
EYE DISCHARGE: 0
DIARRHEA: 0
VOMITING: 0

## 2022-12-05 NOTE — ASSESSMENT & PLAN NOTE
Patient will start Cortisporin otic drops as directed.  She may occasionally use a Sudafed as her blood pressure is running reasonable for comfort.  Discussed with her that she cannot use Sudafed all the time because of her blood pressure.  She will consider whether she wants to fly in a few weeks based on how her ear is feeling

## 2022-12-05 NOTE — PROGRESS NOTES
Internal Medicine Note       Reason for visit: R Ear Discomfort       HPI   Ritu Ramirez is a 67 y.o. female who presents with right ear pain . Feels clogged. States she went to  on Saturday and was told to take afrin and saline. Sinus rinse and nothing is working. Cough is gone. Throat is fine. Still with nasal congestion. Concerned that issue can go into the mastoid bone.         Past Medical History:   Diagnosis Date   • Anemia    • Anxiety    • Hypertension    • Iron deficiency    • Lipid disorder    • Systemic mastocytosis    • Vitamin D deficiency      Past Surgical History:   Procedure Laterality Date   • AUGMENTATION MAMMAPLASTY Bilateral    • COLONOSCOPY     • PATELLA SURGERY     • REDUCTION MAMMAPLASTY       Social History     Social History Narrative   • Not on file     Family History   Problem Relation Age of Onset   • Heart disease Biological Mother    • Prostate cancer Biological Father      Anesthetics - amide type - select amino amides, Iodinated contrast media, Penicillins, Clarithromycin, Erythromycin base, and Nsaids (non-steroidal anti-inflammatory drug)  Current Outpatient Medications   Medication Sig Dispense Refill   • neomycin-polymyxin-hydrocortisone (CORTISPORIN) otic solution Administer 3 drops into the right ear 4 (four) times a day for 10 days. 10 mL 0   • amLODIPine (NORVASC) 5 mg tablet Take 1 tablet (5 mg total) by mouth daily. 90 tablet 1   • B complex-vitamin C-folic acid 400 mcg tablet tablet Take 1 tablet by mouth daily.     • biotin 1 mg capsule Take 1 capsule by mouth daily. Dose unknown       • bisacodyL (DULCOLAX) 5 mg EC tablet Take 5 mg by mouth daily.     • calcium carbonate (CALCIUM 500 ORAL) Take 500 mg by mouth daily. Dose unknown       • cholecalciferol, vitamin D3, 1,000 unit capsule Take 1,000 Units by mouth daily.       • doxycycline hyclate (VIBRA-TABS) 100 mg tablet Take 1 tablet (100 mg total) by mouth 2 (two) times a day for 10 days. 14 tablet 0   •  escitalopram (LEXAPRO) 10 mg tablet TAKE 1 TABLET BY MOUTH EVERY DAY 90 tablet 3   • hydrochlorothiazide (HYDRODIURIL) 25 mg tablet Take 1 tablet (25 mg total) by mouth once daily. 90 tablet 3   • lisdexamfetamine (VYVANSE) 60 mg capsule Take 1 capsule (60 mg total) by mouth every morning. 30 capsule 0   • potassium chloride (KLOR-CON M) 20 mEq CR tablet Take 20 mEq by mouth daily.     • rosuvastatin 10 mg tablet Take 1 tablet (10 mg total) by mouth once daily. 30 tablet 11     Current Facility-Administered Medications   Medication Dose Route Frequency Provider Last Rate Last Admin   • EPINEPHrine (EPIPEN) 0.3 mg/0.3 mL injection syringe 0.3 mg  0.3 mg intramuscular Once Shauna Ellis CRNP           Review of Systems   Constitutional: Positive for fatigue. Negative for chills and fever.   HENT: Positive for congestion and ear pain. Negative for ear discharge, postnasal drip, sinus pressure, sinus pain, sore throat and tinnitus.    Eyes: Negative for pain, discharge, redness and itching.   Respiratory: Negative for cough and shortness of breath.    Cardiovascular: Negative for chest pain and palpitations.   Gastrointestinal: Negative for abdominal distention, abdominal pain, diarrhea, nausea and vomiting.   Neurological: Negative for headaches.       Objective   Vitals:    12/05/22 1332   BP: 90/60   Pulse:    Temp:    SpO2:        Physical Exam  Constitutional:       Appearance: Normal appearance.   HENT:      Left Ear: Tympanic membrane and ear canal normal.      Ears:      Comments: Right tm wnl, eac red with minimal swelling     Mouth/Throat:      Mouth: Mucous membranes are moist.      Pharynx: No oropharyngeal exudate or posterior oropharyngeal erythema.   Eyes:      Conjunctiva/sclera: Conjunctivae normal.   Cardiovascular:      Rate and Rhythm: Normal rate and regular rhythm.      Heart sounds: Normal heart sounds.   Pulmonary:      Breath sounds: Normal breath sounds.   Abdominal:      General: Bowel  sounds are normal.      Palpations: Abdomen is soft.   Musculoskeletal:      Cervical back: Neck supple.   Neurological:      Mental Status: She is alert.         Lab Results   Component Value Date    WBC 5.88 11/29/2022    HGB 10.8 (L) 11/29/2022     (H) 11/29/2022    CHOL 150 02/07/2022    TRIG 85 02/07/2022    HDL 71 02/07/2022    ALT 36 08/12/2022    AST 27 08/12/2022     (L) 11/09/2022    K 3.7 11/09/2022    CREATININE 0.4 (L) 11/09/2022    TSH 1.53 02/07/2022    INR 1.0 05/07/2021    HGBA1C 4.7 (L) 08/11/2020          Current Outpatient Medications:   •  neomycin-polymyxin-hydrocortisone (CORTISPORIN) otic solution, Administer 3 drops into the right ear 4 (four) times a day for 10 days., Disp: 10 mL, Rfl: 0  •  amLODIPine (NORVASC) 5 mg tablet, Take 1 tablet (5 mg total) by mouth daily., Disp: 90 tablet, Rfl: 1  •  B complex-vitamin C-folic acid 400 mcg tablet tablet, Take 1 tablet by mouth daily., Disp: , Rfl:   •  biotin 1 mg capsule, Take 1 capsule by mouth daily. Dose unknown  , Disp: , Rfl:   •  bisacodyL (DULCOLAX) 5 mg EC tablet, Take 5 mg by mouth daily., Disp: , Rfl:   •  calcium carbonate (CALCIUM 500 ORAL), Take 500 mg by mouth daily. Dose unknown  , Disp: , Rfl:   •  cholecalciferol, vitamin D3, 1,000 unit capsule, Take 1,000 Units by mouth daily.  , Disp: , Rfl:   •  doxycycline hyclate (VIBRA-TABS) 100 mg tablet, Take 1 tablet (100 mg total) by mouth 2 (two) times a day for 10 days., Disp: 14 tablet, Rfl: 0  •  escitalopram (LEXAPRO) 10 mg tablet, TAKE 1 TABLET BY MOUTH EVERY DAY, Disp: 90 tablet, Rfl: 3  •  hydrochlorothiazide (HYDRODIURIL) 25 mg tablet, Take 1 tablet (25 mg total) by mouth once daily., Disp: 90 tablet, Rfl: 3  •  lisdexamfetamine (VYVANSE) 60 mg capsule, Take 1 capsule (60 mg total) by mouth every morning., Disp: 30 capsule, Rfl: 0  •  potassium chloride (KLOR-CON M) 20 mEq CR tablet, Take 20 mEq by mouth daily., Disp: , Rfl:   •  rosuvastatin 10 mg tablet, Take 1  tablet (10 mg total) by mouth once daily., Disp: 30 tablet, Rfl: 11    Current Facility-Administered Medications:   •  EPINEPHrine (EPIPEN) 0.3 mg/0.3 mL injection syringe 0.3 mg, 0.3 mg, intramuscular, Once, Shauna Ellis CRNP      Assessment   Diagnoses and all orders for this visit:    Systemic mastocytosis (Primary)  Assessment & Plan:  Follows with Dr. Cooley yearly in [place].    Orders:  -     EPINEPHrine (EPIPEN) 0.3 mg/0.3 mL injection syringe 0.3 mg    Acute diffuse otitis externa of right ear  Assessment & Plan:  Patient will start Cortisporin otic drops as directed.  She may occasionally use a Sudafed as her blood pressure is running reasonable for comfort.  Discussed with her that she cannot use Sudafed all the time because of her blood pressure.  She will consider whether she wants to fly in a few weeks based on how her ear is feeling      Other orders  -     neomycin-polymyxin-hydrocortisone (CORTISPORIN) otic solution; Administer 3 drops into the right ear 4 (four) times a day for 10 days.     Is requesting a refill on her epinephrine which she got last at [facility].  She states she uses it only occasionally .     MELIZA Rob  12/5/2022  1:13 PM

## 2022-12-06 ENCOUNTER — LAB REQUISITION (OUTPATIENT)
Dept: LAB | Facility: HOSPITAL | Age: 67
End: 2022-12-06
Payer: MEDICARE

## 2022-12-06 DIAGNOSIS — D50.8 OTHER IRON DEFICIENCY ANEMIAS: ICD-10-CM

## 2022-12-06 LAB
ANISOCYTOSIS BLD QL SMEAR: ABNORMAL
BASOPHILS # BLD: 0.03 K/UL (ref 0.01–0.1)
BASOPHILS NFR BLD: 0.3 %
DACRYOCYTES BLD QL SMEAR: ABNORMAL
DIFFERENTIAL METHOD BLD: ABNORMAL
EOSINOPHIL # BLD: 0.37 K/UL (ref 0.04–0.36)
EOSINOPHIL NFR BLD: 4.1 %
ERYTHROCYTE [DISTWIDTH] IN BLOOD BY AUTOMATED COUNT: 15.4 % (ref 11.7–14.4)
ERYTHROCYTE [SEDIMENTATION RATE] IN BLOOD BY WESTERGREN METHOD: 16 MM/HR
HCT VFR BLDCO AUTO: 36.2 % (ref 35–45)
HGB BLD-MCNC: 11.3 G/DL (ref 11.8–15.7)
IMM GRANULOCYTES # BLD AUTO: 0.11 K/UL (ref 0–0.08)
IMM GRANULOCYTES NFR BLD AUTO: 1.2 %
LYMPHOCYTES # BLD: 1.26 K/UL (ref 1.2–3.5)
LYMPHOCYTES NFR BLD: 14 %
MCH RBC QN AUTO: 22.5 PG (ref 28–33.2)
MCHC RBC AUTO-ENTMCNC: 31.2 G/DL (ref 32.2–35.5)
MCV RBC AUTO: 72.1 FL (ref 83–98)
MONOCYTES # BLD: 0.64 K/UL (ref 0.28–0.8)
MONOCYTES NFR BLD: 7.1 %
NEUTROPHILS # BLD: 6.61 K/UL (ref 1.7–7)
NEUTROPHILS # BLD: 6.61 K/UL (ref 1.7–7)
NEUTS SEG NFR BLD: 73.3 %
NRBC BLD-RTO: 0 %
OVALOCYTES BLD QL SMEAR: ABNORMAL
PDW BLD AUTO: 9.6 FL (ref 9.4–12.3)
PLATELET # BLD AUTO: 561 K/UL (ref 150–369)
PLATELET # BLD EST: ABNORMAL 10*3/UL
RBC # BLD AUTO: 5.02 M/UL (ref 3.93–5.22)
WBC # BLD AUTO: 9.02 K/UL (ref 3.8–10.5)

## 2022-12-06 PROCEDURE — 85025 COMPLETE CBC W/AUTO DIFF WBC: CPT | Performed by: INTERNAL MEDICINE

## 2022-12-06 PROCEDURE — 85652 RBC SED RATE AUTOMATED: CPT | Performed by: INTERNAL MEDICINE

## 2022-12-06 RX ORDER — LISDEXAMFETAMINE DIMESYLATE 60 MG/1
60 CAPSULE ORAL EVERY MORNING
Qty: 30 CAPSULE | Refills: 0 | Status: SHIPPED | OUTPATIENT
Start: 2022-12-06 | End: 2023-01-12 | Stop reason: SDUPTHER

## 2022-12-06 NOTE — TELEPHONE ENCOUNTER
Please let Ms Ramirez know that I renewed her vyvanse but will need documentation from a psychiatrist or psychoogist of her diagnosis of ADHD - if she does not have one we can refer her to [facility] behavioral care

## 2022-12-07 ENCOUNTER — OFFICE VISIT (OUTPATIENT)
Dept: SURGERY | Facility: CLINIC | Age: 67
End: 2022-12-07
Payer: MEDICARE

## 2022-12-07 VITALS — BODY MASS INDEX: 22.09 KG/M2 | HEIGHT: 61 IN | WEIGHT: 117 LBS

## 2022-12-07 DIAGNOSIS — K64.8 INTERNAL HEMORRHOID, BLEEDING: Primary | ICD-10-CM

## 2022-12-07 PROCEDURE — G8756 NO BP MEASURE DOC: HCPCS | Performed by: SURGERY

## 2022-12-07 PROCEDURE — 99213 OFFICE O/P EST LOW 20 MIN: CPT | Performed by: SURGERY

## 2022-12-07 RX ORDER — FLUTICASONE PROPIONATE 50 MCG
2 SPRAY, SUSPENSION (ML) NASAL DAILY
COMMUNITY
Start: 2022-11-25 | End: 2023-05-26

## 2022-12-07 NOTE — ASSESSMENT & PLAN NOTE
This patient has had years of bleeding from internal hemorrhoids. She has actually not had bleeding since the end of October but is ready for surgery when and if the bleeding returns.     The hemorrhoids are so large and chronic that office based therapy has not been able to improve the symptoms.  Surgical excision is the next logical option.

## 2022-12-07 NOTE — LETTER
December 7, 2022     Ashley Omalley MD  [address] [facility] [place] [address]    Patient: Ritu Ramirez  YOB: 1955  Date of Visit: 12/7/2022      Dear Dr. Sirisha Omalley:    Thank you for referring Ritu Ramirez to me for evaluation. Below are my notes for this consultation.    If you have questions, please do not hesitate to call me. I look forward to following your patient along with you.         Sincerely,        Javier Corcoran MD        CC: No Recipients  Bette Henry PA C  12/7/2022 10:57 AM  Sign when Signing Visit  Chief Complaint:   Chief Complaint   Patient presents with   • Hemorrhoids       HPI      Patient is a 67 y.o. female who presents with the following:      Internal bleeding hems:  07/27/20 - injected large angry int hems  11/20 - [facility] - injected in hospital for HGB 6  06/21/21 - injected again  12/28/21 - injected again    06/15/22 - [facility] - rubber banded   10/27/22 - [facility] - suggested surgery  --    She bled a lot after being rubber banded by Dr. Matos. She returned to Dr. Matos in Oct '22 who suggested she have surgery. Since then she has not had any bleeding and her hgb is up to 11.3 (uo from 10.8 in November and 9.3 in October).     She reports she is ready for surgery if her bleeding returns.     Medical History:   Past Medical History:   Diagnosis Date   • Anemia    • Anxiety    • Hypertension    • Iron deficiency    • Lipid disorder    • Systemic mastocytosis    • Vitamin D deficiency        Surgical History:   Past Surgical History:   Procedure Laterality Date   • AUGMENTATION MAMMAPLASTY Bilateral    • COLONOSCOPY     • PATELLA SURGERY     • REDUCTION MAMMAPLASTY         Social History:   Social History     Socioeconomic History   • Marital status: Single     Spouse name: Not on file   • Number of children: Not on file   • Years of education: Not on file   • Highest education level: Not on file   Occupational History   • Not on  Patient last saw Dr Stephanie Power  2 yrs ago. Had her mammogram done recently and it was suggested that she follow up with her doctor. She is aware that Dr Stephanie Power is out of town until 12/28/18. She is just looking to be advised on what would be recommended next. It is hard for her to come in the office. She is her 's caregiver. file   Tobacco Use   • Smoking status: Former     Packs/day: 0.25     Types: Cigarettes     Quit date:      Years since quittin.9   • Smokeless tobacco: Never   Vaping Use   • Vaping Use: Never used   Substance and Sexual Activity   • Alcohol use: Yes     Alcohol/week: 1.0 standard drink     Types: 1 Glasses of wine per week     Comment: Social   • Drug use: No   • Sexual activity: Defer   Other Topics Concern   • Not on file   Social History Narrative   • Not on file     Social Determinants of Health     Financial Resource Strain: Not on file   Food Insecurity: No Food Insecurity   • Worried About Running Out of Food in the Last Year: Never true   • Ran Out of Food in the Last Year: Never true   Transportation Needs: Not on file   Physical Activity: Not on file   Stress: Not on file   Social Connections: Not on file   Intimate Partner Violence: Not on file   Housing Stability: Not on file       Family History:   Family History   Problem Relation Age of Onset   • Heart disease Biological Mother    • Prostate cancer Biological Father        Allergies:   Anesthetics - amide type - select amino amides, Iodinated contrast media, Penicillins, Clarithromycin, Erythromycin base, and Nsaids (non-steroidal anti-inflammatory drug)    Current Medications:      Current Outpatient Medications:   •  amLODIPine (NORVASC) 5 mg tablet, Take 1 tablet (5 mg total) by mouth daily., Disp: 90 tablet, Rfl: 1  •  B complex-vitamin C-folic acid 400 mcg tablet tablet, Take 1 tablet by mouth daily., Disp: , Rfl:   •  biotin 1 mg capsule, Take 1 capsule by mouth daily. Dose unknown  , Disp: , Rfl:   •  bisacodyL (DULCOLAX) 5 mg EC tablet, Take 5 mg by mouth daily., Disp: , Rfl:   •  calcium carbonate (CALCIUM 500 ORAL), Take 500 mg by mouth daily. Dose unknown  , Disp: , Rfl:   •  cholecalciferol, vitamin D3, 1,000 unit capsule, Take 1,000 Units by mouth daily.  , Disp: , Rfl:   •  escitalopram (LEXAPRO) 10 mg tablet, TAKE 1 TABLET BY  MOUTH EVERY DAY, Disp: 90 tablet, Rfl: 3  •  fluticasone propionate (FLONASE) 50 mcg/actuation nasal spray, Administer 2 sprays into each nostril daily., Disp: , Rfl:   •  hydrochlorothiazide (HYDRODIURIL) 25 mg tablet, Take 1 tablet (25 mg total) by mouth once daily., Disp: 90 tablet, Rfl: 3  •  lisdexamfetamine (VYVANSE) 60 mg capsule, Take 1 capsule (60 mg total) by mouth every morning., Disp: 30 capsule, Rfl: 0  •  neomycin-polymyxin-hydrocortisone (CORTISPORIN) otic solution, Administer 3 drops into the right ear 4 (four) times a day for 10 days., Disp: 10 mL, Rfl: 0  •  potassium chloride (KLOR-CON M) 20 mEq CR tablet, Take 20 mEq by mouth daily., Disp: , Rfl:   •  rosuvastatin 10 mg tablet, Take 1 tablet (10 mg total) by mouth once daily., Disp: 30 tablet, Rfl: 11    Review of Systems  All 14 systems reviewed and the findings are not pertinent to the current problem.    Objective      Vital Signs  There were no vitals filed for this visit.  Body mass index is 22.11 kg/m².      Physical Exam  General Appearance:  Well developed.  Well nourished.  In no acute distress.    Anorectal Examination:    No pilonidal sinus was observed.   No pilonidal cyst was observed.   Perianal skin was not erythematous.  No perianal wart was observed.  No anal fissure was observed.   Anus did not have a fistula.   Anal sphincter tone was normal.    The patient had large internal hemorrhoids.      No external anal skin tags were observed.   Anus had no mass.  Rectum:  No rectal abscess was observed.   Rectal prolapse was not observed.   No rectal fistula was observed.   Rectal exam was not tender.   No rectal fluctuance was observed.  Rectal exam showed no mass.   Normal richard-anal skin with no lesions or dermatitis      Problem List Items Addressed This Visit        Circulatory    Internal hemorrhoid, bleeding - Primary     This patient has had years of bleeding from internal hemorrhoids. She has actually not had bleeding since the  end of October but is ready for surgery when and if the bleeding returns.     The hemorrhoids are so large and chronic that office based therapy has not been able to improve the symptoms.  Surgical excision is the next logical option.                  DOYLE Lee 12/7/2022 10:57 AM

## 2022-12-08 NOTE — TELEPHONE ENCOUNTER
Spoke w/ pt informed her that she will need documentation w/ ADHD dx. Pt states she will provide documentation. Pt also states that she saw ENT for hearing concerns and was dx w/ hearing loss in rt ear. Pt's OV is recorded in chart from Dr. Cruz.

## 2022-12-13 ENCOUNTER — LAB REQUISITION (OUTPATIENT)
Dept: LAB | Facility: HOSPITAL | Age: 67
End: 2022-12-13
Payer: MEDICARE

## 2022-12-13 DIAGNOSIS — D50.8 OTHER IRON DEFICIENCY ANEMIAS: ICD-10-CM

## 2022-12-13 LAB
25(OH)D3 SERPL-MCNC: 39 NG/ML (ref 30–100)
ALBUMIN SERPL-MCNC: 4.4 G/DL (ref 3.4–5)
ALP SERPL-CCNC: 56 IU/L (ref 35–126)
ALT SERPL-CCNC: 26 IU/L (ref 11–54)
ANION GAP SERPL CALC-SCNC: 9 MEQ/L (ref 3–15)
AST SERPL-CCNC: 23 IU/L (ref 15–41)
BASOPHILS # BLD: 0.01 K/UL (ref 0.01–0.1)
BASOPHILS NFR BLD: 0.1 %
BILIRUB SERPL-MCNC: 0.4 MG/DL (ref 0.3–1.2)
BUN SERPL-MCNC: 24 MG/DL (ref 8–20)
CALCIUM SERPL-MCNC: 9.4 MG/DL (ref 8.9–10.3)
CHLORIDE SERPL-SCNC: 100 MEQ/L (ref 98–109)
CO2 SERPL-SCNC: 25 MEQ/L (ref 22–32)
CREAT SERPL-MCNC: 0.7 MG/DL (ref 0.6–1.1)
DIFFERENTIAL METHOD BLD: ABNORMAL
EOSINOPHIL # BLD: 0.02 K/UL (ref 0.04–0.36)
EOSINOPHIL NFR BLD: 0.1 %
ERYTHROCYTE [DISTWIDTH] IN BLOOD BY AUTOMATED COUNT: 17.5 % (ref 11.7–14.4)
GFR SERPL CREATININE-BSD FRML MDRD: >60 ML/MIN/1.73M*2
GLUCOSE SERPL-MCNC: 160 MG/DL (ref 70–99)
HCT VFR BLDCO AUTO: 35.6 % (ref 35–45)
HGB BLD-MCNC: 11 G/DL (ref 11.8–15.7)
IMM GRANULOCYTES # BLD AUTO: 0.37 K/UL (ref 0–0.08)
IMM GRANULOCYTES NFR BLD AUTO: 2.6 %
LYMPHOCYTES # BLD: 0.78 K/UL (ref 1.2–3.5)
LYMPHOCYTES NFR BLD: 5.5 %
MCH RBC QN AUTO: 22.4 PG (ref 28–33.2)
MCHC RBC AUTO-ENTMCNC: 30.9 G/DL (ref 32.2–35.5)
MCV RBC AUTO: 72.4 FL (ref 83–98)
MONOCYTES # BLD: 0.2 K/UL (ref 0.28–0.8)
MONOCYTES NFR BLD: 1.4 %
NEUTROPHILS # BLD: 12.81 K/UL (ref 1.7–7)
NEUTROPHILS # BLD: 12.81 K/UL (ref 1.7–7)
NEUTS SEG NFR BLD: 90.3 %
NRBC BLD-RTO: 0 %
PDW BLD AUTO: 9.5 FL (ref 9.4–12.3)
PLATELET # BLD AUTO: 622 K/UL (ref 150–369)
POTASSIUM SERPL-SCNC: 3.4 MEQ/L (ref 3.6–5.1)
PROT SERPL-MCNC: 6.7 G/DL (ref 6–8.2)
RBC # BLD AUTO: 4.92 M/UL (ref 3.93–5.22)
SODIUM SERPL-SCNC: 134 MEQ/L (ref 136–144)
WBC # BLD AUTO: 14.19 K/UL (ref 3.8–10.5)

## 2022-12-13 PROCEDURE — 80053 COMPREHEN METABOLIC PANEL: CPT | Performed by: INTERNAL MEDICINE

## 2022-12-13 PROCEDURE — 85025 COMPLETE CBC W/AUTO DIFF WBC: CPT | Performed by: INTERNAL MEDICINE

## 2022-12-13 PROCEDURE — 82306 VITAMIN D 25 HYDROXY: CPT | Performed by: INTERNAL MEDICINE

## 2022-12-28 ENCOUNTER — HOSPITAL ENCOUNTER (OUTPATIENT)
Dept: RADIOLOGY | Age: 67
Discharge: HOME | End: 2022-12-28
Attending: INTERNAL MEDICINE
Payer: MEDICARE

## 2022-12-28 DIAGNOSIS — Q82.2 CONGENITAL CUTANEOUS MASTOCYTOSIS: ICD-10-CM

## 2022-12-28 DIAGNOSIS — K82.4 CHOLESTEROLOSIS OF GALLBLADDER: ICD-10-CM

## 2022-12-28 PROCEDURE — 76705 ECHO EXAM OF ABDOMEN: CPT

## 2023-01-10 ENCOUNTER — LAB REQUISITION (OUTPATIENT)
Dept: LAB | Facility: HOSPITAL | Age: 68
End: 2023-01-10
Payer: MEDICARE

## 2023-01-10 ENCOUNTER — TELEPHONE (OUTPATIENT)
Dept: INTERNAL MEDICINE | Facility: CLINIC | Age: 68
End: 2023-01-10
Payer: MEDICARE

## 2023-01-10 DIAGNOSIS — D50.8 OTHER IRON DEFICIENCY ANEMIAS: ICD-10-CM

## 2023-01-10 LAB
BASOPHILS # BLD: 0.03 K/UL (ref 0.01–0.1)
BASOPHILS NFR BLD: 0.4 %
DIFFERENTIAL METHOD BLD: ABNORMAL
EOSINOPHIL # BLD: 0.35 K/UL (ref 0.04–0.36)
EOSINOPHIL NFR BLD: 5.1 %
ERYTHROCYTE [DISTWIDTH] IN BLOOD BY AUTOMATED COUNT: 20.7 % (ref 11.7–14.4)
HCT VFR BLDCO AUTO: 34.6 % (ref 35–45)
HGB BLD-MCNC: 10.7 G/DL (ref 11.8–15.7)
IMM GRANULOCYTES # BLD AUTO: 0.04 K/UL (ref 0–0.08)
IMM GRANULOCYTES NFR BLD AUTO: 0.6 %
LYMPHOCYTES # BLD: 1.09 K/UL (ref 1.2–3.5)
LYMPHOCYTES NFR BLD: 15.8 %
MCH RBC QN AUTO: 23.5 PG (ref 28–33.2)
MCHC RBC AUTO-ENTMCNC: 30.9 G/DL (ref 32.2–35.5)
MCV RBC AUTO: 75.9 FL (ref 83–98)
MONOCYTES # BLD: 0.69 K/UL (ref 0.28–0.8)
MONOCYTES NFR BLD: 10 %
NEUTROPHILS # BLD: 4.69 K/UL (ref 1.7–7)
NEUTROPHILS # BLD: 4.69 K/UL (ref 1.7–7)
NEUTS SEG NFR BLD: 68.1 %
NRBC BLD-RTO: 0 %
PDW BLD AUTO: 9 FL (ref 9.4–12.3)
PLATELET # BLD AUTO: 392 K/UL (ref 150–369)
RBC # BLD AUTO: 4.56 M/UL (ref 3.93–5.22)
WBC # BLD AUTO: 6.89 K/UL (ref 3.8–10.5)

## 2023-01-10 PROCEDURE — 85025 COMPLETE CBC W/AUTO DIFF WBC: CPT | Performed by: INTERNAL MEDICINE

## 2023-01-12 RX ORDER — LISDEXAMFETAMINE DIMESYLATE 60 MG/1
60 CAPSULE ORAL EVERY MORNING
Qty: 30 CAPSULE | Refills: 0 | Status: SHIPPED | OUTPATIENT
Start: 2023-01-12 | End: 2023-05-26

## 2023-01-12 RX ORDER — LORAZEPAM 0.5 MG/1
0.5 TABLET ORAL EVERY 8 HOURS PRN
Qty: 12 TABLET | Refills: 0 | Status: SHIPPED | OUTPATIENT
Start: 2023-01-12 | End: 2023-06-26

## 2023-01-12 NOTE — TELEPHONE ENCOUNTER
I placed a letter for the patient regarding her medications and upcoming travel.  Please mail to her -let her know we will be mailing it

## 2023-01-12 NOTE — TELEPHONE ENCOUNTER
Spoke with the patient.  She wanted to discuss her use of Vyvanse.  Will be seeing a neurologist Dr. Lauro Madera but cannot get an appointment until February.  I advised her that I will refill new her medication for 1 more month but after that we need documentation from either neurology or psychiatry that this medication is required for her.  She does feel much better with Vyvanse with improved concentration and likely does have ADHD but again I do need documentation.  She also requested a note -will be traveling to [facility] and needs a note stating she is on this medication.  Also requested Ativan for flying.  I will send in a small dose of that as well.

## 2023-01-13 ENCOUNTER — TRANSCRIBE ORDERS (OUTPATIENT)
Dept: SCHEDULING | Age: 68
End: 2023-01-13

## 2023-01-13 DIAGNOSIS — H91.20 SUDDEN IDIOPATHIC HEARING LOSS, UNSPECIFIED EAR: Primary | ICD-10-CM

## 2023-01-16 ENCOUNTER — TELEPHONE (OUTPATIENT)
Dept: SCHEDULING | Facility: CLINIC | Age: 68
End: 2023-01-16
Payer: MEDICARE

## 2023-01-16 NOTE — TELEPHONE ENCOUNTER
Pre-cert Request    Name of patient: Ritu Ramirez    Name of physician: Bright Selby MD    Type of test: Stress Echo    Insurance: Medicare    Location having test done: [facility]     NPI of location:   [id number]    Scheduled/Expected date for test: 3/2

## 2023-01-23 ENCOUNTER — HOSPITAL ENCOUNTER (OUTPATIENT)
Dept: RADIOLOGY | Age: 68
Discharge: HOME | End: 2023-01-23
Attending: OTOLARYNGOLOGY
Payer: MEDICARE

## 2023-01-23 DIAGNOSIS — H91.20 SUDDEN IDIOPATHIC HEARING LOSS, UNSPECIFIED EAR: ICD-10-CM

## 2023-01-26 ENCOUNTER — LAB REQUISITION (OUTPATIENT)
Dept: LAB | Facility: HOSPITAL | Age: 68
End: 2023-01-26
Payer: MEDICARE

## 2023-01-26 DIAGNOSIS — D50.9 IRON DEFICIENCY ANEMIA, UNSPECIFIED: ICD-10-CM

## 2023-01-26 LAB
ANISOCYTOSIS BLD QL SMEAR: ABNORMAL
BASOPHILS # BLD: 0.01 K/UL (ref 0.01–0.1)
BASOPHILS NFR BLD: 0.3 %
DACRYOCYTES BLD QL SMEAR: ABNORMAL
DIFFERENTIAL METHOD BLD: ABNORMAL
EOSINOPHIL # BLD: 0.16 K/UL (ref 0.04–0.36)
EOSINOPHIL NFR BLD: 4.9 %
ERYTHROCYTE [DISTWIDTH] IN BLOOD BY AUTOMATED COUNT: 20.4 % (ref 11.7–14.4)
HCT VFR BLDCO AUTO: 32.5 % (ref 35–45)
HGB BLD-MCNC: 10.2 G/DL (ref 11.8–15.7)
IMM GRANULOCYTES # BLD AUTO: 0.02 K/UL (ref 0–0.08)
IMM GRANULOCYTES NFR BLD AUTO: 0.6 %
LYMPHOCYTES # BLD: 0.97 K/UL (ref 1.2–3.5)
LYMPHOCYTES NFR BLD: 29.8 %
MCH RBC QN AUTO: 24 PG (ref 28–33.2)
MCHC RBC AUTO-ENTMCNC: 31.4 G/DL (ref 32.2–35.5)
MCV RBC AUTO: 76.5 FL (ref 83–98)
MICROCYTES BLD QL SMEAR: ABNORMAL
MONOCYTES # BLD: 0.4 K/UL (ref 0.28–0.8)
MONOCYTES NFR BLD: 12.3 %
NEUTROPHILS # BLD: 1.7 K/UL (ref 1.7–7)
NEUTROPHILS # BLD: 1.7 K/UL (ref 1.7–7)
NEUTS SEG NFR BLD: 52.1 %
NRBC BLD-RTO: 0 %
OVALOCYTES BLD QL SMEAR: ABNORMAL
PDW BLD AUTO: 9.1 FL (ref 9.4–12.3)
PHOSPHATE SERPL-MCNC: 2.5 MG/DL (ref 2.4–4.7)
PLAT MORPH BLD: NORMAL
PLATELET # BLD AUTO: 287 K/UL (ref 150–369)
PLATELET # BLD EST: ABNORMAL 10*3/UL
POIKILOCYTOSIS BLD QL SMEAR: ABNORMAL
RBC # BLD AUTO: 4.25 M/UL (ref 3.93–5.22)
WBC # BLD AUTO: 3.26 K/UL (ref 3.8–10.5)

## 2023-01-26 PROCEDURE — 84100 ASSAY OF PHOSPHORUS: CPT | Performed by: INTERNAL MEDICINE

## 2023-01-26 PROCEDURE — 85025 COMPLETE CBC W/AUTO DIFF WBC: CPT | Performed by: INTERNAL MEDICINE

## 2023-01-31 DIAGNOSIS — G47.33 OBSTRUCTIVE SLEEP APNEA (ADULT) (PEDIATRIC): Primary | ICD-10-CM

## 2023-02-07 ENCOUNTER — LAB REQUISITION (OUTPATIENT)
Dept: LAB | Facility: HOSPITAL | Age: 68
End: 2023-02-07
Payer: MEDICARE

## 2023-02-07 DIAGNOSIS — D50.8 OTHER IRON DEFICIENCY ANEMIAS: ICD-10-CM

## 2023-02-07 LAB
ALBUMIN SERPL-MCNC: 3.9 G/DL (ref 3.4–5)
ALP SERPL-CCNC: 54 IU/L (ref 35–126)
ALT SERPL-CCNC: 28 IU/L (ref 11–54)
ANION GAP SERPL CALC-SCNC: 11 MEQ/L (ref 3–15)
AST SERPL-CCNC: 21 IU/L (ref 15–41)
BASOPHILS # BLD: 0.03 K/UL (ref 0.01–0.1)
BASOPHILS NFR BLD: 0.3 %
BILIRUB SERPL-MCNC: 0.1 MG/DL (ref 0.3–1.2)
BUN SERPL-MCNC: 25 MG/DL (ref 8–20)
CALCIUM SERPL-MCNC: 8.9 MG/DL (ref 8.9–10.3)
CHLORIDE SERPL-SCNC: 99 MEQ/L (ref 98–109)
CO2 SERPL-SCNC: 25 MEQ/L (ref 22–32)
CREAT SERPL-MCNC: 0.6 MG/DL (ref 0.6–1.1)
DIFFERENTIAL METHOD BLD: ABNORMAL
EOSINOPHIL # BLD: 0.33 K/UL (ref 0.04–0.36)
EOSINOPHIL NFR BLD: 2.8 %
ERYTHROCYTE [DISTWIDTH] IN BLOOD BY AUTOMATED COUNT: 21 % (ref 11.7–14.4)
GFR SERPL CREATININE-BSD FRML MDRD: >60 ML/MIN/1.73M*2
GLUCOSE SERPL-MCNC: 130 MG/DL (ref 70–99)
HCT VFR BLDCO AUTO: 33.6 % (ref 35–45)
HGB BLD-MCNC: 10.9 G/DL (ref 11.8–15.7)
IMM GRANULOCYTES # BLD AUTO: 0.33 K/UL (ref 0–0.08)
IMM GRANULOCYTES NFR BLD AUTO: 2.8 %
LYMPHOCYTES # BLD: 1.21 K/UL (ref 1.2–3.5)
LYMPHOCYTES NFR BLD: 10.2 %
MCH RBC QN AUTO: 25.5 PG (ref 28–33.2)
MCHC RBC AUTO-ENTMCNC: 32.4 G/DL (ref 32.2–35.5)
MCV RBC AUTO: 78.7 FL (ref 83–98)
MONOCYTES # BLD: 0.9 K/UL (ref 0.28–0.8)
MONOCYTES NFR BLD: 7.6 %
NEUTROPHILS # BLD: 9.12 K/UL (ref 1.7–7)
NEUTROPHILS # BLD: 9.12 K/UL (ref 1.7–7)
NEUTS SEG NFR BLD: 76.3 %
NRBC BLD-RTO: 0 %
PDW BLD AUTO: 9.4 FL (ref 9.4–12.3)
PHOSPHATE SERPL-MCNC: 2 MG/DL (ref 2.4–4.7)
PLATELET # BLD AUTO: 422 K/UL (ref 150–369)
POTASSIUM SERPL-SCNC: 2.9 MEQ/L (ref 3.6–5.1)
PROT SERPL-MCNC: 6 G/DL (ref 6–8.2)
RBC # BLD AUTO: 4.27 M/UL (ref 3.93–5.22)
SODIUM SERPL-SCNC: 135 MEQ/L (ref 136–144)
WBC # BLD AUTO: 11.92 K/UL (ref 3.8–10.5)

## 2023-02-07 PROCEDURE — 85025 COMPLETE CBC W/AUTO DIFF WBC: CPT | Performed by: INTERNAL MEDICINE

## 2023-02-07 PROCEDURE — 84100 ASSAY OF PHOSPHORUS: CPT | Performed by: INTERNAL MEDICINE

## 2023-02-07 PROCEDURE — 80053 COMPREHEN METABOLIC PANEL: CPT | Performed by: INTERNAL MEDICINE

## 2023-02-22 ENCOUNTER — LAB REQUISITION (OUTPATIENT)
Dept: LAB | Facility: HOSPITAL | Age: 68
End: 2023-02-22
Payer: MEDICARE

## 2023-02-22 DIAGNOSIS — D50.8 OTHER IRON DEFICIENCY ANEMIAS: ICD-10-CM

## 2023-02-22 LAB
ANION GAP SERPL CALC-SCNC: 9 MEQ/L (ref 3–15)
BASOPHILS # BLD: 0.03 K/UL (ref 0.01–0.1)
BASOPHILS NFR BLD: 0.4 %
BUN SERPL-MCNC: 20 MG/DL (ref 8–20)
CALCIUM SERPL-MCNC: 10 MG/DL (ref 8.9–10.3)
CHLORIDE SERPL-SCNC: 100 MEQ/L (ref 98–109)
CO2 SERPL-SCNC: 27 MEQ/L (ref 22–32)
CREAT SERPL-MCNC: 0.8 MG/DL (ref 0.6–1.1)
DIFFERENTIAL METHOD BLD: ABNORMAL
EOSINOPHIL # BLD: 0.43 K/UL (ref 0.04–0.36)
EOSINOPHIL NFR BLD: 5.8 %
ERYTHROCYTE [DISTWIDTH] IN BLOOD BY AUTOMATED COUNT: 17.6 % (ref 11.7–14.4)
FERRITIN SERPL-MCNC: 297 NG/ML (ref 11–250)
GFR SERPL CREATININE-BSD FRML MDRD: >60 ML/MIN/1.73M*2
GLUCOSE SERPL-MCNC: 120 MG/DL (ref 70–99)
HCT VFR BLDCO AUTO: 36.8 % (ref 35–45)
HGB BLD-MCNC: 11.7 G/DL (ref 11.8–15.7)
IMM GRANULOCYTES # BLD AUTO: 0.03 K/UL (ref 0–0.08)
IMM GRANULOCYTES NFR BLD AUTO: 0.4 %
IRON SATN MFR SERPL: 21 % (ref 15–45)
IRON SERPL-MCNC: 68 UG/DL (ref 35–150)
LYMPHOCYTES # BLD: 1.11 K/UL (ref 1.2–3.5)
LYMPHOCYTES NFR BLD: 15 %
MCH RBC QN AUTO: 25.9 PG (ref 28–33.2)
MCHC RBC AUTO-ENTMCNC: 31.8 G/DL (ref 32.2–35.5)
MCV RBC AUTO: 81.4 FL (ref 83–98)
MONOCYTES # BLD: 0.63 K/UL (ref 0.28–0.8)
MONOCYTES NFR BLD: 8.5 %
NEUTROPHILS # BLD: 5.15 K/UL (ref 1.7–7)
NEUTROPHILS # BLD: 5.15 K/UL (ref 1.7–7)
NEUTS SEG NFR BLD: 69.9 %
NRBC BLD-RTO: 0 %
PDW BLD AUTO: 9.1 FL (ref 9.4–12.3)
PHOSPHATE SERPL-MCNC: 3.2 MG/DL (ref 2.4–4.7)
PLATELET # BLD AUTO: 318 K/UL (ref 150–369)
POTASSIUM SERPL-SCNC: 2.7 MEQ/L (ref 3.6–5.1)
RBC # BLD AUTO: 4.52 M/UL (ref 3.93–5.22)
RETICS #: 0.11 M/UL (ref 0–0.12)
RETICS/RBC NFR: 2.45 % (ref 0.6–2.8)
SODIUM SERPL-SCNC: 136 MEQ/L (ref 136–144)
TIBC SERPL-MCNC: 330 UG/DL (ref 270–460)
UIBC SERPL-MCNC: 262 UG/DL (ref 180–360)
WBC # BLD AUTO: 7.38 K/UL (ref 3.8–10.5)

## 2023-02-22 PROCEDURE — 85045 AUTOMATED RETICULOCYTE COUNT: CPT | Performed by: INTERNAL MEDICINE

## 2023-02-22 PROCEDURE — 80048 BASIC METABOLIC PNL TOTAL CA: CPT | Performed by: INTERNAL MEDICINE

## 2023-02-22 PROCEDURE — 83540 ASSAY OF IRON: CPT | Performed by: INTERNAL MEDICINE

## 2023-02-22 PROCEDURE — 82728 ASSAY OF FERRITIN: CPT | Performed by: INTERNAL MEDICINE

## 2023-02-22 PROCEDURE — 84100 ASSAY OF PHOSPHORUS: CPT | Performed by: INTERNAL MEDICINE

## 2023-02-22 PROCEDURE — 85025 COMPLETE CBC W/AUTO DIFF WBC: CPT | Performed by: INTERNAL MEDICINE

## 2023-02-24 ENCOUNTER — TRANSCRIBE ORDERS (OUTPATIENT)
Dept: SCHEDULING | Age: 68
End: 2023-02-24

## 2023-02-24 DIAGNOSIS — Z98.82 BREAST IMPLANT STATUS: Primary | ICD-10-CM

## 2023-03-03 ENCOUNTER — LAB REQUISITION (OUTPATIENT)
Dept: LAB | Facility: HOSPITAL | Age: 68
End: 2023-03-03
Payer: MEDICARE

## 2023-03-03 DIAGNOSIS — D50.8 OTHER IRON DEFICIENCY ANEMIAS: ICD-10-CM

## 2023-03-03 LAB
ALBUMIN SERPL-MCNC: 4.1 G/DL (ref 3.4–5)
ALP SERPL-CCNC: 60 IU/L (ref 35–126)
ALT SERPL-CCNC: 25 IU/L (ref 11–54)
ANION GAP SERPL CALC-SCNC: 7 MEQ/L (ref 3–15)
AST SERPL-CCNC: 17 IU/L (ref 15–41)
BASOPHILS # BLD: 0.02 K/UL (ref 0.01–0.1)
BASOPHILS NFR BLD: 0.3 %
BILIRUB SERPL-MCNC: 0.3 MG/DL (ref 0.3–1.2)
BUN SERPL-MCNC: 20 MG/DL (ref 8–20)
CALCIUM SERPL-MCNC: 9.7 MG/DL (ref 8.9–10.3)
CHLORIDE SERPL-SCNC: 103 MEQ/L (ref 98–109)
CO2 SERPL-SCNC: 28 MEQ/L (ref 22–32)
CREAT SERPL-MCNC: 0.7 MG/DL (ref 0.6–1.1)
DIFFERENTIAL METHOD BLD: ABNORMAL
EOSINOPHIL # BLD: 0.33 K/UL (ref 0.04–0.36)
EOSINOPHIL NFR BLD: 5.2 %
ERYTHROCYTE [DISTWIDTH] IN BLOOD BY AUTOMATED COUNT: 16.1 % (ref 11.7–14.4)
GFR SERPL CREATININE-BSD FRML MDRD: >60 ML/MIN/1.73M*2
GLUCOSE SERPL-MCNC: 99 MG/DL (ref 70–99)
HCT VFR BLDCO AUTO: 34.5 % (ref 35–45)
HGB BLD-MCNC: 11 G/DL (ref 11.8–15.7)
IMM GRANULOCYTES # BLD AUTO: 0.06 K/UL (ref 0–0.08)
IMM GRANULOCYTES NFR BLD AUTO: 1 %
LYMPHOCYTES # BLD: 0.64 K/UL (ref 1.2–3.5)
LYMPHOCYTES NFR BLD: 10.2 %
MCH RBC QN AUTO: 26.4 PG (ref 28–33.2)
MCHC RBC AUTO-ENTMCNC: 31.9 G/DL (ref 32.2–35.5)
MCV RBC AUTO: 82.9 FL (ref 83–98)
MONOCYTES # BLD: 0.49 K/UL (ref 0.28–0.8)
MONOCYTES NFR BLD: 7.8 %
NEUTROPHILS # BLD: 4.76 K/UL (ref 1.7–7)
NEUTROPHILS # BLD: 4.76 K/UL (ref 1.7–7)
NEUTS SEG NFR BLD: 75.5 %
NRBC BLD-RTO: 0 %
PDW BLD AUTO: 9.2 FL (ref 9.4–12.3)
PLATELET # BLD AUTO: 252 K/UL (ref 150–369)
POTASSIUM SERPL-SCNC: 3.1 MEQ/L (ref 3.6–5.1)
PROT SERPL-MCNC: 6.6 G/DL (ref 6–8.2)
RBC # BLD AUTO: 4.16 M/UL (ref 3.93–5.22)
SODIUM SERPL-SCNC: 138 MEQ/L (ref 136–144)
WBC # BLD AUTO: 6.3 K/UL (ref 3.8–10.5)

## 2023-03-03 PROCEDURE — 82040 ASSAY OF SERUM ALBUMIN: CPT | Performed by: INTERNAL MEDICINE

## 2023-03-03 PROCEDURE — 85025 COMPLETE CBC W/AUTO DIFF WBC: CPT | Performed by: INTERNAL MEDICINE

## 2023-03-08 ENCOUNTER — HOSPITAL ENCOUNTER (OUTPATIENT)
Dept: SLEEP MEDICINE | Facility: CLINIC | Age: 68
Discharge: HOME | End: 2023-03-08
Attending: INTERNAL MEDICINE
Payer: MEDICARE

## 2023-03-08 DIAGNOSIS — G47.33 OBSTRUCTIVE SLEEP APNEA (ADULT) (PEDIATRIC): ICD-10-CM

## 2023-03-08 PROCEDURE — 95810 POLYSOM 6/> YRS 4/> PARAM: CPT

## 2023-03-29 ENCOUNTER — LAB REQUISITION (OUTPATIENT)
Dept: LAB | Facility: HOSPITAL | Age: 68
End: 2023-03-29
Attending: INTERNAL MEDICINE
Payer: MEDICARE

## 2023-03-29 DIAGNOSIS — G47.33 OBSTRUCTIVE SLEEP APNEA (ADULT) (PEDIATRIC): ICD-10-CM

## 2023-03-29 DIAGNOSIS — D50.8 OTHER IRON DEFICIENCY ANEMIAS: ICD-10-CM

## 2023-03-29 DIAGNOSIS — G47.61 PERIODIC LIMB MOVEMENT DISORDER: ICD-10-CM

## 2023-03-29 LAB
BASOPHILS # BLD: 0.04 K/UL (ref 0.01–0.1)
BASOPHILS NFR BLD: 0.5 %
DIFFERENTIAL METHOD BLD: ABNORMAL
EOSINOPHIL # BLD: 0.32 K/UL (ref 0.04–0.36)
EOSINOPHIL NFR BLD: 4.2 %
ERYTHROCYTE [DISTWIDTH] IN BLOOD BY AUTOMATED COUNT: 14.8 % (ref 11.7–14.4)
HCT VFR BLDCO AUTO: 36 % (ref 35–45)
HGB BLD-MCNC: 11.4 G/DL (ref 11.8–15.7)
IMM GRANULOCYTES # BLD AUTO: 0.04 K/UL (ref 0–0.08)
IMM GRANULOCYTES NFR BLD AUTO: 0.5 %
LYMPHOCYTES # BLD: 1 K/UL (ref 1.2–3.5)
LYMPHOCYTES NFR BLD: 13 %
MCH RBC QN AUTO: 26.9 PG (ref 28–33.2)
MCHC RBC AUTO-ENTMCNC: 31.7 G/DL (ref 32.2–35.5)
MCV RBC AUTO: 84.9 FL (ref 83–98)
MONOCYTES # BLD: 0.58 K/UL (ref 0.28–0.8)
MONOCYTES NFR BLD: 7.5 %
NEUTROPHILS # BLD: 5.73 K/UL (ref 1.7–7)
NEUTROPHILS # BLD: 5.73 K/UL (ref 1.7–7)
NEUTS SEG NFR BLD: 74.3 %
NRBC BLD-RTO: 0 %
PDW BLD AUTO: 9.6 FL (ref 9.4–12.3)
PLATELET # BLD AUTO: 341 K/UL (ref 150–369)
RBC # BLD AUTO: 4.24 M/UL (ref 3.93–5.22)
WBC # BLD AUTO: 7.71 K/UL (ref 3.8–10.5)

## 2023-03-29 PROCEDURE — 85025 COMPLETE CBC W/AUTO DIFF WBC: CPT | Performed by: INTERNAL MEDICINE

## 2023-03-29 PROCEDURE — 83520 IMMUNOASSAY QUANT NOS NONAB: CPT | Performed by: INTERNAL MEDICINE

## 2023-04-01 LAB — TRYPTASE SERPL-MCNC: 35.8 MCG/L

## 2023-04-11 ENCOUNTER — APPOINTMENT (OUTPATIENT)
Dept: LAB | Facility: HOSPITAL | Age: 68
End: 2023-04-11
Attending: INTERNAL MEDICINE
Payer: MEDICARE

## 2023-04-11 ENCOUNTER — TRANSCRIBE ORDERS (OUTPATIENT)
Dept: PREADMISSION TESTING | Facility: HOSPITAL | Age: 68
End: 2023-04-11

## 2023-04-11 DIAGNOSIS — R94.6 ABNORMAL RESULTS OF THYROID FUNCTION STUDIES: ICD-10-CM

## 2023-04-11 DIAGNOSIS — F90.9 ATTENTION-DEFICIT HYPERACTIVITY DISORDER, UNSPECIFIED TYPE: ICD-10-CM

## 2023-04-11 DIAGNOSIS — R30.9 PAINFUL MICTURITION, UNSPECIFIED: ICD-10-CM

## 2023-04-11 DIAGNOSIS — E83.39 OTHER DISORDERS OF PHOSPHORUS METABOLISM: ICD-10-CM

## 2023-04-11 DIAGNOSIS — D50.8 OTHER IRON DEFICIENCY ANEMIAS: ICD-10-CM

## 2023-04-11 DIAGNOSIS — E55.9 VITAMIN D DEFICIENCY, UNSPECIFIED: ICD-10-CM

## 2023-04-11 DIAGNOSIS — E61.1 IRON DEFICIENCY: ICD-10-CM

## 2023-04-11 DIAGNOSIS — F90.9 ATTENTION-DEFICIT HYPERACTIVITY DISORDER, UNSPECIFIED TYPE: Primary | ICD-10-CM

## 2023-04-11 LAB
25(OH)D3 SERPL-MCNC: 46 NG/ML (ref 30–100)
ANION GAP SERPL CALC-SCNC: 8 MEQ/L (ref 3–15)
BACTERIA URNS QL MICRO: ABNORMAL /HPF
BASOPHILS # BLD: 0.03 K/UL (ref 0.01–0.1)
BASOPHILS NFR BLD: 0.5 %
BILIRUB UR QL STRIP.AUTO: NEGATIVE MG/DL
BUN SERPL-MCNC: 19 MG/DL (ref 8–20)
CALCIUM SERPL-MCNC: 10.2 MG/DL (ref 8.9–10.3)
CHLORIDE SERPL-SCNC: 100 MEQ/L (ref 98–109)
CLARITY UR REFRACT.AUTO: CLEAR
CO2 SERPL-SCNC: 30 MEQ/L (ref 22–32)
COLOR UR AUTO: YELLOW
CREAT SERPL-MCNC: 0.6 MG/DL (ref 0.6–1.1)
DIFFERENTIAL METHOD BLD: ABNORMAL
EOSINOPHIL # BLD: 0.44 K/UL (ref 0.04–0.36)
EOSINOPHIL NFR BLD: 8 %
ERYTHROCYTE [DISTWIDTH] IN BLOOD BY AUTOMATED COUNT: 14 % (ref 11.7–14.4)
FERRITIN SERPL-MCNC: 194 NG/ML (ref 11–250)
GFR SERPL CREATININE-BSD FRML MDRD: >60 ML/MIN/1.73M*2
GLUCOSE SERPL-MCNC: 111 MG/DL (ref 70–99)
GLUCOSE UR STRIP.AUTO-MCNC: NEGATIVE MG/DL
HCT VFR BLDCO AUTO: 40.8 % (ref 35–45)
HGB BLD-MCNC: 12.7 G/DL (ref 11.8–15.7)
HGB UR QL STRIP.AUTO: NEGATIVE
HYALINE CASTS #/AREA URNS LPF: ABNORMAL /LPF
IMM GRANULOCYTES # BLD AUTO: 0.06 K/UL (ref 0–0.08)
IMM GRANULOCYTES NFR BLD AUTO: 1.1 %
IRON SATN MFR SERPL: 15 % (ref 15–45)
IRON SERPL-MCNC: 54 UG/DL (ref 35–150)
KETONES UR STRIP.AUTO-MCNC: NEGATIVE MG/DL
LEUKOCYTE ESTERASE UR QL STRIP.AUTO: 1
LYMPHOCYTES # BLD: 0.79 K/UL (ref 1.2–3.5)
LYMPHOCYTES NFR BLD: 14.3 %
MCH RBC QN AUTO: 26.2 PG (ref 28–33.2)
MCHC RBC AUTO-ENTMCNC: 31.1 G/DL (ref 32.2–35.5)
MCV RBC AUTO: 84.1 FL (ref 83–98)
MONOCYTES # BLD: 0.53 K/UL (ref 0.28–0.8)
MONOCYTES NFR BLD: 9.6 %
MUCOUS THREADS URNS QL MICRO: 1 /LPF
NEUTROPHILS # BLD: 3.66 K/UL (ref 1.7–7)
NEUTROPHILS # BLD: 3.66 K/UL (ref 1.7–7)
NEUTS SEG NFR BLD: 66.5 %
NITRITE UR QL STRIP.AUTO: NEGATIVE
NRBC BLD-RTO: 0 %
PDW BLD AUTO: 8.9 FL (ref 9.4–12.3)
PH UR STRIP.AUTO: 5.5 [PH]
PHOSPHATE SERPL-MCNC: 3.3 MG/DL (ref 2.4–4.7)
PLATELET # BLD AUTO: 299 K/UL (ref 150–369)
POTASSIUM SERPL-SCNC: 3.8 MEQ/L (ref 3.6–5.1)
PROT UR QL STRIP.AUTO: 1
RBC # BLD AUTO: 4.85 M/UL (ref 3.93–5.22)
RBC #/AREA URNS HPF: ABNORMAL /HPF
SODIUM SERPL-SCNC: 138 MEQ/L (ref 136–144)
SP GR UR REFRACT.AUTO: 1.03
SQUAMOUS URNS QL MICRO: ABNORMAL /HPF
TIBC SERPL-MCNC: 353 UG/DL (ref 270–460)
TSH SERPL DL<=0.05 MIU/L-ACNC: 5.43 MIU/L (ref 0.34–5.6)
UIBC SERPL-MCNC: 299 UG/DL (ref 180–360)
UROBILINOGEN UR STRIP-ACNC: 0.2 EU/DL
WBC # BLD AUTO: 5.51 K/UL (ref 3.8–10.5)
WBC #/AREA URNS HPF: ABNORMAL /HPF

## 2023-04-11 PROCEDURE — 36415 COLL VENOUS BLD VENIPUNCTURE: CPT

## 2023-04-11 PROCEDURE — 84443 ASSAY THYROID STIM HORMONE: CPT

## 2023-04-11 PROCEDURE — 84100 ASSAY OF PHOSPHORUS: CPT

## 2023-04-11 PROCEDURE — 80048 BASIC METABOLIC PNL TOTAL CA: CPT

## 2023-04-11 PROCEDURE — 85025 COMPLETE CBC W/AUTO DIFF WBC: CPT

## 2023-04-11 PROCEDURE — 83540 ASSAY OF IRON: CPT

## 2023-04-11 PROCEDURE — 82306 VITAMIN D 25 HYDROXY: CPT

## 2023-04-11 PROCEDURE — 82728 ASSAY OF FERRITIN: CPT

## 2023-04-11 PROCEDURE — 81003 URINALYSIS AUTO W/O SCOPE: CPT

## 2023-04-12 ENCOUNTER — OFFICE VISIT (OUTPATIENT)
Dept: INTERNAL MEDICINE | Facility: CLINIC | Age: 68
End: 2023-04-12
Payer: MEDICARE

## 2023-04-12 VITALS
DIASTOLIC BLOOD PRESSURE: 86 MMHG | HEIGHT: 61 IN | WEIGHT: 121 LBS | OXYGEN SATURATION: 99 % | BODY MASS INDEX: 22.84 KG/M2 | TEMPERATURE: 98.2 F | SYSTOLIC BLOOD PRESSURE: 128 MMHG | HEART RATE: 53 BPM

## 2023-04-12 DIAGNOSIS — D12.3 ADENOMATOUS POLYP OF TRANSVERSE COLON: ICD-10-CM

## 2023-04-12 DIAGNOSIS — R39.9 UTI SYMPTOMS: ICD-10-CM

## 2023-04-12 DIAGNOSIS — R79.89 ELEVATED VITAMIN B12 LEVEL: ICD-10-CM

## 2023-04-12 DIAGNOSIS — D47.09 MASTOCYTOSIS: ICD-10-CM

## 2023-04-12 DIAGNOSIS — D51.8 OTHER VITAMIN B12 DEFICIENCY ANEMIAS: ICD-10-CM

## 2023-04-12 DIAGNOSIS — R82.81 STERILE PYURIA: ICD-10-CM

## 2023-04-12 DIAGNOSIS — E78.2 MIXED HYPERLIPIDEMIA: ICD-10-CM

## 2023-04-12 DIAGNOSIS — R93.1 ELEVATED CORONARY ARTERY CALCIUM SCORE: ICD-10-CM

## 2023-04-12 DIAGNOSIS — R53.83 FATIGUE, UNSPECIFIED TYPE: ICD-10-CM

## 2023-04-12 DIAGNOSIS — I10 PRIMARY HYPERTENSION: Primary | ICD-10-CM

## 2023-04-12 LAB
BILIRUBIN, POC: NEGATIVE
BLOOD URINE, POC: POSITIVE
CLARITY, POC: ABNORMAL
COLOR, POC: YELLOW
EXPIRATION DATE: ABNORMAL
GLUCOSE URINE, POC: NEGATIVE
KETONES, POC: NEGATIVE
LEUKOCYTE EST, POC: ABNORMAL
Lab: ABNORMAL
NITRITE, POC: NEGATIVE
PH, POC: 5
POCT MANUFACTURER: ABNORMAL
PROTEIN, POC: ABNORMAL
SPECIFIC GRAVITY, POC: 1020

## 2023-04-12 PROCEDURE — G8754 DIAS BP LESS 90: HCPCS | Performed by: INTERNAL MEDICINE

## 2023-04-12 PROCEDURE — 99214 OFFICE O/P EST MOD 30 MIN: CPT | Mod: 25 | Performed by: INTERNAL MEDICINE

## 2023-04-12 PROCEDURE — G8752 SYS BP LESS 140: HCPCS | Performed by: INTERNAL MEDICINE

## 2023-04-12 PROCEDURE — 81002 URINALYSIS NONAUTO W/O SCOPE: CPT | Performed by: INTERNAL MEDICINE

## 2023-04-12 RX ORDER — AMLODIPINE BESYLATE 5 MG/1
5 TABLET ORAL DAILY
Qty: 90 TABLET | Refills: 1 | Status: SHIPPED | OUTPATIENT
Start: 2023-04-12 | End: 2023-06-26

## 2023-04-12 ASSESSMENT — ENCOUNTER SYMPTOMS
FREQUENCY: 0
DYSURIA: 0
PALPITATIONS: 0
FLANK PAIN: 0
CONSTIPATION: 0
SHORTNESS OF BREATH: 0
LIGHT-HEADEDNESS: 0
HEMATURIA: 0
CHEST TIGHTNESS: 0
ABDOMINAL PAIN: 0
DIARRHEA: 0

## 2023-04-12 ASSESSMENT — PATIENT HEALTH QUESTIONNAIRE - PHQ9: SUM OF ALL RESPONSES TO PHQ9 QUESTIONS 1 & 2: 0

## 2023-04-12 NOTE — PROGRESS NOTES
Subjective      Patient ID: Ritu Ramirez is a 67 y.o. female.    HPI  She is here for follow up - had covid in December - some hearing loss since then - treated with prednisone and is improved.  Continues to follow with Dr. Calhoun for her mastocytosis.Tryptase level normal. Overall stable. Had been feeling exhausted - some pressure in the bladder at night - had UA with Dr. Calhoun which had some WBC but no bacteria. No dysuria,or frequency but some urgency.    The following have been reviewed and updated as appropriate in this visit:   Allergies  Meds  Problems       Past Medical History:   Diagnosis Date   • Anemia    • Anxiety    • Hypertension    • Iron deficiency    • Lipid disorder    • Systemic mastocytosis    • Vitamin D deficiency      Past Surgical History:   Procedure Laterality Date   • AUGMENTATION MAMMAPLASTY Bilateral    • COLONOSCOPY     • PATELLA SURGERY     • REDUCTION MAMMAPLASTY       Family History   Problem Relation Age of Onset   • Heart disease Biological Mother    • Prostate cancer Biological Father      Social History     Socioeconomic History   • Marital status: Single     Spouse name: Not on file   • Number of children: Not on file   • Years of education: Not on file   • Highest education level: Not on file   Occupational History   • Not on file   Tobacco Use   • Smoking status: Former     Packs/day: 0.25     Types: Cigarettes     Quit date:      Years since quittin.2   • Smokeless tobacco: Never   Vaping Use   • Vaping status: Never Used   Substance and Sexual Activity   • Alcohol use: Yes     Alcohol/week: 1.0 standard drink of alcohol     Types: 1 Glasses of wine per week     Comment: Social   • Drug use: No   • Sexual activity: Defer   Other Topics Concern   • Not on file   Social History Narrative   • Not on file     Social Determinants of Health     Financial Resource Strain: Not on file   Food Insecurity: No Food Insecurity (2021)    Hunger Vital Sign    • Worried  About Running Out of Food in the Last Year: Never true    • Ran Out of Food in the Last Year: Never true   Transportation Needs: Not on file   Physical Activity: Not on file   Stress: Not on file   Social Connections: Not on file   Intimate Partner Violence: Not on file   Housing Stability: Not on file       Review of Systems   Respiratory: Negative for chest tightness and shortness of breath.    Cardiovascular: Negative for chest pain and palpitations.   Gastrointestinal: Negative for abdominal pain, constipation and diarrhea.   Genitourinary: Negative for dysuria, flank pain, frequency, hematuria, pelvic pain and vaginal pain.   Neurological: Negative for light-headedness.       Allergies   Allergen Reactions   • Anesthetics - Amide Type - Select Amino Amides Anaphylaxis     Other reaction(s): Anaphylaxis   • Iodinated Contrast Media      Other reaction(s): Anaphylaxis   • Penicillins Hives   • Clarithromycin      Other reaction(s): Rash   • Erythromycin Base      Other reaction(s): Anaphylaxis   • Nsaids (Non-Steroidal Anti-Inflammatory Drug)      Other reaction(s): Anaphylaxis     Current Outpatient Medications   Medication Sig Dispense Refill   • amLODIPine (NORVASC) 5 mg tablet Take 1 tablet (5 mg total) by mouth daily. 90 tablet 1   • B complex-vitamin C-folic acid 400 mcg tablet tablet Take 1 tablet by mouth daily.     • biotin 1 mg capsule Take 1 capsule by mouth daily. Dose unknown       • bisacodyL (DULCOLAX) 5 mg EC tablet Take 5 mg by mouth daily.     • calcium carbonate (CALCIUM 500 ORAL) Take 500 mg by mouth daily. Dose unknown       • cholecalciferol, vitamin D3, 1,000 unit capsule Take 1,000 Units by mouth daily.       • escitalopram (LEXAPRO) 10 mg tablet TAKE 1 TABLET BY MOUTH EVERY DAY 90 tablet 3   • fluticasone propionate (FLONASE) 50 mcg/actuation nasal spray Administer 2 sprays into each nostril daily.     • hydrochlorothiazide (HYDRODIURIL) 25 mg tablet Take 1 tablet (25 mg total) by mouth  "once daily. 90 tablet 3   • lisdexamfetamine (VYVANSE) 60 mg capsule Take 1 capsule (60 mg total) by mouth every morning. 30 capsule 0   • LORazepam (ATIVAN) 0.5 mg tablet Take 1 tablet (0.5 mg total) by mouth every 8 (eight) hours as needed for anxiety. 12 tablet 0   • potassium chloride (KLOR-CON M) 20 mEq CR tablet Take 20 mEq by mouth daily.     • rosuvastatin (CRESTOR) 10 mg tablet Take 1 tablet (10 mg total) by mouth daily. 90 tablet 3     No current facility-administered medications for this visit.       Objective   Vitals:    04/12/23 1120   BP: 128/86   Pulse: (!) 53   Temp: 36.8 °C (98.2 °F)   SpO2: 99%   Weight: 54.9 kg (121 lb)   Height: 1.549 m (5' 1\")       Physical Exam  Vitals and nursing note reviewed.   Constitutional:       Appearance: She is well-developed.   HENT:      Head: Normocephalic and atraumatic.   Neck:      Thyroid: No thyromegaly.      Vascular: No carotid bruit.   Cardiovascular:      Rate and Rhythm: Normal rate and regular rhythm.      Pulses: Normal pulses.      Heart sounds: Normal heart sounds. No murmur heard.  Pulmonary:      Effort: Pulmonary effort is normal.      Breath sounds: Normal breath sounds.   Abdominal:      General: Bowel sounds are normal.      Palpations: Abdomen is soft. There is no mass.      Tenderness: There is no abdominal tenderness.   Musculoskeletal:      Cervical back: Normal range of motion and neck supple.   Skin:     General: Skin is warm and dry.   Neurological:      Mental Status: She is alert and oriented to person, place, and time.   Psychiatric:         Behavior: Behavior normal.         Thought Content: Thought content normal.         Judgment: Judgment normal.         Assessment/Plan   Problem List Items Addressed This Visit     Primary hypertension - Primary     Continue amlodipne and low sodium diet  Reasonable control today.         Relevant Medications    amLODIPine (NORVASC) 5 mg tablet    Adenomatous polyp of transverse colon     " Colonoscopy due this year Dr. Jaya Swenson         Elevated coronary artery calcium score     Neg stress echo 2020  Neg carotid duplex     Continue with daily statn anx heart healthy mediterranean diet         Relevant Medications    amLODIPine (NORVASC) 5 mg tablet    Hyperlipidemia    Mastocytosis    Fatigue    Relevant Orders    TSH 3rd Generation    Sterile pyuria     Will check a repeat urinalysis and culture   Consider urology evaluation if persistant        Other Visit Diagnoses     Elevated vitamin B12 level        Relevant Orders    Vitamin B12    Other vitamin B12 deficiency anemias        Relevant Orders    Vitamin B12    UTI symptoms        Relevant Orders    Urinalysis with microscopic    Urine culture (clean catch)    POCT urinalysis dipstick (Completed)          Ashley Omalley MD    4/12/2023

## 2023-04-12 NOTE — PATIENT INSTRUCTIONS
DR.Mathew Regalado or Jaya Rodriguez  at [facility]  or Dr. Angela Onofre or Dr. Constance Majano   ambulatory

## 2023-04-13 LAB
APPEARANCE UR: CLEAR
BACTERIA #/AREA URNS HPF: NORMAL /[HPF]
BILIRUB UR QL STRIP: NEGATIVE
CASTS URNS QL MICRO: NORMAL /LPF
COLOR UR: YELLOW
EPI CELLS #/AREA URNS HPF: NORMAL /HPF (ref 0–10)
GLUCOSE UR QL STRIP: NEGATIVE
HGB UR QL STRIP: NEGATIVE
KETONES UR QL STRIP: NEGATIVE
LEUKOCYTE ESTERASE UR QL STRIP: ABNORMAL
MICRO URNS: ABNORMAL
NITRITE UR QL STRIP: NEGATIVE
PH UR STRIP: 5 [PH] (ref 5–7.5)
PROT UR QL STRIP: NEGATIVE
RBC #/AREA URNS HPF: NORMAL /HPF (ref 0–2)
SP GR UR STRIP: 1.02 (ref 1–1.03)
UROBILINOGEN UR STRIP-MCNC: 0.2 MG/DL (ref 0.2–1)
WBC #/AREA URNS HPF: NORMAL /HPF (ref 0–5)

## 2023-04-13 NOTE — ASSESSMENT & PLAN NOTE
Neg stress echo 2020  Neg carotid duplex     Continue with daily statn anx heart healthy mediterranean diet

## 2023-04-14 ENCOUNTER — TELEPHONE (OUTPATIENT)
Dept: INTERNAL MEDICINE | Facility: CLINIC | Age: 68
End: 2023-04-14
Payer: MEDICARE

## 2023-04-14 ENCOUNTER — APPOINTMENT (OUTPATIENT)
Dept: LAB | Facility: HOSPITAL | Age: 68
End: 2023-04-14
Attending: INTERNAL MEDICINE
Payer: MEDICARE

## 2023-04-14 DIAGNOSIS — R82.998 LEUKOCYTES IN URINE: Primary | ICD-10-CM

## 2023-04-14 DIAGNOSIS — R82.998 LEUKOCYTES IN URINE: ICD-10-CM

## 2023-04-14 DIAGNOSIS — R79.89 ELEVATED VITAMIN B12 LEVEL: ICD-10-CM

## 2023-04-14 DIAGNOSIS — D51.8 OTHER VITAMIN B12 DEFICIENCY ANEMIAS: ICD-10-CM

## 2023-04-14 LAB
BACTERIA UR CULT: NO GROWTH
BACTERIA UR CULT: NORMAL
BACTERIA URNS QL MICRO: ABNORMAL /HPF
BILIRUB UR QL STRIP.AUTO: NEGATIVE MG/DL
CLARITY UR REFRACT.AUTO: CLEAR
COLOR UR AUTO: YELLOW
GLUCOSE UR STRIP.AUTO-MCNC: NEGATIVE MG/DL
HGB UR QL STRIP.AUTO: NEGATIVE
HYALINE CASTS #/AREA URNS LPF: ABNORMAL /LPF
KETONES UR STRIP.AUTO-MCNC: NEGATIVE MG/DL
LEUKOCYTE ESTERASE UR QL STRIP.AUTO: ABNORMAL
MUCOUS THREADS URNS QL MICRO: ABNORMAL /LPF
NITRITE UR QL STRIP.AUTO: NEGATIVE
PH UR STRIP.AUTO: 5.5 [PH]
PROT UR QL STRIP.AUTO: ABNORMAL
RBC #/AREA URNS HPF: ABNORMAL /HPF
SP GR UR REFRACT.AUTO: 1.03
SQUAMOUS URNS QL MICRO: ABNORMAL /HPF
UROBILINOGEN UR STRIP-ACNC: 0.2 EU/DL
VIT B12 SERPL-MCNC: >1500 PG/ML (ref 180–914)
WBC #/AREA URNS HPF: ABNORMAL /HPF

## 2023-04-14 PROCEDURE — 81001 URINALYSIS AUTO W/SCOPE: CPT

## 2023-04-14 PROCEDURE — 82607 VITAMIN B-12: CPT

## 2023-04-14 PROCEDURE — 36415 COLL VENOUS BLD VENIPUNCTURE: CPT

## 2023-04-14 NOTE — LETTER
[facility] Page 0 of 0     Account #: [id number] Collection Date:         Req/Control #: [id number] Collection Time:              Client / Ordering Site Information: Physician Information:   Account Name: [facility] [place]   Address 1: [facility] [address]   Address 2:    [facility] Zip: [place] [address]   Phone: [phone]    Ordering: Ashley Nazario   Degree: MD   NPI: [id number]   UPIN:    Physician ID:              Patient Information:   Name: REBECCA BROOKE   Legal Sex: Female   SSN: [id number]   Patient ID: [id number]    YOB: 1955 (67 years)   Phone: [phone]   Address: [facility] [address] [address]                Comments:             Order Code Tests Ordered (Total: 1)    Order Code Tests Ordered      [id number] Urinalysis with microscopic                       Specimen Source:   ([id number]) Urinalysis with microscopic:  Urine, Clean Catch                Diagnosis Codes:   R82.998                 Bill Type: Third-Party    Carrier Code:              Responsible Party / Guarantor Information:   Name: REBECCA BROOKE   Address: [facility] [address] [facility] Zip: [place] [address]   Phone: [phone]   Relation to Pt: Self   Employer Name: Self Employed           ABN:     Worker's Comp: N Date of Injury:                 Insurance Information:   Primary Insurance: Secondary Insurance:   Carrier Code: Not on file   Ins Co Name: MEDICARE PART A & B   Address 1: [address]   Address 2:    [facility] Zip: [place] [address]   Policy Number: [id number]   Group #:     Carrier Code: Not on file   Ins Co Name: [employer] S[employer]AL [employer]ANCE   Address 1: [address] [address]   Address 2:    [facility] Zip: [place] [address]   Policy Number: [id number]   Group #:       Primary Policy [place] / Insured: Secondary Policy [place] / Insured:   Name: REBECCA BROOKE   Address: [facility] [address]  [address]   Pt Relation to Subscriber: Self    Name: REBECCA BROOKE   Address: [address] [place] [address]     [place] [address]   Pt Relation to Subscriber: Self            Authorization - Please Sign and Date  I hereby authorize the release of medical information related to the services described hereon and authorize payment directly to [facility].      E-Signature: Ashley Nazario MD                          4/14/2023      __  Provider Signature             Date      __________________________________                    ______________  Patient Signature                                                                    Date

## 2023-04-14 NOTE — TELEPHONE ENCOUNTER
Patients urine test came back with elevated white blood cells.  She said Dr. Grimm was going to order her another urineanalysis for today. She will go to [place].

## 2023-04-17 ENCOUNTER — TELEPHONE (OUTPATIENT)
Dept: INTERNAL MEDICINE | Facility: CLINIC | Age: 68
End: 2023-04-17

## 2023-04-18 ENCOUNTER — TELEPHONE (OUTPATIENT)
Dept: INTERNAL MEDICINE | Facility: CLINIC | Age: 68
End: 2023-04-18
Payer: MEDICARE

## 2023-04-18 DIAGNOSIS — R79.89 ELEVATED VITAMIN B12 LEVEL: ICD-10-CM

## 2023-04-18 DIAGNOSIS — E53.8 VITAMIN B 12 DEFICIENCY: Primary | ICD-10-CM

## 2023-04-18 NOTE — TELEPHONE ENCOUNTER
Pt called back and result given. Pt already stopped taking Vit B 12 after seeing her result via portal.  Advised her to repeat B 12 in 6 weeks. Lab ordered and mailed to her address.    Pt asked her urine test result. I told the patient that she is negative for UTI. No RBC,WBC, bacteria.  However, she has some hyaline cast. Any recommendation?

## 2023-04-18 NOTE — TELEPHONE ENCOUNTER
Please let Ms Ramirez know that her B12 is elevated. I would like her to stop her B complex and repeat B12 in 6 weeks

## 2023-04-25 ENCOUNTER — LAB REQUISITION (OUTPATIENT)
Dept: LAB | Facility: HOSPITAL | Age: 68
End: 2023-04-25
Attending: INTERNAL MEDICINE
Payer: MEDICARE

## 2023-04-25 DIAGNOSIS — F90.9 ATTENTION-DEFICIT HYPERACTIVITY DISORDER, UNSPECIFIED TYPE: ICD-10-CM

## 2023-04-25 LAB
BASOPHILS # BLD: 0.04 K/UL (ref 0.01–0.1)
BASOPHILS NFR BLD: 0.7 %
DIFFERENTIAL METHOD BLD: ABNORMAL
EOSINOPHIL # BLD: 0.43 K/UL (ref 0.04–0.36)
EOSINOPHIL NFR BLD: 8 %
ERYTHROCYTE [DISTWIDTH] IN BLOOD BY AUTOMATED COUNT: 13 % (ref 11.7–14.4)
HCT VFR BLDCO AUTO: 38.7 % (ref 35–45)
HGB BLD-MCNC: 12.2 G/DL (ref 11.8–15.7)
IMM GRANULOCYTES # BLD AUTO: 0.05 K/UL (ref 0–0.08)
IMM GRANULOCYTES NFR BLD AUTO: 0.9 %
LYMPHOCYTES # BLD: 1.2 K/UL (ref 1.2–3.5)
LYMPHOCYTES NFR BLD: 22.3 %
MCH RBC QN AUTO: 25.5 PG (ref 28–33.2)
MCHC RBC AUTO-ENTMCNC: 31.5 G/DL (ref 32.2–35.5)
MCV RBC AUTO: 80.8 FL (ref 83–98)
MONOCYTES # BLD: 0.47 K/UL (ref 0.28–0.8)
MONOCYTES NFR BLD: 8.7 %
NEUTROPHILS # BLD: 3.19 K/UL (ref 1.7–7)
NEUTROPHILS # BLD: 3.19 K/UL (ref 1.7–7)
NEUTS SEG NFR BLD: 59.4 %
NRBC BLD-RTO: 0 %
PDW BLD AUTO: 9.2 FL (ref 9.4–12.3)
PLATELET # BLD AUTO: 318 K/UL (ref 150–369)
RBC # BLD AUTO: 4.79 M/UL (ref 3.93–5.22)
WBC # BLD AUTO: 5.38 K/UL (ref 3.8–10.5)

## 2023-04-25 PROCEDURE — 85025 COMPLETE CBC W/AUTO DIFF WBC: CPT | Performed by: INTERNAL MEDICINE

## 2023-05-09 ENCOUNTER — LAB REQUISITION (OUTPATIENT)
Dept: LAB | Facility: HOSPITAL | Age: 68
End: 2023-05-09
Attending: INTERNAL MEDICINE
Payer: MEDICARE

## 2023-05-09 DIAGNOSIS — D50.8 OTHER IRON DEFICIENCY ANEMIAS: ICD-10-CM

## 2023-05-09 DIAGNOSIS — G47.61 PERIODIC LIMB MOVEMENT DISORDER: ICD-10-CM

## 2023-05-09 DIAGNOSIS — E78.2 MIXED HYPERLIPIDEMIA: ICD-10-CM

## 2023-05-09 DIAGNOSIS — I10 ESSENTIAL (PRIMARY) HYPERTENSION: ICD-10-CM

## 2023-05-09 LAB
BASOPHILS # BLD: 0.05 K/UL (ref 0.01–0.1)
BASOPHILS NFR BLD: 0.8 %
DIFFERENTIAL METHOD BLD: ABNORMAL
EOSINOPHIL # BLD: 0.66 K/UL (ref 0.04–0.36)
EOSINOPHIL NFR BLD: 10 %
ERYTHROCYTE [DISTWIDTH] IN BLOOD BY AUTOMATED COUNT: 13.3 % (ref 11.7–14.4)
HCT VFR BLDCO AUTO: 39 % (ref 35–45)
HGB BLD-MCNC: 12.7 G/DL (ref 11.8–15.7)
IMM GRANULOCYTES # BLD AUTO: 0.05 K/UL (ref 0–0.08)
IMM GRANULOCYTES NFR BLD AUTO: 0.8 %
LYMPHOCYTES # BLD: 0.98 K/UL (ref 1.2–3.5)
LYMPHOCYTES NFR BLD: 14.9 %
MCH RBC QN AUTO: 26.2 PG (ref 28–33.2)
MCHC RBC AUTO-ENTMCNC: 32.6 G/DL (ref 32.2–35.5)
MCV RBC AUTO: 80.6 FL (ref 83–98)
MONOCYTES # BLD: 0.53 K/UL (ref 0.28–0.8)
MONOCYTES NFR BLD: 8.1 %
NEUTROPHILS # BLD: 4.3 K/UL (ref 1.7–7)
NEUTROPHILS # BLD: 4.3 K/UL (ref 1.7–7)
NEUTS SEG NFR BLD: 65.4 %
NRBC BLD-RTO: 0 %
PDW BLD AUTO: 9.1 FL (ref 9.4–12.3)
PLATELET # BLD AUTO: 341 K/UL (ref 150–369)
RBC # BLD AUTO: 4.84 M/UL (ref 3.93–5.22)
WBC # BLD AUTO: 6.57 K/UL (ref 3.8–10.5)

## 2023-05-09 PROCEDURE — 85025 COMPLETE CBC W/AUTO DIFF WBC: CPT | Performed by: INTERNAL MEDICINE

## 2023-05-18 ENCOUNTER — TELEPHONE (OUTPATIENT)
Dept: CARDIOLOGY | Facility: CLINIC | Age: 68
End: 2023-05-18
Payer: MEDICARE

## 2023-05-18 DIAGNOSIS — R00.2 PALPITATIONS: Primary | ICD-10-CM

## 2023-05-18 NOTE — TELEPHONE ENCOUNTER
I called and spoke with patient, I let her know Dr. Selby would like her to wear a 7-day Zio patch.  She verbalized understanding.    Jamila, please place order for 7-day Zio patch for palpitations.  Thank you

## 2023-05-18 NOTE — TELEPHONE ENCOUNTER
I spoke to the patient.  She does have a Banyan jeff.  She is outside now.  Her heart rate is 62.  She did tell me that when she checked her heart rate and it was 120-130 that the device did not tell her that she was in atrial fibrillation.  I will speak to Dr. Selby and call the patient back.

## 2023-05-18 NOTE — TELEPHONE ENCOUNTER
Dr. Selby pt     Last OV 10/24/22       Mastocytosis Follows with Aniearnestine Calhoun   HypoKalemia   Elevated CA score     Pt is calling to report that over the last 2-3 days she is having episodes where her HR feels irregular, like it is pounding at times.     She says usually her HR is in the 60's but lately it has been in the 80's and during these episodes has gone up to 130's.     She says this has happened 2-3 times daily.     She does not have an Afib Dx.     She denies SOB   + lightheadedness   She felt nausea when working out this am and stopped.     She has had loose stools 3-4 times a day over the last 3-4 days.   She is drinking at least 40 oz of fluid daily.       She is taking Potassium 8 mEQ daily, down for 20 mEq    She continues HCTZ  25 mg qd     She says her appetite is good.     Last serum chemistries 4/11/23   Potassium is 3.8 mEQ/L     Pt/ Pt family maybe reached at: [phone]

## 2023-05-26 ENCOUNTER — LAB REQUISITION (OUTPATIENT)
Dept: LAB | Facility: HOSPITAL | Age: 68
End: 2023-05-26
Attending: INTERNAL MEDICINE
Payer: MEDICARE

## 2023-05-26 ENCOUNTER — OFFICE VISIT (OUTPATIENT)
Dept: CARDIOLOGY | Facility: CLINIC | Age: 68
End: 2023-05-26
Payer: MEDICARE

## 2023-05-26 VITALS
WEIGHT: 118 LBS | OXYGEN SATURATION: 98 % | HEIGHT: 61 IN | SYSTOLIC BLOOD PRESSURE: 130 MMHG | BODY MASS INDEX: 22.28 KG/M2 | DIASTOLIC BLOOD PRESSURE: 60 MMHG | HEART RATE: 67 BPM

## 2023-05-26 DIAGNOSIS — I49.1 PAC (PREMATURE ATRIAL CONTRACTION): ICD-10-CM

## 2023-05-26 DIAGNOSIS — R00.1 SINUS BRADYCARDIA: ICD-10-CM

## 2023-05-26 DIAGNOSIS — R06.00 DYSPNEA, UNSPECIFIED TYPE: ICD-10-CM

## 2023-05-26 DIAGNOSIS — D50.0 IRON DEFICIENCY ANEMIA DUE TO CHRONIC BLOOD LOSS: ICD-10-CM

## 2023-05-26 DIAGNOSIS — D47.02 SYSTEMIC MASTOCYTOSIS: Chronic | ICD-10-CM

## 2023-05-26 DIAGNOSIS — I48.0 PAROXYSMAL ATRIAL FIBRILLATION (CMS/HCC): ICD-10-CM

## 2023-05-26 DIAGNOSIS — D50.8 OTHER IRON DEFICIENCY ANEMIAS: ICD-10-CM

## 2023-05-26 DIAGNOSIS — E87.6 HYPOKALEMIA: Chronic | ICD-10-CM

## 2023-05-26 DIAGNOSIS — E78.5 DYSLIPIDEMIA: ICD-10-CM

## 2023-05-26 DIAGNOSIS — R00.2 PALPITATIONS: ICD-10-CM

## 2023-05-26 DIAGNOSIS — R93.1 ELEVATED CORONARY ARTERY CALCIUM SCORE: Primary | ICD-10-CM

## 2023-05-26 DIAGNOSIS — I10 PRIMARY HYPERTENSION: ICD-10-CM

## 2023-05-26 LAB
ALBUMIN SERPL-MCNC: 4.8 G/DL (ref 3.4–5)
ALP SERPL-CCNC: 67 IU/L (ref 35–126)
ALT SERPL-CCNC: 49 IU/L (ref 11–54)
ANION GAP SERPL CALC-SCNC: 10 MEQ/L (ref 3–15)
AST SERPL-CCNC: 37 IU/L (ref 15–41)
BASOPHILS # BLD: 0.05 K/UL (ref 0.01–0.1)
BASOPHILS NFR BLD: 0.6 %
BILIRUB SERPL-MCNC: 0.7 MG/DL (ref 0.3–1.2)
BUN SERPL-MCNC: 22 MG/DL (ref 8–20)
CALCIUM SERPL-MCNC: 9.8 MG/DL (ref 8.9–10.3)
CHLORIDE SERPL-SCNC: 101 MEQ/L (ref 98–109)
CO2 SERPL-SCNC: 28 MEQ/L (ref 22–32)
CREAT SERPL-MCNC: 0.8 MG/DL (ref 0.6–1.1)
DIFFERENTIAL METHOD BLD: ABNORMAL
EOSINOPHIL # BLD: 0.5 K/UL (ref 0.04–0.36)
EOSINOPHIL NFR BLD: 6.4 %
ERYTHROCYTE [DISTWIDTH] IN BLOOD BY AUTOMATED COUNT: 14.7 % (ref 11.7–14.4)
FOLATE SERPL-MCNC: >20 NG/ML
GFR SERPL CREATININE-BSD FRML MDRD: >60 ML/MIN/1.73M*2
GLUCOSE SERPL-MCNC: 93 MG/DL (ref 70–99)
HCT VFR BLDCO AUTO: 34.4 % (ref 35–45)
HGB BLD-MCNC: 10.9 G/DL (ref 11.8–15.7)
IMM GRANULOCYTES # BLD AUTO: 0.04 K/UL (ref 0–0.08)
IMM GRANULOCYTES NFR BLD AUTO: 0.5 %
LYMPHOCYTES # BLD: 1.21 K/UL (ref 1.2–3.5)
LYMPHOCYTES NFR BLD: 15.5 %
MCH RBC QN AUTO: 26.3 PG (ref 28–33.2)
MCHC RBC AUTO-ENTMCNC: 31.7 G/DL (ref 32.2–35.5)
MCV RBC AUTO: 83.1 FL (ref 83–98)
MONOCYTES # BLD: 0.62 K/UL (ref 0.28–0.8)
MONOCYTES NFR BLD: 7.9 %
NEUTROPHILS # BLD: 5.41 K/UL (ref 1.7–7)
NEUTROPHILS # BLD: 5.41 K/UL (ref 1.7–7)
NEUTS SEG NFR BLD: 69.1 %
NRBC BLD-RTO: 0 %
PDW BLD AUTO: 9.5 FL (ref 9.4–12.3)
PLATELET # BLD AUTO: 443 K/UL (ref 150–369)
POTASSIUM SERPL-SCNC: 3 MEQ/L (ref 3.6–5.1)
PROT SERPL-MCNC: 7.3 G/DL (ref 6–8.2)
RBC # BLD AUTO: 4.14 M/UL (ref 3.93–5.22)
SODIUM SERPL-SCNC: 139 MEQ/L (ref 136–144)
VIT B12 SERPL-MCNC: 1230 PG/ML (ref 180–914)
WBC # BLD AUTO: 7.83 K/UL (ref 3.8–10.5)

## 2023-05-26 PROCEDURE — 82607 VITAMIN B-12: CPT | Performed by: INTERNAL MEDICINE

## 2023-05-26 PROCEDURE — 80053 COMPREHEN METABOLIC PANEL: CPT | Performed by: INTERNAL MEDICINE

## 2023-05-26 PROCEDURE — G8754 DIAS BP LESS 90: HCPCS | Performed by: INTERNAL MEDICINE

## 2023-05-26 PROCEDURE — 85025 COMPLETE CBC W/AUTO DIFF WBC: CPT | Performed by: INTERNAL MEDICINE

## 2023-05-26 PROCEDURE — G8752 SYS BP LESS 140: HCPCS | Performed by: INTERNAL MEDICINE

## 2023-05-26 PROCEDURE — 82746 ASSAY OF FOLIC ACID SERUM: CPT | Performed by: INTERNAL MEDICINE

## 2023-05-26 PROCEDURE — 99215 OFFICE O/P EST HI 40 MIN: CPT | Performed by: INTERNAL MEDICINE

## 2023-05-26 PROCEDURE — 93000 ELECTROCARDIOGRAM COMPLETE: CPT | Performed by: INTERNAL MEDICINE

## 2023-05-26 RX ORDER — DEXTROAMPHETAMINE SACCHARATE, AMPHETAMINE ASPARTATE, DEXTROAMPHETAMINE SULFATE AND AMPHETAMINE SULFATE 7.5; 7.5; 7.5; 7.5 MG/1; MG/1; MG/1; MG/1
30 TABLET ORAL DAILY
COMMUNITY

## 2023-05-26 RX ORDER — SPIRONOLACTONE 25 MG/1
25 TABLET ORAL DAILY
Qty: 90 TABLET | Refills: 3 | Status: SHIPPED | OUTPATIENT
Start: 2023-05-26 | End: 2024-06-03

## 2023-05-26 ASSESSMENT — ENCOUNTER SYMPTOMS
ORTHOPNEA: 0
CLAUDICATION: 0
NEAR-SYNCOPE: 0
PALPITATIONS: 1
IRREGULAR HEARTBEAT: 1

## 2023-05-26 NOTE — ASSESSMENT & PLAN NOTE
I reviewed multiple tracings from her cardia jeff which she printed for review.  She has frequent ectopic beats which I believe are PACs with some aberration.  She is definitely symptomatic.  She has no lightheadedness or syncope.  She does take Adderall.  We discussed the fact that Adderall could contribute to these ectopic beats.  She does have a fair amount of vodka.  I recommend that she reduce this habit.  She does run a low potassium.  I therefore will place her on spironolactone and stop hydrochlorothiazide and her potassium supplement.  She will continue on magnesium supplement.  I will check a 1 week Zio patch to quantitate her PAC burden.  I will then see her back in follow-up in a month.  She will continue exercise.

## 2023-05-26 NOTE — ASSESSMENT & PLAN NOTE
She continues on rosuvastatin.  Her last LDL was 62.  She will continue to try to eat a healthy diet.  She is exercising without symptoms of angina and thus does not appear to require any further cardiac testing at this time.  It is unclear if aspirin will help her cardiovascular prognosis as part of primary prevention so I am not recommended this at present.

## 2023-05-26 NOTE — LETTER
"May 26, 2023     Ashley Omalley MD  [address] [facility] [address]    Patient: Ritu Ramirez  YOB: 1955  Date of Visit: 5/26/2023      Dear Dr. Sirisha Omalley:    Thank you for referring Ritu Ramirez to me for evaluation. Below are my notes for this consultation.    If you have questions, please do not hesitate to call me. I look forward to following your patient along with you.         Sincerely,        Bright Selby MD        CC: No Recipients    Bright Selby MD  5/26/2023  4:47 PM  Signed     Cardiology Note       Reason for visit:   Chief Complaint   Patient presents with   • Elevated coronary artery calcium score      HPI   Ritu Ramirez is a 67 y.o. female who presents for concern of new onset atrial fibrillation.     Patient has a history of palpitations and a \"fluttery\" feeling. She was prescribed a 7d monitor which she has not used yet. She has the [employer] jeff which she used when feeling the palpitations.  She has occasional lightheadedness when she stands.  She has no syncope.  Her cardiologist told her she was in atrial fibrillation this week on multiple occasions and thus I saw her urgently.    She is very active at baseline. She boxes, lifts weight and walks 4-5x per week. She endorses mild LOPEZ when she boxes but not during mild to moderate intensity exercises.    Patient has a history of a stroke when she was in her 50s.She had an MRI of her brain in 1/2023 which showed no acute or old infarct. No recent falls. No history of falls or bleeding disorder.     Patient is on adderall for being tired and less energetic.     She drinks 2 vodka drinks most nights.  She has 1 cup of caffeinated coffee per day.  Her sleep is fair.  She does take a magnesium at home.        Past Medical History:   Diagnosis Date   • Anemia    • Anxiety    • Hypertension    • Iron deficiency    • Lipid disorder    • Systemic mastocytosis    • Vitamin D deficiency      Past Surgical " History:   Procedure Laterality Date   • AUGMENTATION MAMMAPLASTY Bilateral    • COLONOSCOPY     • PATELLA SURGERY     • REDUCTION MAMMAPLASTY       Social History     Socioeconomic History   • Marital status: Single     Spouse name: None   • Number of children: None   • Years of education: None   • Highest education level: None   Tobacco Use   • Smoking status: Former     Packs/day: 0.25     Types: Cigarettes     Quit date:      Years since quittin.4   • Smokeless tobacco: Never   Vaping Use   • Vaping status: Never Used   Substance and Sexual Activity   • Alcohol use: Yes     Alcohol/week: 1.0 standard drink of alcohol     Types: 1 Glasses of wine per week     Comment: Social   • Drug use: No   • Sexual activity: Defer     Social Determinants of Health     Food Insecurity: No Food Insecurity (2021)    Hunger Vital Sign    • Worried About Running Out of Food in the Last Year: Never true    • Ran Out of Food in the Last Year: Never true     Family History   Problem Relation Age of Onset   • Heart disease Biological Mother    • Prostate cancer Biological Father      Anesthetics - amide type - select amino amides, Iodinated contrast media, Penicillins, Clarithromycin, Erythromycin base, and Nsaids (non-steroidal anti-inflammatory drug)  Current Outpatient Medications   Medication Sig Dispense Refill   • amLODIPine (NORVASC) 5 mg tablet Take 1 tablet (5 mg total) by mouth daily. 90 tablet 1   • B complex-vitamin C-folic acid 400 mcg tablet tablet Take 1 tablet by mouth daily.     • biotin 1 mg capsule Take 1 capsule by mouth daily. Dose unknown       • bisacodyL (DULCOLAX) 5 mg EC tablet Take 5 mg by mouth daily.     • calcium carbonate (CALCIUM 500 ORAL) Take 500 mg by mouth daily. Dose unknown       • cholecalciferol, vitamin D3, 1,000 unit capsule Take 1,000 Units by mouth daily.       • escitalopram (LEXAPRO) 10 mg tablet TAKE 1 TABLET BY MOUTH EVERY DAY 90 tablet 3   • hydrochlorothiazide  "(HYDRODIURIL) 25 mg tablet Take 1 tablet (25 mg total) by mouth once daily. 90 tablet 3   • lisdexamfetamine (VYVANSE) 60 mg capsule Take 1 capsule (60 mg total) by mouth every morning. 30 capsule 0   • LORazepam (ATIVAN) 0.5 mg tablet Take 1 tablet (0.5 mg total) by mouth every 8 (eight) hours as needed for anxiety. 12 tablet 0   • potassium chloride (KLOR-CON M) 20 mEq CR tablet Take 20 mEq by mouth daily.     • rosuvastatin (CRESTOR) 10 mg tablet Take 1 tablet (10 mg total) by mouth daily. 90 tablet 3     No current facility-administered medications for this visit.     Review of Systems   Cardiovascular: Positive for irregular heartbeat and palpitations. Negative for chest pain, claudication, cyanosis, leg swelling, near-syncope and orthopnea.     Objective   Vitals:    05/26/23 1408   BP: 130/60   BP Location: Right upper arm   Patient Position: Sitting   Pulse: 67   SpO2: 98%   Weight: 53.5 kg (118 lb)   Height: 1.549 m (5' 1\")     Weight: 53.5 kg (118 lb)     Physical Exam:    General Appearance:  Alert, no distress   Head:  Normocephalic, without obvious abnormality, atraumatic   Eyes:  Conjunctiva/corneas clear, EOM's intact   Neck: No thyroid enlargement. No JVD. No bruits    Lungs:   Clear to auscultation bilaterally, respirations unlabored, no rales, no wheezing   Heart:  egular rhythm, S1 and S2 normal, no murmur, rub or gallop   Abdomen:   Soft, non-tender, no masses, no organomegaly   Vascular: Pulses 2+ and symmetric all extremities, no carotid bruit or jugular vein distention   Musculoskeletal:  Skin: No injury or deformity  Skin color, texture, turgor normal, no rashes or lesions, no cyanosis or edema   Extremities: Extremities normal, atraumatic, pulses normal   Behavior/Emotional: Appropriate, cooperative       Lab Results   Component Value Date    WBC 6.57 05/09/2023    HGB 12.7 05/09/2023     05/09/2023    CHOL 150 02/07/2022    TRIG 85 02/07/2022    HDL 71 02/07/2022    LDLCALC 62 " 02/07/2022    ALT 25 03/03/2023    AST 17 03/03/2023     04/11/2023    K 3.8 04/11/2023    CREATININE 0.6 04/11/2023    TSH 5.43 04/11/2023    INR 1.0 05/07/2021    HGBA1C 4.7 (L) 08/11/2020        ECG   SR 67, 2 PVCs     ASSESSMENT/PLAN    Problem List Items Addressed This Visit        High    Primary hypertension    Overview     2017 echocardiogram: Normal structural heart    On Amlodipine 5 and HCTZ 25         Current Assessment & Plan     Her blood pressure has been okay.  She runs a low potassium.  I will stop hydrochlorothiazide and potassium and placed her on spironolactone 25 mg daily.  She will then have follow-up blood work drawn.  She will continue on amlodipine.         Relevant Medications    spironolactone (ALDACTONE) 25 mg tablet    PAC (premature atrial contraction)    Overview     5/2023 [employer] jeff: Multiple recordings with frequent PACs sometimes in a trigeminal pattern.  No evidence for atrial fibrillation.         Current Assessment & Plan     I reviewed multiple tracings from her "[employer] jeff which she printed for review.  She has frequent ectopic beats which I believe are PACs with some aberration.  She is definitely symptomatic.  She has no lightheadedness or syncope.  She does take Adderall.  We discussed the fact that Adderall could contribute to these ectopic beats.  She does have a fair amount of vodka.  I recommend that she reduce this habit.  She does run a low potassium.  I therefore will place her on spironolactone and stop hydrochlorothiazide and her potassium supplement.  She will continue on magnesium supplement.  I will check a 1 week Zio patch to quantitate her PAC burden.  I will then see her back in follow-up in a month.  She will continue exercise.         Relevant Medications    dextroamphetamine-amphetamine (ADDERALL) 30 mg tablet    Elevated coronary artery calcium score - Primary    Overview     6/2019 score: 128 (90th%)  2/12/2020 SE--11:16, 92%mPHR, negative          Current Assessment & Plan     She continues on rosuvastatin.  Her last LDL was 62.  She will continue to try to eat a healthy diet.  She is exercising without symptoms of angina and thus does not appear to require any further cardiac testing at this time.  It is unclear if aspirin will help her cardiovascular prognosis as part of primary prevention so I am not recommended this at present.         Relevant Medications    dextroamphetamine-amphetamine (ADDERALL) 30 mg tablet    Other Relevant Orders    ECG 12 LEAD-OFFICE PERFORMED (Completed)       Medium    Systemic mastocytosis (Chronic)    Current Assessment & Plan     She is under active care.         Hypokalemia (Chronic)    Current Assessment & Plan     I will stop her hydrochlorothiazide and potassium replaced on spironolactone every check blood work.         Dyslipidemia    Current Assessment & Plan     Her last LDL was 62.  She will continue on rosuvastatin and a heart healthy diet.            Low    Sinus bradycardia    Overview     She has the [employer] Layo.         Current Assessment & Plan     Her heart rate has not been as low lately.         Relevant Medications    dextroamphetamine-amphetamine (ADDERALL) 30 mg tablet    Iron deficiency anemia    Overview     Secondary to hemorrhoidal bleeding            Unprioritized    RESOLVED: Paroxysmal atrial fibrillation (CMS/HCC)    Overview     New 5/2023         Relevant Medications    dextroamphetamine-amphetamine (ADDERALL) 30 mg tablet    Palpitations    Overview     [employer] layo shows SR with frequent PVC.         Dyspnea    Overview     Good exercise tolerance. Develops mild LOPEZ when she goes to boxing classes.            I spent 62 minutes with the patient and her rhythm tracings and her partner today.  We discussed all the above issues and the plan.  Answered the questions the best my ability.    Return in about 1 month (around 6/26/2023).    Bright Selby MD  5/26/2023

## 2023-05-26 NOTE — ASSESSMENT & PLAN NOTE
Her blood pressure has been okay.  She runs a low potassium.  I will stop hydrochlorothiazide and potassium and placed her on spironolactone 25 mg daily.  She will then have follow-up blood work drawn.  She will continue on amlodipine.

## 2023-05-26 NOTE — TELEPHONE ENCOUNTER
Ms. Ramirez contacted the office to provide you with an update.    She hasn't worn the Zio patch yet. The first one she received in the mail wasn't working properly so she was advised to return it. Replacement arrived today.    Pt reports periodic rhythm monitoring with [employer]. She has had 3 (possibly 4) episodes of a-fib since 5/23.    One episode on 5/24 lasted only a minute. Another episode the following day started at 2:40pm, when she checked her ECG at 3:25pm she was still out of rhythm. At 9:23pm she was back in sinus.    A-fib HRs well controlled at 70s-80s bpm.    She denies associated complaints of dizziness, lightheadedness, near syncope, chest pain or SOB. She does feel more fatigued though.     I asked her to send ECG tracings to you via pt portal. I gave her help desk # should she need assistance.    She'd like to come in next week for an OV.    Pt contact # [phone].

## 2023-05-26 NOTE — PROGRESS NOTES
"   Cardiology Note       Reason for visit:   Chief Complaint   Patient presents with   • Elevated coronary artery calcium score      HPI   Ritu Ramirez is a 67 y.o. female who presents for concern of new onset atrial fibrillation.     Patient has a history of palpitations and a \"fluttery\" feeling. She was prescribed a 7d monitor which she has not used yet. She has the [employer] jeff which she used when feeling the palpitations.  She has occasional lightheadedness when she stands.  She has no syncope.  Her cardiologist told her she was in atrial fibrillation this week on multiple occasions and thus I saw her urgently.    She is very active at baseline. She boxes, lifts weight and walks 4-5x per week. She endorses mild LOPEZ when she boxes but not during mild to moderate intensity exercises.    Patient has a history of a stroke when she was in her 50s.She had an MRI of her brain in 2023 which showed no acute or old infarct. No recent falls. No history of falls or bleeding disorder.     Patient is on adderall for being tired and less energetic.     She drinks 2 vodka drinks most nights.  She has 1 cup of caffeinated coffee per day.  Her sleep is fair.  She does take a magnesium at home.        Past Medical History:   Diagnosis Date   • Anemia    • Anxiety    • Hypertension    • Iron deficiency    • Lipid disorder    • Systemic mastocytosis    • Vitamin D deficiency      Past Surgical History:   Procedure Laterality Date   • AUGMENTATION MAMMAPLASTY Bilateral    • COLONOSCOPY     • PATELLA SURGERY     • REDUCTION MAMMAPLASTY       Social History     Socioeconomic History   • Marital status: Single     Spouse name: None   • Number of children: None   • Years of education: None   • Highest education level: None   Tobacco Use   • Smoking status: Former     Packs/day: 0.25     Types: Cigarettes     Quit date:      Years since quittin.4   • Smokeless tobacco: Never   Vaping Use   • Vaping status: Never Used   Substance and " Sexual Activity   • Alcohol use: Yes     Alcohol/week: 1.0 standard drink of alcohol     Types: 1 Glasses of wine per week     Comment: Social   • Drug use: No   • Sexual activity: Defer     Social Determinants of Health     Food Insecurity: No Food Insecurity (12/23/2021)    Hunger Vital Sign    • Worried About Running Out of Food in the Last Year: Never true    • Ran Out of Food in the Last Year: Never true     Family History   Problem Relation Age of Onset   • Heart disease Biological Mother    • Prostate cancer Biological Father      Anesthetics - amide type - select amino amides, Iodinated contrast media, Penicillins, Clarithromycin, Erythromycin base, and Nsaids (non-steroidal anti-inflammatory drug)  Current Outpatient Medications   Medication Sig Dispense Refill   • amLODIPine (NORVASC) 5 mg tablet Take 1 tablet (5 mg total) by mouth daily. 90 tablet 1   • B complex-vitamin C-folic acid 400 mcg tablet tablet Take 1 tablet by mouth daily.     • biotin 1 mg capsule Take 1 capsule by mouth daily. Dose unknown       • bisacodyL (DULCOLAX) 5 mg EC tablet Take 5 mg by mouth daily.     • calcium carbonate (CALCIUM 500 ORAL) Take 500 mg by mouth daily. Dose unknown       • cholecalciferol, vitamin D3, 1,000 unit capsule Take 1,000 Units by mouth daily.       • escitalopram (LEXAPRO) 10 mg tablet TAKE 1 TABLET BY MOUTH EVERY DAY 90 tablet 3   • hydrochlorothiazide (HYDRODIURIL) 25 mg tablet Take 1 tablet (25 mg total) by mouth once daily. 90 tablet 3   • lisdexamfetamine (VYVANSE) 60 mg capsule Take 1 capsule (60 mg total) by mouth every morning. 30 capsule 0   • LORazepam (ATIVAN) 0.5 mg tablet Take 1 tablet (0.5 mg total) by mouth every 8 (eight) hours as needed for anxiety. 12 tablet 0   • potassium chloride (KLOR-CON M) 20 mEq CR tablet Take 20 mEq by mouth daily.     • rosuvastatin (CRESTOR) 10 mg tablet Take 1 tablet (10 mg total) by mouth daily. 90 tablet 3     No current facility-administered medications for  "this visit.     Review of Systems   Cardiovascular: Positive for irregular heartbeat and palpitations. Negative for chest pain, claudication, cyanosis, leg swelling, near-syncope and orthopnea.     Objective   Vitals:    05/26/23 1408   BP: 130/60   BP Location: Right upper arm   Patient Position: Sitting   Pulse: 67   SpO2: 98%   Weight: 53.5 kg (118 lb)   Height: 1.549 m (5' 1\")     Weight: 53.5 kg (118 lb)     Physical Exam:    General Appearance:  Alert, no distress   Head:  Normocephalic, without obvious abnormality, atraumatic   Eyes:  Conjunctiva/corneas clear, EOM's intact   Neck: No thyroid enlargement. No JVD. No bruits    Lungs:   Clear to auscultation bilaterally, respirations unlabored, no rales, no wheezing   Heart:  egular rhythm, S1 and S2 normal, no murmur, rub or gallop   Abdomen:   Soft, non-tender, no masses, no organomegaly   Vascular: Pulses 2+ and symmetric all extremities, no carotid bruit or jugular vein distention   Musculoskeletal:  Skin: No injury or deformity  Skin color, texture, turgor normal, no rashes or lesions, no cyanosis or edema   Extremities: Extremities normal, atraumatic, pulses normal   Behavior/Emotional: Appropriate, cooperative       Lab Results   Component Value Date    WBC 6.57 05/09/2023    HGB 12.7 05/09/2023     05/09/2023    CHOL 150 02/07/2022    TRIG 85 02/07/2022    HDL 71 02/07/2022    LDLCALC 62 02/07/2022    ALT 25 03/03/2023    AST 17 03/03/2023     04/11/2023    K 3.8 04/11/2023    CREATININE 0.6 04/11/2023    TSH 5.43 04/11/2023    INR 1.0 05/07/2021    HGBA1C 4.7 (L) 08/11/2020        ECG   SR 67, 2 PVCs     ASSESSMENT/PLAN    Problem List Items Addressed This Visit        High    Primary hypertension    Overview     2017 echocardiogram: Normal structural heart    On Amlodipine 5 and HCTZ 25         Current Assessment & Plan     Her blood pressure has been okay.  She runs a low potassium.  I will stop hydrochlorothiazide and potassium and " placed her on spironolactone 25 mg daily.  She will then have follow-up blood work drawn.  She will continue on amlodipine.         Relevant Medications    spironolactone (ALDACTONE) 25 mg tablet    PAC (premature atrial contraction)    Overview     5/2023 [employer] jeff: Multiple recordings with frequent PACs sometimes in a trigeminal pattern.  No evidence for atrial fibrillation.         Current Assessment & Plan     I reviewed multiple tracings from her [employer] jeff which she printed for review.  She has frequent ectopic beats which I believe are PACs with some aberration.  She is definitely symptomatic.  She has no lightheadedness or syncope.  She does take Adderall.  We discussed the fact that Adderall could contribute to these ectopic beats.  She does have a fair amount of vodka.  I recommend that she reduce this habit.  She does run a low potassium.  I therefore will place her on spironolactone and stop hydrochlorothiazide and her potassium supplement.  She will continue on magnesium supplement.  I will check a 1 week Zio patch to quantitate her PAC burden.  I will then see her back in follow-up in a month.  She will continue exercise.         Relevant Medications    dextroamphetamine-amphetamine (ADDERALL) 30 mg tablet    Elevated coronary artery calcium score - Primary    Overview     6/2019 score: 128 (90th%)  2/12/2020 SE--11:16, 92%mPHR, negative         Current Assessment & Plan     She continues on rosuvastatin.  Her last LDL was 62.  She will continue to try to eat a healthy diet.  She is exercising without symptoms of angina and thus does not appear to require any further cardiac testing at this time.  It is unclear if aspirin will help her cardiovascular prognosis as part of primary prevention so I am not recommended this at present.         Relevant Medications    dextroamphetamine-amphetamine (ADDERALL) 30 mg tablet    Other Relevant Orders    ECG 12 LEAD-OFFICE PERFORMED (Completed)       Medium     Systemic mastocytosis (Chronic)    Current Assessment & Plan     She is under active care.         Hypokalemia (Chronic)    Current Assessment & Plan     I will stop her hydrochlorothiazide and potassium replaced on spironolactone every check blood work.         Dyslipidemia    Current Assessment & Plan     Her last LDL was 62.  She will continue on rosuvastatin and a heart healthy diet.            Low    Sinus bradycardia    Overview     She has the [employer] Layo.         Current Assessment & Plan     Her heart rate has not been as low lately.         Relevant Medications    dextroamphetamine-amphetamine (ADDERALL) 30 mg tablet    Iron deficiency anemia    Overview     Secondary to hemorrhoidal bleeding            Unprioritized    RESOLVED: Paroxysmal atrial fibrillation (CMS/HCC)    Overview     New 5/2023         Relevant Medications    dextroamphetamine-amphetamine (ADDERALL) 30 mg tablet    Palpitations    Overview     [employer] layo shows SR with frequent PVC.         Dyspnea    Overview     Good exercise tolerance. Develops mild LOPEZ when she goes to boxing classes.            I spent 62 minutes with the patient and her rhythm tracings and her partner today.  We discussed all the above issues and the plan.  Answered the questions the best my ability.    Return in about 1 month (around 6/26/2023).    Bright Selby MD  5/26/2023

## 2023-05-26 NOTE — ASSESSMENT & PLAN NOTE
I will stop her hydrochlorothiazide and potassium replaced on spironolactone every check blood work.

## 2023-05-30 ENCOUNTER — HOSPITAL ENCOUNTER (OUTPATIENT)
Dept: RADIOLOGY | Age: 68
Discharge: HOME | End: 2023-05-30
Attending: OBSTETRICS & GYNECOLOGY
Payer: MEDICARE

## 2023-05-30 DIAGNOSIS — Z98.82 BREAST IMPLANT STATUS: ICD-10-CM

## 2023-05-30 PROCEDURE — G0279 TOMOSYNTHESIS, MAMMO: HCPCS

## 2023-05-30 PROCEDURE — 77066 DX MAMMO INCL CAD BI: CPT

## 2023-06-22 ENCOUNTER — TELEPHONE (OUTPATIENT)
Dept: CARDIOLOGY | Facility: CLINIC | Age: 68
End: 2023-06-22
Payer: MEDICARE

## 2023-06-22 DIAGNOSIS — R00.2 PALPITATIONS: ICD-10-CM

## 2023-06-22 NOTE — TELEPHONE ENCOUNTER
I called the patient to review her 7-day ZIO monitor.  She is currently at [facility] for treatment of systemic mastocytosis.  I told her that she needs to check her potassium level before she is seen by Dr. Selby on June 26.  She tells me that it was checked yesterday and is 2.3.  She was given 40 mill equivalents p.o. of potassium chloride.  Her lab was repeated this morning and is pending.  She did not stop hydrochlorothiazide as she was instructed to do last month and she did not begin spironolactone.  I told her that she needs to discontinue hydrochlorothiazide now as this is making her hypokalemia worse.  She will begin spironolactone tomorrow. I told her to bring what ever records she can as well as lab results to her appointment on Monday. DIANA is aware.

## 2023-06-26 ENCOUNTER — OFFICE VISIT (OUTPATIENT)
Dept: CARDIOLOGY | Facility: CLINIC | Age: 68
End: 2023-06-26
Payer: MEDICARE

## 2023-06-26 VITALS
BODY MASS INDEX: 23.22 KG/M2 | WEIGHT: 123 LBS | OXYGEN SATURATION: 98 % | SYSTOLIC BLOOD PRESSURE: 122 MMHG | HEIGHT: 61 IN | DIASTOLIC BLOOD PRESSURE: 60 MMHG

## 2023-06-26 DIAGNOSIS — D50.0 IRON DEFICIENCY ANEMIA DUE TO CHRONIC BLOOD LOSS: ICD-10-CM

## 2023-06-26 DIAGNOSIS — I49.1 PAC (PREMATURE ATRIAL CONTRACTION): ICD-10-CM

## 2023-06-26 DIAGNOSIS — E87.6 HYPOKALEMIA: Chronic | ICD-10-CM

## 2023-06-26 DIAGNOSIS — Z78.9 ALCOHOL USE: ICD-10-CM

## 2023-06-26 DIAGNOSIS — E78.5 DYSLIPIDEMIA: ICD-10-CM

## 2023-06-26 DIAGNOSIS — R93.1 ELEVATED CORONARY ARTERY CALCIUM SCORE: Primary | ICD-10-CM

## 2023-06-26 DIAGNOSIS — I10 PRIMARY HYPERTENSION: ICD-10-CM

## 2023-06-26 DIAGNOSIS — R00.1 SINUS BRADYCARDIA: ICD-10-CM

## 2023-06-26 DIAGNOSIS — D47.02 SYSTEMIC MASTOCYTOSIS: Chronic | ICD-10-CM

## 2023-06-26 PROBLEM — F10.90 ALCOHOL USE: Status: ACTIVE | Noted: 2023-06-26

## 2023-06-26 PROCEDURE — 93000 ELECTROCARDIOGRAM COMPLETE: CPT | Performed by: INTERNAL MEDICINE

## 2023-06-26 PROCEDURE — G8754 DIAS BP LESS 90: HCPCS | Performed by: INTERNAL MEDICINE

## 2023-06-26 PROCEDURE — 99214 OFFICE O/P EST MOD 30 MIN: CPT | Performed by: INTERNAL MEDICINE

## 2023-06-26 PROCEDURE — G8752 SYS BP LESS 140: HCPCS | Performed by: INTERNAL MEDICINE

## 2023-06-26 ASSESSMENT — ENCOUNTER SYMPTOMS
DIAPHORESIS: 0
HEARTBURN: 0
ORTHOPNEA: 0
WEIGHT LOSS: 0
DYSPNEA ON EXERTION: 0
PALPITATIONS: 0
SHORTNESS OF BREATH: 0
LIGHT-HEADEDNESS: 0
COUGH: 0
CLAUDICATION: 0
BLURRED VISION: 0
SYNCOPE: 0
COLOR CHANGE: 0
ADENOPATHY: 0
WEIGHT GAIN: 0
ALTERED MENTAL STATUS: 0
MUSCLE CRAMPS: 0

## 2023-06-26 NOTE — PROGRESS NOTES
Cardiology Note       Reason for visit:   Chief Complaint   Patient presents with   • Elevated coronary artery calcium score      HPI   Ritu Ramirez is a 67 y.o. female who presents for follow-up.    I met her on May 26.  I wanted her to change her amlodipine and hydrochlorothiazide to spironolactone.  She did not stop the hydrochlorothiazide nor start the spironolactone.  I placed her on a 1 week event monitor which demonstrated runs of ectopic ventricular and atrial beats.  She was at the [facility] last week for follow-up of her systemic mastocytosis.  Her potassium level there was 2.3.  I instructed her to begin spironolactone and stop hydrochlorothiazide and take potassium supplement.    She feels better over the last few days with less palpitations.  She continues to exercise by walking and boxing.  She denies chest pain or shortness of breath.  She denies significant lightheadedness or syncope.  She has not had edema.      Past Medical History:   Diagnosis Date   • Anemia    • Anxiety    • Hypertension    • Iron deficiency    • Lipid disorder    • Systemic mastocytosis    • Vitamin D deficiency      Past Surgical History:   Procedure Laterality Date   • AUGMENTATION MAMMAPLASTY Bilateral    • COLONOSCOPY     • PATELLA SURGERY     • REDUCTION MAMMAPLASTY       Social History     Socioeconomic History   • Marital status: Single     Spouse name: None   • Number of children: None   • Years of education: None   • Highest education level: None   Tobacco Use   • Smoking status: Former     Packs/day: 0.25     Types: Cigarettes     Quit date:      Years since quittin.5   • Smokeless tobacco: Never   Vaping Use   • Vaping Use: Never used   Substance and Sexual Activity   • Alcohol use: Yes     Alcohol/week: 1.0 standard drink of alcohol     Types: 1 Glasses of wine per week     Comment: Social   • Drug use: No   • Sexual activity: Defer     Social Determinants of Health     Food Insecurity: No Food Insecurity  (12/23/2021)    Hunger Vital Sign    • Worried About Running Out of Food in the Last Year: Never true    • Ran Out of Food in the Last Year: Never true     Family History   Problem Relation Age of Onset   • Heart disease Biological Mother    • Prostate cancer Biological Father      Anesthetics - amide type - select amino amides, Iodinated contrast media, Penicillins, Clarithromycin, Erythromycin base, and Nsaids (non-steroidal anti-inflammatory drug)  Current Outpatient Medications   Medication Sig Dispense Refill   • biotin 1 mg capsule Take 1 capsule by mouth daily. Dose unknown       • bisacodyL (DULCOLAX) 5 mg EC tablet Take 5 mg by mouth daily.     • calcium carbonate (CALCIUM 500 ORAL) Take 500 mg by mouth daily. Dose unknown       • cholecalciferol, vitamin D3, 1,000 unit capsule Take 1,000 Units by mouth daily.       • dextroamphetamine-amphetamine (ADDERALL) 30 mg tablet Take 30 mg by mouth daily.     • escitalopram (LEXAPRO) 10 mg tablet TAKE 1 TABLET BY MOUTH EVERY DAY 90 tablet 3   • rosuvastatin (CRESTOR) 10 mg tablet Take 1 tablet (10 mg total) by mouth daily. 90 tablet 3   • spironolactone (ALDACTONE) 25 mg tablet Take 1 tablet (25 mg total) by mouth daily. 90 tablet 3     No current facility-administered medications for this visit.       Review of Systems   Constitutional: Negative for diaphoresis, malaise/fatigue, weight gain and weight loss.   HENT: Negative for nosebleeds.    Eyes: Negative for blurred vision.   Cardiovascular: Negative for chest pain, claudication, dyspnea on exertion, leg swelling, orthopnea, palpitations and syncope.   Respiratory: Negative for cough and shortness of breath.    Hematologic/Lymphatic: Negative for adenopathy.   Skin: Negative for color change.   Musculoskeletal: Negative for muscle cramps.   Gastrointestinal: Negative for heartburn.   Genitourinary: Negative for nocturia.   Neurological: Negative for light-headedness.   Psychiatric/Behavioral: Negative for  "altered mental status.     Objective   Vitals:    06/26/23 1327   BP: 122/60   BP Location: Left upper arm   Patient Position: Sitting   SpO2: 98%   Weight: 55.8 kg (123 lb)   Height: 1.549 m (5' 1\")     Weight: 55.8 kg (123 lb)     Physical Exam:    General Appearance:  Alert, no distress   Head:  Normocephalic, without obvious abnormality, atraumatic   Eyes:  Conjunctiva/corneas clear, EOM's intact   Neck: No thyroid enlargement. No JVD. No bruits    Lungs:   Clear to auscultation bilaterally, respirations unlabored, no rales, no wheezing   Heart:  Regular rhythm, S1 and S2 normal, no murmur, rub or gallop   Abdomen:   Soft, non-tender, no masses, no organomegaly   Vascular: Pulses 2+ and symmetric all extremities, no carotid bruit or jugular vein distention   Musculoskeletal:  Skin: No injury or deformity  Skin color, texture, turgor normal, no rashes or lesions, no cyanosis or edema   Extremities: Extremities normal, atraumatic, pulses normal   Behavior/Emotional: Appropriate, cooperative       Lab Results   Component Value Date    WBC 7.83 05/26/2023    HGB 10.9 (L) 05/26/2023     (H) 05/26/2023    CHOL 150 02/07/2022    TRIG 85 02/07/2022    HDL 71 02/07/2022    LDLCALC 62 02/07/2022    ALT 49 05/26/2023    AST 37 05/26/2023     05/26/2023    K 3.0 (L) 05/26/2023    CREATININE 0.8 05/26/2023    TSH 5.43 04/11/2023    INR 1.0 05/07/2021    HGBA1C 4.7 (L) 08/11/2020          ECG   sinus bradycardia  PRWP     ASSESSMENT/PLAN    Problem List Items Addressed This Visit        High    Primary hypertension    Overview     2017 echocardiogram: Normal structural heart             Current Assessment & Plan     We will follow her blood pressure on spironolactone alone.  It is excellent today.         PAC (premature atrial contraction)    Overview     5/2023 [employer] jeff: Multiple recordings with frequent PACs sometimes in a trigeminal pattern.  No evidence for atrial fibrillation.    6/2023 7 day Zio: " Potassium 3.0. 1 run Vtach 7 beats. 15 episodes SVT lasting up to 15 seconds at HR  bpm. Symptoms correlate with SVT.         Current Assessment & Plan     Her ectopic beats and short arrhythmias are very much likely related to her very low potassium levels.  I will place her on spironolactone.  She will continue on her potassium supplements.  I will follow-up her potassium levels.  We will then follow-up her symptoms with her potassium is normal.         Elevated coronary artery calcium score - Primary    Overview     6/2019 score: 128 (90th%)  2/12/2020 SE--11:16, 92%mPHR, negative         Current Assessment & Plan     She is active and without angina.  She will continue on rosuvastatin.  I will not place her on aspirin given her anemia.  She will continue exercise and contact me with any symptoms of concern.  Does not appear that she requires a stress test at this time.         Relevant Orders    ECG 12 LEAD-OFFICE PERFORMED (Completed)       Medium    Systemic mastocytosis (Chronic)    Overview     NIH         Hypokalemia (Chronic)    Overview     Potassium 2.3 on HCTZ         Current Assessment & Plan     I discussed the importance of a normal potassium level with her.  We will monitor this with blood work and I now placed her on spironolactone.         Relevant Orders    Basic metabolic panel    Dyslipidemia    Current Assessment & Plan     She continues on rosuvastatin.  She tolerates this.  Her last LDL was 62.            Low    Sinus bradycardia    Overview     She has the [employer] Layo.         Current Assessment & Plan     She is active and asymptomatic.  I will follow her off negative chronotropic agents.         Iron deficiency anemia    Overview     Secondary to hemorrhoidal bleeding            Unprioritized    Alcohol use    Overview     Vodkas 2-3 times/week             Return in about 3 months (around 9/26/2023).    Bright Selby MD  6/26/2023

## 2023-06-26 NOTE — ASSESSMENT & PLAN NOTE
I discussed the importance of a normal potassium level with her.  We will monitor this with blood work and I now placed her on spironolactone.

## 2023-06-26 NOTE — LETTER
June 26, 2023     Ashley Omalley MD  [facility] [id number]    Patient: Ritu Ramirez  YOB: 1955  Date of Visit: 6/26/2023      Dear Dr. Sirisha Omalley:    Thank you for referring Ritu Ramirez to me for evaluation. Below are my notes for this consultation.    If you have questions, please do not hesitate to call me. I look forward to following your patient along with you.         Sincerely,        Bright Selby MD        CC: No Recipients    Bright Selby MD  6/26/2023  3:47 PM  Signed     Cardiology Note       Reason for visit:   Chief Complaint   Patient presents with   • Elevated coronary artery calcium score      HPI   Rtiu Ramirez is a 67 y.o. female who presents for follow-up.    I met her on May 26.  I wanted her to change her amlodipine and hydrochlorothiazide to spironolactone.  She did not stop the hydrochlorothiazide nor start the spironolactone.  I placed her on a 1 week event monitor which demonstrated runs of ectopic ventricular and atrial beats.  She was at the [facility] last week for follow-up of her systemic mastocytosis.  Her potassium level there was 2.3.  I instructed her to begin spironolactone and stop hydrochlorothiazide and take potassium supplement.    She feels better over the last few days with less palpitations.  She continues to exercise by walking and boxing.  She denies chest pain or shortness of breath.  She denies significant lightheadedness or syncope.  She has not had edema.      Past Medical History:   Diagnosis Date   • Anemia    • Anxiety    • Hypertension    • Iron deficiency    • Lipid disorder    • Systemic mastocytosis    • Vitamin D deficiency      Past Surgical History:   Procedure Laterality Date   • AUGMENTATION MAMMAPLASTY Bilateral    • COLONOSCOPY     • PATELLA SURGERY     • REDUCTION MAMMAPLASTY       Social History     Socioeconomic History   • Marital status: Single     Spouse name: None   • Number of children: None    • Years of education: None   • Highest education level: None   Tobacco Use   • Smoking status: Former     Packs/day: 0.25     Types: Cigarettes     Quit date:      Years since quittin.5   • Smokeless tobacco: Never   Vaping Use   • Vaping Use: Never used   Substance and Sexual Activity   • Alcohol use: Yes     Alcohol/week: 1.0 standard drink of alcohol     Types: 1 Glasses of wine per week     Comment: Social   • Drug use: No   • Sexual activity: Defer     Social Determinants of Health     Food Insecurity: No Food Insecurity (2021)    Hunger Vital Sign    • Worried About Running Out of Food in the Last Year: Never true    • Ran Out of Food in the Last Year: Never true     Family History   Problem Relation Age of Onset   • Heart disease Biological Mother    • Prostate cancer Biological Father      Anesthetics - amide type - select amino amides, Iodinated contrast media, Penicillins, Clarithromycin, Erythromycin base, and Nsaids (non-steroidal anti-inflammatory drug)  Current Outpatient Medications   Medication Sig Dispense Refill   • biotin 1 mg capsule Take 1 capsule by mouth daily. Dose unknown       • bisacodyL (DULCOLAX) 5 mg EC tablet Take 5 mg by mouth daily.     • calcium carbonate (CALCIUM 500 ORAL) Take 500 mg by mouth daily. Dose unknown       • cholecalciferol, vitamin D3, 1,000 unit capsule Take 1,000 Units by mouth daily.       • dextroamphetamine-amphetamine (ADDERALL) 30 mg tablet Take 30 mg by mouth daily.     • escitalopram (LEXAPRO) 10 mg tablet TAKE 1 TABLET BY MOUTH EVERY DAY 90 tablet 3   • rosuvastatin (CRESTOR) 10 mg tablet Take 1 tablet (10 mg total) by mouth daily. 90 tablet 3   • spironolactone (ALDACTONE) 25 mg tablet Take 1 tablet (25 mg total) by mouth daily. 90 tablet 3     No current facility-administered medications for this visit.       Review of Systems   Constitutional: Negative for diaphoresis, malaise/fatigue, weight gain and weight loss.   HENT: Negative for  "nosebleeds.    Eyes: Negative for blurred vision.   Cardiovascular: Negative for chest pain, claudication, dyspnea on exertion, leg swelling, orthopnea, palpitations and syncope.   Respiratory: Negative for cough and shortness of breath.    Hematologic/Lymphatic: Negative for adenopathy.   Skin: Negative for color change.   Musculoskeletal: Negative for muscle cramps.   Gastrointestinal: Negative for heartburn.   Genitourinary: Negative for nocturia.   Neurological: Negative for light-headedness.   Psychiatric/Behavioral: Negative for altered mental status.     Objective   Vitals:    06/26/23 1327   BP: 122/60   BP Location: Left upper arm   Patient Position: Sitting   SpO2: 98%   Weight: 55.8 kg (123 lb)   Height: 1.549 m (5' 1\")     Weight: 55.8 kg (123 lb)     Physical Exam:    General Appearance:  Alert, no distress   Head:  Normocephalic, without obvious abnormality, atraumatic   Eyes:  Conjunctiva/corneas clear, EOM's intact   Neck: No thyroid enlargement. No JVD. No bruits    Lungs:   Clear to auscultation bilaterally, respirations unlabored, no rales, no wheezing   Heart:  Regular rhythm, S1 and S2 normal, no murmur, rub or gallop   Abdomen:   Soft, non-tender, no masses, no organomegaly   Vascular: Pulses 2+ and symmetric all extremities, no carotid bruit or jugular vein distention   Musculoskeletal:  Skin: No injury or deformity  Skin color, texture, turgor normal, no rashes or lesions, no cyanosis or edema   Extremities: Extremities normal, atraumatic, pulses normal   Behavior/Emotional: Appropriate, cooperative       Lab Results   Component Value Date    WBC 7.83 05/26/2023    HGB 10.9 (L) 05/26/2023     (H) 05/26/2023    CHOL 150 02/07/2022    TRIG 85 02/07/2022    HDL 71 02/07/2022    LDLCALC 62 02/07/2022    ALT 49 05/26/2023    AST 37 05/26/2023     05/26/2023    K 3.0 (L) 05/26/2023    CREATININE 0.8 05/26/2023    TSH 5.43 04/11/2023    INR 1.0 05/07/2021    HGBA1C 4.7 (L) 08/11/2020    "       ECG   sinus bradycardia  PRWP     ASSESSMENT/PLAN    Problem List Items Addressed This Visit        High    Primary hypertension    Overview     2017 echocardiogram: Normal structural heart             Current Assessment & Plan     We will follow her blood pressure on spironolactone alone.  It is excellent today.         PAC (premature atrial contraction)    Overview     5/2023 [employer] jeff: Multiple recordings with frequent PACs sometimes in a trigeminal pattern.  No evidence for atrial fibrillation.    6/2023 7 day Zio: Potassium 3.0. 1 run Vtach 7 beats. 15 episodes SVT lasting up to 15 seconds at HR  bpm. Symptoms correlate with SVT.         Current Assessment & Plan     Her ectopic beats and short arrhythmias are very much likely related to her very low potassium levels.  I will place her on spironolactone.  She will continue on her potassium supplements.  I will follow-up her potassium levels.  We will then follow-up her symptoms with her potassium is normal.         Elevated coronary artery calcium score - Primary    Overview     6/2019 score: 128 (90th%)  2/12/2020 SE--11:16, 92%mPHR, negative         Current Assessment & Plan     She is active and without angina.  She will continue on rosuvastatin.  I will not place her on aspirin given her anemia.  She will continue exercise and contact me with any symptoms of concern.  Does not appear that she requires a stress test at this time.         Relevant Orders    ECG 12 LEAD-OFFICE PERFORMED (Completed)       Medium    Systemic mastocytosis (Chronic)    Overview     NIH         Hypokalemia (Chronic)    Overview     Potassium 2.3 on HCTZ         Current Assessment & Plan     I discussed the importance of a normal potassium level with her.  We will monitor this with blood work and I now placed her on spironolactone.         Relevant Orders    Basic metabolic panel    Dyslipidemia    Current Assessment & Plan     She continues on rosuvastatin.  She  tolerates this.  Her last LDL was 62.            Low    Sinus bradycardia    Overview     She has the [employer] Layo.         Current Assessment & Plan     She is active and asymptomatic.  I will follow her off negative chronotropic agents.         Iron deficiency anemia    Overview     Secondary to hemorrhoidal bleeding            Unprioritized    Alcohol use    Overview     Vodkas 2-3 times/week             Return in about 3 months (around 9/26/2023).    Bright Selby MD  6/26/2023

## 2023-06-26 NOTE — ASSESSMENT & PLAN NOTE
Her ectopic beats and short arrhythmias are very much likely related to her very low potassium levels.  I will place her on spironolactone.  She will continue on her potassium supplements.  I will follow-up her potassium levels.  We will then follow-up her symptoms with her potassium is normal.   [employer]:

## 2023-06-29 ENCOUNTER — APPOINTMENT (OUTPATIENT)
Dept: LAB | Facility: HOSPITAL | Age: 68
End: 2023-06-29
Attending: INTERNAL MEDICINE
Payer: MEDICARE

## 2023-06-29 ENCOUNTER — TRANSCRIBE ORDERS (OUTPATIENT)
Dept: SCHEDULING | Age: 68
End: 2023-06-29

## 2023-06-29 DIAGNOSIS — D50.8 OTHER IRON DEFICIENCY ANEMIAS: Primary | ICD-10-CM

## 2023-06-29 DIAGNOSIS — D50.8 OTHER IRON DEFICIENCY ANEMIAS: ICD-10-CM

## 2023-06-29 LAB
ALBUMIN SERPL-MCNC: 4.4 G/DL (ref 3.5–5.7)
ALP SERPL-CCNC: 52 IU/L (ref 34–104)
ALT SERPL-CCNC: 24 IU/L (ref 7–52)
ANION GAP SERPL CALC-SCNC: 5 MEQ/L (ref 3–15)
AST SERPL-CCNC: 17 IU/L (ref 13–39)
BASOPHILS # BLD: 0.03 K/UL (ref 0.01–0.1)
BASOPHILS NFR BLD: 0.5 %
BILIRUB SERPL-MCNC: 0.4 MG/DL (ref 0.3–1)
BUN SERPL-MCNC: 15 MG/DL (ref 7–25)
CALCIUM SERPL-MCNC: 9.6 MG/DL (ref 8.6–10.3)
CHLORIDE SERPL-SCNC: 108 MEQ/L (ref 98–107)
CO2 SERPL-SCNC: 27 MEQ/L (ref 21–31)
CREAT SERPL-MCNC: 0.6 MG/DL (ref 0.6–1.2)
DIFFERENTIAL METHOD BLD: ABNORMAL
EOSINOPHIL # BLD: 0.47 K/UL (ref 0.04–0.36)
EOSINOPHIL NFR BLD: 7.2 %
ERYTHROCYTE [DISTWIDTH] IN BLOOD BY AUTOMATED COUNT: 14.5 % (ref 11.7–14.4)
GFR SERPL CREATININE-BSD FRML MDRD: >60 ML/MIN/1.73M*2
GLUCOSE SERPL-MCNC: 100 MG/DL (ref 70–99)
HCT VFR BLDCO AUTO: 36.9 % (ref 35–45)
HGB BLD-MCNC: 11.4 G/DL (ref 11.8–15.7)
IMM GRANULOCYTES # BLD AUTO: 0.06 K/UL (ref 0–0.08)
IMM GRANULOCYTES NFR BLD AUTO: 0.9 %
IRON SATN MFR SERPL: 12 % (ref 15–45)
IRON SERPL-MCNC: 41 UG/DL (ref 50–212)
LYMPHOCYTES # BLD: 1.02 K/UL (ref 1.2–3.5)
LYMPHOCYTES NFR BLD: 15.6 %
MCH RBC QN AUTO: 24.8 PG (ref 28–33.2)
MCHC RBC AUTO-ENTMCNC: 30.9 G/DL (ref 32.2–35.5)
MCV RBC AUTO: 80.4 FL (ref 83–98)
MONOCYTES # BLD: 0.59 K/UL (ref 0.28–0.8)
MONOCYTES NFR BLD: 9 %
NEUTROPHILS # BLD: 4.36 K/UL (ref 1.7–7)
NEUTROPHILS # BLD: 4.36 K/UL (ref 1.7–7)
NEUTS SEG NFR BLD: 66.8 %
NRBC BLD-RTO: 0 %
PDW BLD AUTO: 9.2 FL (ref 9.4–12.3)
PLATELET # BLD AUTO: 343 K/UL (ref 150–369)
POTASSIUM SERPL-SCNC: 4.3 MEQ/L (ref 3.5–5.1)
PROT SERPL-MCNC: 6.3 G/DL (ref 6.4–8.9)
RBC # BLD AUTO: 4.59 M/UL (ref 3.93–5.22)
SODIUM SERPL-SCNC: 140 MEQ/L (ref 136–145)
TIBC SERPL-MCNC: 344 UG/DL (ref 270–460)
UIBC SERPL-MCNC: 303 UG/DL (ref 180–360)
WBC # BLD AUTO: 6.53 K/UL (ref 3.8–10.5)

## 2023-06-29 PROCEDURE — 83540 ASSAY OF IRON: CPT

## 2023-06-29 PROCEDURE — 83550 IRON BINDING TEST: CPT

## 2023-06-29 PROCEDURE — 36415 COLL VENOUS BLD VENIPUNCTURE: CPT

## 2023-06-29 PROCEDURE — 85025 COMPLETE CBC W/AUTO DIFF WBC: CPT

## 2023-06-29 PROCEDURE — 82040 ASSAY OF SERUM ALBUMIN: CPT

## 2023-06-29 PROCEDURE — 82728 ASSAY OF FERRITIN: CPT

## 2023-06-29 PROCEDURE — 80053 COMPREHEN METABOLIC PANEL: CPT

## 2023-07-01 LAB — FERRITIN SERPL-MCNC: 73 NG/ML (ref 11–250)

## 2023-07-11 ENCOUNTER — LAB REQUISITION (OUTPATIENT)
Dept: LAB | Facility: HOSPITAL | Age: 68
End: 2023-07-11
Attending: INTERNAL MEDICINE
Payer: MEDICARE

## 2023-07-11 DIAGNOSIS — D50.8 OTHER IRON DEFICIENCY ANEMIAS: ICD-10-CM

## 2023-07-11 LAB
ALBUMIN SERPL-MCNC: 4.6 G/DL (ref 3.5–5.7)
ALP SERPL-CCNC: 56 IU/L (ref 34–104)
ALT SERPL-CCNC: 21 IU/L (ref 7–52)
ANION GAP SERPL CALC-SCNC: 8 MEQ/L (ref 3–15)
AST SERPL-CCNC: 16 IU/L (ref 13–39)
BASOPHILS # BLD: 0.04 K/UL (ref 0.01–0.1)
BASOPHILS NFR BLD: 0.6 %
BILIRUB SERPL-MCNC: 0.5 MG/DL (ref 0.3–1)
BUN SERPL-MCNC: 14 MG/DL (ref 7–25)
CALCIUM SERPL-MCNC: 9.4 MG/DL (ref 8.6–10.3)
CHLORIDE SERPL-SCNC: 107 MEQ/L (ref 98–107)
CO2 SERPL-SCNC: 25 MEQ/L (ref 21–31)
CREAT SERPL-MCNC: 0.8 MG/DL (ref 0.6–1.2)
DIFFERENTIAL METHOD BLD: ABNORMAL
EOSINOPHIL # BLD: 0.33 K/UL (ref 0.04–0.36)
EOSINOPHIL NFR BLD: 4.6 %
ERYTHROCYTE [DISTWIDTH] IN BLOOD BY AUTOMATED COUNT: 14.1 % (ref 11.7–14.4)
GFR SERPL CREATININE-BSD FRML MDRD: >60 ML/MIN/1.73M*2
GLUCOSE SERPL-MCNC: 123 MG/DL (ref 70–99)
HCT VFR BLDCO AUTO: 36.7 % (ref 35–45)
HGB BLD-MCNC: 11.7 G/DL (ref 11.8–15.7)
IMM GRANULOCYTES # BLD AUTO: 0.05 K/UL (ref 0–0.08)
IMM GRANULOCYTES NFR BLD AUTO: 0.7 %
LYMPHOCYTES # BLD: 0.9 K/UL (ref 1.2–3.5)
LYMPHOCYTES NFR BLD: 12.6 %
MCH RBC QN AUTO: 24.8 PG (ref 28–33.2)
MCHC RBC AUTO-ENTMCNC: 31.9 G/DL (ref 32.2–35.5)
MCV RBC AUTO: 77.9 FL (ref 83–98)
MONOCYTES # BLD: 0.47 K/UL (ref 0.28–0.8)
MONOCYTES NFR BLD: 6.6 %
NEUTROPHILS # BLD: 5.38 K/UL (ref 1.7–7)
NEUTROPHILS # BLD: 5.38 K/UL (ref 1.7–7)
NEUTS SEG NFR BLD: 74.9 %
NRBC BLD-RTO: 0 %
PDW BLD AUTO: 9.1 FL (ref 9.4–12.3)
PLATELET # BLD AUTO: 334 K/UL (ref 150–369)
POTASSIUM SERPL-SCNC: 3.6 MEQ/L (ref 3.5–5.1)
PROT SERPL-MCNC: 6.6 G/DL (ref 6.4–8.9)
RBC # BLD AUTO: 4.71 M/UL (ref 3.93–5.22)
SODIUM SERPL-SCNC: 140 MEQ/L (ref 136–145)
WBC # BLD AUTO: 7.17 K/UL (ref 3.8–10.5)

## 2023-07-11 PROCEDURE — 85025 COMPLETE CBC W/AUTO DIFF WBC: CPT | Performed by: INTERNAL MEDICINE

## 2023-07-11 PROCEDURE — 80053 COMPREHEN METABOLIC PANEL: CPT | Performed by: INTERNAL MEDICINE

## 2023-07-21 ENCOUNTER — LAB REQUISITION (OUTPATIENT)
Dept: LAB | Facility: HOSPITAL | Age: 68
End: 2023-07-21
Attending: INTERNAL MEDICINE
Payer: MEDICARE

## 2023-07-21 DIAGNOSIS — D50.8 OTHER IRON DEFICIENCY ANEMIAS: ICD-10-CM

## 2023-07-21 LAB
BASOPHILS # BLD: 0.04 K/UL (ref 0.01–0.1)
BASOPHILS NFR BLD: 0.5 %
DIFFERENTIAL METHOD BLD: ABNORMAL
EOSINOPHIL # BLD: 0.54 K/UL (ref 0.04–0.36)
EOSINOPHIL NFR BLD: 6.5 %
ERYTHROCYTE [DISTWIDTH] IN BLOOD BY AUTOMATED COUNT: 14.7 % (ref 11.7–14.4)
HCT VFR BLDCO AUTO: 40.4 % (ref 35–45)
HGB BLD-MCNC: 12.4 G/DL (ref 11.8–15.7)
IMM GRANULOCYTES # BLD AUTO: 0.06 K/UL (ref 0–0.08)
IMM GRANULOCYTES NFR BLD AUTO: 0.7 %
LYMPHOCYTES # BLD: 0.88 K/UL (ref 1.2–3.5)
LYMPHOCYTES NFR BLD: 10.6 %
MCH RBC QN AUTO: 24.6 PG (ref 28–33.2)
MCHC RBC AUTO-ENTMCNC: 30.7 G/DL (ref 32.2–35.5)
MCV RBC AUTO: 80 FL (ref 83–98)
MONOCYTES # BLD: 0.5 K/UL (ref 0.28–0.8)
MONOCYTES NFR BLD: 6 %
NEUTROPHILS # BLD: 6.26 K/UL (ref 1.7–7)
NEUTROPHILS # BLD: 6.26 K/UL (ref 1.7–7)
NEUTS SEG NFR BLD: 75.7 %
NRBC BLD-RTO: 0 %
PDW BLD AUTO: 8.9 FL (ref 9.4–12.3)
PLATELET # BLD AUTO: 366 K/UL (ref 150–369)
RBC # BLD AUTO: 5.05 M/UL (ref 3.93–5.22)
WBC # BLD AUTO: 8.28 K/UL (ref 3.8–10.5)

## 2023-07-21 PROCEDURE — 85025 COMPLETE CBC W/AUTO DIFF WBC: CPT | Performed by: INTERNAL MEDICINE

## 2023-08-04 RX ORDER — ESCITALOPRAM OXALATE 10 MG/1
TABLET ORAL
Qty: 90 TABLET | Refills: 3 | Status: SHIPPED | OUTPATIENT
Start: 2023-08-04

## 2023-08-04 NOTE — TELEPHONE ENCOUNTER
Medicine Refill Request    Last Office Visit: 4/12/2023   Last Consult Visit: Visit date not found  Last Telemedicine Visit: 11/25/2022 Shauna Ellis CRNP    Next Appointment: 12/14/2023      Current Outpatient Medications:   •  biotin 1 mg capsule, Take 1 capsule by mouth daily. Dose unknown  , Disp: , Rfl:   •  bisacodyL (DULCOLAX) 5 mg EC tablet, Take 5 mg by mouth daily., Disp: , Rfl:   •  calcium carbonate (CALCIUM 500 ORAL), Take 500 mg by mouth daily. Dose unknown  , Disp: , Rfl:   •  cholecalciferol, vitamin D3, 1,000 unit capsule, Take 1,000 Units by mouth daily.  , Disp: , Rfl:   •  dextroamphetamine-amphetamine (ADDERALL) 30 mg tablet, Take 30 mg by mouth daily., Disp: , Rfl:   •  escitalopram (LEXAPRO) 10 mg tablet, TAKE 1 TABLET BY MOUTH EVERY DAY, Disp: 90 tablet, Rfl: 3  •  rosuvastatin (CRESTOR) 10 mg tablet, Take 1 tablet (10 mg total) by mouth daily., Disp: 90 tablet, Rfl: 3  •  spironolactone (ALDACTONE) 25 mg tablet, Take 1 tablet (25 mg total) by mouth daily., Disp: 90 tablet, Rfl: 3      BP Readings from Last 3 Encounters:   06/26/23 122/60   05/26/23 130/60   04/12/23 128/86       Recent Lab results:  Lab Results   Component Value Date    CHOL 150 02/07/2022   ,   Lab Results   Component Value Date    HDL 71 02/07/2022   ,   Lab Results   Component Value Date    LDLCALC 62 02/07/2022   ,   Lab Results   Component Value Date    TRIG 85 02/07/2022        Lab Results   Component Value Date    GLUCOSE 123 (H) 07/11/2023   ,   Lab Results   Component Value Date    HGBA1C 4.7 (L) 08/11/2020         Lab Results   Component Value Date    CREATININE 0.8 07/11/2023       Lab Results   Component Value Date    TSH 5.43 04/11/2023           Lab Results   Component Value Date    HGBA1C 4.7 (L) 08/11/2020

## 2023-08-07 ENCOUNTER — LAB REQUISITION (OUTPATIENT)
Dept: LAB | Facility: HOSPITAL | Age: 68
End: 2023-08-07
Attending: INTERNAL MEDICINE
Payer: MEDICARE

## 2023-08-07 DIAGNOSIS — E53.9 VITAMIN B DEFICIENCY, UNSPECIFIED: ICD-10-CM

## 2023-08-07 DIAGNOSIS — D50.8 OTHER IRON DEFICIENCY ANEMIAS: ICD-10-CM

## 2023-08-07 LAB
ALBUMIN SERPL-MCNC: 4.7 G/DL (ref 3.5–5.7)
ALP SERPL-CCNC: 58 IU/L (ref 34–125)
ALT SERPL-CCNC: 37 IU/L (ref 7–52)
ANION GAP SERPL CALC-SCNC: 5 MEQ/L (ref 3–15)
AST SERPL-CCNC: 23 IU/L (ref 13–39)
BASOPHILS # BLD: 0.03 K/UL (ref 0.01–0.1)
BASOPHILS NFR BLD: 0.6 %
BILIRUB SERPL-MCNC: 0.3 MG/DL (ref 0.3–1.2)
BUN SERPL-MCNC: 18 MG/DL (ref 7–25)
CALCIUM SERPL-MCNC: 9.5 MG/DL (ref 8.6–10.3)
CHLORIDE SERPL-SCNC: 107 MEQ/L (ref 98–107)
CO2 SERPL-SCNC: 27 MEQ/L (ref 21–31)
CREAT SERPL-MCNC: 0.6 MG/DL (ref 0.6–1.2)
DIFFERENTIAL METHOD BLD: ABNORMAL
EOSINOPHIL # BLD: 0.35 K/UL (ref 0.04–0.36)
EOSINOPHIL NFR BLD: 6.5 %
ERYTHROCYTE [DISTWIDTH] IN BLOOD BY AUTOMATED COUNT: 15 % (ref 11.7–14.4)
GFR SERPL CREATININE-BSD FRML MDRD: >60 ML/MIN/1.73M*2
GLUCOSE SERPL-MCNC: 107 MG/DL (ref 70–99)
HCT VFR BLDCO AUTO: 35.2 % (ref 35–45)
HGB BLD-MCNC: 11 G/DL (ref 11.8–15.7)
IMM GRANULOCYTES # BLD AUTO: 0.05 K/UL (ref 0–0.08)
IMM GRANULOCYTES NFR BLD AUTO: 0.9 %
LYMPHOCYTES # BLD: 0.75 K/UL (ref 1.2–3.5)
LYMPHOCYTES NFR BLD: 14 %
MCH RBC QN AUTO: 24.6 PG (ref 28–33.2)
MCHC RBC AUTO-ENTMCNC: 31.3 G/DL (ref 32.2–35.5)
MCV RBC AUTO: 78.7 FL (ref 83–98)
MONOCYTES # BLD: 0.36 K/UL (ref 0.28–0.8)
MONOCYTES NFR BLD: 6.7 %
NEUTROPHILS # BLD: 3.83 K/UL (ref 1.7–7)
NEUTROPHILS # BLD: 3.83 K/UL (ref 1.7–7)
NEUTS SEG NFR BLD: 71.3 %
NRBC BLD-RTO: 0 %
PDW BLD AUTO: 8.9 FL (ref 9.4–12.3)
PLATELET # BLD AUTO: 371 K/UL (ref 150–369)
POTASSIUM SERPL-SCNC: 3.7 MEQ/L (ref 3.5–5.1)
PROT SERPL-MCNC: 6.6 G/DL (ref 6–8.2)
RBC # BLD AUTO: 4.47 M/UL (ref 3.93–5.22)
SODIUM SERPL-SCNC: 139 MEQ/L (ref 136–145)
VIT B12 SERPL-MCNC: 959 PG/ML (ref 180–914)
WBC # BLD AUTO: 5.37 K/UL (ref 3.8–10.5)

## 2023-08-07 PROCEDURE — 85025 COMPLETE CBC W/AUTO DIFF WBC: CPT | Performed by: INTERNAL MEDICINE

## 2023-08-07 PROCEDURE — 82607 VITAMIN B-12: CPT | Performed by: INTERNAL MEDICINE

## 2023-08-07 PROCEDURE — 80053 COMPREHEN METABOLIC PANEL: CPT | Performed by: INTERNAL MEDICINE

## 2023-08-28 ENCOUNTER — TRANSCRIBE ORDERS (OUTPATIENT)
Dept: SCHEDULING | Age: 68
End: 2023-08-28

## 2023-08-28 ENCOUNTER — APPOINTMENT (OUTPATIENT)
Dept: LAB | Facility: HOSPITAL | Age: 68
End: 2023-08-28
Attending: INTERNAL MEDICINE
Payer: MEDICARE

## 2023-08-28 DIAGNOSIS — D50.8 OTHER IRON DEFICIENCY ANEMIAS: Primary | ICD-10-CM

## 2023-08-28 DIAGNOSIS — D50.8 OTHER IRON DEFICIENCY ANEMIAS: ICD-10-CM

## 2023-08-28 LAB
BASOPHILS # BLD: 0.02 K/UL (ref 0.01–0.1)
BASOPHILS NFR BLD: 0.3 %
DIFFERENTIAL METHOD BLD: ABNORMAL
EOSINOPHIL # BLD: 0.41 K/UL (ref 0.04–0.36)
EOSINOPHIL NFR BLD: 6.7 %
ERYTHROCYTE [DISTWIDTH] IN BLOOD BY AUTOMATED COUNT: 15.5 % (ref 11.7–14.4)
FERRITIN SERPL-MCNC: 64 NG/ML (ref 11–250)
HCT VFR BLDCO AUTO: 29.5 % (ref 35–45)
HGB BLD-MCNC: 8.9 G/DL (ref 11.8–15.7)
IMM GRANULOCYTES # BLD AUTO: 0.06 K/UL (ref 0–0.08)
IMM GRANULOCYTES NFR BLD AUTO: 1 %
IRON SATN MFR SERPL: 11 % (ref 15–45)
IRON SERPL-MCNC: 37 UG/DL (ref 35–150)
LYMPHOCYTES # BLD: 0.82 K/UL (ref 1.2–3.5)
LYMPHOCYTES NFR BLD: 13.3 %
MCH RBC QN AUTO: 24 PG (ref 28–33.2)
MCHC RBC AUTO-ENTMCNC: 30.2 G/DL (ref 32.2–35.5)
MCV RBC AUTO: 79.5 FL (ref 83–98)
MONOCYTES # BLD: 0.47 K/UL (ref 0.28–0.8)
MONOCYTES NFR BLD: 7.6 %
NEUTROPHILS # BLD: 4.37 K/UL (ref 1.7–7)
NEUTROPHILS # BLD: 4.37 K/UL (ref 1.7–7)
NEUTS SEG NFR BLD: 71.1 %
NRBC BLD-RTO: 0 %
PDW BLD AUTO: 8.5 FL (ref 9.4–12.3)
PLATELET # BLD AUTO: 300 K/UL (ref 150–369)
RBC # BLD AUTO: 3.71 M/UL (ref 3.93–5.22)
TIBC SERPL-MCNC: 344 UG/DL (ref 270–460)
UIBC SERPL-MCNC: 307 UG/DL (ref 180–360)
WBC # BLD AUTO: 6.15 K/UL (ref 3.8–10.5)

## 2023-08-28 PROCEDURE — 36415 COLL VENOUS BLD VENIPUNCTURE: CPT

## 2023-08-28 PROCEDURE — 83540 ASSAY OF IRON: CPT

## 2023-08-28 PROCEDURE — 85025 COMPLETE CBC W/AUTO DIFF WBC: CPT

## 2023-08-28 PROCEDURE — 82728 ASSAY OF FERRITIN: CPT

## 2023-08-30 ENCOUNTER — OFFICE VISIT (OUTPATIENT)
Dept: SURGERY | Facility: CLINIC | Age: 68
End: 2023-08-30
Payer: MEDICARE

## 2023-08-30 VITALS — HEIGHT: 61 IN | BODY MASS INDEX: 22.28 KG/M2 | WEIGHT: 118 LBS

## 2023-08-30 DIAGNOSIS — K64.8 INTERNAL HEMORRHOID, BLEEDING: ICD-10-CM

## 2023-08-30 PROCEDURE — G8756 NO BP MEASURE DOC: HCPCS | Performed by: SURGERY

## 2023-08-30 PROCEDURE — 99213 OFFICE O/P EST LOW 20 MIN: CPT | Mod: 25 | Performed by: SURGERY

## 2023-08-30 NOTE — PATIENT INSTRUCTIONS
Injection Sclerotherapy for Hemorrhoids    Treatment Overview    Injection sclerotherapy is a medical procedure used to treat bulging or bleeding internal hemorrhoids. This fixative procedure uses a chemical that scars the tissues and cuts off the hemorrhoids' blood supply.    The doctor injects a chemical called “phenol” mixed in olive oil into the veins within a hemorrhoid. This chemical causes the vein to harden and the hemorrhoid tissue to die. A scar forms in place of the hemorrhoid on the wall of the anal canal. The scar tissue, which is firm and thick, holds nearby tissue and veins in place so they don't bulge into the anal canal.    What To Expect After Treatment    ? a small to moderate amount of bleeding may occur for up to 7 days  ? you may feel an ache or soreness for 24-48 hours  ? you may feel full or like you have to defecate for 24 hours    Taking Care of Yourself    ? sitting in a warm tub or sitz bath may relieve the aching sensation  ? tylenol or ibuprofen should be adequate to control the discomfort  ? try to maintain a high fiber diet in order to reduce straining with defecation    Adverse Reactions - please call Dr. Corcoran if any of these occur    ? fever  ? chills  ? aching joints  ? pelvic pain  ? pelvic inflammation  ? rectal inflammation or infection      These reactions are rare and may occur in 1 in 500 cases.  Most are not serious, are self-limiting, and require no medical intervention.

## 2023-08-30 NOTE — PROGRESS NOTES
Chief Complaint:   Chief Complaint   Patient presents with   • Hemorrhoids       HPI     Patient is a 67 y.o. female who presents with the following:      Int bleeding hems:  20 - injected large angry int hems   - [facility] - injected in hospital for HGB 6  21 - injected again  21 - injected again  22 - bleeding stopped, ready for surgery - PE: large hems - f/u when bleeding    Reports bleeding has returned and she would like another injection. Copious bleeding occurs with BMs and drips into the bowl. Reports she was recently constipated and did an enema - thinks this instigated her bleeding.    Not ready for surgery - grandchild is coming in October.    Medical History:   Past Medical History:   Diagnosis Date   • Anemia    • Anxiety    • Hypertension    • Iron deficiency    • Lipid disorder    • Systemic mastocytosis    • Vitamin D deficiency        Surgical History:   Past Surgical History:   Procedure Laterality Date   • AUGMENTATION MAMMAPLASTY Bilateral    • COLONOSCOPY     • PATELLA SURGERY     • REDUCTION MAMMAPLASTY         Social History:   Social History     Socioeconomic History   • Marital status: Single     Spouse name: Not on file   • Number of children: Not on file   • Years of education: Not on file   • Highest education level: Not on file   Occupational History   • Not on file   Tobacco Use   • Smoking status: Former     Packs/day: 0.25     Types: Cigarettes     Quit date:      Years since quittin.6   • Smokeless tobacco: Never   Vaping Use   • Vaping Use: Never used   Substance and Sexual Activity   • Alcohol use: Yes     Alcohol/week: 1.0 standard drink of alcohol     Types: 1 Glasses of wine per week     Comment: Social   • Drug use: No   • Sexual activity: Defer   Other Topics Concern   • Not on file   Social History Narrative   • Not on file     Social Determinants of Health     Financial Resource Strain: Not on file   Food Insecurity: No Food Insecurity  (12/23/2021)    Hunger Vital Sign    • Worried About Running Out of Food in the Last Year: Never true    • Ran Out of Food in the Last Year: Never true   Transportation Needs: Not on file   Physical Activity: Not on file   Stress: Not on file   Social Connections: Not on file   Intimate Partner Violence: Not on file   Housing Stability: Not on file       Family History:   Family History   Problem Relation Age of Onset   • Heart disease Biological Mother    • Prostate cancer Biological Father        Allergies:   Anesthetics - amide type - select amino amides, Iodinated contrast media, Penicillins, Clarithromycin, Erythromycin base, and Nsaids (non-steroidal anti-inflammatory drug)    Current Medications:      Current Outpatient Medications:   •  biotin 1 mg capsule, Take 1 capsule by mouth daily. Dose unknown  , Disp: , Rfl:   •  bisacodyL (DULCOLAX) 5 mg EC tablet, Take 5 mg by mouth daily., Disp: , Rfl:   •  calcium carbonate (CALCIUM 500 ORAL), Take 500 mg by mouth daily. Dose unknown  , Disp: , Rfl:   •  cholecalciferol, vitamin D3, 1,000 unit capsule, Take 1,000 Units by mouth daily.  , Disp: , Rfl:   •  dextroamphetamine-amphetamine (ADDERALL) 30 mg tablet, Take 30 mg by mouth daily., Disp: , Rfl:   •  escitalopram (LEXAPRO) 10 mg tablet, TAKE 1 TABLET BY MOUTH EVERY DAY, Disp: 90 tablet, Rfl: 3  •  rosuvastatin (CRESTOR) 10 mg tablet, Take 1 tablet (10 mg total) by mouth daily., Disp: 90 tablet, Rfl: 3  •  spironolactone (ALDACTONE) 25 mg tablet, Take 1 tablet (25 mg total) by mouth daily., Disp: 90 tablet, Rfl: 3    Review of Systems  All 14 systems reviewed and the findings are not pertinent to the current problem.    Objective     Vital Signs  There were no vitals filed for this visit.  Body mass index is 22.3 kg/m².      Physical Exam  General Appearance:  Well developed.  Well nourished.  In no acute distress.    Anorectal Examination:    No pilonidal sinus was observed.   No pilonidal cyst was observed.    Perianal skin was not erythematous.  No perianal wart was observed.  No anal fissure was observed.   Anus did not have a fistula.   Anal sphincter tone was normal.    The patient had large, friable, grade three internal hemorrhoids.    No external anal skin tags were observed.   Anus had no mass.  Rectum:  No rectal abscess was observed.   Rectal prolapse was not observed.   No rectal fistula was observed.   Rectal exam was not tender.   No rectal fluctuance was observed.  Rectal exam showed no mass.   Normal richard-anal skin with no lesions or dermatitis      Problem List Items Addressed This Visit        Circulatory    Internal hemorrhoid, bleeding     The internal hemorrhoids were again injected with 5% phenol mixed in olive oil as a sclerotherapy.  The patient will return on a PRN basis for further injections.  If these treatments do not relieve their symptoms then surgery may be considered.                    DOYLE Lee 8/30/2023 10:16 AM

## 2023-08-30 NOTE — LETTER
August 30, 2023     Ashley Omalley MD  [address] [facility] [place] [address]    Patient: Ritu Ramirez  YOB: 1955  Date of Visit: 8/30/2023      Dear Dr. Sirisha Omalley:    Thank you for referring Ritu Ramirez to me for evaluation. Below are my notes for this consultation.    If you have questions, please do not hesitate to call me. I look forward to following your patient along with you.         Sincerely,        Javier Corcoran MD        CC: No Recipients    Bette Henry PA C  8/30/2023 10:16 AM  Sign when Signing Visit      Chief Complaint:   Chief Complaint   Patient presents with   • Hemorrhoids       HPI     Patient is a 67 y.o. female who presents with the following:      Int bleeding hems:  07/27/20 - injected large angry int hems  11/20 - [facility] - injected in hospital for HGB 6  06/21/21 - injected again  12/28/21 - injected again  12/07/22 - bleeding stopped, ready for surgery - PE: large hems - f/u when bleeding    Reports bleeding has returned and she would like another injection. Copious bleeding occurs with BMs and drips into the bowl. Reports she was recently constipated and did an enema - thinks this instigated her bleeding.    Not ready for surgery - grandchild is coming in October.    Medical History:   Past Medical History:   Diagnosis Date   • Anemia    • Anxiety    • Hypertension    • Iron deficiency    • Lipid disorder    • Systemic mastocytosis    • Vitamin D deficiency        Surgical History:   Past Surgical History:   Procedure Laterality Date   • AUGMENTATION MAMMAPLASTY Bilateral    • COLONOSCOPY     • PATELLA SURGERY     • REDUCTION MAMMAPLASTY         Social History:   Social History     Socioeconomic History   • Marital status: Single     Spouse name: Not on file   • Number of children: Not on file   • Years of education: Not on file   • Highest education level: Not on file   Occupational History   • Not on file   Tobacco Use   • Smoking status:  Former     Packs/day: 0.25     Types: Cigarettes     Quit date:      Years since quittin.6   • Smokeless tobacco: Never   Vaping Use   • Vaping Use: Never used   Substance and Sexual Activity   • Alcohol use: Yes     Alcohol/week: 1.0 standard drink of alcohol     Types: 1 Glasses of wine per week     Comment: Social   • Drug use: No   • Sexual activity: Defer   Other Topics Concern   • Not on file   Social History Narrative   • Not on file     Social Determinants of Health     Financial Resource Strain: Not on file   Food Insecurity: No Food Insecurity (2021)    Hunger Vital Sign    • Worried About Running Out of Food in the Last Year: Never true    • Ran Out of Food in the Last Year: Never true   Transportation Needs: Not on file   Physical Activity: Not on file   Stress: Not on file   Social Connections: Not on file   Intimate Partner Violence: Not on file   Housing Stability: Not on file       Family History:   Family History   Problem Relation Age of Onset   • Heart disease Biological Mother    • Prostate cancer Biological Father        Allergies:   Anesthetics - amide type - select amino amides, Iodinated contrast media, Penicillins, Clarithromycin, Erythromycin base, and Nsaids (non-steroidal anti-inflammatory drug)    Current Medications:      Current Outpatient Medications:   •  biotin 1 mg capsule, Take 1 capsule by mouth daily. Dose unknown  , Disp: , Rfl:   •  bisacodyL (DULCOLAX) 5 mg EC tablet, Take 5 mg by mouth daily., Disp: , Rfl:   •  calcium carbonate (CALCIUM 500 ORAL), Take 500 mg by mouth daily. Dose unknown  , Disp: , Rfl:   •  cholecalciferol, vitamin D3, 1,000 unit capsule, Take 1,000 Units by mouth daily.  , Disp: , Rfl:   •  dextroamphetamine-amphetamine (ADDERALL) 30 mg tablet, Take 30 mg by mouth daily., Disp: , Rfl:   •  escitalopram (LEXAPRO) 10 mg tablet, TAKE 1 TABLET BY MOUTH EVERY DAY, Disp: 90 tablet, Rfl: 3  •  rosuvastatin (CRESTOR) 10 mg tablet, Take 1 tablet (10  mg total) by mouth daily., Disp: 90 tablet, Rfl: 3  •  spironolactone (ALDACTONE) 25 mg tablet, Take 1 tablet (25 mg total) by mouth daily., Disp: 90 tablet, Rfl: 3    Review of Systems  All 14 systems reviewed and the findings are not pertinent to the current problem.    Objective     Vital Signs  There were no vitals filed for this visit.  Body mass index is 22.3 kg/m².      Physical Exam  General Appearance:  Well developed.  Well nourished.  In no acute distress.    Anorectal Examination:    No pilonidal sinus was observed.   No pilonidal cyst was observed.   Perianal skin was not erythematous.  No perianal wart was observed.  No anal fissure was observed.   Anus did not have a fistula.   Anal sphincter tone was normal.    The patient had large, friable, grade three internal hemorrhoids.    No external anal skin tags were observed.   Anus had no mass.  Rectum:  No rectal abscess was observed.   Rectal prolapse was not observed.   No rectal fistula was observed.   Rectal exam was not tender.   No rectal fluctuance was observed.  Rectal exam showed no mass.   Normal richard-anal skin with no lesions or dermatitis      Problem List Items Addressed This Visit        Circulatory    Internal hemorrhoid, bleeding     The internal hemorrhoids were again injected with 5% phenol mixed in olive oil as a sclerotherapy.  The patient will return on a PRN basis for further injections.  If these treatments do not relieve their symptoms then surgery may be considered.                    DOYLE Lee 8/30/2023 10:16 AM            Bette Henry PA C  8/30/2023 10:16 AM  Sign when Signing Visit  Procedures  The patient had large friable internal hemorrhoids that required office based therapy.  Verbal consent was obtained.  No prep was necessary.  First a digital rectal exam and then anoscopy was performed.  Injection sclerotherapy was then performed using 5% phenol in olive oil through an 18 gauge spinal needle.  The  patient tolerated it well and was given instructions to return as needed.

## 2023-09-01 ENCOUNTER — LAB REQUISITION (OUTPATIENT)
Dept: LAB | Facility: HOSPITAL | Age: 68
End: 2023-09-01
Attending: INTERNAL MEDICINE
Payer: MEDICARE

## 2023-09-01 DIAGNOSIS — D50.8 OTHER IRON DEFICIENCY ANEMIAS: ICD-10-CM

## 2023-09-01 LAB
25(OH)D3 SERPL-MCNC: 27 NG/ML (ref 30–100)
ALBUMIN SERPL-MCNC: 4.6 G/DL (ref 3.5–5.7)
ALP SERPL-CCNC: 48 IU/L (ref 34–125)
ALT SERPL-CCNC: 18 IU/L (ref 7–52)
ANION GAP SERPL CALC-SCNC: 6 MEQ/L (ref 3–15)
AST SERPL-CCNC: 16 IU/L (ref 13–39)
BASOPHILS # BLD: 0.03 K/UL (ref 0.01–0.1)
BASOPHILS NFR BLD: 0.4 %
BILIRUB SERPL-MCNC: 0.4 MG/DL (ref 0.3–1.2)
BUN SERPL-MCNC: 20 MG/DL (ref 7–25)
CALCIUM SERPL-MCNC: 9.7 MG/DL (ref 8.6–10.3)
CHLORIDE SERPL-SCNC: 108 MEQ/L (ref 98–107)
CO2 SERPL-SCNC: 27 MEQ/L (ref 21–31)
CREAT SERPL-MCNC: 0.7 MG/DL (ref 0.6–1.2)
DIFFERENTIAL METHOD BLD: ABNORMAL
EOSINOPHIL # BLD: 0.71 K/UL (ref 0.04–0.36)
EOSINOPHIL NFR BLD: 10.6 %
ERYTHROCYTE [DISTWIDTH] IN BLOOD BY AUTOMATED COUNT: 15.6 % (ref 11.7–14.4)
FOLATE SERPL-MCNC: 15.2 NG/ML
GFR SERPL CREATININE-BSD FRML MDRD: >60 ML/MIN/1.73M*2
GLUCOSE SERPL-MCNC: 112 MG/DL (ref 70–99)
HCT VFR BLDCO AUTO: 28.5 % (ref 35–45)
HGB BLD-MCNC: 8.3 G/DL (ref 11.8–15.7)
IMM GRANULOCYTES # BLD AUTO: 0.07 K/UL (ref 0–0.08)
IMM GRANULOCYTES NFR BLD AUTO: 1 %
LYMPHOCYTES # BLD: 0.89 K/UL (ref 1.2–3.5)
LYMPHOCYTES NFR BLD: 13.2 %
MCH RBC QN AUTO: 24 PG (ref 28–33.2)
MCHC RBC AUTO-ENTMCNC: 29.1 G/DL (ref 32.2–35.5)
MCV RBC AUTO: 82.4 FL (ref 83–98)
MONOCYTES # BLD: 0.37 K/UL (ref 0.28–0.8)
MONOCYTES NFR BLD: 5.5 %
NEUTROPHILS # BLD: 4.65 K/UL (ref 1.7–7)
NEUTROPHILS # BLD: 4.65 K/UL (ref 1.7–7)
NEUTS SEG NFR BLD: 69.3 %
NRBC BLD-RTO: 0 %
PDW BLD AUTO: 9.8 FL (ref 9.4–12.3)
PLATELET # BLD AUTO: 362 K/UL (ref 150–369)
POTASSIUM SERPL-SCNC: 3.8 MEQ/L (ref 3.5–5.1)
PROT SERPL-MCNC: 6.3 G/DL (ref 6–8.2)
RBC # BLD AUTO: 3.46 M/UL (ref 3.93–5.22)
SODIUM SERPL-SCNC: 141 MEQ/L (ref 136–145)
TSH SERPL DL<=0.05 MIU/L-ACNC: 2.41 MIU/L (ref 0.34–5.6)
VIT B12 SERPL-MCNC: 1164 PG/ML (ref 180–914)
WBC # BLD AUTO: 6.72 K/UL (ref 3.8–10.5)

## 2023-09-01 PROCEDURE — 84443 ASSAY THYROID STIM HORMONE: CPT | Performed by: INTERNAL MEDICINE

## 2023-09-01 PROCEDURE — 82607 VITAMIN B-12: CPT | Performed by: INTERNAL MEDICINE

## 2023-09-01 PROCEDURE — 82306 VITAMIN D 25 HYDROXY: CPT | Performed by: INTERNAL MEDICINE

## 2023-09-01 PROCEDURE — 80053 COMPREHEN METABOLIC PANEL: CPT | Performed by: INTERNAL MEDICINE

## 2023-09-01 PROCEDURE — 82746 ASSAY OF FOLIC ACID SERUM: CPT | Performed by: INTERNAL MEDICINE

## 2023-09-01 PROCEDURE — 85025 COMPLETE CBC W/AUTO DIFF WBC: CPT | Performed by: INTERNAL MEDICINE

## 2023-09-11 ENCOUNTER — LAB REQUISITION (OUTPATIENT)
Dept: LAB | Facility: HOSPITAL | Age: 68
End: 2023-09-11
Attending: INTERNAL MEDICINE
Payer: MEDICARE

## 2023-09-11 DIAGNOSIS — D50.8 OTHER IRON DEFICIENCY ANEMIAS: ICD-10-CM

## 2023-09-11 LAB
BASOPHILS # BLD: 0.04 K/UL (ref 0.01–0.1)
BASOPHILS NFR BLD: 0.6 %
DIFFERENTIAL METHOD BLD: ABNORMAL
EOSINOPHIL # BLD: 0.82 K/UL (ref 0.04–0.36)
EOSINOPHIL NFR BLD: 12.9 %
ERYTHROCYTE [DISTWIDTH] IN BLOOD BY AUTOMATED COUNT: 15.9 % (ref 11.7–14.4)
HCT VFR BLDCO AUTO: 35.4 % (ref 35–45)
HGB BLD-MCNC: 10.7 G/DL (ref 11.8–15.7)
IMM GRANULOCYTES # BLD AUTO: 0.04 K/UL (ref 0–0.08)
IMM GRANULOCYTES NFR BLD AUTO: 0.6 %
LYMPHOCYTES # BLD: 1.01 K/UL (ref 1.2–3.5)
LYMPHOCYTES NFR BLD: 15.9 %
MCH RBC QN AUTO: 24.1 PG (ref 28–33.2)
MCHC RBC AUTO-ENTMCNC: 30.2 G/DL (ref 32.2–35.5)
MCV RBC AUTO: 79.7 FL (ref 83–98)
MONOCYTES # BLD: 0.45 K/UL (ref 0.28–0.8)
MONOCYTES NFR BLD: 7.1 %
NEUTROPHILS # BLD: 3.98 K/UL (ref 1.7–7)
NEUTROPHILS # BLD: 3.98 K/UL (ref 1.7–7)
NEUTS SEG NFR BLD: 62.9 %
NRBC BLD-RTO: 0 %
PDW BLD AUTO: 9.7 FL (ref 9.4–12.3)
PLATELET # BLD AUTO: 426 K/UL (ref 150–369)
RBC # BLD AUTO: 4.44 M/UL (ref 3.93–5.22)
WBC # BLD AUTO: 6.34 K/UL (ref 3.8–10.5)

## 2023-09-11 PROCEDURE — 85025 COMPLETE CBC W/AUTO DIFF WBC: CPT | Performed by: INTERNAL MEDICINE

## 2023-09-19 ENCOUNTER — LAB REQUISITION (OUTPATIENT)
Dept: LAB | Facility: HOSPITAL | Age: 68
End: 2023-09-19
Attending: INTERNAL MEDICINE
Payer: MEDICARE

## 2023-09-19 DIAGNOSIS — D50.8 OTHER IRON DEFICIENCY ANEMIAS: ICD-10-CM

## 2023-09-19 LAB
BASOPHILS # BLD: 0.04 K/UL (ref 0.01–0.1)
BASOPHILS NFR BLD: 0.5 %
DIFFERENTIAL METHOD BLD: ABNORMAL
EOSINOPHIL # BLD: 0.47 K/UL (ref 0.04–0.36)
EOSINOPHIL NFR BLD: 5.4 %
ERYTHROCYTE [DISTWIDTH] IN BLOOD BY AUTOMATED COUNT: 15.6 % (ref 11.7–14.4)
HCT VFR BLDCO AUTO: 36.4 % (ref 35–45)
HGB BLD-MCNC: 11.1 G/DL (ref 11.8–15.7)
IMM GRANULOCYTES # BLD AUTO: 0.07 K/UL (ref 0–0.08)
IMM GRANULOCYTES NFR BLD AUTO: 0.8 %
LYMPHOCYTES # BLD: 0.97 K/UL (ref 1.2–3.5)
LYMPHOCYTES NFR BLD: 11.1 %
MCH RBC QN AUTO: 24.3 PG (ref 28–33.2)
MCHC RBC AUTO-ENTMCNC: 30.5 G/DL (ref 32.2–35.5)
MCV RBC AUTO: 79.6 FL (ref 83–98)
MONOCYTES # BLD: 0.45 K/UL (ref 0.28–0.8)
MONOCYTES NFR BLD: 5.1 %
NEUTROPHILS # BLD: 6.76 K/UL (ref 1.7–7)
NEUTROPHILS # BLD: 6.76 K/UL (ref 1.7–7)
NEUTS SEG NFR BLD: 77.1 %
NRBC BLD-RTO: 0 %
PDW BLD AUTO: 9.5 FL (ref 9.4–12.3)
PLATELET # BLD AUTO: 376 K/UL (ref 150–369)
RBC # BLD AUTO: 4.57 M/UL (ref 3.93–5.22)
WBC # BLD AUTO: 8.76 K/UL (ref 3.8–10.5)

## 2023-09-19 PROCEDURE — 85025 COMPLETE CBC W/AUTO DIFF WBC: CPT | Performed by: INTERNAL MEDICINE

## 2023-10-13 ENCOUNTER — TELEPHONE (OUTPATIENT)
Dept: SCHEDULING | Facility: CLINIC | Age: 68
End: 2023-10-13
Payer: MEDICARE

## 2023-10-13 ENCOUNTER — TELEPHONE (OUTPATIENT)
Dept: SURGERY | Facility: CLINIC | Age: 68
End: 2023-10-13
Payer: MEDICARE

## 2023-10-13 NOTE — TELEPHONE ENCOUNTER
Patient admitted up from ED with rhino rocket in right nare and elevated in BP (see flowsheet). Mostly Bulgarian speaking,  [employer] (934130) utilized to complete admission forms and nursing assessment. Patient oriented to the room and all questions answered. Will continue to monitor.    Pt is asking if you could provide the name of a  doctor, she saw you on 8/30 appt and at the time she said her PCP is leaving & needed a  doctor.    You had told her you had a name of a doctor and she is just now calling to ask you for the name.

## 2023-10-16 ENCOUNTER — TELEPHONE (OUTPATIENT)
Dept: INTERNAL MEDICINE | Facility: CLINIC | Age: 68
End: 2023-10-16
Payer: MEDICARE

## 2023-10-16 ENCOUNTER — TRANSCRIBE ORDERS (OUTPATIENT)
Dept: SCHEDULING | Age: 68
End: 2023-10-16

## 2023-10-16 ENCOUNTER — APPOINTMENT (OUTPATIENT)
Dept: LAB | Facility: HOSPITAL | Age: 68
End: 2023-10-16
Attending: INTERNAL MEDICINE
Payer: MEDICARE

## 2023-10-16 DIAGNOSIS — E87.6 HYPOKALEMIA: ICD-10-CM

## 2023-10-16 DIAGNOSIS — E55.9 VITAMIN D DEFICIENCY: Primary | ICD-10-CM

## 2023-10-16 DIAGNOSIS — E53.8 VITAMIN B 12 DEFICIENCY: ICD-10-CM

## 2023-10-16 DIAGNOSIS — D50.8 OTHER IRON DEFICIENCY ANEMIAS: ICD-10-CM

## 2023-10-16 DIAGNOSIS — D50.8 OTHER IRON DEFICIENCY ANEMIAS: Primary | ICD-10-CM

## 2023-10-16 DIAGNOSIS — R53.83 FATIGUE, UNSPECIFIED TYPE: ICD-10-CM

## 2023-10-16 DIAGNOSIS — E55.9 VITAMIN D DEFICIENCY: ICD-10-CM

## 2023-10-16 DIAGNOSIS — E61.1 IRON DEFICIENCY: ICD-10-CM

## 2023-10-16 LAB
25(OH)D3 SERPL-MCNC: 22 NG/ML (ref 30–100)
ANION GAP SERPL CALC-SCNC: 9 MEQ/L (ref 3–15)
BASOPHILS # BLD: 0.04 K/UL (ref 0.01–0.1)
BASOPHILS NFR BLD: 0.7 %
BUN SERPL-MCNC: 19 MG/DL (ref 7–25)
CALCIUM SERPL-MCNC: 10.7 MG/DL (ref 8.6–10.3)
CHLORIDE SERPL-SCNC: 105 MEQ/L (ref 98–107)
CO2 SERPL-SCNC: 23 MEQ/L (ref 21–31)
CREAT SERPL-MCNC: 0.7 MG/DL (ref 0.6–1.2)
DIFFERENTIAL METHOD BLD: ABNORMAL
EOSINOPHIL # BLD: 0.42 K/UL (ref 0.04–0.36)
EOSINOPHIL NFR BLD: 7.3 %
ERYTHROCYTE [DISTWIDTH] IN BLOOD BY AUTOMATED COUNT: 15.2 % (ref 11.7–14.4)
FERRITIN SERPL-MCNC: 61 NG/ML (ref 11–250)
GFR SERPL CREATININE-BSD FRML MDRD: >60 ML/MIN/1.73M*2
GLUCOSE SERPL-MCNC: 114 MG/DL (ref 70–99)
HCT VFR BLDCO AUTO: 40.4 % (ref 35–45)
HGB BLD-MCNC: 12.7 G/DL (ref 11.8–15.7)
IMM GRANULOCYTES # BLD AUTO: 0.05 K/UL (ref 0–0.08)
IMM GRANULOCYTES NFR BLD AUTO: 0.9 %
IRON SATN MFR SERPL: 13 % (ref 15–45)
IRON SERPL-MCNC: 56 UG/DL (ref 35–150)
LYMPHOCYTES # BLD: 1 K/UL (ref 1.2–3.5)
LYMPHOCYTES NFR BLD: 17.4 %
MAGNESIUM SERPL-MCNC: 1.7 MG/DL (ref 1.8–2.5)
MCH RBC QN AUTO: 24.4 PG (ref 28–33.2)
MCHC RBC AUTO-ENTMCNC: 31.4 G/DL (ref 32.2–35.5)
MCV RBC AUTO: 77.5 FL (ref 83–98)
MONOCYTES # BLD: 0.43 K/UL (ref 0.28–0.8)
MONOCYTES NFR BLD: 7.5 %
NEUTROPHILS # BLD: 3.82 K/UL (ref 1.7–7)
NEUTROPHILS # BLD: 3.82 K/UL (ref 1.7–7)
NEUTS SEG NFR BLD: 66.2 %
NRBC BLD-RTO: 0 %
PDW BLD AUTO: 9.1 FL (ref 9.4–12.3)
PLATELET # BLD AUTO: 374 K/UL (ref 150–369)
POTASSIUM SERPL-SCNC: 4.2 MEQ/L (ref 3.5–5.1)
RBC # BLD AUTO: 5.21 M/UL (ref 3.93–5.22)
SODIUM SERPL-SCNC: 137 MEQ/L (ref 136–145)
TIBC SERPL-MCNC: 433 UG/DL (ref 270–460)
TSH SERPL DL<=0.05 MIU/L-ACNC: 2.14 MIU/L (ref 0.34–5.6)
UIBC SERPL-MCNC: 377 UG/DL (ref 180–360)
VIT B12 SERPL-MCNC: 653 PG/ML (ref 180–914)
WBC # BLD AUTO: 5.76 K/UL (ref 3.8–10.5)

## 2023-10-16 PROCEDURE — 36415 COLL VENOUS BLD VENIPUNCTURE: CPT

## 2023-10-16 PROCEDURE — 84443 ASSAY THYROID STIM HORMONE: CPT

## 2023-10-16 PROCEDURE — 80048 BASIC METABOLIC PNL TOTAL CA: CPT

## 2023-10-16 PROCEDURE — 83540 ASSAY OF IRON: CPT

## 2023-10-16 PROCEDURE — 85025 COMPLETE CBC W/AUTO DIFF WBC: CPT

## 2023-10-16 PROCEDURE — 82306 VITAMIN D 25 HYDROXY: CPT

## 2023-10-16 PROCEDURE — 83735 ASSAY OF MAGNESIUM: CPT

## 2023-10-16 PROCEDURE — 82607 VITAMIN B-12: CPT

## 2023-10-16 PROCEDURE — 84207 ASSAY OF VITAMIN B-6: CPT | Mod: GZ

## 2023-10-16 PROCEDURE — 82728 ASSAY OF FERRITIN: CPT

## 2023-10-16 PROCEDURE — 83550 IRON BINDING TEST: CPT

## 2023-10-16 NOTE — TELEPHONE ENCOUNTER
Pt calling ; states she had abnormal blood work in April and  wanted her to repeat  Vitamin D  Vitamin B6 & B12  Potassium  Pt requesting order to be sent to patient portal  Pt has an appointment at 11:30 today

## 2023-10-20 ENCOUNTER — TELEPHONE (OUTPATIENT)
Dept: INTERNAL MEDICINE | Facility: CLINIC | Age: 68
End: 2023-10-20

## 2023-10-20 LAB — PYRIDOXAL PHOS SERPL-MCNC: 5.1 NG/ML (ref 2.1–21.7)

## 2023-10-21 NOTE — TELEPHONE ENCOUNTER
Please let Ms Ramirez know that her calcium is 10.7 and vitamin D 21. I would like her to increase her vitamin D to two capsules daily ( 2000 IU) and stop her calcium supplementation.   Repeat calcium in two weeks

## 2023-10-23 DIAGNOSIS — E83.52 SERUM CALCIUM ELEVATED: Primary | ICD-10-CM

## 2023-10-23 NOTE — TELEPHONE ENCOUNTER
Notified pt that her calcium is 10.7 and Vitamin D 21.  Advised her to increase her vitamin D to two capsules daily and stop taking Calcium supplements.  Pt stated that she does not take any calcium supplements. Instructed pt keep hydrating and repeat calcium in 2 weeks.  Labs mailed to her home address.    [Eyesight Problems] : eyesight problems [Shortness Of Breath] : shortness of breath [Dry Eyes] : dryness of the eyes [Pain during urination] : pain during urination [Urine Infection (bladder/kidney)] : bladder/kidney infection [Negative] : Heme/Lymph

## 2023-10-25 ENCOUNTER — LAB REQUISITION (OUTPATIENT)
Dept: LAB | Facility: HOSPITAL | Age: 68
End: 2023-10-25
Attending: INTERNAL MEDICINE
Payer: MEDICARE

## 2023-10-25 DIAGNOSIS — E61.2 MAGNESIUM DEFICIENCY: ICD-10-CM

## 2023-10-25 DIAGNOSIS — D50.8 OTHER IRON DEFICIENCY ANEMIAS: ICD-10-CM

## 2023-10-25 DIAGNOSIS — F90.9 ATTENTION-DEFICIT HYPERACTIVITY DISORDER, UNSPECIFIED TYPE: ICD-10-CM

## 2023-10-25 LAB
ALBUMIN SERPL-MCNC: 4.7 G/DL (ref 3.5–5.7)
ALP SERPL-CCNC: 80 IU/L (ref 34–125)
ALT SERPL-CCNC: 19 IU/L (ref 7–52)
ANION GAP SERPL CALC-SCNC: 6 MEQ/L (ref 3–15)
AST SERPL-CCNC: 18 IU/L (ref 13–39)
BASOPHILS # BLD: 0.04 K/UL (ref 0.01–0.1)
BASOPHILS NFR BLD: 0.6 %
BILIRUB SERPL-MCNC: 0.4 MG/DL (ref 0.3–1.2)
BUN SERPL-MCNC: 17 MG/DL (ref 7–25)
CALCIUM SERPL-MCNC: 10.2 MG/DL (ref 8.6–10.3)
CHLORIDE SERPL-SCNC: 101 MEQ/L (ref 98–107)
CO2 SERPL-SCNC: 31 MEQ/L (ref 21–31)
CREAT SERPL-MCNC: 0.6 MG/DL (ref 0.6–1.2)
DIFFERENTIAL METHOD BLD: ABNORMAL
EOSINOPHIL # BLD: 0.35 K/UL (ref 0.04–0.36)
EOSINOPHIL NFR BLD: 5.5 %
ERYTHROCYTE [DISTWIDTH] IN BLOOD BY AUTOMATED COUNT: 14.7 % (ref 11.7–14.4)
GFR SERPL CREATININE-BSD FRML MDRD: >60 ML/MIN/1.73M*2
GLUCOSE SERPL-MCNC: 111 MG/DL (ref 70–99)
HCT VFR BLDCO AUTO: 37.3 % (ref 35–45)
HGB BLD-MCNC: 11.6 G/DL (ref 11.8–15.7)
IMM GRANULOCYTES # BLD AUTO: 0.03 K/UL (ref 0–0.08)
IMM GRANULOCYTES NFR BLD AUTO: 0.5 %
LYMPHOCYTES # BLD: 0.9 K/UL (ref 1.2–3.5)
LYMPHOCYTES NFR BLD: 14.2 %
MCH RBC QN AUTO: 23.8 PG (ref 28–33.2)
MCHC RBC AUTO-ENTMCNC: 31.1 G/DL (ref 32.2–35.5)
MCV RBC AUTO: 76.6 FL (ref 83–98)
MONOCYTES # BLD: 0.52 K/UL (ref 0.28–0.8)
MONOCYTES NFR BLD: 8.2 %
NEUTROPHILS # BLD: 4.48 K/UL (ref 1.7–7)
NEUTROPHILS # BLD: 4.48 K/UL (ref 1.7–7)
NEUTS SEG NFR BLD: 71 %
NRBC BLD-RTO: 0 %
PDW BLD AUTO: 9.8 FL (ref 9.4–12.3)
PLATELET # BLD AUTO: 323 K/UL (ref 150–369)
POTASSIUM SERPL-SCNC: 4 MEQ/L (ref 3.5–5.1)
PROT SERPL-MCNC: 6.4 G/DL (ref 6–8.2)
RBC # BLD AUTO: 4.87 M/UL (ref 3.93–5.22)
SODIUM SERPL-SCNC: 138 MEQ/L (ref 136–145)
WBC # BLD AUTO: 6.32 K/UL (ref 3.8–10.5)

## 2023-10-25 PROCEDURE — 83735 ASSAY OF MAGNESIUM: CPT | Performed by: INTERNAL MEDICINE

## 2023-10-25 PROCEDURE — 80053 COMPREHEN METABOLIC PANEL: CPT | Performed by: INTERNAL MEDICINE

## 2023-10-25 PROCEDURE — 85025 COMPLETE CBC W/AUTO DIFF WBC: CPT | Performed by: INTERNAL MEDICINE

## 2023-10-26 LAB — MAGNESIUM SERPL-MCNC: 1.8 MG/DL (ref 1.8–2.5)

## 2023-11-08 ENCOUNTER — OFFICE VISIT (OUTPATIENT)
Dept: CARDIOLOGY | Facility: CLINIC | Age: 68
End: 2023-11-08
Payer: MEDICARE

## 2023-11-08 VITALS
DIASTOLIC BLOOD PRESSURE: 78 MMHG | HEIGHT: 61 IN | SYSTOLIC BLOOD PRESSURE: 118 MMHG | OXYGEN SATURATION: 98 % | RESPIRATION RATE: 18 BRPM | BODY MASS INDEX: 22.43 KG/M2 | WEIGHT: 118.8 LBS | HEART RATE: 55 BPM

## 2023-11-08 DIAGNOSIS — D50.0 IRON DEFICIENCY ANEMIA DUE TO CHRONIC BLOOD LOSS: ICD-10-CM

## 2023-11-08 DIAGNOSIS — I10 PRIMARY HYPERTENSION: ICD-10-CM

## 2023-11-08 DIAGNOSIS — E78.5 DYSLIPIDEMIA: ICD-10-CM

## 2023-11-08 DIAGNOSIS — D47.02 SYSTEMIC MASTOCYTOSIS: Chronic | ICD-10-CM

## 2023-11-08 DIAGNOSIS — E87.6 HYPOKALEMIA: Chronic | ICD-10-CM

## 2023-11-08 DIAGNOSIS — I49.1 PAC (PREMATURE ATRIAL CONTRACTION): ICD-10-CM

## 2023-11-08 DIAGNOSIS — R00.1 SINUS BRADYCARDIA: ICD-10-CM

## 2023-11-08 DIAGNOSIS — R93.1 ELEVATED CORONARY ARTERY CALCIUM SCORE: Primary | ICD-10-CM

## 2023-11-08 PROCEDURE — G8752 SYS BP LESS 140: HCPCS | Performed by: INTERNAL MEDICINE

## 2023-11-08 PROCEDURE — 99213 OFFICE O/P EST LOW 20 MIN: CPT | Performed by: INTERNAL MEDICINE

## 2023-11-08 PROCEDURE — G8754 DIAS BP LESS 90: HCPCS | Performed by: INTERNAL MEDICINE

## 2023-11-08 PROCEDURE — 93000 ELECTROCARDIOGRAM COMPLETE: CPT | Performed by: INTERNAL MEDICINE

## 2023-11-08 RX ORDER — LANOLIN ALCOHOL/MO/W.PET/CERES
400 CREAM (GRAM) TOPICAL DAILY
COMMUNITY

## 2023-11-08 ASSESSMENT — ENCOUNTER SYMPTOMS
CLAUDICATION: 0
DYSPNEA ON EXERTION: 0
ALTERED MENTAL STATUS: 0
LIGHT-HEADEDNESS: 0
ADENOPATHY: 0
SYNCOPE: 0
WEIGHT GAIN: 0
PALPITATIONS: 0
MUSCLE CRAMPS: 0
HEARTBURN: 0
COLOR CHANGE: 0
SHORTNESS OF BREATH: 0
DIAPHORESIS: 0
COUGH: 0
WEIGHT LOSS: 0
BLURRED VISION: 0
ORTHOPNEA: 0

## 2023-11-08 NOTE — ASSESSMENT & PLAN NOTE
She appears to be doing well since her potassium is better.  She will contact me with any symptoms of concern.  She will stay on magnesium.

## 2023-11-08 NOTE — LETTER
2023     Ashley Omalley MD  [address] [facility] [address]    Patient: Ritu Ramirez  YOB: 1955  Date of Visit: 2023      Dear Dr. Sirisha Omalley:    Thank you for referring Ritu Ramirez to me for evaluation. Below are my notes for this consultation.    If you have questions, please do not hesitate to call me. I look forward to following your patient along with you.         Sincerely,        Bright Selby MD        CC: No Recipients    Bright Selby MD  2023  1:47 PM  Signed     Cardiology Note       Reason for visit:   Chief Complaint   Patient presents with    Elevated coronary artery calcium score      HPI   Ritu Ramirez is a 68 y.o. female who presents for scheduled follow-up.    Last saw her in .  I took her off hydrochlorothiazide and placed her on spironolactone.  She feels much better.  Indeed her potassium came up.  She exercise by walking lifting weights.  She denies cardiac symptoms now of chest pain, shortness breath, palpitations, lightheadedness or syncope.      Past Medical History:   Diagnosis Date    Anemia     Anxiety     Hypertension     Iron deficiency     Lipid disorder     Systemic mastocytosis     Vitamin D deficiency      Past Surgical History:   Procedure Laterality Date    AUGMENTATION MAMMAPLASTY Bilateral     COLONOSCOPY      PATELLA SURGERY      REDUCTION MAMMAPLASTY       Social History     Socioeconomic History    Marital status: Single     Spouse name: None    Number of children: None    Years of education: None    Highest education level: None   Tobacco Use    Smoking status: Former     Packs/day: .25     Types: Cigarettes     Quit date:      Years since quittin.8    Smokeless tobacco: Never   Vaping Use    Vaping Use: Never used   Substance and Sexual Activity    Alcohol use: Yes     Alcohol/week: 1.0 standard drink of alcohol     Types: 1 Glasses of wine per week     Comment:  Social    Drug use: No    Sexual activity: Defer     Social Determinants of Health     Food Insecurity: No Food Insecurity (12/23/2021)    Hunger Vital Sign     Worried About Running Out of Food in the Last Year: Never true     Ran Out of Food in the Last Year: Never true     Family History   Problem Relation Age of Onset    Heart disease Biological Mother     Prostate cancer Biological Father      Anesthetics - amide type - select amino amides, Iodinated contrast media, Penicillins, Clarithromycin, Erythromycin base, and Nsaids (non-steroidal anti-inflammatory drug)  Current Outpatient Medications   Medication Sig Dispense Refill    bisacodyL (DULCOLAX) 5 mg EC tablet Take 5 mg by mouth daily.      cholecalciferol, vitamin D3, 1,000 unit capsule Take 1,000 Units by mouth daily.        dextroamphetamine-amphetamine (ADDERALL) 30 mg tablet Take 30 mg by mouth daily.      escitalopram (LEXAPRO) 10 mg tablet TAKE 1 TABLET BY MOUTH EVERY DAY 90 tablet 3    magnesium oxide (MAG-OX) 400 mg (241.3 mg magnesium) tablet Take 400 mg by mouth daily.      rosuvastatin (CRESTOR) 10 mg tablet Take 1 tablet (10 mg total) by mouth daily. 90 tablet 3    spironolactone (ALDACTONE) 25 mg tablet Take 1 tablet (25 mg total) by mouth daily. 90 tablet 3    biotin 1 mg capsule Take 1 capsule by mouth daily. Dose unknown        calcium carbonate (CALCIUM 500 ORAL) Take 500 mg by mouth daily. Dose unknown         No current facility-administered medications for this visit.       Review of Systems   Constitutional: Positive for malaise/fatigue. Negative for diaphoresis, weight gain and weight loss.   HENT: Negative for nosebleeds.    Eyes: Negative for blurred vision.   Cardiovascular: Negative for chest pain, claudication, dyspnea on exertion, leg swelling, orthopnea, palpitations and syncope.   Respiratory: Negative for cough and shortness of breath.    Hematologic/Lymphatic: Negative for adenopathy.   Skin: Negative for  "color change.   Musculoskeletal: Negative for muscle cramps.   Gastrointestinal: Negative for heartburn.   Genitourinary: Negative for nocturia.   Neurological: Negative for light-headedness.   Psychiatric/Behavioral: Negative for altered mental status.     Objective   Vitals:    11/08/23 1324   BP: 118/78   BP Location: Left upper arm   Patient Position: Sitting   Pulse: (!) 55   Resp: 18   SpO2: 98%   Weight: 53.9 kg (118 lb 12.8 oz)   Height: 1.549 m (5' 1\")     Weight: 53.9 kg (118 lb 12.8 oz)     Physical Exam:    General Appearance:  Alert, no distress   Head:  Normocephalic, without obvious abnormality, atraumatic   Eyes:  Conjunctiva/corneas clear, EOM's intact   Neck: No thyroid enlargement. No JVD. No bruits    Lungs:   Clear to auscultation bilaterally, respirations unlabored, no rales, no wheezing   Heart:  Regular rhythm, S1 and S2 normal, no murmur, rub or gallop   Abdomen:   Soft, non-tender, no masses, no organomegaly   Vascular: Pulses 2+ and symmetric all extremities, no carotid bruit or jugular vein distention   Musculoskeletal:  Skin: No injury or deformity  Skin color, texture, turgor normal, no rashes or lesions, no cyanosis or edema   Extremities: Extremities normal, atraumatic, pulses normal   Behavior/Emotional: Appropriate, cooperative       Lab Results   Component Value Date    WBC 6.32 10/25/2023    HGB 11.6 (L) 10/25/2023     10/25/2023    CHOL 150 02/07/2022    TRIG 85 02/07/2022    HDL 71 02/07/2022    LDLCALC 62 02/07/2022    ALT 19 10/25/2023    AST 18 10/25/2023     10/25/2023    K 4.0 10/25/2023    CREATININE 0.6 10/25/2023    TSH 2.14 10/16/2023    INR 1.0 05/07/2021    HGBA1C 4.7 (L) 08/11/2020          ECG   sinus bradycardia  LAFB  PRWP     ASSESSMENT/PLAN    Problem List Items Addressed This Visit        High    Primary hypertension    Overview     2017 echocardiogram: Normal structural heart             Current Assessment & Plan     Her blood pressure is " excellent on bilateral lactone.         PAC (premature atrial contraction)    Overview     5/2023 [employer] layo: Multiple recordings with frequent PACs sometimes in a trigeminal pattern.  No evidence for atrial fibrillation.    6/2023 7 day [employer]: Potassium 3.0. 1 run Vtach 7 beats. 15 episodes SVT lasting up to 15 seconds at HR  bpm. Symptoms correlate with SVT.    Palpitations improved since potassium better.         Current Assessment & Plan     She appears to be doing well since her potassium is better.  She will contact me with any symptoms of concern.  She will stay on magnesium.         Elevated coronary artery calcium score - Primary    Overview     6/2019 score: 128 (90th%)  2/12/2020 SE--11:16, 92%mPHR, negative         Current Assessment & Plan     She has no symptoms of angina.  We will follow her on statin therapy.  She does contact me with symptoms of concern.         Relevant Orders    ECG 12 LEAD-OFFICE PERFORMED (Completed)       Medium    Systemic mastocytosis (Chronic)    Overview     NIH         Hypokalemia (Chronic)    Overview     Potassium 2.3 on HCTZ         Current Assessment & Plan     Her potassium is better on spironolactone.         Dyslipidemia    Current Assessment & Plan     Her last LDL was 62 on rosuvastatin and efforts at a heart healthy diet.            Low    Sinus bradycardia    Overview     She has the doouba Layo.         Current Assessment & Plan     Her heart rate is acceptable today.  She has no symptoms of bradycardia.         Iron deficiency anemia    Overview     Secondary to hemorrhoidal bleeding             Return in about 1 year (around 11/8/2024).    Bright Selby MD  11/8/2023

## 2023-11-08 NOTE — ASSESSMENT & PLAN NOTE
She has no symptoms of angina.  We will follow her on statin therapy.  She does contact me with symptoms of concern.

## 2023-11-08 NOTE — PROGRESS NOTES
Cardiology Note       Reason for visit:   Chief Complaint   Patient presents with    Elevated coronary artery calcium score      HPI   Ritu Ramirez is a 68 y.o. female who presents for scheduled follow-up.    Last saw her in .  I took her off hydrochlorothiazide and placed her on spironolactone.  She feels much better.  Indeed her potassium came up.  She exercise by walking lifting weights.  She denies cardiac symptoms now of chest pain, shortness breath, palpitations, lightheadedness or syncope.      Past Medical History:   Diagnosis Date    Anemia     Anxiety     Hypertension     Iron deficiency     Lipid disorder     Systemic mastocytosis     Vitamin D deficiency      Past Surgical History:   Procedure Laterality Date    AUGMENTATION MAMMAPLASTY Bilateral     COLONOSCOPY      PATELLA SURGERY      REDUCTION MAMMAPLASTY       Social History     Socioeconomic History    Marital status: Single     Spouse name: None    Number of children: None    Years of education: None    Highest education level: None   Tobacco Use    Smoking status: Former     Packs/day: .25     Types: Cigarettes     Quit date:      Years since quittin.8    Smokeless tobacco: Never   Vaping Use    Vaping Use: Never used   Substance and Sexual Activity    Alcohol use: Yes     Alcohol/week: 1.0 standard drink of alcohol     Types: 1 Glasses of wine per week     Comment: Social    Drug use: No    Sexual activity: Defer     Social Determinants of Health     Food Insecurity: No Food Insecurity (2021)    Hunger Vital Sign     Worried About Running Out of Food in the Last Year: Never true     Ran Out of Food in the Last Year: Never true     Family History   Problem Relation Age of Onset    Heart disease Biological Mother     Prostate cancer Biological Father      Anesthetics - amide type - select amino amides, Iodinated contrast media, Penicillins, Clarithromycin, Erythromycin base, and Nsaids (non-steroidal  "anti-inflammatory drug)  Current Outpatient Medications   Medication Sig Dispense Refill    bisacodyL (DULCOLAX) 5 mg EC tablet Take 5 mg by mouth daily.      cholecalciferol, vitamin D3, 1,000 unit capsule Take 1,000 Units by mouth daily.        dextroamphetamine-amphetamine (ADDERALL) 30 mg tablet Take 30 mg by mouth daily.      escitalopram (LEXAPRO) 10 mg tablet TAKE 1 TABLET BY MOUTH EVERY DAY 90 tablet 3    magnesium oxide (MAG-OX) 400 mg (241.3 mg magnesium) tablet Take 400 mg by mouth daily.      rosuvastatin (CRESTOR) 10 mg tablet Take 1 tablet (10 mg total) by mouth daily. 90 tablet 3    spironolactone (ALDACTONE) 25 mg tablet Take 1 tablet (25 mg total) by mouth daily. 90 tablet 3    biotin 1 mg capsule Take 1 capsule by mouth daily. Dose unknown        calcium carbonate (CALCIUM 500 ORAL) Take 500 mg by mouth daily. Dose unknown         No current facility-administered medications for this visit.       Review of Systems   Constitutional: Positive for malaise/fatigue. Negative for diaphoresis, weight gain and weight loss.   HENT: Negative for nosebleeds.    Eyes: Negative for blurred vision.   Cardiovascular: Negative for chest pain, claudication, dyspnea on exertion, leg swelling, orthopnea, palpitations and syncope.   Respiratory: Negative for cough and shortness of breath.    Hematologic/Lymphatic: Negative for adenopathy.   Skin: Negative for color change.   Musculoskeletal: Negative for muscle cramps.   Gastrointestinal: Negative for heartburn.   Genitourinary: Negative for nocturia.   Neurological: Negative for light-headedness.   Psychiatric/Behavioral: Negative for altered mental status.     Objective   Vitals:    11/08/23 1324   BP: 118/78   BP Location: Left upper arm   Patient Position: Sitting   Pulse: (!) 55   Resp: 18   SpO2: 98%   Weight: 53.9 kg (118 lb 12.8 oz)   Height: 1.549 m (5' 1\")     Weight: 53.9 kg (118 lb 12.8 oz)     Physical Exam:    General Appearance:  Alert, no " distress   Head:  Normocephalic, without obvious abnormality, atraumatic   Eyes:  Conjunctiva/corneas clear, EOM's intact   Neck: No thyroid enlargement. No JVD. No bruits    Lungs:   Clear to auscultation bilaterally, respirations unlabored, no rales, no wheezing   Heart:  Regular rhythm, S1 and S2 normal, no murmur, rub or gallop   Abdomen:   Soft, non-tender, no masses, no organomegaly   Vascular: Pulses 2+ and symmetric all extremities, no carotid bruit or jugular vein distention   Musculoskeletal:  Skin: No injury or deformity  Skin color, texture, turgor normal, no rashes or lesions, no cyanosis or edema   Extremities: Extremities normal, atraumatic, pulses normal   Behavior/Emotional: Appropriate, cooperative       Lab Results   Component Value Date    WBC 6.32 10/25/2023    HGB 11.6 (L) 10/25/2023     10/25/2023    CHOL 150 02/07/2022    TRIG 85 02/07/2022    HDL 71 02/07/2022    LDLCALC 62 02/07/2022    ALT 19 10/25/2023    AST 18 10/25/2023     10/25/2023    K 4.0 10/25/2023    CREATININE 0.6 10/25/2023    TSH 2.14 10/16/2023    INR 1.0 05/07/2021    HGBA1C 4.7 (L) 08/11/2020          ECG   sinus bradycardia  LAFB  PRWP     ASSESSMENT/PLAN    Problem List Items Addressed This Visit        High    Primary hypertension    Overview     2017 echocardiogram: Normal structural heart             Current Assessment & Plan     Her blood pressure is excellent on bilateral lactone.         PAC (premature atrial contraction)    Overview     5/2023 [facility] jeff: Multiple recordings with frequent PACs sometimes in a trigeminal pattern.  No evidence for atrial fibrillation.    6/2023 7 day [facility]: Potassium 3.0. 1 run Vtach 7 beats. 15 episodes SVT lasting up to 15 seconds at HR  bpm. Symptoms correlate with SVT.    Palpitations improved since potassium better.         Current Assessment & Plan     She appears to be doing well since her potassium is better.  She will contact me with any symptoms of concern.   She will stay on magnesium.         Elevated coronary artery calcium score - Primary    Overview     6/2019 score: 128 (90th%)  2/12/2020 SE--11:16, 92%mPHR, negative         Current Assessment & Plan     She has no symptoms of angina.  We will follow her on statin therapy.  She does contact me with symptoms of concern.         Relevant Orders    ECG 12 LEAD-OFFICE PERFORMED (Completed)       Medium    Systemic mastocytosis (Chronic)    Overview     NIH         Hypokalemia (Chronic)    Overview     Potassium 2.3 on HCTZ         Current Assessment & Plan     Her potassium is better on spironolactone.         Dyslipidemia    Current Assessment & Plan     Her last LDL was 62 on rosuvastatin and efforts at a heart healthy diet.            Low    Sinus bradycardia    Overview     She has the My Point...Exactly Layo.         Current Assessment & Plan     Her heart rate is acceptable today.  She has no symptoms of bradycardia.         Iron deficiency anemia    Overview     Secondary to hemorrhoidal bleeding             Return in about 1 year (around 11/8/2024).    Bright Selby MD  11/8/2023

## 2023-11-13 ENCOUNTER — LAB REQUISITION (OUTPATIENT)
Dept: LAB | Facility: HOSPITAL | Age: 68
End: 2023-11-13
Attending: INTERNAL MEDICINE
Payer: MEDICARE

## 2023-11-13 DIAGNOSIS — M81.6 LOCALIZED OSTEOPOROSIS (LEQUESNE): ICD-10-CM

## 2023-11-13 LAB
BASOPHILS # BLD: 0.03 K/UL (ref 0.01–0.1)
BASOPHILS NFR BLD: 0.4 %
DIFFERENTIAL METHOD BLD: ABNORMAL
EOSINOPHIL # BLD: 0.27 K/UL (ref 0.04–0.36)
EOSINOPHIL NFR BLD: 3.9 %
ERYTHROCYTE [DISTWIDTH] IN BLOOD BY AUTOMATED COUNT: 16.4 % (ref 11.7–14.4)
HCT VFR BLDCO AUTO: 43.5 % (ref 35–45)
HGB BLD-MCNC: 13.9 G/DL (ref 11.8–15.7)
IMM GRANULOCYTES # BLD AUTO: 0.04 K/UL (ref 0–0.08)
IMM GRANULOCYTES NFR BLD AUTO: 0.6 %
LYMPHOCYTES # BLD: 0.92 K/UL (ref 1.2–3.5)
LYMPHOCYTES NFR BLD: 13.4 %
MCH RBC QN AUTO: 24.4 PG (ref 28–33.2)
MCHC RBC AUTO-ENTMCNC: 32 G/DL (ref 32.2–35.5)
MCV RBC AUTO: 76.4 FL (ref 83–98)
MONOCYTES # BLD: 0.51 K/UL (ref 0.28–0.8)
MONOCYTES NFR BLD: 7.4 %
NEUTROPHILS # BLD: 5.1 K/UL (ref 1.7–7)
NEUTROPHILS # BLD: 5.1 K/UL (ref 1.7–7)
NEUTS SEG NFR BLD: 74.3 %
NRBC BLD-RTO: 0 %
PDW BLD AUTO: 9.1 FL (ref 9.4–12.3)
PHOSPHATE SERPL-MCNC: 1.6 MG/DL (ref 2.4–4.7)
PLATELET # BLD AUTO: 349 K/UL (ref 150–369)
RBC # BLD AUTO: 5.69 M/UL (ref 3.93–5.22)
WBC # BLD AUTO: 6.87 K/UL (ref 3.8–10.5)

## 2023-11-13 PROCEDURE — 83088 ASSAY OF HISTAMINE: CPT | Performed by: INTERNAL MEDICINE

## 2023-11-13 PROCEDURE — 83520 IMMUNOASSAY QUANT NOS NONAB: CPT | Performed by: INTERNAL MEDICINE

## 2023-11-13 PROCEDURE — 85025 COMPLETE CBC W/AUTO DIFF WBC: CPT | Mod: GZ | Performed by: INTERNAL MEDICINE

## 2023-11-13 PROCEDURE — 84100 ASSAY OF PHOSPHORUS: CPT | Performed by: INTERNAL MEDICINE

## 2023-11-15 ENCOUNTER — TRANSCRIBE ORDERS (OUTPATIENT)
Dept: SCHEDULING | Age: 68
End: 2023-11-15

## 2023-11-15 DIAGNOSIS — K82.4 CHOLESTEROLOSIS OF GALLBLADDER: Primary | ICD-10-CM

## 2023-11-17 LAB — HISTAMINE SERPL-MCNC: <1.5 NG/ML

## 2023-11-18 LAB — TRYPTASE SERPL-MCNC: 35.9 MCG/L

## 2023-11-29 ENCOUNTER — APPOINTMENT (OUTPATIENT)
Dept: LAB | Facility: HOSPITAL | Age: 68
End: 2023-11-29
Attending: INTERNAL MEDICINE
Payer: MEDICARE

## 2023-11-29 ENCOUNTER — TRANSCRIBE ORDERS (OUTPATIENT)
Dept: SCHEDULING | Age: 68
End: 2023-11-29

## 2023-11-29 DIAGNOSIS — D50.8 OTHER IRON DEFICIENCY ANEMIAS: Primary | ICD-10-CM

## 2023-11-29 DIAGNOSIS — D50.8 OTHER IRON DEFICIENCY ANEMIAS: ICD-10-CM

## 2023-11-29 DIAGNOSIS — Q82.2 CONGENITAL CUTANEOUS MASTOCYTOSIS: ICD-10-CM

## 2023-11-29 LAB
ALBUMIN SERPL-MCNC: 4.8 G/DL (ref 3.5–5.7)
ALP SERPL-CCNC: 147 IU/L (ref 34–125)
ALT SERPL-CCNC: 35 IU/L (ref 7–52)
ANION GAP SERPL CALC-SCNC: 7 MEQ/L (ref 3–15)
AST SERPL-CCNC: 20 IU/L (ref 13–39)
BASOPHILS # BLD: 0.03 K/UL (ref 0.01–0.1)
BASOPHILS NFR BLD: 0.3 %
BILIRUB SERPL-MCNC: 0.4 MG/DL (ref 0.3–1.2)
BUN SERPL-MCNC: 18 MG/DL (ref 7–25)
CALCIUM SERPL-MCNC: 9.4 MG/DL (ref 8.6–10.3)
CHLORIDE SERPL-SCNC: 106 MEQ/L (ref 98–107)
CO2 SERPL-SCNC: 25 MEQ/L (ref 21–31)
CREAT SERPL-MCNC: 0.5 MG/DL (ref 0.6–1.2)
DIFFERENTIAL METHOD BLD: ABNORMAL
EOSINOPHIL # BLD: 0.43 K/UL (ref 0.04–0.36)
EOSINOPHIL NFR BLD: 4.6 %
ERYTHROCYTE [DISTWIDTH] IN BLOOD BY AUTOMATED COUNT: 19.1 % (ref 11.7–14.4)
GFR SERPL CREATININE-BSD FRML MDRD: >60 ML/MIN/1.73M*2
GLUCOSE SERPL-MCNC: 97 MG/DL (ref 70–99)
HCT VFR BLDCO AUTO: 42.2 % (ref 35–45)
HGB BLD-MCNC: 13.3 G/DL (ref 11.8–15.7)
IMM GRANULOCYTES # BLD AUTO: 0.05 K/UL (ref 0–0.08)
IMM GRANULOCYTES NFR BLD AUTO: 0.5 %
LYMPHOCYTES # BLD: 0.95 K/UL (ref 1.2–3.5)
LYMPHOCYTES NFR BLD: 10.1 %
MCH RBC QN AUTO: 24.8 PG (ref 28–33.2)
MCHC RBC AUTO-ENTMCNC: 31.5 G/DL (ref 32.2–35.5)
MCV RBC AUTO: 78.6 FL (ref 83–98)
MONOCYTES # BLD: 0.7 K/UL (ref 0.28–0.8)
MONOCYTES NFR BLD: 7.5 %
NEUTROPHILS # BLD: 7.22 K/UL (ref 1.7–7)
NEUTROPHILS # BLD: 7.22 K/UL (ref 1.7–7)
NEUTS SEG NFR BLD: 77 %
NRBC BLD-RTO: 0 %
PDW BLD AUTO: 9.1 FL (ref 9.4–12.3)
PHOSPHATE SERPL-MCNC: 1.9 MG/DL (ref 2.4–4.7)
PLATELET # BLD AUTO: 323 K/UL (ref 150–369)
POTASSIUM SERPL-SCNC: 3.8 MEQ/L (ref 3.5–5.1)
PROT SERPL-MCNC: 6.9 G/DL (ref 6–8.2)
RBC # BLD AUTO: 5.37 M/UL (ref 3.93–5.22)
SODIUM SERPL-SCNC: 138 MEQ/L (ref 136–145)
WBC # BLD AUTO: 9.38 K/UL (ref 3.8–10.5)

## 2023-11-29 PROCEDURE — 36415 COLL VENOUS BLD VENIPUNCTURE: CPT

## 2023-11-29 PROCEDURE — 83540 ASSAY OF IRON: CPT

## 2023-11-29 PROCEDURE — 80053 COMPREHEN METABOLIC PANEL: CPT

## 2023-11-29 PROCEDURE — 82728 ASSAY OF FERRITIN: CPT

## 2023-11-29 PROCEDURE — 84100 ASSAY OF PHOSPHORUS: CPT

## 2023-11-29 PROCEDURE — 85025 COMPLETE CBC W/AUTO DIFF WBC: CPT

## 2023-11-30 ENCOUNTER — TRANSCRIBE ORDERS (OUTPATIENT)
Dept: LAB | Facility: HOSPITAL | Age: 68
End: 2023-11-30

## 2023-11-30 ENCOUNTER — APPOINTMENT (OUTPATIENT)
Dept: LAB | Facility: HOSPITAL | Age: 68
End: 2023-11-30
Attending: INTERNAL MEDICINE
Payer: MEDICARE

## 2023-11-30 ENCOUNTER — TELEPHONE (OUTPATIENT)
Dept: INTERNAL MEDICINE | Facility: CLINIC | Age: 68
End: 2023-11-30
Payer: MEDICARE

## 2023-11-30 DIAGNOSIS — D50.8 OTHER IRON DEFICIENCY ANEMIAS: ICD-10-CM

## 2023-11-30 DIAGNOSIS — D50.8 OTHER IRON DEFICIENCY ANEMIAS: Primary | ICD-10-CM

## 2023-11-30 LAB
BACTERIA URNS QL MICRO: ABNORMAL /HPF
BILIRUB UR QL STRIP.AUTO: NEGATIVE MG/DL
CLARITY UR REFRACT.AUTO: CLEAR
COLOR UR AUTO: YELLOW
FERRITIN SERPL-MCNC: 675 NG/ML (ref 11–250)
GLUCOSE UR STRIP.AUTO-MCNC: NEGATIVE MG/DL
HGB UR QL STRIP.AUTO: 2
HYALINE CASTS #/AREA URNS LPF: ABNORMAL /LPF
IRON SATN MFR SERPL: 38 % (ref 15–45)
IRON SERPL-MCNC: 121 UG/DL (ref 35–150)
KETONES UR STRIP.AUTO-MCNC: NEGATIVE MG/DL
LEUKOCYTE ESTERASE UR QL STRIP.AUTO: 1
MUCOUS THREADS URNS QL MICRO: ABNORMAL /LPF
NITRITE UR QL STRIP.AUTO: NEGATIVE
PH UR STRIP.AUTO: 5.5 [PH]
PROT UR QL STRIP.AUTO: 1
RBC #/AREA URNS HPF: ABNORMAL /HPF
SP GR UR REFRACT.AUTO: 1.03
SQUAMOUS URNS QL MICRO: ABNORMAL /HPF
TIBC SERPL-MCNC: 316 UG/DL (ref 270–460)
UIBC SERPL-MCNC: 195 UG/DL (ref 180–360)
UROBILINOGEN UR STRIP-ACNC: 0.2 EU/DL
WBC #/AREA URNS HPF: ABNORMAL /HPF

## 2023-11-30 PROCEDURE — 81001 URINALYSIS AUTO W/SCOPE: CPT

## 2023-11-30 PROCEDURE — 87086 URINE CULTURE/COLONY COUNT: CPT

## 2023-12-01 ENCOUNTER — TRANSCRIBE ORDERS (OUTPATIENT)
Dept: SCHEDULING | Age: 68
End: 2023-12-01

## 2023-12-01 DIAGNOSIS — M54.16 RADICULOPATHY, LUMBAR REGION: Primary | ICD-10-CM

## 2023-12-02 LAB — BACTERIA UR CULT: NORMAL

## 2023-12-06 ENCOUNTER — HOSPITAL ENCOUNTER (OUTPATIENT)
Dept: RADIOLOGY | Facility: CLINIC | Age: 68
Discharge: HOME | End: 2023-12-06
Attending: INTERNAL MEDICINE
Payer: MEDICARE

## 2023-12-06 DIAGNOSIS — Q82.2 CONGENITAL CUTANEOUS MASTOCYTOSIS: ICD-10-CM

## 2023-12-06 DIAGNOSIS — D50.8 OTHER IRON DEFICIENCY ANEMIAS: ICD-10-CM

## 2023-12-06 DIAGNOSIS — K82.4 CHOLESTEROLOSIS OF GALLBLADDER: ICD-10-CM

## 2023-12-06 PROCEDURE — 71046 X-RAY EXAM CHEST 2 VIEWS: CPT

## 2023-12-06 PROCEDURE — 76700 US EXAM ABDOM COMPLETE: CPT

## 2023-12-07 ENCOUNTER — LAB REQUISITION (OUTPATIENT)
Dept: LAB | Facility: HOSPITAL | Age: 68
End: 2023-12-07
Attending: HOSPITALIST
Payer: MEDICARE

## 2023-12-07 DIAGNOSIS — R10.9 UNSPECIFIED ABDOMINAL PAIN: ICD-10-CM

## 2023-12-07 LAB
ALBUMIN SERPL-MCNC: 4.8 G/DL (ref 3.5–5.7)
ALP SERPL-CCNC: 142 IU/L (ref 34–125)
ALT SERPL-CCNC: 33 IU/L (ref 7–52)
ANION GAP SERPL CALC-SCNC: 8 MEQ/L (ref 3–15)
AST SERPL-CCNC: 21 IU/L (ref 13–39)
BACTERIA URNS QL MICRO: ABNORMAL /HPF
BASOPHILS # BLD: 0.03 K/UL (ref 0.01–0.1)
BASOPHILS NFR BLD: 0.4 %
BILIRUB SERPL-MCNC: 0.4 MG/DL (ref 0.3–1.2)
BILIRUB UR QL STRIP.AUTO: NEGATIVE MG/DL
BUN SERPL-MCNC: 16 MG/DL (ref 7–25)
CALCIUM SERPL-MCNC: 9.4 MG/DL (ref 8.6–10.3)
CHLORIDE SERPL-SCNC: 107 MEQ/L (ref 98–107)
CLARITY UR REFRACT.AUTO: CLEAR
CO2 SERPL-SCNC: 25 MEQ/L (ref 21–31)
COLOR UR AUTO: YELLOW
CREAT SERPL-MCNC: 0.5 MG/DL (ref 0.6–1.2)
DIFFERENTIAL METHOD BLD: ABNORMAL
EGFRCR SERPLBLD CKD-EPI 2021: >60 ML/MIN/1.73M*2
EOSINOPHIL # BLD: 0.34 K/UL (ref 0.04–0.36)
EOSINOPHIL NFR BLD: 4.2 %
ERYTHROCYTE [DISTWIDTH] IN BLOOD BY AUTOMATED COUNT: 20.2 % (ref 11.7–14.4)
GLUCOSE SERPL-MCNC: 101 MG/DL (ref 70–99)
GLUCOSE UR STRIP.AUTO-MCNC: NEGATIVE MG/DL
HCT VFR BLDCO AUTO: 42.9 % (ref 35–45)
HGB BLD-MCNC: 13.2 G/DL (ref 11.8–15.7)
HGB UR QL STRIP.AUTO: NEGATIVE
HYALINE CASTS #/AREA URNS LPF: ABNORMAL /LPF
IMM GRANULOCYTES # BLD AUTO: 0.09 K/UL (ref 0–0.08)
IMM GRANULOCYTES NFR BLD AUTO: 1.1 %
KETONES UR STRIP.AUTO-MCNC: NEGATIVE MG/DL
LEUKOCYTE ESTERASE UR QL STRIP.AUTO: NEGATIVE
LYMPHOCYTES # BLD: 0.97 K/UL (ref 1.2–3.5)
LYMPHOCYTES NFR BLD: 12.1 %
MAGNESIUM SERPL-MCNC: 1.9 MG/DL (ref 1.8–2.5)
MCH RBC QN AUTO: 24.9 PG (ref 28–33.2)
MCHC RBC AUTO-ENTMCNC: 30.8 G/DL (ref 32.2–35.5)
MCV RBC AUTO: 80.9 FL (ref 83–98)
MONOCYTES # BLD: 0.51 K/UL (ref 0.28–0.8)
MONOCYTES NFR BLD: 6.4 %
MUCOUS THREADS URNS QL MICRO: ABNORMAL /LPF
NEUTROPHILS # BLD: 6.09 K/UL (ref 1.7–7)
NEUTS SEG NFR BLD: 75.8 %
NITRITE UR QL STRIP.AUTO: NEGATIVE
NRBC BLD-RTO: 0 %
PDW BLD AUTO: 9.4 FL (ref 9.4–12.3)
PH UR STRIP.AUTO: 6 [PH]
PHOSPHATE SERPL-MCNC: 2.2 MG/DL (ref 2.4–4.7)
PLATELET # BLD AUTO: 402 K/UL (ref 150–369)
POTASSIUM SERPL-SCNC: 4.3 MEQ/L (ref 3.5–5.1)
PROT SERPL-MCNC: 6.7 G/DL (ref 6–8.2)
PROT UR QL STRIP.AUTO: 1
RBC # BLD AUTO: 5.3 M/UL (ref 3.93–5.22)
RBC #/AREA URNS HPF: ABNORMAL /HPF
SODIUM SERPL-SCNC: 140 MEQ/L (ref 136–145)
SP GR UR REFRACT.AUTO: 1.03
SQUAMOUS URNS QL MICRO: ABNORMAL /HPF
UROBILINOGEN UR STRIP-ACNC: 0.2 EU/DL
WBC # BLD AUTO: 8.03 K/UL (ref 3.8–10.5)
WBC #/AREA URNS HPF: ABNORMAL /HPF

## 2023-12-07 PROCEDURE — 87086 URINE CULTURE/COLONY COUNT: CPT | Performed by: HOSPITALIST

## 2023-12-07 PROCEDURE — 84100 ASSAY OF PHOSPHORUS: CPT | Performed by: HOSPITALIST

## 2023-12-07 PROCEDURE — 85025 COMPLETE CBC W/AUTO DIFF WBC: CPT | Performed by: HOSPITALIST

## 2023-12-07 PROCEDURE — 36415 COLL VENOUS BLD VENIPUNCTURE: CPT | Performed by: HOSPITALIST

## 2023-12-07 PROCEDURE — 80053 COMPREHEN METABOLIC PANEL: CPT | Performed by: HOSPITALIST

## 2023-12-07 PROCEDURE — 83735 ASSAY OF MAGNESIUM: CPT | Performed by: HOSPITALIST

## 2023-12-07 PROCEDURE — 81001 URINALYSIS AUTO W/SCOPE: CPT | Performed by: HOSPITALIST

## 2023-12-08 ENCOUNTER — HOSPITAL ENCOUNTER (OUTPATIENT)
Dept: RADIOLOGY | Facility: CLINIC | Age: 68
Discharge: HOME | End: 2023-12-08
Attending: PHYSICAL MEDICINE & REHABILITATION
Payer: MEDICARE

## 2023-12-08 DIAGNOSIS — M54.16 RADICULOPATHY, LUMBAR REGION: ICD-10-CM

## 2023-12-09 LAB — BACTERIA UR CULT: NORMAL

## 2023-12-12 ENCOUNTER — TELEPHONE (OUTPATIENT)
Dept: NEUROSURGERY | Facility: CLINIC | Age: 68
End: 2023-12-12
Payer: MEDICARE

## 2023-12-12 NOTE — TELEPHONE ENCOUNTER
Spoke with patient, she was referred by Dr. Onofre. She can come on Thursday 12/14 for appt. Please add to Dr. Ann's schedule at 1:40.

## 2023-12-14 ENCOUNTER — OFFICE VISIT (OUTPATIENT)
Dept: NEUROSURGERY | Facility: CLINIC | Age: 68
End: 2023-12-14
Payer: MEDICARE

## 2023-12-14 DIAGNOSIS — M54.50 ACUTE BILATERAL LOW BACK PAIN WITHOUT SCIATICA: Primary | ICD-10-CM

## 2023-12-14 PROCEDURE — 99204 OFFICE O/P NEW MOD 45 MIN: CPT | Performed by: NEUROLOGICAL SURGERY

## 2023-12-14 PROCEDURE — G8756 NO BP MEASURE DOC: HCPCS | Performed by: NEUROLOGICAL SURGERY

## 2023-12-14 NOTE — LETTER
2023     Angela Baldwin, DO  [address]  [place]  [place] [address]    Patient: Ritu Ramirez  YOB: 1955  Date of Visit: 2023      Dear Dr. Baldwin:    Thank you for referring Ritu Ramirez to me for evaluation. Below are my notes for this consultation.    If you have questions, please do not hesitate to call me. I look forward to following your patient along with you.         Sincerely,        Dorian Ann MD        CC: Dorian Hood MD  2023  4:59 PM  Signed      [facility]  [facility]  Medical [address], [address]  [place] [address]  Phone: [phone]  Fax: [phone]        23      Re: Ritu Ramirez  : 1955      Chief Complaint:  Lumbosacral discomfort     History of Present Illness:   Ritu Ramirez is a 68 y.o. right handed female who presents in neurosurgical consultation referred for evaluation of lumbosacral discomfort.  Her symptoms progressed over the past several weeks. There was no inciting event or circumstance.  Of note she has contended with intermittent radicular leg pain for the past 3 years.  She sought medical evaluation and was diagnosed with stenosis most notable at L4-L5.  Supportive measures were discussed at that time.      New imaging of her lumbosacral spine was recommended in light of her escalating axial discomfort without associated radicular extension.  This study disclosed, new compression deformities, progressive stenosis prompting her referral to my attention.    On interview today, she describes mild to moderate lumbosacral discomfort which extends across her waist but no lower than her sacral region.  Her pain improved after administration of a Medrol Dosepak. Postural activities magnify her symptoms, whereas rest in the recumbent position helps to a degree.  She denies further changes in her strength, sensation, bowel, bladder, gait function.  She  continues to ambulate without the need for an assistive device.  She experiences a mild level of impairment in her activities of daily living secondary to the aforementioned issues    She follows with [facility] for systemic mastocytosis which was diagnosed throw bone marrow biopsy. She does also carry a diagnosis of osteoporosis.     Medical History:  has a past medical history of Anemia, Anxiety, Hypertension, Iron deficiency, Lipid disorder, Systemic mastocytosis, and Vitamin D deficiency.    Surgical History:  has a past surgical history that includes Colonoscopy; Reduction mammaplasty; Augmentation mammaplasty (Bilateral); and Patella surgery.    Family History: family history includes Heart disease in her biological mother; Prostate cancer in her biological father.  Family history non-contributory to neurological condition.    Social History:   Social History     Socioeconomic History   • Marital status: Single   Tobacco Use   • Smoking status: Former     Packs/day: .25     Types: Cigarettes     Quit date:      Years since quittin.9   • Smokeless tobacco: Never   Vaping Use   • Vaping Use: Never used   Substance and Sexual Activity   • Alcohol use: Yes     Alcohol/week: 1.0 standard drink of alcohol     Types: 1 Glasses of wine per week     Comment: Social   • Drug use: No   • Sexual activity: Defer     Social Determinants of Health     Food Insecurity: No Food Insecurity (2021)    Hunger Vital Sign    • Worried About Running Out of Food in the Last Year: Never true    • Ran Out of Food in the Last Year: Never true        Allergies:   Allergies   Allergen Reactions   • Anesthetics - Amide Type - Select Amino Amides Anaphylaxis     Other reaction(s): Anaphylaxis   • Iodinated Contrast Media      Other reaction(s): Anaphylaxis   • Penicillins Hives   • Clarithromycin      Other reaction(s): Rash   • Erythromycin Base      Other reaction(s): Anaphylaxis   • Nsaids (Non-Steroidal Anti-Inflammatory Drug)       Other reaction(s): Anaphylaxis       Medications:   Current Outpatient Medications   Medication Sig Dispense Refill   • biotin 1 mg capsule Take 1 capsule by mouth daily. Dose unknown       • bisacodyL (DULCOLAX) 5 mg EC tablet Take 5 mg by mouth daily.     • calcium carbonate (CALCIUM 500 ORAL) Take 500 mg by mouth daily. Dose unknown       • cholecalciferol, vitamin D3, 1,000 unit capsule Take 1,000 Units by mouth daily.       • dextroamphetamine-amphetamine (ADDERALL) 30 mg tablet Take 30 mg by mouth daily.     • escitalopram (LEXAPRO) 10 mg tablet TAKE 1 TABLET BY MOUTH EVERY DAY 90 tablet 3   • magnesium oxide (MAG-OX) 400 mg (241.3 mg magnesium) tablet Take 400 mg by mouth daily.     • rosuvastatin (CRESTOR) 10 mg tablet Take 1 tablet (10 mg total) by mouth daily. 90 tablet 3   • spironolactone (ALDACTONE) 25 mg tablet Take 1 tablet (25 mg total) by mouth daily. 90 tablet 3     No current facility-administered medications for this visit.       Review of Systems:   A 14 point review of systems was performed and aside from what is mentioned above is otherwise negative.    General physical examination:  The patient is well appearing female, sitting upright in her chair in no acute distress, she appears her stated age.  Her head is atraumatic, normocephalic. Her neck is supple without obvious adenopathy. Oral mucosa is moist. Her peripheral pulses are symmetric and palpable. Extremities without peripheral edema. Her breathing is normal and unlabored.     Vital signs were reviewed and within normal limits.    Neurologic examination:  Mental status:  The patient is alert, attentive, and oriented. Speech is clear and fluent with good repetition, comprehension, and naming.  Remote and recent memory are normal as well as fund of knowledge.    Cranial nerves:  CN II: Visual fields are full to confrontation.  Pupils are equal and briskly reactive to light.   CN III, IV, VI: Extra-occular muscles are intact  CN V:  Facial sensation is intact and equal in V1-V3 distributions bilaterally.   CN VII: Face is symmetric with normal eye closure and smile.  CN VIII: Hearing is normal to rubbing fingers  CN IX, X: Palate elevates symmetrically. Phonation is normal.  CN XI: Head turning and shoulder shrug are intact  CN XII: Tongue is midline with normal movements and no atrophy.    Motor:  There is no pronator drift.  Muscle bulk and tone are normal.    Deltoid Biceps Triceps Wrist ext Hand  Finger Spread Hip flexion Knee ext Dorsi-  flexion EHL Plantar Flexion   L 5 5 5 5 5 5 5 5 5 5 5   R 5 5 5 5 5 5 5 5 5 5 5     Reflexes:  Reflexes are 2+ and symmetric at the biceps, brachialis, triceps, patellar, and Achilli's. There is no Sahu's sign or ankle clonus.    Sensory:   Intact light touch, pinprick, and position sense, throughout all 4 extremities.    Coordination:  There is no dysmetria on finger-to-nose testing.  Romberg is absent.    Gait:  Gait is steady with normal steps.  Heel and toe walking are normal. Tandem gait is normal.      Data Review:  MRI LUMBAR SPINE WITHOUT CONTRAST 12/8/23  COMMENT:  NUMBERING: Last fully formed disc space is designated L5-S1.     SPINAL CORD: Normal conus. Conus terminates at the T12-L1 level.     DISCS: Multilevel disc dessication.     BONES: New abnormal edema in the superior T12 vertebral body on the right side,  with slight superior endplate depression. Stable chronic compression fracture of  L1, with slight retropulsion of posterior superior vertebral body without  significant canal stenosis. New abnormal edema in inferior L3 vertebral body,  more pronounced on the left than on the right, without significant height loss.  New small area of ill-defined edema at the inferior aspect of the left side of  the L4 vertebral body, also without significant height loss. Stable degenerative  endplate marrow signal changes along the inferior L4 and superior L5 endplates.     SOFT TISSUES:  Prevertebral and paraspinal soft tissues unremarkable.     T12-L1: Mild facet arthropathy. No canal or foraminal stenosis. Stable.     L1-L2: Mild facet arthropathy. Mild bilateral foraminal stenosis. No canal  stenosis. Stable.     L2-L3: Slight disc bulge, with shallow left foraminal protrusion. No canal or  foraminal stenosis. Stable.     L3-L4: Mild facet arthropathy. No canal or foraminal stenosis. Stable.     L4-L5: Disc desiccation and loss of height with disc uncovering related to  listhesis. Severe facet arthropathy and ligamentum flavum  redundancy/hypertrophy. Severe canal stenosis with cauda equina compression.  Severe right foraminal stenosis and slight left foraminal stenosis. Stable.     L5-S1: Moderate right and mild left facet arthropathy. No canal or foraminal  stenosis. Stable.     OTHER: None.     IMPRESSION:  Areas of ill defined edema in superior T12 vertebral body with slight depression  of superior endplate, L3 vertebral body, inferior left L4 vertebral body, and  inferior right L5 vertebral body; concerning for osteoporotic fractures.     Stable chronic compression fracture of L1.     Stable multilevel discogenic and facet degenerative changes with severe canal  stenosis at L4-L5 with cauda equina compression.        Images were personally reviewed by myself and with the patient.      Assessment and Plan:   In summary, Ritu Ramirez is a 68 y.o. female with improving features of axial low back discomfort.  Recent MRI of the lumbosacral spine discloses mild compression fractures at several vertebral levels likely on the basis of her osteoporosis as well as progressive L4-L5 stenosis.  Remarkably she has no active features of radiculopathy on history, neurologic exam.  Surgical intervention for this condition would be ill-advised at present.    The natural history of spinal stenosis, radiculopathy, spondylolisthesis was discussed at length.  She was advised instead to continue with lifestyle  modification, avoidance of excessive bending, twisting, overhead lifting, unrestrained forces across her axial skeleton.  I look forward to reevaluating her progress in 6 months time.  In the interim should her symptomatology evolve or worsen, I have asked she return sooner for prompt reevaluation.    Thank you for referring Ritu Ramirez  to my attention.  Please feel free to contact me anytime if I can be of further assistance.        I, Portia Le PA-C, am scribing for, and in the presence of, Dr. Dorian Ann.    I, Dorian Ann, personally performed the services described in this documentation as scribed by Portia Le PA-C in my presence, and it is accurate and complete.     I spent 45 minutes on this date of service performing the following activities: obtaining history, performing examination, entering orders, documenting, preparing for visit, obtaining / reviewing records, providing counseling and education, independently reviewing study/studies, communicating results and coordinating care.

## 2023-12-14 NOTE — PROGRESS NOTES
[facility] Neurosurgery  [facility]  Medical Office [address], [address]  [place] [address]  Phone: [phone]  Fax: [phone]        23      Re: Ritu Ramirez  : 1955      Chief Complaint:  Lumbosacral discomfort     History of Present Illness:   Ritu Ramirez is a 68 y.o. right handed female who presents in neurosurgical consultation referred for evaluation of lumbosacral discomfort.  Her symptoms progressed over the past several weeks. There was no inciting event or circumstance.  Of note she has contended with intermittent radicular leg pain for the past 3 years.  She sought medical evaluation and was diagnosed with stenosis most notable at L4-L5.  Supportive measures were discussed at that time.      New imaging of her lumbosacral spine was recommended in light of her escalating axial discomfort without associated radicular extension.  This study disclosed, new compression deformities, progressive stenosis prompting her referral to my attention.    On interview today, she describes mild to moderate lumbosacral discomfort which extends across her waist but no lower than her sacral region.  Her pain improved after administration of a Medrol Dosepak. Postural activities magnify her symptoms, whereas rest in the recumbent position helps to a degree.  She denies further changes in her strength, sensation, bowel, bladder, gait function.  She continues to ambulate without the need for an assistive device.  She experiences a mild level of impairment in her activities of daily living secondary to the aforementioned issues    She follows with [facility] for systemic mastocytosis which was diagnosed throw bone marrow biopsy. She does also carry a diagnosis of osteoporosis.     Medical History:  has a past medical history of Anemia, Anxiety, Hypertension, Iron deficiency, Lipid disorder, Systemic mastocytosis, and Vitamin D deficiency.    Surgical History:  has a past surgical  history that includes Colonoscopy; Reduction mammaplasty; Augmentation mammaplasty (Bilateral); and Patella surgery.    Family History: family history includes Heart disease in her biological mother; Prostate cancer in her biological father.  Family history non-contributory to neurological condition.    Social History:   Social History     Socioeconomic History   • Marital status: Single   Tobacco Use   • Smoking status: Former     Packs/day: .25     Types: Cigarettes     Quit date:      Years since quittin.9   • Smokeless tobacco: Never   Vaping Use   • Vaping Use: Never used   Substance and Sexual Activity   • Alcohol use: Yes     Alcohol/week: 1.0 standard drink of alcohol     Types: 1 Glasses of wine per week     Comment: Social   • Drug use: No   • Sexual activity: Defer     Social Determinants of Health     Food Insecurity: No Food Insecurity (2021)    Hunger Vital Sign    • Worried About Running Out of Food in the Last Year: Never true    • Ran Out of Food in the Last Year: Never true        Allergies:   Allergies   Allergen Reactions   • Anesthetics - Amide Type - Select Amino Amides Anaphylaxis     Other reaction(s): Anaphylaxis   • Iodinated Contrast Media      Other reaction(s): Anaphylaxis   • Penicillins Hives   • Clarithromycin      Other reaction(s): Rash   • Erythromycin Base      Other reaction(s): Anaphylaxis   • Nsaids (Non-Steroidal Anti-Inflammatory Drug)      Other reaction(s): Anaphylaxis       Medications:   Current Outpatient Medications   Medication Sig Dispense Refill   • biotin 1 mg capsule Take 1 capsule by mouth daily. Dose unknown       • bisacodyL (DULCOLAX) 5 mg EC tablet Take 5 mg by mouth daily.     • calcium carbonate (CALCIUM 500 ORAL) Take 500 mg by mouth daily. Dose unknown       • cholecalciferol, vitamin D3, 1,000 unit capsule Take 1,000 Units by mouth daily.       • dextroamphetamine-amphetamine (ADDERALL) 30 mg tablet Take 30 mg by mouth daily.     •  escitalopram (LEXAPRO) 10 mg tablet TAKE 1 TABLET BY MOUTH EVERY DAY 90 tablet 3   • magnesium oxide (MAG-OX) 400 mg (241.3 mg magnesium) tablet Take 400 mg by mouth daily.     • rosuvastatin (CRESTOR) 10 mg tablet Take 1 tablet (10 mg total) by mouth daily. 90 tablet 3   • spironolactone (ALDACTONE) 25 mg tablet Take 1 tablet (25 mg total) by mouth daily. 90 tablet 3     No current facility-administered medications for this visit.       Review of Systems:   A 14 point review of systems was performed and aside from what is mentioned above is otherwise negative.    General physical examination:  The patient is well appearing female, sitting upright in her chair in no acute distress, she appears her stated age.  Her head is atraumatic, normocephalic. Her neck is supple without obvious adenopathy. Oral mucosa is moist. Her peripheral pulses are symmetric and palpable. Extremities without peripheral edema. Her breathing is normal and unlabored.     Vital signs were reviewed and within normal limits.    Neurologic examination:  Mental status:  The patient is alert, attentive, and oriented. Speech is clear and fluent with good repetition, comprehension, and naming.  Remote and recent memory are normal as well as fund of knowledge.    Cranial nerves:  CN II: Visual fields are full to confrontation.  Pupils are equal and briskly reactive to light.   CN III, IV, VI: Extra-occular muscles are intact  CN V: Facial sensation is intact and equal in V1-V3 distributions bilaterally.   CN VII: Face is symmetric with normal eye closure and smile.  CN VIII: Hearing is normal to rubbing fingers  CN IX, X: Palate elevates symmetrically. Phonation is normal.  CN XI: Head turning and shoulder shrug are intact  CN XII: Tongue is midline with normal movements and no atrophy.    Motor:  There is no pronator drift.  Muscle bulk and tone are normal.    Deltoid Biceps Triceps Wrist ext Hand  Finger Spread Hip flexion Knee ext  Dorsi-  flexion EHL Plantar Flexion   L 5 5 5 5 5 5 5 5 5 5 5   R 5 5 5 5 5 5 5 5 5 5 5     Reflexes:  Reflexes are 2+ and symmetric at the biceps, brachialis, triceps, patellar, and Achilli's. There is no Sahu's sign or ankle clonus.    Sensory:   Intact light touch, pinprick, and position sense, throughout all 4 extremities.    Coordination:  There is no dysmetria on finger-to-nose testing.  Romberg is absent.    Gait:  Gait is steady with normal steps.  Heel and toe walking are normal. Tandem gait is normal.      Data Review:  MRI LUMBAR SPINE WITHOUT CONTRAST 12/8/23  COMMENT:  NUMBERING: Last fully formed disc space is designated L5-S1.     SPINAL CORD: Normal conus. Conus terminates at the T12-L1 level.     DISCS: Multilevel disc dessication.     BONES: New abnormal edema in the superior T12 vertebral body on the right side,  with slight superior endplate depression. Stable chronic compression fracture of  L1, with slight retropulsion of posterior superior vertebral body without  significant canal stenosis. New abnormal edema in inferior L3 vertebral body,  more pronounced on the left than on the right, without significant height loss.  New small area of ill-defined edema at the inferior aspect of the left side of  the L4 vertebral body, also without significant height loss. Stable degenerative  endplate marrow signal changes along the inferior L4 and superior L5 endplates.     SOFT TISSUES: Prevertebral and paraspinal soft tissues unremarkable.     T12-L1: Mild facet arthropathy. No canal or foraminal stenosis. Stable.     L1-L2: Mild facet arthropathy. Mild bilateral foraminal stenosis. No canal  stenosis. Stable.     L2-L3: Slight disc bulge, with shallow left foraminal protrusion. No canal or  foraminal stenosis. Stable.     L3-L4: Mild facet arthropathy. No canal or foraminal stenosis. Stable.     L4-L5: Disc desiccation and loss of height with disc uncovering related to  listhesis. Severe facet  arthropathy and ligamentum flavum  redundancy/hypertrophy. Severe canal stenosis with cauda equina compression.  Severe right foraminal stenosis and slight left foraminal stenosis. Stable.     L5-S1: Moderate right and mild left facet arthropathy. No canal or foraminal  stenosis. Stable.     OTHER: None.     IMPRESSION:  Areas of ill defined edema in superior T12 vertebral body with slight depression  of superior endplate, L3 vertebral body, inferior left L4 vertebral body, and  inferior right L5 vertebral body; concerning for osteoporotic fractures.     Stable chronic compression fracture of L1.     Stable multilevel discogenic and facet degenerative changes with severe canal  stenosis at L4-L5 with cauda equina compression.        Images were personally reviewed by myself and with the patient.      Assessment and Plan:   In summary, Ritu Ramirez is a 68 y.o. female with improving features of axial low back discomfort.  Recent MRI of the lumbosacral spine discloses mild compression fractures at several vertebral levels likely on the basis of her osteoporosis as well as progressive L4-L5 stenosis.  Remarkably she has no active features of radiculopathy on history, neurologic exam.  Surgical intervention for this condition would be ill-advised at present.    The natural history of spinal stenosis, radiculopathy, spondylolisthesis was discussed at length.  She was advised instead to continue with lifestyle modification, avoidance of excessive bending, twisting, overhead lifting, unrestrained forces across her axial skeleton.  I look forward to reevaluating her progress in 6 months time.  In the interim should her symptomatology evolve or worsen, I have asked she return sooner for prompt reevaluation.    Thank you for referring Ritu Ramirez  to my attention.  Please feel free to contact me anytime if I can be of further assistance.        IPortia PA-C, am scribing for, and in the presence of, Dr. Dorian Ann.    I,  Dorian Ann, personally performed the services described in this documentation as scribed by Portia Le PA-C in my presence, and it is accurate and complete.     I spent 45 minutes on this date of service performing the following activities: obtaining history, performing examination, entering orders, documenting, preparing for visit, obtaining / reviewing records, providing counseling and education, independently reviewing study/studies, communicating results and coordinating care.

## 2023-12-21 ENCOUNTER — OFFICE VISIT (OUTPATIENT)
Dept: INTERNAL MEDICINE | Facility: CLINIC | Age: 68
End: 2023-12-21
Payer: MEDICARE

## 2023-12-21 DIAGNOSIS — R74.8 ELEVATED ALKALINE PHOSPHATASE LEVEL: ICD-10-CM

## 2023-12-21 DIAGNOSIS — R93.1 ELEVATED CORONARY ARTERY CALCIUM SCORE: ICD-10-CM

## 2023-12-21 DIAGNOSIS — E55.9 VITAMIN D DEFICIENCY: ICD-10-CM

## 2023-12-21 DIAGNOSIS — E78.5 DYSLIPIDEMIA: ICD-10-CM

## 2023-12-21 DIAGNOSIS — R16.0 LIVER MASS: ICD-10-CM

## 2023-12-21 DIAGNOSIS — E78.5 HYPERLIPIDEMIA, UNSPECIFIED HYPERLIPIDEMIA TYPE: ICD-10-CM

## 2023-12-21 DIAGNOSIS — D47.02 SYSTEMIC MASTOCYTOSIS: ICD-10-CM

## 2023-12-21 DIAGNOSIS — S22.000A COMPRESSION FRACTURE OF BODY OF THORACIC VERTEBRA (CMS/HCC): ICD-10-CM

## 2023-12-21 DIAGNOSIS — I10 PRIMARY HYPERTENSION: Primary | ICD-10-CM

## 2023-12-21 PROCEDURE — 99215 OFFICE O/P EST HI 40 MIN: CPT | Performed by: INTERNAL MEDICINE

## 2023-12-21 PROCEDURE — G8752 SYS BP LESS 140: HCPCS | Performed by: INTERNAL MEDICINE

## 2023-12-21 PROCEDURE — G8755 DIAS BP > OR = 90: HCPCS | Performed by: INTERNAL MEDICINE

## 2023-12-21 ASSESSMENT — ENCOUNTER SYMPTOMS
SHORTNESS OF BREATH: 0
PALPITATIONS: 0
DIARRHEA: 0
CHEST TIGHTNESS: 0
CONSTIPATION: 0
ABDOMINAL PAIN: 0
LIGHT-HEADEDNESS: 0

## 2023-12-21 NOTE — PATIENT INSTRUCTIONS
Follow with Dr. Jaya Swenson for colonoscopy  [phone]  obtain an MRI abd witht and without contrast   Bone dens

## 2023-12-21 NOTE — PROGRESS NOTES
Subjective      Patient ID: Ritu Ramirez is a 68 y.o. female.    HPI Ritu is here for follow up - a month ago experieced severe mid upper back pain- had an MRI - severe spinal stenosis  L45 ,  new compression fractures ,T12, edema of L3, L4 Nad L5 without change in vertebral height but concerning for compression fractures, L1old compression fracture. .  Will be going for a bone density.Is using oxycodone for pain but not too helpful.   Also was noted to have a liver lesion seen on an ultrasound of the abdomen ordered by DR. Ani Calhoun- 0.8 cm subcapsular hepatic mass for which abdominal MRI is recommended.  Also is   Due to follow with Dr. Jaya Grayson for colonoscopy due to hx polyps.No abdominal pain, weight loss, hematochezia or melena or change in bowel habits.     Follows with Dr. Lauro Alba for ADHD.Has been stable on medication.   Remains compliant with her anti hypertensive regimen.No chest pain , palpitations, shortness of breath or lightheadedness.    Will be following with hematology at [place] now that VICENTE Calhoun has retired and she continues also to follow at the [facility] for her systemic mastocytosis.    The following have been reviewed and updated as appropriate in this visit:   Allergies  Meds  Problems       Past Medical History:   Diagnosis Date   • Anemia    • Anxiety    • Hypertension    • Iron deficiency    • Lipid disorder    • Systemic mastocytosis    • Vitamin D deficiency      Past Surgical History:   Procedure Laterality Date   • AUGMENTATION MAMMAPLASTY Bilateral    • COLONOSCOPY     • PATELLA SURGERY     • REDUCTION MAMMAPLASTY       Family History   Problem Relation Age of Onset   • Heart disease Biological Mother    • Prostate cancer Biological Father      Social History     Socioeconomic History   • Marital status: Single     Spouse name: Not on file   • Number of children: Not on file   • Years of education: Not on file   • Highest education level: Not on file   Occupational  History   • Not on file   Tobacco Use   • Smoking status: Former     Packs/day: .25     Types: Cigarettes     Quit date:      Years since quittin.1   • Smokeless tobacco: Never   Vaping Use   • Vaping Use: Never used   Substance and Sexual Activity   • Alcohol use: Yes     Alcohol/week: 1.0 standard drink of alcohol     Types: 1 Glasses of wine per week     Comment: Social   • Drug use: No   • Sexual activity: Defer   Other Topics Concern   • Not on file   Social History Narrative   • Not on file     Social Determinants of Health     Financial Resource Strain: Not on file   Food Insecurity: No Food Insecurity (2021)    Hunger Vital Sign    • Worried About Running Out of Food in the Last Year: Never true    • Ran Out of Food in the Last Year: Never true   Transportation Needs: Not on file   Physical Activity: Not on file   Stress: Not on file   Social Connections: Not on file   Intimate Partner Violence: Not on file   Housing Stability: Not on file       Review of Systems   Respiratory: Negative for chest tightness and shortness of breath.    Cardiovascular: Negative for chest pain and palpitations.   Gastrointestinal: Negative for abdominal pain, constipation and diarrhea.   Musculoskeletal: Positive for back pain.   Neurological: Negative for light-headedness.       Allergies   Allergen Reactions   • Anesthetics - Amide Type - Select Amino Amides Anaphylaxis     Other reaction(s): Anaphylaxis   • Iodinated Contrast Media      Other reaction(s): Anaphylaxis   • Other Anaphylaxis     MAST CELL ACTIVATION SYNDROME    • Penicillins Hives   • Clarithromycin      Other reaction(s): Rash   • Erythromycin Base      Other reaction(s): Anaphylaxis   • Nsaids (Non-Steroidal Anti-Inflammatory Drug)      Other reaction(s): Anaphylaxis     Current Outpatient Medications   Medication Sig Dispense Refill   • biotin 1 mg capsule Take 1 capsule by mouth daily. Dose unknown       • bisacodyL (DULCOLAX) 5 mg EC tablet  "Take 5 mg by mouth daily.     • calcium carbonate (CALCIUM 500 ORAL) Take 500 mg by mouth daily. Dose unknown       • cholecalciferol, vitamin D3, 1,000 unit capsule Take 1,000 Units by mouth daily.       • dextroamphetamine-amphetamine (ADDERALL) 30 mg tablet Take 30 mg by mouth daily.     • escitalopram (LEXAPRO) 10 mg tablet TAKE 1 TABLET BY MOUTH EVERY DAY 90 tablet 3   • magnesium oxide (MAG-OX) 400 mg (241.3 mg magnesium) tablet Take 400 mg by mouth daily.     • rosuvastatin (CRESTOR) 10 mg tablet TAKE 1 TABLET BY MOUTH EVERY DAY 90 tablet 3   • spironolactone (ALDACTONE) 25 mg tablet Take 1 tablet (25 mg total) by mouth daily. 90 tablet 3     No current facility-administered medications for this visit.       Objective   Vitals:    12/21/23 0802   BP: 132/74   Pulse: 61   Temp: 36.4 °C (97.5 °F)   SpO2: 100%   Weight: 54.4 kg (120 lb)   Height: 1.549 m (5' 1\")       Physical Exam  Vitals and nursing note reviewed.   Constitutional:       Appearance: She is well-developed.   HENT:      Head: Normocephalic and atraumatic.   Neck:      Thyroid: No thyromegaly.      Vascular: No carotid bruit.   Cardiovascular:      Rate and Rhythm: Normal rate and regular rhythm.      Pulses: Normal pulses.      Heart sounds: Normal heart sounds. No murmur heard.  Pulmonary:      Effort: Pulmonary effort is normal.      Breath sounds: Normal breath sounds.   Abdominal:      General: Bowel sounds are normal.      Palpations: Abdomen is soft. There is no mass.      Tenderness: There is no abdominal tenderness.   Musculoskeletal:      Cervical back: Normal range of motion and neck supple.      Comments: Wearing a back brace  Tenderness over t12    Skin:     General: Skin is warm and dry.   Neurological:      Mental Status: She is alert and oriented to person, place, and time.   Psychiatric:         Behavior: Behavior normal.         Thought Content: Thought content normal.         Judgment: Judgment normal.         Assessment/Plan "   Problem List Items Addressed This Visit     Primary hypertension - Primary     Well controlled on spironolactone and low sodium diet         Dyslipidemia    Elevated coronary artery calcium score     Continue daily statin - she remains asymptomatic - follows with Dr. Selby         Hyperlipidemia     Well controlled on daily statin         Relevant Orders    Lipid panel    TSH 3rd Generation    Vitamin D deficiency     Continue on supplementation         Relevant Orders    Vitamin D 25 hydroxy    Systemic mastocytosis     Has been stable overall - Dr. Calhoun is retiring - she will follow at [place] - also follows yearly at [facility]         Compression fracture of body of thoracic vertebra (CMS/HCC)     Continue with oxycodone prn only weaning off - may use extra strength tylenol - will be going for bone density - if pain is severe and unrelenting could consider kyphoplasty - will follow with rheumatology Dr. Zhao and continue on prolia-  check phosphorous Calcium PTH     I spent 45 minutes on this date of service performing the following activities: obtaining history, performing examination, entering orders, documenting, obtaining / reviewing records, providing counseling and education, communicating results and coordinating care.         Relevant Orders    DEXA BONE DENSITY    Elevated alkaline phosphatase level     Likely bone related - will check repeat         Relevant Orders    Gamma GT   Other Visit Diagnoses     Liver mass        Relevant Orders    MRI ABDOMEN WITH AND WITHOUT CONTRAST          Ashley Omalley MD    1/30/2024

## 2023-12-23 PROBLEM — S22.000A COMPRESSION FRACTURE OF BODY OF THORACIC VERTEBRA (CMS/HCC): Status: ACTIVE | Noted: 2023-12-23

## 2023-12-23 PROBLEM — R74.8 ELEVATED ALKALINE PHOSPHATASE LEVEL: Status: ACTIVE | Noted: 2023-12-23

## 2023-12-23 ASSESSMENT — ENCOUNTER SYMPTOMS: BACK PAIN: 1

## 2024-01-30 VITALS
OXYGEN SATURATION: 100 % | SYSTOLIC BLOOD PRESSURE: 132 MMHG | BODY MASS INDEX: 22.66 KG/M2 | TEMPERATURE: 97.5 F | WEIGHT: 120 LBS | DIASTOLIC BLOOD PRESSURE: 74 MMHG | HEIGHT: 61 IN | HEART RATE: 61 BPM

## 2024-01-30 NOTE — ASSESSMENT & PLAN NOTE
Has been stable overall - Dr. Calhoun is retiring - she will follow at [place] - also follows yearly at [facility]

## 2024-01-30 NOTE — ASSESSMENT & PLAN NOTE
Continue on supplementation   

## 2024-01-30 NOTE — ASSESSMENT & PLAN NOTE
Continue with oxycodone prn only weaning off - may use extra strength tylenol - will be going for bone density - if pain is severe and unrelenting could consider kyphoplasty - will follow with rheumatology Dr. Zhao and continue on prolia-  check phosphorous Calcium PTH     I spent 45 minutes on this date of service performing the following activities: obtaining history, performing examination, entering orders, documenting, obtaining / reviewing records, providing counseling and education, communicating results and coordinating care.

## 2024-02-15 ENCOUNTER — APPOINTMENT (OUTPATIENT)
Dept: LAB | Facility: HOSPITAL | Age: 69
End: 2024-02-15
Attending: STUDENT IN AN ORGANIZED HEALTH CARE EDUCATION/TRAINING PROGRAM
Payer: MEDICARE

## 2024-02-15 ENCOUNTER — TRANSCRIBE ORDERS (OUTPATIENT)
Dept: INTERNAL MEDICINE | Facility: HOSPITAL | Age: 69
End: 2024-02-15

## 2024-02-15 DIAGNOSIS — D50.8 OTHER IRON DEFICIENCY ANEMIAS: ICD-10-CM

## 2024-02-15 DIAGNOSIS — D50.8 OTHER IRON DEFICIENCY ANEMIAS: Primary | ICD-10-CM

## 2024-02-15 DIAGNOSIS — R74.8 ELEVATED ALKALINE PHOSPHATASE LEVEL: ICD-10-CM

## 2024-02-15 DIAGNOSIS — E78.5 HYPERLIPIDEMIA, UNSPECIFIED HYPERLIPIDEMIA TYPE: ICD-10-CM

## 2024-02-15 DIAGNOSIS — E55.9 VITAMIN D DEFICIENCY: ICD-10-CM

## 2024-02-15 LAB
25(OH)D3 SERPL-MCNC: 31 NG/ML (ref 30–100)
ALBUMIN SERPL-MCNC: 4.6 G/DL (ref 3.5–5.7)
ALP SERPL-CCNC: 52 IU/L (ref 34–125)
ALT SERPL-CCNC: 32 IU/L (ref 7–52)
ANION GAP SERPL CALC-SCNC: 7 MEQ/L (ref 3–15)
AST SERPL-CCNC: 19 IU/L (ref 13–39)
BASOPHILS # BLD: 0.04 K/UL (ref 0.01–0.1)
BASOPHILS NFR BLD: 0.5 %
BILIRUB SERPL-MCNC: 0.7 MG/DL (ref 0.3–1.2)
BUN SERPL-MCNC: 18 MG/DL (ref 7–25)
CALCIUM SERPL-MCNC: 9.6 MG/DL (ref 8.6–10.3)
CHLORIDE SERPL-SCNC: 107 MEQ/L (ref 98–107)
CHOLEST SERPL-MCNC: 159 MG/DL
CO2 SERPL-SCNC: 26 MEQ/L (ref 21–31)
CREAT SERPL-MCNC: 0.6 MG/DL (ref 0.6–1.2)
DIFFERENTIAL METHOD BLD: ABNORMAL
EGFRCR SERPLBLD CKD-EPI 2021: >60 ML/MIN/1.73M*2
EOSINOPHIL # BLD: 0.59 K/UL (ref 0.04–0.36)
EOSINOPHIL NFR BLD: 7.9 %
ERYTHROCYTE [DISTWIDTH] IN BLOOD BY AUTOMATED COUNT: 13.7 % (ref 11.7–14.4)
FERRITIN SERPL-MCNC: 187 NG/ML (ref 11–250)
GGT SERPL-CCNC: 33 IU/L (ref 9–64)
GLUCOSE SERPL-MCNC: 109 MG/DL (ref 70–99)
HCT VFR BLD AUTO: 40.2 % (ref 35–45)
HDLC SERPL-MCNC: 74 MG/DL
HDLC SERPL: 2.1 {RATIO}
HGB BLD-MCNC: 13.1 G/DL (ref 11.8–15.7)
IMM GRANULOCYTES # BLD AUTO: 0.05 K/UL (ref 0–0.08)
IMM GRANULOCYTES NFR BLD AUTO: 0.7 %
IRON SATN MFR SERPL: 25 % (ref 15–45)
IRON SERPL-MCNC: 84 UG/DL (ref 35–150)
LDLC SERPL CALC-MCNC: 65 MG/DL
LYMPHOCYTES # BLD: 0.87 K/UL (ref 1.2–3.5)
LYMPHOCYTES NFR BLD: 11.7 %
MCH RBC QN AUTO: 26.4 PG (ref 28–33.2)
MCHC RBC AUTO-ENTMCNC: 32.6 G/DL (ref 32.2–35.5)
MCV RBC AUTO: 81 FL (ref 83–98)
MONOCYTES # BLD: 0.53 K/UL (ref 0.28–0.8)
MONOCYTES NFR BLD: 7.1 %
NEUTROPHILS # BLD: 5.35 K/UL (ref 1.7–7)
NEUTROPHILS # BLD: 5.35 K/UL (ref 1.7–7)
NEUTS SEG NFR BLD: 72.1 %
NONHDLC SERPL-MCNC: 85 MG/DL
NRBC BLD-RTO: 0 %
PDW BLD AUTO: 9 FL (ref 9.4–12.3)
PLATELET # BLD AUTO: 365 K/UL (ref 150–369)
POTASSIUM SERPL-SCNC: 3.7 MEQ/L (ref 3.5–5.1)
PROT SERPL-MCNC: 6.1 G/DL (ref 6–8.2)
RBC # BLD AUTO: 4.96 M/UL (ref 3.93–5.22)
SODIUM SERPL-SCNC: 140 MEQ/L (ref 136–145)
TIBC SERPL-MCNC: 339 UG/DL (ref 270–460)
TRIGL SERPL-MCNC: 98 MG/DL
TSH SERPL DL<=0.05 MIU/L-ACNC: 1.95 MIU/L (ref 0.34–5.6)
UIBC SERPL-MCNC: 255 UG/DL (ref 180–360)
WBC # BLD AUTO: 7.43 K/UL (ref 3.8–10.5)

## 2024-02-15 PROCEDURE — 83540 ASSAY OF IRON: CPT

## 2024-02-15 PROCEDURE — 82977 ASSAY OF GGT: CPT

## 2024-02-15 PROCEDURE — 80061 LIPID PANEL: CPT

## 2024-02-15 PROCEDURE — 85025 COMPLETE CBC W/AUTO DIFF WBC: CPT

## 2024-02-15 PROCEDURE — 36415 COLL VENOUS BLD VENIPUNCTURE: CPT

## 2024-02-15 PROCEDURE — 82306 VITAMIN D 25 HYDROXY: CPT

## 2024-02-15 PROCEDURE — 80053 COMPREHEN METABOLIC PANEL: CPT

## 2024-02-15 PROCEDURE — 82728 ASSAY OF FERRITIN: CPT

## 2024-02-15 PROCEDURE — 84443 ASSAY THYROID STIM HORMONE: CPT

## 2024-02-16 ENCOUNTER — TELEPHONE (OUTPATIENT)
Dept: INTERNAL MEDICINE | Facility: CLINIC | Age: 69
End: 2024-02-16
Payer: MEDICARE

## 2024-04-10 ENCOUNTER — TRANSCRIBE ORDERS (OUTPATIENT)
Dept: LAB | Facility: HOSPITAL | Age: 69
End: 2024-04-10

## 2024-04-10 ENCOUNTER — APPOINTMENT (OUTPATIENT)
Dept: LAB | Facility: HOSPITAL | Age: 69
End: 2024-04-10
Attending: HOSPITALIST
Payer: MEDICARE

## 2024-04-10 DIAGNOSIS — I10 ESSENTIAL (PRIMARY) HYPERTENSION: ICD-10-CM

## 2024-04-10 DIAGNOSIS — D81.819 BIOTIN-DEPENDENT CARBOXYLASE DEFICIENCY, UNSPECIFIED: ICD-10-CM

## 2024-04-10 DIAGNOSIS — D64.9 ANEMIA, UNSPECIFIED: ICD-10-CM

## 2024-04-10 DIAGNOSIS — I10 ESSENTIAL (PRIMARY) HYPERTENSION: Primary | ICD-10-CM

## 2024-04-10 DIAGNOSIS — R73.9 HYPERGLYCEMIA, UNSPECIFIED: ICD-10-CM

## 2024-04-10 LAB
ALBUMIN SERPL-MCNC: 4.9 G/DL (ref 3.5–5.7)
ALP SERPL-CCNC: 56 IU/L (ref 34–125)
ALT SERPL-CCNC: 25 IU/L (ref 7–52)
ANION GAP SERPL CALC-SCNC: 7 MEQ/L (ref 3–15)
AST SERPL-CCNC: 18 IU/L (ref 13–39)
BASOPHILS # BLD: 0.05 K/UL (ref 0.01–0.1)
BASOPHILS NFR BLD: 0.7 %
BILIRUB SERPL-MCNC: 0.5 MG/DL (ref 0.3–1.2)
BUN SERPL-MCNC: 21 MG/DL (ref 7–25)
CALCIUM SERPL-MCNC: 10.4 MG/DL (ref 8.6–10.3)
CHLORIDE SERPL-SCNC: 106 MEQ/L (ref 98–107)
CO2 SERPL-SCNC: 24 MEQ/L (ref 21–31)
CREAT SERPL-MCNC: 0.6 MG/DL (ref 0.6–1.2)
DIFFERENTIAL METHOD BLD: ABNORMAL
EGFRCR SERPLBLD CKD-EPI 2021: >60 ML/MIN/1.73M*2
EOSINOPHIL # BLD: 0.56 K/UL (ref 0.04–0.36)
EOSINOPHIL NFR BLD: 7.5 %
ERYTHROCYTE [DISTWIDTH] IN BLOOD BY AUTOMATED COUNT: 14.4 % (ref 11.7–14.4)
EST. AVERAGE GLUCOSE BLD GHB EST-MCNC: 82 MG/DL
FERRITIN SERPL-MCNC: 40 NG/ML (ref 11–250)
FOLATE SERPL-MCNC: 10.6 NG/ML
GLUCOSE SERPL-MCNC: 100 MG/DL (ref 70–99)
HBA1C MFR BLD: 4.5 %
HCT VFR BLD AUTO: 37.1 % (ref 35–45)
HGB BLD-MCNC: 11.1 G/DL (ref 11.8–15.7)
IMM GRANULOCYTES # BLD AUTO: 0.08 K/UL (ref 0–0.08)
IMM GRANULOCYTES NFR BLD AUTO: 1.1 %
IRON SATN MFR SERPL: 11 % (ref 15–45)
IRON SERPL-MCNC: 49 UG/DL (ref 35–150)
LYMPHOCYTES # BLD: 1.11 K/UL (ref 1.2–3.5)
LYMPHOCYTES NFR BLD: 15 %
MCH RBC QN AUTO: 25.1 PG (ref 28–33.2)
MCHC RBC AUTO-ENTMCNC: 29.9 G/DL (ref 32.2–35.5)
MCV RBC AUTO: 83.9 FL (ref 83–98)
MONOCYTES # BLD: 0.54 K/UL (ref 0.28–0.8)
MONOCYTES NFR BLD: 7.3 %
NEUTROPHILS # BLD: 5.08 K/UL (ref 1.7–7)
NEUTS SEG NFR BLD: 68.4 %
NRBC BLD-RTO: 0 %
PDW BLD AUTO: 9.9 FL (ref 9.4–12.3)
PLATELET # BLD AUTO: 377 K/UL (ref 150–369)
POTASSIUM SERPL-SCNC: 3.4 MEQ/L (ref 3.5–5.1)
PROT SERPL-MCNC: 6.8 G/DL (ref 6–8.2)
RBC # BLD AUTO: 4.42 M/UL (ref 3.93–5.22)
SODIUM SERPL-SCNC: 137 MEQ/L (ref 136–145)
TIBC SERPL-MCNC: 434 UG/DL (ref 270–460)
UIBC SERPL-MCNC: 385 UG/DL (ref 180–360)
VIT B12 SERPL-MCNC: 731 PG/ML (ref 180–914)
WBC # BLD AUTO: 7.42 K/UL (ref 3.8–10.5)

## 2024-04-10 PROCEDURE — 82746 ASSAY OF FOLIC ACID SERUM: CPT

## 2024-04-10 PROCEDURE — 82607 VITAMIN B-12: CPT

## 2024-04-10 PROCEDURE — 83036 HEMOGLOBIN GLYCOSYLATED A1C: CPT

## 2024-04-10 PROCEDURE — 85025 COMPLETE CBC W/AUTO DIFF WBC: CPT

## 2024-04-10 PROCEDURE — 82728 ASSAY OF FERRITIN: CPT

## 2024-04-10 PROCEDURE — 82040 ASSAY OF SERUM ALBUMIN: CPT

## 2024-04-10 PROCEDURE — 83550 IRON BINDING TEST: CPT

## 2024-04-10 PROCEDURE — 36415 COLL VENOUS BLD VENIPUNCTURE: CPT

## 2024-06-03 RX ORDER — SPIRONOLACTONE 25 MG/1
25 TABLET ORAL DAILY
Qty: 90 TABLET | Refills: 3 | Status: SHIPPED | OUTPATIENT
Start: 2024-06-03

## 2024-06-18 ENCOUNTER — LAB REQUISITION (OUTPATIENT)
Dept: LAB | Facility: HOSPITAL | Age: 69
End: 2024-06-18
Attending: HOSPITALIST
Payer: MEDICARE

## 2024-06-18 DIAGNOSIS — E83.52 HYPERCALCEMIA: ICD-10-CM

## 2024-06-18 DIAGNOSIS — D64.9 ANEMIA, UNSPECIFIED: ICD-10-CM

## 2024-06-18 DIAGNOSIS — R80.9 PROTEINURIA, UNSPECIFIED: ICD-10-CM

## 2024-06-18 LAB
ALBUMIN SERPL-MCNC: 4.4 G/DL (ref 3.5–5.7)
ALP SERPL-CCNC: 52 IU/L (ref 34–125)
ALT SERPL-CCNC: 26 IU/L (ref 7–52)
ANION GAP SERPL CALC-SCNC: 10 MEQ/L (ref 3–15)
AST SERPL-CCNC: 22 IU/L (ref 13–39)
BASOPHILS # BLD: 0.06 K/UL (ref 0.01–0.1)
BASOPHILS NFR BLD: 0.5 %
BILIRUB SERPL-MCNC: 0.3 MG/DL (ref 0.3–1.2)
BUN SERPL-MCNC: 18 MG/DL (ref 7–25)
CALCIUM SERPL-MCNC: 9.8 MG/DL (ref 8.6–10.3)
CALCIUM SERPL-MCNC: 9.8 MG/DL (ref 8.6–10.3)
CHLORIDE SERPL-SCNC: 102 MEQ/L (ref 98–107)
CO2 SERPL-SCNC: 27 MEQ/L (ref 21–31)
CREAT SERPL-MCNC: 0.6 MG/DL (ref 0.6–1.2)
CREAT UR-MCNC: 152.4 MG/DL
DIFFERENTIAL METHOD BLD: ABNORMAL
EGFRCR SERPLBLD CKD-EPI 2021: >60 ML/MIN/1.73M*2
EOSINOPHIL # BLD: 0.33 K/UL (ref 0.04–0.36)
EOSINOPHIL NFR BLD: 2.9 %
ERYTHROCYTE [DISTWIDTH] IN BLOOD BY AUTOMATED COUNT: 14.6 % (ref 11.7–14.4)
FERRITIN SERPL-MCNC: 11 NG/ML (ref 11–250)
FOLATE SERPL-MCNC: 6.4 NG/ML
GLUCOSE SERPL-MCNC: 99 MG/DL (ref 70–99)
HCT VFR BLD AUTO: 35.3 % (ref 35–45)
HGB BLD-MCNC: 10.5 G/DL (ref 11.8–15.7)
IMM GRANULOCYTES # BLD AUTO: 0.38 K/UL (ref 0–0.08)
IMM GRANULOCYTES NFR BLD AUTO: 3.4 %
IRON SATN MFR SERPL: 5 % (ref 15–45)
IRON SERPL-MCNC: 24 UG/DL (ref 35–150)
LYMPHOCYTES # BLD: 1.22 K/UL (ref 1.2–3.5)
LYMPHOCYTES NFR BLD: 10.9 %
MAGNESIUM SERPL-MCNC: 1.8 MG/DL (ref 1.8–2.5)
MCH RBC QN AUTO: 21.7 PG (ref 28–33.2)
MCHC RBC AUTO-ENTMCNC: 29.7 G/DL (ref 32.2–35.5)
MCV RBC AUTO: 73.1 FL (ref 83–98)
MICROALBUMIN UR-MCNC: 15.6 MG/L
MICROALBUMIN/CREAT UR: 10.2 UG/MG
MONOCYTES # BLD: 0.76 K/UL (ref 0.28–0.8)
MONOCYTES NFR BLD: 6.8 %
NEUTROPHILS # BLD: 8.44 K/UL (ref 1.7–7)
NEUTS SEG NFR BLD: 75.5 %
NRBC BLD-RTO: 0 %
PDW BLD AUTO: 9.4 FL (ref 9.4–12.3)
PHOSPHATE SERPL-MCNC: 3.2 MG/DL (ref 2.4–4.7)
PLATELET # BLD AUTO: 487 K/UL (ref 150–369)
POTASSIUM SERPL-SCNC: 3.5 MEQ/L (ref 3.5–5.1)
PROT SERPL-MCNC: 6.5 G/DL (ref 6–8.2)
PTH-INTACT SERPL-MCNC: 38 PG/ML (ref 12–88)
RBC # BLD AUTO: 4.83 M/UL (ref 3.93–5.22)
SODIUM SERPL-SCNC: 139 MEQ/L (ref 136–145)
TIBC SERPL-MCNC: 440 UG/DL (ref 270–460)
UIBC SERPL-MCNC: 416 UG/DL (ref 180–360)
VIT B12 SERPL-MCNC: 880 PG/ML (ref 180–914)
WBC # BLD AUTO: 11.19 K/UL (ref 3.8–10.5)

## 2024-06-18 PROCEDURE — 82607 VITAMIN B-12: CPT | Performed by: HOSPITALIST

## 2024-06-18 PROCEDURE — 84100 ASSAY OF PHOSPHORUS: CPT | Performed by: HOSPITALIST

## 2024-06-18 PROCEDURE — 83735 ASSAY OF MAGNESIUM: CPT | Performed by: HOSPITALIST

## 2024-06-18 PROCEDURE — 83970 ASSAY OF PARATHORMONE: CPT | Performed by: HOSPITALIST

## 2024-06-18 PROCEDURE — 80053 COMPREHEN METABOLIC PANEL: CPT | Performed by: HOSPITALIST

## 2024-06-18 PROCEDURE — 83550 IRON BINDING TEST: CPT | Performed by: HOSPITALIST

## 2024-06-18 PROCEDURE — 82728 ASSAY OF FERRITIN: CPT | Performed by: HOSPITALIST

## 2024-06-18 PROCEDURE — 84165 PROTEIN E-PHORESIS SERUM: CPT | Performed by: HOSPITALIST

## 2024-06-18 PROCEDURE — 82570 ASSAY OF URINE CREATININE: CPT | Performed by: HOSPITALIST

## 2024-06-18 PROCEDURE — 85025 COMPLETE CBC W/AUTO DIFF WBC: CPT | Performed by: HOSPITALIST

## 2024-06-18 PROCEDURE — 82746 ASSAY OF FOLIC ACID SERUM: CPT | Performed by: HOSPITALIST

## 2024-06-19 ENCOUNTER — TRANSCRIBE ORDERS (OUTPATIENT)
Dept: SCHEDULING | Age: 69
End: 2024-06-19

## 2024-06-19 DIAGNOSIS — R06.09 OTHER FORMS OF DYSPNEA: Primary | ICD-10-CM

## 2024-06-19 DIAGNOSIS — R41.3 OTHER AMNESIA: Primary | ICD-10-CM

## 2024-06-19 DIAGNOSIS — Z98.82 BREAST IMPLANT STATUS: Primary | ICD-10-CM

## 2024-06-19 LAB
ALBUMIN MFR UR ELPH: 71 %
ALBUMIN SERPL ELPH-MCNC: 4.62 G/DL (ref 3.2–4.9)
ALBUMIN/GLOB SERPL: 2.5 {RATIO} (ref 1.1–2.1)
ALPHA1 GLOB MFR SERPL ELPH: 2.6 %
ALPHA1 GLOB SERPL ELPH-MCNC: 0.17 G/DL (ref 0.08–0.23)
ALPHA2 GLOB MFR UR ELPH: 10.3 %
ALPHA2 GLOB SERPL ELPH-MCNC: 0.67 G/DL (ref 0.45–0.92)
B-GLOBULIN SERPL ELPH-MCNC: 0.59 G/DL (ref 0.5–1.03)
BETA1 GLOB MFR UR ELPH: 9.1 %
GAMMA GLOB MFR UR ELPH: 7 %
GAMMA GLOB SERPL ELPH-MCNC: 0.46 G/DL (ref 0.6–1.6)
PROT PATTERN SERPL ELPH-IMP: NORMAL
PROT SERPL-MCNC: 6.5 G/DL (ref 6–8.2)

## 2024-07-08 ENCOUNTER — HOSPITAL ENCOUNTER (OUTPATIENT)
Dept: RADIOLOGY | Age: 69
Discharge: HOME | End: 2024-07-08
Attending: OBSTETRICS & GYNECOLOGY
Payer: MEDICARE

## 2024-07-08 DIAGNOSIS — Z98.82 BREAST IMPLANT STATUS: ICD-10-CM

## 2024-07-08 PROCEDURE — 77066 DX MAMMO INCL CAD BI: CPT

## 2024-07-08 PROCEDURE — G0279 TOMOSYNTHESIS, MAMMO: HCPCS

## 2024-07-15 ENCOUNTER — HOSPITAL ENCOUNTER (OUTPATIENT)
Dept: RADIOLOGY | Age: 69
Discharge: HOME | End: 2024-07-15
Attending: PSYCHIATRY & NEUROLOGY
Payer: MEDICARE

## 2024-07-15 DIAGNOSIS — R41.3 OTHER AMNESIA: ICD-10-CM

## 2024-07-25 ENCOUNTER — TRANSCRIBE ORDERS (OUTPATIENT)
Dept: LAB | Facility: HOSPITAL | Age: 69
End: 2024-07-25

## 2024-07-25 ENCOUNTER — APPOINTMENT (OUTPATIENT)
Dept: LAB | Facility: HOSPITAL | Age: 69
End: 2024-07-25
Attending: HOSPITALIST
Payer: MEDICARE

## 2024-07-25 DIAGNOSIS — M79.10 MYALGIA, UNSPECIFIED SITE: ICD-10-CM

## 2024-07-25 DIAGNOSIS — M79.10 MYALGIA, UNSPECIFIED SITE: Primary | ICD-10-CM

## 2024-07-25 LAB
ALBUMIN SERPL-MCNC: 4.3 G/DL (ref 3.5–5.7)
ALP SERPL-CCNC: 58 IU/L (ref 34–125)
ALT SERPL-CCNC: 23 IU/L (ref 7–52)
ANION GAP SERPL CALC-SCNC: 7 MEQ/L (ref 3–15)
ANISOCYTOSIS BLD QL SMEAR: ABNORMAL
AST SERPL-CCNC: 18 IU/L (ref 13–39)
BASOPHILS # BLD: 0.06 K/UL (ref 0.01–0.1)
BASOPHILS NFR BLD: 0.6 %
BILIRUB SERPL-MCNC: 0.4 MG/DL (ref 0.3–1.2)
BUN SERPL-MCNC: 20 MG/DL (ref 7–25)
CALCIUM SERPL-MCNC: 9.8 MG/DL (ref 8.6–10.3)
CHLORIDE SERPL-SCNC: 105 MEQ/L (ref 98–107)
CK SERPL-CCNC: 74 U/L (ref 30–223)
CO2 SERPL-SCNC: 28 MEQ/L (ref 21–31)
CREAT SERPL-MCNC: 0.6 MG/DL (ref 0.6–1.2)
CRP SERPL-MCNC: 1 MG/L
DIFFERENTIAL METHOD BLD: ABNORMAL
EGFRCR SERPLBLD CKD-EPI 2021: >60 ML/MIN/1.73M*2
EOSINOPHIL # BLD: 0.62 K/UL (ref 0.04–0.36)
EOSINOPHIL NFR BLD: 5.7 %
ERYTHROCYTE [DISTWIDTH] IN BLOOD BY AUTOMATED COUNT: 17.4 % (ref 11.7–14.4)
ERYTHROCYTE [SEDIMENTATION RATE] IN BLOOD BY WESTERGREN METHOD: 12 MM/HR
FERRITIN SERPL-MCNC: 6 NG/ML (ref 11–250)
GLUCOSE SERPL-MCNC: 81 MG/DL (ref 70–99)
HCT VFR BLD AUTO: 31.9 % (ref 35–45)
HGB BLD-MCNC: 9 G/DL (ref 11.8–15.7)
IMM GRANULOCYTES # BLD AUTO: 0.11 K/UL (ref 0–0.08)
IMM GRANULOCYTES NFR BLD AUTO: 1 %
IRON SATN MFR SERPL: 2 % (ref 15–45)
IRON SERPL-MCNC: 11 UG/DL (ref 35–150)
LYMPHOCYTES # BLD: 1.07 K/UL (ref 1.2–3.5)
LYMPHOCYTES NFR BLD: 9.9 %
MAGNESIUM SERPL-MCNC: 1.7 MG/DL (ref 1.8–2.5)
MCH RBC QN AUTO: 19.4 PG (ref 28–33.2)
MCHC RBC AUTO-ENTMCNC: 28.2 G/DL (ref 32.2–35.5)
MCV RBC AUTO: 68.9 FL (ref 83–98)
MONOCYTES # BLD: 1.12 K/UL (ref 0.28–0.8)
MONOCYTES NFR BLD: 10.4 %
NEUTROPHILS # BLD: 7.83 K/UL (ref 1.7–7)
NEUTS SEG NFR BLD: 72.4 %
NRBC BLD-RTO: 0 %
OVALOCYTES BLD QL SMEAR: ABNORMAL
PDW BLD AUTO: 9 FL (ref 9.4–12.3)
PHOSPHATE SERPL-MCNC: 3.7 MG/DL (ref 2.4–4.7)
PLAT MORPH BLD: NORMAL
PLATELET # BLD AUTO: 461 K/UL (ref 150–369)
PLATELET # BLD EST: ABNORMAL 10*3/UL
POTASSIUM SERPL-SCNC: 3.9 MEQ/L (ref 3.5–5.1)
PROT SERPL-MCNC: 6.2 G/DL (ref 6–8.2)
RBC # BLD AUTO: 4.63 M/UL (ref 3.93–5.22)
SODIUM SERPL-SCNC: 140 MEQ/L (ref 136–145)
TIBC SERPL-MCNC: 461 UG/DL (ref 270–460)
UIBC SERPL-MCNC: 450 UG/DL (ref 180–360)
WBC # BLD AUTO: 10.81 K/UL (ref 3.8–10.5)

## 2024-07-25 PROCEDURE — 86140 C-REACTIVE PROTEIN: CPT

## 2024-07-25 PROCEDURE — 36415 COLL VENOUS BLD VENIPUNCTURE: CPT

## 2024-07-25 PROCEDURE — 86431 RHEUMATOID FACTOR QUANT: CPT

## 2024-07-25 PROCEDURE — 85652 RBC SED RATE AUTOMATED: CPT

## 2024-07-25 PROCEDURE — 83550 IRON BINDING TEST: CPT | Mod: GA

## 2024-07-25 PROCEDURE — 84100 ASSAY OF PHOSPHORUS: CPT

## 2024-07-25 PROCEDURE — 83735 ASSAY OF MAGNESIUM: CPT

## 2024-07-25 PROCEDURE — 82728 ASSAY OF FERRITIN: CPT | Mod: GA

## 2024-07-25 PROCEDURE — 85025 COMPLETE CBC W/AUTO DIFF WBC: CPT

## 2024-07-25 PROCEDURE — 86038 ANTINUCLEAR ANTIBODIES: CPT

## 2024-07-25 PROCEDURE — 80053 COMPREHEN METABOLIC PANEL: CPT

## 2024-07-25 PROCEDURE — 82550 ASSAY OF CK (CPK): CPT

## 2024-07-26 LAB
ANA SER QL IA: NEGATIVE
RHEUMATOID FACT SERPL-ACNC: 11 IU/ML

## 2024-10-11 ENCOUNTER — TRANSCRIBE ORDERS (OUTPATIENT)
Dept: LAB | Facility: HOSPITAL | Age: 69
End: 2024-10-11

## 2024-10-11 ENCOUNTER — APPOINTMENT (OUTPATIENT)
Dept: LAB | Facility: HOSPITAL | Age: 69
End: 2024-10-11
Attending: HOSPITALIST
Payer: MEDICARE

## 2024-10-11 DIAGNOSIS — R61 GENERALIZED HYPERHIDROSIS: ICD-10-CM

## 2024-10-11 DIAGNOSIS — R61 GENERALIZED HYPERHIDROSIS: Primary | ICD-10-CM

## 2024-10-11 DIAGNOSIS — I10 ESSENTIAL (PRIMARY) HYPERTENSION: ICD-10-CM

## 2024-10-11 DIAGNOSIS — D64.9 ANEMIA, UNSPECIFIED: ICD-10-CM

## 2024-10-11 LAB
ALBUMIN SERPL-MCNC: 4.9 G/DL (ref 3.5–5.7)
ALP SERPL-CCNC: 67 IU/L (ref 34–125)
ALT SERPL-CCNC: 30 IU/L (ref 7–52)
ANION GAP SERPL CALC-SCNC: 9 MEQ/L (ref 3–15)
AST SERPL-CCNC: 21 IU/L (ref 13–39)
BASOPHILS # BLD: 0.04 K/UL (ref 0.01–0.1)
BASOPHILS NFR BLD: 0.3 %
BILIRUB SERPL-MCNC: 0.6 MG/DL (ref 0.3–1.2)
BUN SERPL-MCNC: 16 MG/DL (ref 7–25)
CALCIUM SERPL-MCNC: 10.9 MG/DL (ref 8.6–10.3)
CHLORIDE SERPL-SCNC: 103 MEQ/L (ref 98–107)
CO2 SERPL-SCNC: 28 MEQ/L (ref 21–31)
CORTIS SERPL-MCNC: 7.9 UG/DL
CREAT SERPL-MCNC: 0.7 MG/DL (ref 0.6–1.2)
CRP SERPL-MCNC: 2.5 MG/L
DIFFERENTIAL METHOD BLD: ABNORMAL
EGFRCR SERPLBLD CKD-EPI 2021: >60 ML/MIN/1.73M*2
EOSINOPHIL # BLD: 0.35 K/UL (ref 0.04–0.36)
EOSINOPHIL NFR BLD: 3 %
ERYTHROCYTE [DISTWIDTH] IN BLOOD BY AUTOMATED COUNT: 20.7 % (ref 11.7–14.4)
ERYTHROCYTE [SEDIMENTATION RATE] IN BLOOD BY WESTERGREN METHOD: 20 MM/HR
GLUCOSE SERPL-MCNC: 95 MG/DL (ref 70–99)
HCG UR QL: NEGATIVE
HCT VFR BLD AUTO: 42.8 % (ref 35–45)
HGB BLD-MCNC: 13.2 G/DL (ref 11.8–15.7)
IMM GRANULOCYTES # BLD AUTO: 0.05 K/UL (ref 0–0.08)
IMM GRANULOCYTES NFR BLD AUTO: 0.4 %
LYMPHOCYTES # BLD: 1.07 K/UL (ref 1.2–3.5)
LYMPHOCYTES NFR BLD: 9.3 %
MCH RBC QN AUTO: 22.8 PG (ref 28–33.2)
MCHC RBC AUTO-ENTMCNC: 30.8 G/DL (ref 32.2–35.5)
MCV RBC AUTO: 74 FL (ref 83–98)
MONOCYTES # BLD: 0.81 K/UL (ref 0.28–0.8)
MONOCYTES NFR BLD: 7 %
NEUTROPHILS # BLD: 9.22 K/UL (ref 1.7–7)
NEUTS SEG NFR BLD: 80 %
NRBC BLD-RTO: 0 %
PLATELET # BLD AUTO: 455 K/UL (ref 150–369)
PMV BLD AUTO: 9.6 FL (ref 9.4–12.3)
POTASSIUM SERPL-SCNC: 3.6 MEQ/L (ref 3.5–5.1)
PROT SERPL-MCNC: 7.1 G/DL (ref 6–8.2)
RBC # BLD AUTO: 5.78 M/UL (ref 3.93–5.22)
SODIUM SERPL-SCNC: 140 MEQ/L (ref 136–145)
TSH SERPL DL<=0.05 MIU/L-ACNC: 3.14 MIU/L (ref 0.34–5.6)
WBC # BLD AUTO: 11.54 K/UL (ref 3.8–10.5)

## 2024-10-11 PROCEDURE — 85652 RBC SED RATE AUTOMATED: CPT

## 2024-10-11 PROCEDURE — 80053 COMPREHEN METABOLIC PANEL: CPT

## 2024-10-11 PROCEDURE — 82533 TOTAL CORTISOL: CPT

## 2024-10-11 PROCEDURE — 86316 IMMUNOASSAY TUMOR OTHER: CPT

## 2024-10-11 PROCEDURE — 85025 COMPLETE CBC W/AUTO DIFF WBC: CPT

## 2024-10-11 PROCEDURE — 84443 ASSAY THYROID STIM HORMONE: CPT

## 2024-10-11 PROCEDURE — 83835 ASSAY OF METANEPHRINES: CPT

## 2024-10-11 PROCEDURE — 86140 C-REACTIVE PROTEIN: CPT

## 2024-10-11 PROCEDURE — 36415 COLL VENOUS BLD VENIPUNCTURE: CPT

## 2024-10-11 PROCEDURE — 86480 TB TEST CELL IMMUN MEASURE: CPT

## 2024-10-11 PROCEDURE — 84703 CHORIONIC GONADOTROPIN ASSAY: CPT

## 2024-10-14 LAB
M TB IFN-G BLD-IMP: NEGATIVE
METANEPH FREE SERPL-MCNC: 45 PG/ML
METANEPHS SERPL-MCNC: 405 PG/ML
MITOGEN-NIL: 6.82 IU/ML
NIL: 0.02 IU/ML
NORMETANEPH FREE SERPL-MCNC: 360 PG/ML
TB AG-NIL: 0 IU/ML
TB2 AG - NIL: 0.01 IU/ML

## 2024-10-28 ENCOUNTER — TELEPHONE (OUTPATIENT)
Dept: SCHEDULING | Facility: CLINIC | Age: 69
End: 2024-10-28
Payer: MEDICARE

## 2024-10-28 NOTE — TELEPHONE ENCOUNTER
Patient calling in to cancel appt on 11/11.    Patient also states she needs stress test.  LOV only says return in 1 year.  Does patient need stress test?    Please call patient to reschedule OV and stress test if needed.    Patient can be reached at [phone]

## 2024-10-28 NOTE — TELEPHONE ENCOUNTER
Left pt vm stating another Doctor entered that test, and she should call that office to see if it is still needed or not. Told her to call back if she wanted to r/s appt from 11/11

## 2024-12-20 ENCOUNTER — OFFICE VISIT (OUTPATIENT)
Dept: CARDIOLOGY | Facility: CLINIC | Age: 69
End: 2024-12-20
Payer: MEDICARE

## 2024-12-20 VITALS
WEIGHT: 118 LBS | HEIGHT: 61 IN | HEART RATE: 60 BPM | OXYGEN SATURATION: 98 % | RESPIRATION RATE: 16 BRPM | SYSTOLIC BLOOD PRESSURE: 130 MMHG | DIASTOLIC BLOOD PRESSURE: 80 MMHG | BODY MASS INDEX: 22.28 KG/M2

## 2024-12-20 DIAGNOSIS — I10 PRIMARY HYPERTENSION: ICD-10-CM

## 2024-12-20 DIAGNOSIS — E87.6 HYPOKALEMIA: Chronic | ICD-10-CM

## 2024-12-20 DIAGNOSIS — E78.5 DYSLIPIDEMIA: ICD-10-CM

## 2024-12-20 DIAGNOSIS — D50.0 IRON DEFICIENCY ANEMIA DUE TO CHRONIC BLOOD LOSS: ICD-10-CM

## 2024-12-20 DIAGNOSIS — D47.02 SYSTEMIC MASTOCYTOSIS: Chronic | ICD-10-CM

## 2024-12-20 DIAGNOSIS — R00.1 SINUS BRADYCARDIA: ICD-10-CM

## 2024-12-20 DIAGNOSIS — R93.1 ELEVATED CORONARY ARTERY CALCIUM SCORE: Primary | ICD-10-CM

## 2024-12-20 DIAGNOSIS — I49.1 PAC (PREMATURE ATRIAL CONTRACTION): ICD-10-CM

## 2024-12-20 LAB
ATRIAL RATE: 60
P AXIS: 59
PR INTERVAL: 150
QRS DURATION: 104
QT INTERVAL: 422
QTC CALCULATION(BAZETT): 422
R AXIS: -52
T WAVE AXIS: 44
VENTRICULAR RATE: 60

## 2024-12-20 PROCEDURE — G8752 SYS BP LESS 140: HCPCS | Performed by: INTERNAL MEDICINE

## 2024-12-20 PROCEDURE — 93000 ELECTROCARDIOGRAM COMPLETE: CPT | Performed by: INTERNAL MEDICINE

## 2024-12-20 PROCEDURE — G8754 DIAS BP LESS 90: HCPCS | Performed by: INTERNAL MEDICINE

## 2024-12-20 PROCEDURE — 99214 OFFICE O/P EST MOD 30 MIN: CPT | Performed by: INTERNAL MEDICINE

## 2024-12-20 RX ORDER — DIPHENHYDRAMINE HCL 50 MG
50 CAPSULE ORAL ONCE
Qty: 1 CAPSULE | Refills: 0 | Status: SHIPPED | OUTPATIENT
Start: 2024-12-20 | End: 2024-12-20

## 2024-12-20 RX ORDER — PREDNISONE 50 MG/1
50 TABLET ORAL EVERY 6 HOURS
Qty: 3 TABLET | Refills: 0 | Status: SHIPPED | OUTPATIENT
Start: 2024-12-20 | End: 2024-12-21

## 2024-12-20 ASSESSMENT — ENCOUNTER SYMPTOMS
WEIGHT GAIN: 0
CLAUDICATION: 0
MUSCLE CRAMPS: 0
SYNCOPE: 0
WEIGHT LOSS: 0
DYSPNEA ON EXERTION: 0
SHORTNESS OF BREATH: 0
PALPITATIONS: 0
ALTERED MENTAL STATUS: 0
DIAPHORESIS: 0
COLOR CHANGE: 0
ORTHOPNEA: 0
COUGH: 0
BLURRED VISION: 0
ADENOPATHY: 0
HEARTBURN: 0
LIGHT-HEADEDNESS: 0

## 2024-12-20 NOTE — PROGRESS NOTES
Cardiology Note       Reason for visit:   Chief Complaint   Patient presents with    Elevated coronary artery calcium score      HPI   Ritu Ramirez is a 69 y.o. female who presents for scheduled follow-up.    We last saw her in November. She was taken off HCTZ in favour of spironolactone and has been tolerating it well. She feels much better and her potassium has remained stable.  She exercise by walking lifting weights.  She denies cardiac symptoms now of chest pain, shortness breath, palpitations, lightheadedness or syncope.    She has a friend who also has systemic mastocytosis developed kounis syndrome this year. She is wondering about utility of a stress test for follow up on her  echo stress. She had a normal stress test in 2020. We discussed utility of moving forward with CCTA for evaluation of her coronary anatomy.      Past Medical History:   Diagnosis Date    Anemia     Anxiety     Hypertension     Iron deficiency     Lipid disorder     Systemic mastocytosis     Vitamin D deficiency      Past Surgical History   Procedure Laterality Date    Augmentation mammaplasty Bilateral     Colonoscopy      OPEN REDUCTION INTERNAL FIXATION PATELLA FRACTURE Left 2021    Performed by Ashley Kiser MD at [facility] OR [facility]    Patella surgery      Reduction mammaplasty       Social History     Socioeconomic History    Marital status: Single     Spouse name: None    Number of children: None    Years of education: None    Highest education level: None   Tobacco Use    Smoking status: Former     Current packs/day: 0.00     Types: Cigarettes     Quit date:      Years since quittin.9    Smokeless tobacco: Never   Vaping Use    Vaping status: Never Used   Substance and Sexual Activity    Alcohol use: Yes     Alcohol/week: 1.0 standard drink of alcohol     Types: 1 Glasses of wine per week     Comment: Social    Drug use: No    Sexual activity: Defer     Social Drivers of Health     Food Insecurity: No Food  Insecurity (12/23/2021)    Hunger Vital Sign     Worried About Running Out of Food in the Last Year: Never true     Ran Out of Food in the Last Year: Never true     Family History   Problem Relation Name Age of Onset    Heart disease Biological Mother      Prostate cancer Biological Father       Anesthetics - amide type - select amino amides, Iodinated contrast media, Other, Penicillins, Clarithromycin, Erythromycin base, and Nsaids (non-steroidal anti-inflammatory drug)  Current Outpatient Medications   Medication Sig Dispense Refill    diphenhydrAMINE (BENADRYL) 50 mg capsule Take 1 capsule (50 mg total) by mouth once for 1 dose. Take 1 tab 1 hr before exam (with last dose of prednisone). 1 capsule 0    predniSONE (DELTASONE) 50 mg tablet Take 1 tablet (50 mg total) by mouth every 6 (six) hours for 3 doses. Take 1 tab 13 hrs, 1 tab 7 hrs, and 1 tab 1 hr before exam. 3 tablet 0    bisacodyL (DULCOLAX) 5 mg EC tablet Take 5 mg by mouth daily.      cholecalciferol, vitamin D3, 1,000 unit capsule Take 1,000 Units by mouth daily.        dextroamphetamine-amphetamine (ADDERALL) 30 mg tablet Take 30 mg by mouth daily.      escitalopram (LEXAPRO) 10 mg tablet TAKE 1 TABLET BY MOUTH EVERY DAY 90 tablet 3    magnesium oxide (MAG-OX) 400 mg (241.3 mg magnesium) tablet Take 400 mg by mouth daily.      rosuvastatin (CRESTOR) 10 mg tablet TAKE 1 TABLET BY MOUTH EVERY DAY 90 tablet 3    spironolactone (ALDACTONE) 25 mg tablet Take 1 tablet (25 mg total) by mouth daily. 90 tablet 3     No current facility-administered medications for this visit.       Review of Systems   Constitutional: Positive for malaise/fatigue. Negative for diaphoresis, weight gain and weight loss.   HENT:  Negative for nosebleeds.    Eyes:  Negative for blurred vision.   Cardiovascular:  Negative for chest pain, claudication, dyspnea on exertion, leg swelling, orthopnea, palpitations and syncope.   Respiratory:  Negative for cough and shortness of breath.   "  Hematologic/Lymphatic: Negative for adenopathy.   Skin:  Negative for color change.   Musculoskeletal:  Negative for muscle cramps.   Gastrointestinal:  Negative for heartburn.   Genitourinary:  Negative for nocturia.   Neurological:  Negative for light-headedness.   Psychiatric/Behavioral:  Negative for altered mental status.      Objective   Vitals:    12/20/24 1540   BP: 130/80   BP Location: Left upper arm   Patient Position: Sitting   Pulse: 60   Resp: 16   SpO2: 98%   Weight: 53.5 kg (118 lb)   Height: 1.549 m (5' 1\")     Weight: 53.5 kg (118 lb)     Physical Exam:    General Appearance:  Alert, no distress   Head:  Normocephalic, without obvious abnormality, atraumatic   Eyes:  Conjunctiva/corneas clear, EOM's intact   Neck: No thyroid enlargement. No JVD. No bruits    Lungs:   Clear to auscultation bilaterally, respirations unlabored, no rales, no wheezing   Heart:  Regular rhythm, S1 and S2 normal, no murmur, rub or gallop   Abdomen:   Soft, non-tender, no masses, no organomegaly   Vascular: Pulses 2+ and symmetric all extremities, no carotid bruit or jugular vein distention   Musculoskeletal:  Skin: No injury or deformity  Skin color, texture, turgor normal, no rashes or lesions, no cyanosis or edema   Extremities: Extremities normal, atraumatic, pulses normal   Behavior/Emotional: Appropriate, cooperative       Lab Results   Component Value Date    WBC 11.54 (H) 10/11/2024    HGB 13.2 10/11/2024     (H) 10/11/2024    CHOL 159 02/15/2024    TRIG 98 02/15/2024    HDL 74 02/15/2024    LDLCALC 65 02/15/2024    ALT 30 10/11/2024    AST 21 10/11/2024     10/11/2024    K 3.6 10/11/2024    CREATININE 0.7 10/11/2024    TSH 3.14 10/11/2024    INR 1.0 05/07/2021    HGBA1C 4.5 04/10/2024          ECG   sinus bradycardia  LAFB  PRWP     ASSESSMENT/PLAN    Problem List Items Addressed This Visit          High    Primary hypertension    Overview     2017 echocardiogram: Normal structural heart             " Current Assessment & Plan     Well controlled on spironolactone and low sodium diet         Elevated coronary artery calcium score - Primary    Overview     6/2019 score: 128 (90th%)  2/12/2020 SE--11:16, 92%mPHR, negative         Relevant Orders    [facility] (Completed)    CTA CORONARY ARTERY WITH IV CONTRAST    Basic metabolic panel    PAC (premature atrial contraction)    Overview     5/2023 [place] layo: Multiple recordings with frequent PACs sometimes in a trigeminal pattern.  No evidence for atrial fibrillation.    6/2023 7 day Zio: Potassium 3.0. 1 run Vtach 7 beats. 15 episodes SVT lasting up to 15 seconds at HR  bpm. Symptoms correlate with SVT.    Palpitations improved since potassium better.            Medium    Systemic mastocytosis (Chronic)    Overview     NIH         Hypokalemia (Chronic)    Overview     Potassium 2.3 on HCTZ         Current Assessment & Plan     Stable on spironolactone         Dyslipidemia    Current Assessment & Plan     Her last LDL was 65 in february on rosuvastatin and efforts at a heart healthy diet.            Low    Sinus bradycardia    Overview     She has the [place] Layo.         Current Assessment & Plan     Her heart rate is acceptable today. NSR 60bpm.  She has no symptoms of bradycardia.         Iron deficiency anemia    Overview     Secondary to hemorrhoidal bleeding         Current Assessment & Plan     Followed by her PCP. Most recent ferritin  >100 and Hgb 11.5             Return in about 1 year (around 12/20/2025).    Bright Selby MD  12/20/2024

## 2024-12-20 NOTE — LETTER
December 20, 2024     Angela Baldwin, DO  [address]  [place] [place] [address]  [place] [address]    Patient: Ritu Ramirez  YOB: 1955  Date of Visit: 12/20/2024      Dear Dr. Baldwin:    Thank you for referring Ritu Ramirez to me for evaluation. Below are my notes for this consultation.    If you have questions, please do not hesitate to call me. I look forward to following your patient along with you.         Sincerely,        Bright Selby MD        CC: No Recipients    Bright Selby MD  12/20/2024  4:39 PM  Sign when Signing Visit     Cardiology Note       Reason for visit:   Chief Complaint   Patient presents with   • Elevated coronary artery calcium score      HPI   Ritu Ramirez is a 69 y.o. female who presents for scheduled follow-up.    We last saw her in November. She was taken off HCTZ in favour of spironolactone and has been tolerating it well. She feels much better and her potassium has remained stable.  She exercise by walking lifting weights.  She denies cardiac symptoms now of chest pain, shortness breath, palpitations, lightheadedness or syncope.    She has a friend who also has systemic mastocytosis developed kounis syndrome this year. She is wondering about utility of a stress test for follow up on her 2020 echo stress. She had a normal stress test in 2020. We discussed utility of moving forward with CCTA for evaluation of her coronary anatomy.      Past Medical History:   Diagnosis Date   • Anemia    • Anxiety    • Hypertension    • Iron deficiency    • Lipid disorder    • Systemic mastocytosis    • Vitamin D deficiency      Past Surgical History   Procedure Laterality Date   • Augmentation mammaplasty Bilateral    • Colonoscopy     • OPEN REDUCTION INTERNAL FIXATION PATELLA FRACTURE Left 5/8/2021    Performed by Ashley Kiser MD at [facility] OR [facility]   • Patella surgery     • Reduction mammaplasty       Social History     Socioeconomic History   • Marital status: Single      Spouse name: None   • Number of children: None   • Years of education: None   • Highest education level: None   Tobacco Use   • Smoking status: Former     Current packs/day: 0.00     Types: Cigarettes     Quit date:      Years since quittin.9   • Smokeless tobacco: Never   Vaping Use   • Vaping status: Never Used   Substance and Sexual Activity   • Alcohol use: Yes     Alcohol/week: 1.0 standard drink of alcohol     Types: 1 Glasses of wine per week     Comment: Social   • Drug use: No   • Sexual activity: Defer     Social Drivers of Health     Food Insecurity: No Food Insecurity (2021)    Hunger Vital Sign    • Worried About Running Out of Food in the Last Year: Never true    • Ran Out of Food in the Last Year: Never true     Family History   Problem Relation Name Age of Onset   • Heart disease Biological Mother     • Prostate cancer Biological Father       Anesthetics - amide type - select amino amides, Iodinated contrast media, Other, Penicillins, Clarithromycin, Erythromycin base, and Nsaids (non-steroidal anti-inflammatory drug)  Current Outpatient Medications   Medication Sig Dispense Refill   • diphenhydrAMINE (BENADRYL) 50 mg capsule Take 1 capsule (50 mg total) by mouth once for 1 dose. Take 1 tab 1 hr before exam (with last dose of prednisone). 1 capsule 0   • predniSONE (DELTASONE) 50 mg tablet Take 1 tablet (50 mg total) by mouth every 6 (six) hours for 3 doses. Take 1 tab 13 hrs, 1 tab 7 hrs, and 1 tab 1 hr before exam. 3 tablet 0   • bisacodyL (DULCOLAX) 5 mg EC tablet Take 5 mg by mouth daily.     • cholecalciferol, vitamin D3, 1,000 unit capsule Take 1,000 Units by mouth daily.       • dextroamphetamine-amphetamine (ADDERALL) 30 mg tablet Take 30 mg by mouth daily.     • escitalopram (LEXAPRO) 10 mg tablet TAKE 1 TABLET BY MOUTH EVERY DAY 90 tablet 3   • magnesium oxide (MAG-OX) 400 mg (241.3 mg magnesium) tablet Take 400 mg by mouth daily.     • rosuvastatin (CRESTOR) 10 mg tablet  "TAKE 1 TABLET BY MOUTH EVERY DAY 90 tablet 3   • spironolactone (ALDACTONE) 25 mg tablet Take 1 tablet (25 mg total) by mouth daily. 90 tablet 3     No current facility-administered medications for this visit.       Review of Systems   Constitutional: Positive for malaise/fatigue. Negative for diaphoresis, weight gain and weight loss.   HENT:  Negative for nosebleeds.    Eyes:  Negative for blurred vision.   Cardiovascular:  Negative for chest pain, claudication, dyspnea on exertion, leg swelling, orthopnea, palpitations and syncope.   Respiratory:  Negative for cough and shortness of breath.    Hematologic/Lymphatic: Negative for adenopathy.   Skin:  Negative for color change.   Musculoskeletal:  Negative for muscle cramps.   Gastrointestinal:  Negative for heartburn.   Genitourinary:  Negative for nocturia.   Neurological:  Negative for light-headedness.   Psychiatric/Behavioral:  Negative for altered mental status.      Objective   Vitals:    12/20/24 1540   BP: 130/80   BP Location: Left upper arm   Patient Position: Sitting   Pulse: 60   Resp: 16   SpO2: 98%   Weight: 53.5 kg (118 lb)   Height: 1.549 m (5' 1\")     Weight: 53.5 kg (118 lb)     Physical Exam:    General Appearance:  Alert, no distress   Head:  Normocephalic, without obvious abnormality, atraumatic   Eyes:  Conjunctiva/corneas clear, EOM's intact   Neck: No thyroid enlargement. No JVD. No bruits    Lungs:   Clear to auscultation bilaterally, respirations unlabored, no rales, no wheezing   Heart:  Regular rhythm, S1 and S2 normal, no murmur, rub or gallop   Abdomen:   Soft, non-tender, no masses, no organomegaly   Vascular: Pulses 2+ and symmetric all extremities, no carotid bruit or jugular vein distention   Musculoskeletal:  Skin: No injury or deformity  Skin color, texture, turgor normal, no rashes or lesions, no cyanosis or edema   Extremities: Extremities normal, atraumatic, pulses normal   Behavior/Emotional: Appropriate, cooperative "       Lab Results   Component Value Date    WBC 11.54 (H) 10/11/2024    HGB 13.2 10/11/2024     (H) 10/11/2024    CHOL 159 02/15/2024    TRIG 98 02/15/2024    HDL 74 02/15/2024    LDLCALC 65 02/15/2024    ALT 30 10/11/2024    AST 21 10/11/2024     10/11/2024    K 3.6 10/11/2024    CREATININE 0.7 10/11/2024    TSH 3.14 10/11/2024    INR 1.0 05/07/2021    HGBA1C 4.5 04/10/2024          ECG   sinus bradycardia  LAFB  PRWP     ASSESSMENT/PLAN    Problem List Items Addressed This Visit          High    Primary hypertension    Overview     2017 echocardiogram: Normal structural heart             Current Assessment & Plan     Well controlled on spironolactone and low sodium diet         Elevated coronary artery calcium score - Primary    Overview     6/2019 score: 128 (90th%)  2/12/2020 SE--11:16, 92%mPHR, negative         Relevant Orders    [facility] MUSE ECG 12 lead (clinic performed) (Completed)    CTA CORONARY ARTERY WITH IV CONTRAST    Basic metabolic panel    PAC (premature atrial contraction)    Overview     5/2023 [place] layo: Multiple recordings with frequent PACs sometimes in a trigeminal pattern.  No evidence for atrial fibrillation.    6/2023 7 day Zio: Potassium 3.0. 1 run Vtach 7 beats. 15 episodes SVT lasting up to 15 seconds at HR  bpm. Symptoms correlate with SVT.    Palpitations improved since potassium better.            Medium    Systemic mastocytosis (Chronic)    Overview     NIH         Hypokalemia (Chronic)    Overview     Potassium 2.3 on HCTZ         Current Assessment & Plan     Stable on spironolactone         Dyslipidemia    Current Assessment & Plan     Her last LDL was 65 in february on rosuvastatin and efforts at a heart healthy diet.            Low    Sinus bradycardia    Overview     She has the [place] Layo.         Current Assessment & Plan     Her heart rate is acceptable today. NSR 60bpm.  She has no symptoms of bradycardia.         Iron deficiency anemia    Overview      Secondary to hemorrhoidal bleeding         Current Assessment & Plan     Followed by her PCP. Most recent ferritin  >100 and Hgb 11.5             Return in about 1 year (around 12/20/2025).    Bright Selby MD  12/20/2024

## 2024-12-26 ENCOUNTER — TELEPHONE (OUTPATIENT)
Dept: CARDIOLOGY | Facility: CLINIC | Age: 69
End: 2024-12-26

## 2025-01-22 RX ORDER — ROSUVASTATIN CALCIUM 10 MG/1
10 TABLET, COATED ORAL DAILY
Qty: 90 TABLET | Refills: 3 | Status: SHIPPED | OUTPATIENT
Start: 2025-01-22 | End: 2025-02-25 | Stop reason: SDUPTHER

## 2025-01-23 ENCOUNTER — TRANSCRIBE ORDERS (OUTPATIENT)
Dept: LAB | Facility: HOSPITAL | Age: 70
End: 2025-01-23

## 2025-01-23 ENCOUNTER — APPOINTMENT (OUTPATIENT)
Dept: LAB | Facility: HOSPITAL | Age: 70
End: 2025-01-23
Attending: INTERNAL MEDICINE
Payer: MEDICARE

## 2025-01-23 DIAGNOSIS — R53.83 OTHER FATIGUE: ICD-10-CM

## 2025-01-23 DIAGNOSIS — D47.02 SYSTEMIC MASTOCYTOSIS: Primary | ICD-10-CM

## 2025-01-23 DIAGNOSIS — D47.02 SYSTEMIC MASTOCYTOSIS: ICD-10-CM

## 2025-01-23 DIAGNOSIS — D64.9 ANEMIA, UNSPECIFIED: ICD-10-CM

## 2025-01-23 DIAGNOSIS — K04.7 PERIAPICAL ABSCESS WITHOUT SINUS: ICD-10-CM

## 2025-01-23 DIAGNOSIS — R93.1 ELEVATED CORONARY ARTERY CALCIUM SCORE: ICD-10-CM

## 2025-01-23 LAB
ALBUMIN SERPL-MCNC: 4.6 G/DL (ref 3.5–5.7)
ALP SERPL-CCNC: 66 IU/L (ref 34–125)
ALT SERPL-CCNC: 20 IU/L (ref 7–52)
ANION GAP SERPL CALC-SCNC: 8 MEQ/L (ref 3–15)
AST SERPL-CCNC: 20 IU/L (ref 13–39)
BASOPHILS # BLD: 0.05 K/UL (ref 0.01–0.1)
BASOPHILS NFR BLD: 0.6 %
BILIRUB SERPL-MCNC: 0.4 MG/DL (ref 0.3–1.2)
BUN SERPL-MCNC: 17 MG/DL (ref 7–25)
CALCIUM SERPL-MCNC: 9.4 MG/DL (ref 8.6–10.3)
CHLORIDE SERPL-SCNC: 104 MEQ/L (ref 98–107)
CO2 SERPL-SCNC: 27 MEQ/L (ref 21–31)
CREAT SERPL-MCNC: 0.6 MG/DL (ref 0.6–1.2)
DIFFERENTIAL METHOD BLD: ABNORMAL
EGFRCR SERPLBLD CKD-EPI 2021: >60 ML/MIN/1.73M*2
EOSINOPHIL # BLD: 0.47 K/UL (ref 0.04–0.36)
EOSINOPHIL NFR BLD: 5.4 %
ERYTHROCYTE [DISTWIDTH] IN BLOOD BY AUTOMATED COUNT: 14.9 % (ref 11.7–14.4)
FERRITIN SERPL-MCNC: 7 NG/ML (ref 11–250)
FOLATE SERPL-MCNC: 8.8 NG/ML
GLUCOSE SERPL-MCNC: 108 MG/DL (ref 70–99)
HCT VFR BLD AUTO: 38.3 % (ref 35–45)
HGB BLD-MCNC: 11.4 G/DL (ref 11.8–15.7)
IMM GRANULOCYTES # BLD AUTO: 0.05 K/UL (ref 0–0.08)
IMM GRANULOCYTES NFR BLD AUTO: 0.6 %
IRON SATN MFR SERPL: 3 % (ref 15–45)
IRON SERPL-MCNC: 13 UG/DL (ref 35–150)
LYMPHOCYTES # BLD: 1.29 K/UL (ref 1.2–3.5)
LYMPHOCYTES NFR BLD: 14.9 %
MCH RBC QN AUTO: 21.8 PG (ref 28–33.2)
MCHC RBC AUTO-ENTMCNC: 29.8 G/DL (ref 32.2–35.5)
MCV RBC AUTO: 73.2 FL (ref 83–98)
MONOCYTES # BLD: 0.67 K/UL (ref 0.28–0.8)
MONOCYTES NFR BLD: 7.8 %
NEUTROPHILS # BLD: 6.1 K/UL (ref 1.7–7)
NEUTS SEG NFR BLD: 70.7 %
NRBC BLD-RTO: 0 %
PLATELET # BLD AUTO: 472 K/UL (ref 150–369)
PMV BLD AUTO: 9.6 FL (ref 9.4–12.3)
POTASSIUM SERPL-SCNC: 4.3 MEQ/L (ref 3.5–5.1)
PROT SERPL-MCNC: 6.7 G/DL (ref 6–8.2)
RBC # BLD AUTO: 5.23 M/UL (ref 3.93–5.22)
SODIUM SERPL-SCNC: 139 MEQ/L (ref 136–145)
TIBC SERPL-MCNC: 469 UG/DL (ref 270–460)
UIBC SERPL-MCNC: 456 UG/DL (ref 180–360)
VIT B12 SERPL-MCNC: 785 PG/ML (ref 180–914)
WBC # BLD AUTO: 8.63 K/UL (ref 3.8–10.5)

## 2025-01-23 PROCEDURE — 36415 COLL VENOUS BLD VENIPUNCTURE: CPT

## 2025-01-23 PROCEDURE — 85025 COMPLETE CBC W/AUTO DIFF WBC: CPT

## 2025-01-23 PROCEDURE — 82746 ASSAY OF FOLIC ACID SERUM: CPT | Mod: GA

## 2025-01-23 PROCEDURE — 83540 ASSAY OF IRON: CPT

## 2025-01-23 PROCEDURE — 80053 COMPREHEN METABOLIC PANEL: CPT

## 2025-01-23 PROCEDURE — 82728 ASSAY OF FERRITIN: CPT

## 2025-01-23 PROCEDURE — 82607 VITAMIN B-12: CPT | Mod: GA

## 2025-01-23 PROCEDURE — 86665 EPSTEIN-BARR CAPSID VCA: CPT

## 2025-01-23 PROCEDURE — 87040 BLOOD CULTURE FOR BACTERIA: CPT

## 2025-01-24 LAB
EBV VCA IGG SER IA-ACNC: 2.82
EBV VCA IGM SER IA-ACNC: 0.41

## 2025-01-24 NOTE — TELEPHONE ENCOUNTER
Medicine Refill Request    Last Office: 12/5/2022   Last Consult Visit: Visit date not found  Last Telemedicine Visit: 11/25/2022 Shauna Ellis CRNP    Next Appointment: 4/12/2023      Current Outpatient Medications:   •  amLODIPine (NORVASC) 5 mg tablet, Take 1 tablet (5 mg total) by mouth daily., Disp: 90 tablet, Rfl: 1  •  B complex-vitamin C-folic acid 400 mcg tablet tablet, Take 1 tablet by mouth daily., Disp: , Rfl:   •  biotin 1 mg capsule, Take 1 capsule by mouth daily. Dose unknown  , Disp: , Rfl:   •  bisacodyL (DULCOLAX) 5 mg EC tablet, Take 5 mg by mouth daily., Disp: , Rfl:   •  calcium carbonate (CALCIUM 500 ORAL), Take 500 mg by mouth daily. Dose unknown  , Disp: , Rfl:   •  cholecalciferol, vitamin D3, 1,000 unit capsule, Take 1,000 Units by mouth daily.  , Disp: , Rfl:   •  escitalopram (LEXAPRO) 10 mg tablet, TAKE 1 TABLET BY MOUTH EVERY DAY, Disp: 90 tablet, Rfl: 3  •  hydrochlorothiazide (HYDRODIURIL) 25 mg tablet, Take 1 tablet (25 mg total) by mouth once daily., Disp: 90 tablet, Rfl: 3  •  lisdexamfetamine (VYVANSE) 60 mg capsule, Take 1 capsule (60 mg total) by mouth every morning., Disp: 30 capsule, Rfl: 0  •  neomycin-polymyxin-hydrocortisone (CORTISPORIN) otic solution, Administer 3 drops into the right ear 4 (four) times a day for 10 days., Disp: 10 mL, Rfl: 0  •  potassium chloride (KLOR-CON M) 20 mEq CR tablet, Take 20 mEq by mouth daily., Disp: , Rfl:   •  rosuvastatin 10 mg tablet, Take 1 tablet (10 mg total) by mouth once daily., Disp: 30 tablet, Rfl: 11      BP Readings from Last 3 Encounters:   12/05/22 90/60   10/24/22 110/70   06/10/22 116/72       Recent Lab results:  Lab Results   Component Value Date    CHOL 150 02/07/2022   ,   Lab Results   Component Value Date    HDL 71 02/07/2022   ,   Lab Results   Component Value Date    LDLCALC 62 02/07/2022   ,   Lab Results   Component Value Date    TRIG 85 02/07/2022        Lab Results   Component Value Date    GLUCOSE 99  11/09/2022   ,   Lab Results   Component Value Date    HGBA1C 4.7 (L) 08/11/2020         Lab Results   Component Value Date    CREATININE 0.4 (L) 11/09/2022       Lab Results   Component Value Date    TSH 1.53 02/07/2022            Opt out

## 2025-01-27 LAB — BACTERIA BLD CULT: NORMAL

## 2025-01-30 ENCOUNTER — TRANSCRIBE ORDERS (OUTPATIENT)
Dept: SCHEDULING | Age: 70
End: 2025-01-30

## 2025-01-30 DIAGNOSIS — K76.5 HEPATIC VENO-OCCLUSIVE DISEASE: Primary | ICD-10-CM

## 2025-01-30 DIAGNOSIS — K76.9 LIVER DISEASE, UNSPECIFIED: ICD-10-CM

## 2025-02-03 ENCOUNTER — HOSPITAL ENCOUNTER (OUTPATIENT)
Dept: RADIOLOGY | Age: 70
Discharge: HOME | End: 2025-02-03
Attending: HOSPITALIST
Payer: MEDICARE

## 2025-02-03 DIAGNOSIS — K76.9 LIVER DISEASE, UNSPECIFIED: ICD-10-CM

## 2025-02-03 RX ORDER — GADOBUTROL 604.72 MG/ML
0.1 INJECTION INTRAVENOUS ONCE
Status: COMPLETED | OUTPATIENT
Start: 2025-02-03 | End: 2025-02-03

## 2025-02-03 RX ADMIN — GADOBUTROL 5.4 ML: 604.72 INJECTION INTRAVENOUS at 17:12

## 2025-02-13 ENCOUNTER — TELEPHONE (OUTPATIENT)
Dept: SCHEDULING | Facility: CLINIC | Age: 70
End: 2025-02-13
Payer: MEDICARE

## 2025-02-13 RX ORDER — TELMISARTAN 40 MG/1
40 TABLET ORAL DAILY
Qty: 90 TABLET | Refills: 3 | Status: SHIPPED | OUTPATIENT
Start: 2025-02-13

## 2025-02-13 NOTE — TELEPHONE ENCOUNTER
Called and spoke with patient, discussed she does not need an echo prior to her coronary CTA    She currently takes spironolactone 25 mg daily, she has noticed that her blood pressure, which she checks 2 hours after taking her spironolactone, has been averaging 150/100.  She doubled up her spironolactone to take 50 mg daily, does not feel that it is doing much with her blood pressures.  I asked her to go back to her dose of 25 mg daily and I would discuss with Dr Selby.    Patient has also started receiving iron infusions, I assured her that this is okay for her to have from a cardiac standpoint.    Patient did have her cuff checked by her primary Dr. Onofre and her cuff was off, so she has since purchased a new cuff but is still getting elevated readings.    HR 80-90s. start telmi 40mg?

## 2025-02-13 NOTE — TELEPHONE ENCOUNTER
Pt is having a CT Scan on 2/24 and wants to know if she should have an echo before that.   She also has been experiencing some high blood pressure so she doubled up on her Spironolactone. Pt would like discuss.  Pt can be reached at [phone]

## 2025-02-13 NOTE — TELEPHONE ENCOUNTER
Called and spoke with patient, she will stay on 25 mg of spironolactone, she will begin telmisartan 40 mg daily.  We discussed rechecking her BMP in 2 weeks, she is going for her next iron infusion on Monday and will have her labs checked then.  I can see them in the [facility] network and let her know I would update care everywhere and call her with the results.  She verbalized understanding.  New prescription sent to pharmacy on file

## 2025-02-13 NOTE — TELEPHONE ENCOUNTER
I reviewed the patient's chart.  It looks like in the past she took hydrochlorothiazide but it caused hypokalemia and at that point she was placed on spironolactone.  She was also on amlodipine in the past which appears to have been discontinued in 2023 secondary to normal blood pressure readings.    She should go back on 25 mg of spironolactone daily and begin telmisartan 40 mg daily.  She should check a basic metabolic panel in 2 weeks since she has a history of hypokalemia.  Thank you

## 2025-02-18 ENCOUNTER — HOSPITAL ENCOUNTER (OUTPATIENT)
Dept: RADIOLOGY | Age: 70
Discharge: HOME | End: 2025-02-18
Attending: HOSPITALIST
Payer: MEDICARE

## 2025-02-18 DIAGNOSIS — K76.9 LIVER DISEASE, UNSPECIFIED: ICD-10-CM

## 2025-02-18 PROCEDURE — 76700 US EXAM ABDOM COMPLETE: CPT

## 2025-02-18 NOTE — TELEPHONE ENCOUNTER
Left message on machine, voice verified.    Let patient know reviewed her blood work, labs look great.  Asked her to continue on her spironolactone 25 mg daily as well as her telmisartan 40 mg daily.  I asked her to call me in 1 week with an update on her blood pressures.  Encouraged call back with any additional questions

## 2025-02-20 ENCOUNTER — TELEPHONE (OUTPATIENT)
Dept: RADIOLOGY | Facility: HOSPITAL | Age: 70
End: 2025-02-20
Payer: MEDICARE

## 2025-02-21 NOTE — TELEPHONE ENCOUNTER
Spoke with the patient in reference to CTA scan.     Instructions about dye prep provided.     Advised to hold adderall    Directions to hospital and parking given.     All questions answered.

## 2025-02-24 ENCOUNTER — HOSPITAL ENCOUNTER (OUTPATIENT)
Dept: RADIOLOGY | Facility: HOSPITAL | Age: 70
Discharge: HOME | End: 2025-02-24
Attending: INTERNAL MEDICINE
Payer: MEDICARE

## 2025-02-24 VITALS
RESPIRATION RATE: 17 BRPM | OXYGEN SATURATION: 99 % | HEART RATE: 58 BPM | BODY MASS INDEX: 22.28 KG/M2 | SYSTOLIC BLOOD PRESSURE: 123 MMHG | WEIGHT: 118 LBS | HEIGHT: 61 IN | DIASTOLIC BLOOD PRESSURE: 71 MMHG

## 2025-02-24 DIAGNOSIS — R93.1 ELEVATED CORONARY ARTERY CALCIUM SCORE: ICD-10-CM

## 2025-02-24 PROCEDURE — 75574 CT ANGIO HRT W/3D IMAGE: CPT

## 2025-02-24 PROCEDURE — 63600105 HC IODINE BASED CONTRAST: Mod: JZ | Performed by: INTERNAL MEDICINE

## 2025-02-24 PROCEDURE — 63700000 HC SELF-ADMINISTRABLE DRUG: Performed by: INTERNAL MEDICINE

## 2025-02-24 PROCEDURE — 75580 N-INVAS EST C FFR SW ALY CTA: CPT

## 2025-02-24 RX ORDER — METOPROLOL TARTRATE 50 MG/1
50 TABLET ORAL EVERY 30 MIN PRN
Status: DISCONTINUED | OUTPATIENT
Start: 2025-02-24 | End: 2025-02-25 | Stop reason: HOSPADM

## 2025-02-24 RX ORDER — METOPROLOL TARTRATE 50 MG/1
100 TABLET ORAL ONCE
Status: COMPLETED | OUTPATIENT
Start: 2025-02-24 | End: 2025-02-24

## 2025-02-24 RX ORDER — IVABRADINE 7.5 MG/1
15 TABLET, FILM COATED ORAL ONCE
Status: COMPLETED | OUTPATIENT
Start: 2025-02-24 | End: 2025-02-24

## 2025-02-24 RX ORDER — NITROGLYCERIN 0.4 MG/1
0.4 TABLET SUBLINGUAL ONCE
Status: COMPLETED | OUTPATIENT
Start: 2025-02-24 | End: 2025-02-24

## 2025-02-24 RX ORDER — IOPAMIDOL 755 MG/ML
100 INJECTION, SOLUTION INTRAVASCULAR
Status: COMPLETED | OUTPATIENT
Start: 2025-02-24 | End: 2025-02-24

## 2025-02-24 RX ORDER — METOPROLOL TARTRATE 1 MG/ML
5 INJECTION, SOLUTION INTRAVENOUS AS NEEDED
Status: DISCONTINUED | OUTPATIENT
Start: 2025-02-24 | End: 2025-02-25 | Stop reason: HOSPADM

## 2025-02-24 RX ADMIN — IOPAMIDOL 100 ML: 755 INJECTION, SOLUTION INTRAVENOUS at 09:44

## 2025-02-24 RX ADMIN — NITROGLYCERIN 0.4 MG: 0.4 TABLET SUBLINGUAL at 09:31

## 2025-02-24 RX ADMIN — IVABRADINE 15 MG: 7.5 TABLET, FILM COATED ORAL at 08:33

## 2025-02-24 RX ADMIN — METOPROLOL TARTRATE 100 MG: 50 TABLET, FILM COATED ORAL at 08:33

## 2025-02-24 NOTE — DISCHARGE INSTRUCTIONS
Ritu James   [id number]   02/24/25    Cardiac CTA Patient Discharge Instructions      Thank you for allowing our CT staff to participate in your care today.  We would like to provide you with some easy to follow instructions before you leave.    DRINK PLENTY OF FLUIDS  Now that your scan is complete, you will need to drink plenty of fluids, preferably water.    DO NOT drink any caffeinated beverages the rest of the day (coffee, tea, caffeinated sodas).    DO NOT drink any alcoholic beverages for the next 24 hours.  We ask you to drink these fluids to dilute the x-ray dye that was used for your scan and allow it to flush through your kidneys.  Caffeine and alcohol will not allow this flushing to occur effectively.       MEDICATION CHANGES:  no    If you have any questions about this, please contact your primary care physician.    If you need immediate medical attention, please go to the nearest Emergency Department or dial 911.    By signing this form, I acknowledge these instructions have been explained to me to my satisfaction and all my questions have been answered.  I have received a copy of this form.

## 2025-02-24 NOTE — PROGRESS NOTES
Staff present during Radiology Nurse encounter:    Nurse: MARTIR Echevarria  Technologist: YADIRA Sommers  Cardiologist: Dr. Caballero  Ordering Physician: Dr. Selby  Clinical Indications: Chest pain/anginal equiv, intermediate CAD risk, not treadmill candidate    Cardiac CTA Worksheet    Chest pain (symptomatic patients):  Intermediate pre-test probability of Coronary Artery Disease    Ivabradine (CORLANOR) 15mg, metoprolol tartrate (LOPRESSOR) 100mg, and nitroglycerin (NITROSTAT) .4mg    IV Access: #20 Diffusics Left Forearm  Contrast: [id number] 100 ML    Scan: Prospective  HR Final Run: 50/44/43/43

## 2025-02-24 NOTE — PROGRESS NOTES
"Ritu Ramirez   [id number]    Your doctor has referred you for a CTA CORONARY ARTERY W IV CONTRAST that requires the injection of an iodinated contrast material into your bloodstream. This iodinated contrast material, sometimes referred to as \"x-ray dye\" allows for better interpretation of the x-ray films or CT images and results in a more accurate interpretation of the examination.     Without the use of iodinated contrast (x-ray dye), the examination may be less informative and result in a suboptimal examination, and possibly a delay in diagnosis and, if needed, treatment.     The iodinated contrast material is given through a small needle or catheter placed into a vein, usually on the inside of the elbow, on the back of hand, or in a vein in the foot or lower leg.    The most common, though still rare, potential reaction to an intravenous contrast injection is an allergic-like reaction. Most allergic-like reactions are mild, though a small subset of people can have more severe reactions. Mild reactions include mild / scattered hives, itching, scratchy throat, sneezing and nasal congestion. More severe reactions include facial swelling, severe difficulty breathing, wheezing and anaphylactic shock. Those with a prior history allergic-like reaction to the same class of contrast media (such as CT contrast or MRI contrast) are at the highest risk for an allergic reaction.     If you believe you had an allergic reaction to contrast in the past, please let our staff know. We can determine if this increases your risk for a future reaction and provide steps to decrease the risk. This may delay your examination, but it decreases the risk of having a new and possibly more severe reaction to the contrast injection.    People with a history of prior allergic reactions to other substances (such as unrelated medications and food) and patients with a history of asthma have slightly increased risk for an allergic reaction from " contrast material when compared with the general population, but do not require to be pretreated prior to a contrast injection.    You should notify the physician, nurse or technologist if you have ever had any of these conditions or if you have any questions.

## 2025-02-25 ENCOUNTER — TELEPHONE (OUTPATIENT)
Dept: SCHEDULING | Facility: CLINIC | Age: 70
End: 2025-02-25
Payer: MEDICARE

## 2025-02-25 RX ORDER — ROSUVASTATIN CALCIUM 10 MG/1
10 TABLET, COATED ORAL DAILY
Qty: 90 TABLET | Refills: 3 | Status: SHIPPED | OUTPATIENT
Start: 2025-02-25

## 2025-02-25 RX ORDER — ASPIRIN 81 MG/1
81 TABLET ORAL DAILY
Start: 2025-02-25 | End: 2025-08-24

## 2025-02-25 NOTE — TELEPHONE ENCOUNTER
Test Results     Name of caller: Ritu Ramirez    Relationship to patient: self     Name of patient: Ritu Ramirez    Name of physician: Bright Selby MD    Type of test: CTA Coronary Artery    Best contact number: [phone]

## 2025-02-25 NOTE — TELEPHONE ENCOUNTER
I called the patient. The patient had a coronary CTA yesterday.  The test is still listed as preliminary and is not signed.  I told the patient she will receive a call once the results become available.  She verbalized understanding.

## 2025-02-25 NOTE — TELEPHONE ENCOUNTER
I spoke to the patient.  She received her coronary CTA in [facility].  She had numerous questions regarding the results.  We discussed all of them.    We did discuss her having NSAIDs as an allergy in her chart and anaphylaxis.  She tells me that she did take NSAIDs years ago and never had anaphylaxis.  She was told not to take them since she developed systemic mastocytosis.  NSAIDs never caused anaphylaxis.  I did take anaphylaxis out of her chart.  She told me she will start aspirin 81 mg daily.  She has taken aspirin products in the past and had no side effects.

## 2025-02-25 NOTE — TELEPHONE ENCOUNTER
I spoke to the patient and reviewed her coronary CTA.  No significant obstructive disease.  The patient should continue on low-dose aspirin and rosuvastatin.  The patient tells me she is not taking aspirin at all.  I told her that she should begin aspirin 81 mg daily.  She also asked me to send a prescription for rosuvastatin to [facility].  Prescription sent.    When I tried to add aspirin to the patient's medication module I was flagged secondary to her NSAID allergy.  Anaphylaxis is listed as a reaction.  I called the patient back and she did not answer.  I left a message on her machine asking her to call our office back to tell us if she is indeed allergic to NSAIDs and if so is her reaction  anaphylaxis.  I also wanted to know if she has ever taken aspirin before. I told the pt not to begin ASA until she calls us back.    I did not add ASA to her list nor did I prescribe it.

## 2025-03-14 ENCOUNTER — TRANSCRIBE ORDERS (OUTPATIENT)
Dept: LAB | Facility: HOSPITAL | Age: 70
End: 2025-03-14

## 2025-03-14 ENCOUNTER — APPOINTMENT (OUTPATIENT)
Dept: LAB | Facility: HOSPITAL | Age: 70
End: 2025-03-14
Attending: HOSPITALIST
Payer: MEDICARE

## 2025-03-14 DIAGNOSIS — E87.6 HYPOKALEMIA: ICD-10-CM

## 2025-03-14 DIAGNOSIS — D64.9 ANEMIA, UNSPECIFIED: Primary | ICD-10-CM

## 2025-03-14 DIAGNOSIS — M54.50 LOW BACK PAIN: ICD-10-CM

## 2025-03-14 DIAGNOSIS — D64.9 ANEMIA, UNSPECIFIED: ICD-10-CM

## 2025-03-14 LAB
ANION GAP SERPL CALC-SCNC: 10 MEQ/L (ref 3–15)
ANISOCYTOSIS BLD QL SMEAR: ABNORMAL
BASOPHILS # BLD: 0.03 K/UL (ref 0.01–0.1)
BASOPHILS NFR BLD: 0.4 %
BUN SERPL-MCNC: 25 MG/DL (ref 7–25)
CALCIUM SERPL-MCNC: 10.2 MG/DL (ref 8.6–10.3)
CHLORIDE SERPL-SCNC: 101 MEQ/L (ref 98–107)
CO2 SERPL-SCNC: 25 MEQ/L (ref 21–31)
CREAT SERPL-MCNC: 0.8 MG/DL (ref 0.6–1.2)
DIFFERENTIAL METHOD BLD: ABNORMAL
EGFRCR SERPLBLD CKD-EPI 2021: >60 ML/MIN/1.73M*2
EOSINOPHIL # BLD: 0.33 K/UL (ref 0.04–0.36)
EOSINOPHIL NFR BLD: 4.3 %
ERYTHROCYTE [DISTWIDTH] IN BLOOD BY AUTOMATED COUNT: 22.6 % (ref 11.7–14.4)
GLUCOSE SERPL-MCNC: 153 MG/DL (ref 70–99)
HCT VFR BLD AUTO: 39.2 % (ref 35–45)
HGB BLD-MCNC: 11.9 G/DL (ref 11.8–15.7)
IMM GRANULOCYTES # BLD AUTO: 0.07 K/UL (ref 0–0.08)
IMM GRANULOCYTES NFR BLD AUTO: 0.9 %
LYMPHOCYTES # BLD: 1.25 K/UL (ref 1.2–3.5)
LYMPHOCYTES NFR BLD: 16.1 %
MAGNESIUM SERPL-MCNC: 2 MG/DL (ref 1.8–2.5)
MCH RBC QN AUTO: 22.9 PG (ref 28–33.2)
MCHC RBC AUTO-ENTMCNC: 30.4 G/DL (ref 32.2–35.5)
MCV RBC AUTO: 75.5 FL (ref 83–98)
MICROCYTES BLD QL SMEAR: ABNORMAL
MONOCYTES # BLD: 0.6 K/UL (ref 0.28–0.8)
MONOCYTES NFR BLD: 7.7 %
NEUTROPHILS # BLD: 5.48 K/UL (ref 1.7–7)
NEUTS SEG NFR BLD: 70.6 %
NRBC BLD-RTO: 0 %
OVALOCYTES BLD QL SMEAR: ABNORMAL
PLAT MORPH BLD: NORMAL
PLATELET # BLD AUTO: 428 K/UL (ref 150–369)
PLATELET # BLD EST: ABNORMAL 10*3/UL
PMV BLD AUTO: 10.4 FL (ref 9.4–12.3)
POLYCHROMASIA BLD QL SMEAR: ABNORMAL
POTASSIUM SERPL-SCNC: 3.6 MEQ/L (ref 3.5–5.1)
RBC # BLD AUTO: 5.19 M/UL (ref 3.93–5.22)
SCHISTOCYTES BLD QL SMEAR: ABNORMAL
SODIUM SERPL-SCNC: 136 MEQ/L (ref 136–145)
WBC # BLD AUTO: 7.76 K/UL (ref 3.8–10.5)

## 2025-03-14 PROCEDURE — 80048 BASIC METABOLIC PNL TOTAL CA: CPT

## 2025-03-14 PROCEDURE — 36415 COLL VENOUS BLD VENIPUNCTURE: CPT

## 2025-03-14 PROCEDURE — 85025 COMPLETE CBC W/AUTO DIFF WBC: CPT

## 2025-03-14 PROCEDURE — 83735 ASSAY OF MAGNESIUM: CPT

## 2025-04-11 ENCOUNTER — HOSPITAL ENCOUNTER (OUTPATIENT)
Dept: RADIOLOGY | Age: 70
Discharge: HOME | End: 2025-04-11
Attending: HOSPITALIST
Payer: MEDICARE

## 2025-04-11 DIAGNOSIS — M54.50 LOW BACK PAIN: ICD-10-CM

## 2025-05-03 NOTE — TELEPHONE ENCOUNTER
I will send in a short supply of lorazepam but this is not good for long term use for sleep - would consider less habit forming medication such as melatonin, unisom both OTC or zolpidem. Sent in s hort supply but if she needs something more long term we will need to discuss further.   Gen: Awake, Alert, NAD  Head:  NC/AT  Eyes:  PERRL, EOMI, Conjunctiva pink, no scleral icterus  Cardiac/CV:  S1 S2, RRR, no murmurs  Respiratory/Pulm:  CTAB, good air movement, normal resp effort, no wheezes/stridor/retractions/rales/rhonchi  Gastrointestinal/Abdomen:  Soft, nontender, nondistended, no rebound/guarding  Ext:  warm, well perfused, moving all extremities spontaneously, no edema, distal pulses intact, s/p left great toe amputation has subacute appearing wound of left medal foot, indurated skin without fluctuance or discharge, darkened area and nontender with minimal erythema  Skin: left medial foot wound  Neuro:  AAOx3, sensation intact, motor 5/5 x 4 extremities, clear speech

## 2025-05-08 ENCOUNTER — OFFICE VISIT (OUTPATIENT)
Dept: NEUROSURGERY | Facility: CLINIC | Age: 70
End: 2025-05-08
Payer: MEDICARE

## 2025-05-08 VITALS
DIASTOLIC BLOOD PRESSURE: 68 MMHG | HEART RATE: 83 BPM | OXYGEN SATURATION: 96 % | SYSTOLIC BLOOD PRESSURE: 118 MMHG | RESPIRATION RATE: 18 BRPM

## 2025-05-08 DIAGNOSIS — M43.16 SPONDYLOLISTHESIS OF LUMBAR REGION: Primary | ICD-10-CM

## 2025-05-08 PROCEDURE — G8752 SYS BP LESS 140: HCPCS | Performed by: NEUROLOGICAL SURGERY

## 2025-05-08 PROCEDURE — 99215 OFFICE O/P EST HI 40 MIN: CPT | Performed by: NEUROLOGICAL SURGERY

## 2025-05-08 PROCEDURE — G8754 DIAS BP LESS 90: HCPCS | Performed by: NEUROLOGICAL SURGERY

## 2025-05-30 ENCOUNTER — HOSPITAL ENCOUNTER (EMERGENCY)
Facility: HOSPITAL | Age: 70
Discharge: HOME | End: 2025-05-30
Attending: EMERGENCY MEDICINE | Admitting: EMERGENCY MEDICINE
Payer: MEDICARE

## 2025-05-30 VITALS
TEMPERATURE: 98.1 F | DIASTOLIC BLOOD PRESSURE: 96 MMHG | SYSTOLIC BLOOD PRESSURE: 127 MMHG | RESPIRATION RATE: 27 BRPM | HEART RATE: 76 BPM | OXYGEN SATURATION: 99 %

## 2025-05-30 DIAGNOSIS — F10.920 ALCOHOLIC INTOXICATION WITHOUT COMPLICATION (CMS/HCC): Primary | ICD-10-CM

## 2025-05-30 DIAGNOSIS — F32.A DEPRESSION, UNSPECIFIED DEPRESSION TYPE: ICD-10-CM

## 2025-05-30 DIAGNOSIS — T42.4X4A BENZODIAZEPINE OVERDOSE OF UNDETERMINED INTENT, INITIAL ENCOUNTER: ICD-10-CM

## 2025-05-30 LAB
ALBUMIN SERPL-MCNC: 4 G/DL (ref 3.5–5.7)
ALP SERPL-CCNC: 58 IU/L (ref 34–125)
ALT SERPL-CCNC: 35 IU/L (ref 7–52)
AMPHET UR QL SCN: POSITIVE
ANION GAP SERPL CALC-SCNC: 11 MEQ/L (ref 3–15)
APAP SERPL-MCNC: 0.1 UG/ML (ref 10–30)
AST SERPL-CCNC: 27 IU/L (ref 13–39)
BARBITURATES UR QL SCN: NOT DETECTED
BASOPHILS # BLD: 0.05 K/UL (ref 0.01–0.1)
BASOPHILS NFR BLD: 0.7 %
BENZODIAZ UR QL SCN: NOT DETECTED
BILIRUB SERPL-MCNC: 0.5 MG/DL (ref 0.3–1.2)
BUN SERPL-MCNC: 12 MG/DL (ref 7–25)
CALCIUM SERPL-MCNC: 8.6 MG/DL (ref 8.6–10.3)
CANNABINOIDS UR QL SCN: POSITIVE
CHLORIDE SERPL-SCNC: 108 MEQ/L (ref 98–107)
CO2 SERPL-SCNC: 23 MEQ/L (ref 21–31)
COCAINE UR QL SCN: NOT DETECTED
CREAT SERPL-MCNC: 0.6 MG/DL (ref 0.6–1.2)
DIFFERENTIAL METHOD BLD: ABNORMAL
EGFRCR SERPLBLD CKD-EPI 2021: >60 ML/MIN/1.73M*2
EOSINOPHIL # BLD: 0.64 K/UL (ref 0.04–0.36)
EOSINOPHIL NFR BLD: 9.4 %
ERYTHROCYTE [DISTWIDTH] IN BLOOD BY AUTOMATED COUNT: 14.5 % (ref 11.7–14.4)
ETHANOL SERPL-MCNC: 124 MG/DL
FENTANYL CTO UR SCN-MCNC: NOT DETECTED NG/ML
GLUCOSE SERPL-MCNC: 74 MG/DL (ref 70–99)
HCT VFR BLD AUTO: 35.4 % (ref 35–45)
HGB BLD-MCNC: 10.9 G/DL (ref 11.8–15.7)
IMM GRANULOCYTES # BLD AUTO: 0.04 K/UL (ref 0–0.08)
IMM GRANULOCYTES NFR BLD AUTO: 0.6 %
LYMPHOCYTES # BLD: 1.21 K/UL (ref 1.2–3.5)
LYMPHOCYTES NFR BLD: 17.8 %
MCH RBC QN AUTO: 24 PG (ref 28–33.2)
MCHC RBC AUTO-ENTMCNC: 30.8 G/DL (ref 32.2–35.5)
MCV RBC AUTO: 78 FL (ref 83–98)
MONOCYTES # BLD: 0.54 K/UL (ref 0.28–0.8)
MONOCYTES NFR BLD: 8 %
NEUTROPHILS # BLD: 4.31 K/UL (ref 1.7–7)
NEUTS SEG NFR BLD: 63.5 %
NRBC BLD-RTO: 0 %
OPIATES UR QL SCN: POSITIVE
PCP UR QL SCN: NOT DETECTED
PLATELET # BLD AUTO: 316 K/UL (ref 150–369)
PMV BLD AUTO: 8.8 FL (ref 9.4–12.3)
POTASSIUM SERPL-SCNC: 3.1 MEQ/L (ref 3.5–5.1)
PROT SERPL-MCNC: 5.9 G/DL (ref 6–8.2)
RBC # BLD AUTO: 4.54 M/UL (ref 3.93–5.22)
SALICYLATES SERPL-MCNC: <1.5 MG/DL
SODIUM SERPL-SCNC: 142 MEQ/L (ref 136–145)
WBC # BLD AUTO: 6.79 K/UL (ref 3.8–10.5)

## 2025-05-30 PROCEDURE — 99284 EMERGENCY DEPT VISIT MOD MDM: CPT

## 2025-05-30 PROCEDURE — 85025 COMPLETE CBC W/AUTO DIFF WBC: CPT | Performed by: EMERGENCY MEDICINE

## 2025-05-30 PROCEDURE — 36415 COLL VENOUS BLD VENIPUNCTURE: CPT | Performed by: EMERGENCY MEDICINE

## 2025-05-30 PROCEDURE — 80053 COMPREHEN METABOLIC PANEL: CPT | Performed by: EMERGENCY MEDICINE

## 2025-05-30 PROCEDURE — G0480 DRUG TEST DEF 1-7 CLASSES: HCPCS | Performed by: EMERGENCY MEDICINE

## 2025-05-30 PROCEDURE — 63700000 HC SELF-ADMINISTRABLE DRUG: Performed by: EMERGENCY MEDICINE

## 2025-05-30 PROCEDURE — 93005 ELECTROCARDIOGRAM TRACING: CPT | Performed by: EMERGENCY MEDICINE

## 2025-05-30 PROCEDURE — 80307 DRUG TEST PRSMV CHEM ANLYZR: CPT | Performed by: EMERGENCY MEDICINE

## 2025-05-30 PROCEDURE — 99284 EMERGENCY DEPT VISIT MOD MDM: CPT | Mod: 95 | Performed by: PSYCHIATRY & NEUROLOGY

## 2025-05-30 RX ORDER — POTASSIUM CHLORIDE 20 MEQ/1
40 TABLET, EXTENDED RELEASE ORAL ONCE
Status: COMPLETED | OUTPATIENT
Start: 2025-05-30 | End: 2025-05-30

## 2025-05-30 RX ADMIN — POTASSIUM CHLORIDE 40 MEQ: 1500 TABLET, EXTENDED RELEASE ORAL at 18:29

## 2025-05-30 ASSESSMENT — COGNITIVE AND FUNCTIONAL STATUS - GENERAL
SLEEP_WAKE_CYCLE: NO CHANGE
RECENT MEMORY: WNL
THOUGHT_PROCESS: WNL
APPEARANCE: WELL GROOMED
MOOD: DEPRESSED
INSIGHT: INTACT
SPEECH: REGULAR
EYE_CONTACT: WNL
PERCEPTUAL FUNCTION: NORMAL
IMPULSE CONTROL: IMPAIRED, MINIMALLY
ORIENTATION: FULLY ORIENTED
JUDGEMENT: IMPAIRED, MINIMALLY
AROUSAL LEVEL: ALERT
CONCENTRATION: WNL
APPETITE: NO CHANGE
DELUSIONS: NONE OR AGE APPROPRIATE
PSYCHOMOTOR FUNCTIONING: WNL
LIBIDO: NO CHANGE
AFFECT: FULL RANGE
REMOTE MEMORY: WNL
THOUGHT_CONTENT: APPROPRIATE
ATTENTION: WNL

## 2025-05-30 ASSESSMENT — ENCOUNTER SYMPTOMS
FEELINGS OF WORTHLESSNESS: 1
DEPRESSED MOOD: 1
SHORTNESS OF BREATH: 0
FEVER: 0
CHILLS: 0
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
## 2025-05-30 NOTE — BEHAVIORAL HEALTH CRISIS PROGRESS NOTE
7:58pm Call array spoke to Kim to put the patient on the board for loc/psychiatric clearance.    8:43pm Received a call from Dipika segura to provide meeting id [phone] and [phone]    9:51pm Pt was seeing Dr. Castaneda. Waiting on his assessment     9:57pm CC provide crisis listing, local providers, listing of mds and psychologists. Inform all treatment of the disposition.
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
## 2025-05-31 LAB
ATRIAL RATE: 66
P AXIS: 52
PR INTERVAL: 162
QRS DURATION: 82
QT INTERVAL: 402
QTC CALCULATION(BAZETT): 421
R AXIS: -53
T WAVE AXIS: 11
VENTRICULAR RATE: 66

## 2025-05-31 NOTE — DISCHARGE INSTRUCTIONS
Follow up with resources provided by the       Follow up with her therapist x2 weekly. Gave crisis listing, mds and counseling resources, mahamed and mariann.
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
## 2025-06-19 ENCOUNTER — LAB REQUISITION (OUTPATIENT)
Dept: LAB | Facility: HOSPITAL | Age: 70
End: 2025-06-19
Attending: HOSPITALIST
Payer: MEDICARE

## 2025-06-19 DIAGNOSIS — E78.9 DISORDER OF LIPOPROTEIN METABOLISM: ICD-10-CM

## 2025-06-19 LAB
CREAT UR-MCNC: 132.7 MG/DL
CRP SERPL HS-MCNC: 0.46 MG/L
MICROALBUMIN UR-MCNC: 15.6 MG/L
MICROALBUMIN/CREAT UR: 11.8 UG/MG
URATE SERPL-MCNC: 4.2 MG/DL (ref 2.3–6)

## 2025-06-19 PROCEDURE — 83695 ASSAY OF LIPOPROTEIN(A): CPT | Performed by: HOSPITALIST

## 2025-06-19 PROCEDURE — 86141 C-REACTIVE PROTEIN HS: CPT | Performed by: HOSPITALIST

## 2025-06-19 PROCEDURE — 82570 ASSAY OF URINE CREATININE: CPT | Performed by: HOSPITALIST

## 2025-06-19 PROCEDURE — 84550 ASSAY OF BLOOD/URIC ACID: CPT | Performed by: HOSPITALIST

## 2025-06-19 PROCEDURE — 82172 ASSAY OF APOLIPOPROTEIN: CPT | Performed by: HOSPITALIST

## 2025-06-21 LAB
APO B SERPL-MCNC: 64 MG/DL
LPA SERPL-SCNC: 40 NMOL/L

## 2025-06-30 ENCOUNTER — TELEPHONE (OUTPATIENT)
Dept: CARDIOLOGY | Facility: CLINIC | Age: 70
End: 2025-06-30
Payer: MEDICARE

## 2025-06-30 DIAGNOSIS — R06.09 OTHER FORMS OF DYSPNEA: Primary | ICD-10-CM

## 2025-06-30 NOTE — TELEPHONE ENCOUNTER
"Patient of Dr Selby with PMH systemic mastocytosis, hypokalemia, dyslipidemia, sinus bradycardia, iron deficiency anemia    Last OV 12/20/24, follow up 12/19/25    Received call from patient who states \"I saw Dr Selby last week when my partner, Gagandeep, had a cardiac cath and I said, maybe I should get one and today, I had a sharp pain(8/10), in my left chest, about 2 or 3 hours ago, that only lasted for a second or two\" before resolving on own.    Patient denies any additional CP, radiation of pain, N/V, diaphoresis, palpitations, lightheadedness, dizziness, SOB or LOPEZ and states \"I worked out my upper body with weights yesterday\" and is wondering if the pain may be related to a muscle pull but would like to know if she needs a sooner appointment.    Patient did not check BP today but states \"it has been good\", ranging 120s/70s  HR today on [employer] was \"a normal rhythm\" at 78    Patient advised to continue to monitor for above signs/symptoms and if CP returns or if any above signs/symptoms develop she should proceed to ED and call office to provide update.    Patient verbalized understanding and can be reached at [phone] with any different or additional recommendations  "

## 2025-06-30 NOTE — TELEPHONE ENCOUNTER
I reviewed the below message with Dr. Selby and I spoke to the patient.  She will have a  stress echocardiogram.  We will call her with the results when they become available.    Can someone please call the patient and schedule her for stress echocardiogram.  Thank you

## 2025-07-15 ENCOUNTER — TELEPHONE (OUTPATIENT)
Dept: CARDIOLOGY | Facility: HOSPITAL | Age: 70
End: 2025-07-15
Payer: MEDICARE

## 2025-07-15 NOTE — TELEPHONE ENCOUNTER
Left message for patient reviewed instructions for stress echo date and time confirmed. Phone number was left for any questions and/or concerns.

## 2025-07-17 ENCOUNTER — LAB REQUISITION (OUTPATIENT)
Dept: LAB | Facility: HOSPITAL | Age: 70
End: 2025-07-17
Attending: HOSPITALIST
Payer: MEDICARE

## 2025-07-17 ENCOUNTER — HOSPITAL ENCOUNTER (OUTPATIENT)
Dept: CARDIOLOGY | Facility: HOSPITAL | Age: 70
Discharge: HOME | End: 2025-07-17
Attending: INTERNAL MEDICINE
Payer: MEDICARE

## 2025-07-17 ENCOUNTER — HOSPITAL ENCOUNTER (OUTPATIENT)
Dept: RADIOLOGY | Facility: HOSPITAL | Age: 70
Discharge: HOME | End: 2025-07-17
Attending: HOSPITALIST
Payer: MEDICARE

## 2025-07-17 DIAGNOSIS — R10.2 PELVIC AND PERINEAL PAIN: ICD-10-CM

## 2025-07-17 DIAGNOSIS — R10.2 PERINEAL NEURALGIA: ICD-10-CM

## 2025-07-17 DIAGNOSIS — R06.09 OTHER FORMS OF DYSPNEA: ICD-10-CM

## 2025-07-17 LAB
ALBUMIN SERPL-MCNC: 5 G/DL (ref 3.5–5.7)
ALP SERPL-CCNC: 51 IU/L (ref 34–125)
ALT SERPL-CCNC: 22 IU/L (ref 7–52)
ANION GAP SERPL CALC-SCNC: 8 MEQ/L (ref 3–15)
AST SERPL-CCNC: 16 IU/L (ref 13–39)
BACTERIA URNS QL MICRO: 1 /HPF
BASOPHILS # BLD: 0.03 K/UL (ref 0.01–0.1)
BASOPHILS NFR BLD: 0.3 %
BILIRUB SERPL-MCNC: 0.5 MG/DL (ref 0.3–1.2)
BILIRUB UR QL STRIP.AUTO: ABNORMAL MG/DL
BUN SERPL-MCNC: 17 MG/DL (ref 7–25)
CALCIUM SERPL-MCNC: 10.2 MG/DL (ref 8.6–10.3)
CAOX CRY URNS QL MICRO: 3 /HPF
CHLORIDE SERPL-SCNC: 104 MEQ/L (ref 98–107)
CLARITY UR REFRACT.AUTO: ABNORMAL
CO2 SERPL-SCNC: 26 MEQ/L (ref 21–31)
COLOR UR AUTO: ABNORMAL
CREAT SERPL-MCNC: 0.7 MG/DL (ref 0.6–1.2)
DIFFERENTIAL METHOD BLD: ABNORMAL
EGFRCR SERPLBLD CKD-EPI 2021: >60 ML/MIN/1.73M*2
EOSINOPHIL # BLD: 0.58 K/UL (ref 0.04–0.36)
EOSINOPHIL NFR BLD: 5.9 %
ERYTHROCYTE [DISTWIDTH] IN BLOOD BY AUTOMATED COUNT: 14.4 % (ref 11.7–14.4)
GLUCOSE SERPL-MCNC: 113 MG/DL (ref 70–99)
GLUCOSE UR STRIP.AUTO-MCNC: ABNORMAL MG/DL
HCT VFR BLD AUTO: 41.4 % (ref 35–45)
HGB BLD-MCNC: 12.1 G/DL (ref 11.8–15.7)
HGB UR QL STRIP.AUTO: ABNORMAL
HYALINE CASTS #/AREA URNS LPF: ABNORMAL /LPF
IMM GRANULOCYTES # BLD AUTO: 0.07 K/UL (ref 0–0.08)
IMM GRANULOCYTES NFR BLD AUTO: 0.7 %
KETONES UR STRIP.AUTO-MCNC: ABNORMAL MG/DL
LEUKOCYTE ESTERASE UR QL STRIP.AUTO: ABNORMAL
LIPASE SERPL-CCNC: 43 U/L (ref 11–82)
LYMPHOCYTES # BLD: 1.34 K/UL (ref 1.2–3.5)
LYMPHOCYTES NFR BLD: 13.6 %
MCH RBC QN AUTO: 23 PG (ref 28–33.2)
MCHC RBC AUTO-ENTMCNC: 29.2 G/DL (ref 32.2–35.5)
MCV RBC AUTO: 78.6 FL (ref 83–98)
MONOCYTES # BLD: 0.89 K/UL (ref 0.28–0.8)
MONOCYTES NFR BLD: 9 %
MUCOUS THREADS URNS QL MICRO: 1 /LPF
NEUTROPHILS # BLD: 6.96 K/UL (ref 1.7–7)
NEUTS SEG NFR BLD: 70.5 %
NITRITE UR QL STRIP.AUTO: ABNORMAL
NRBC BLD-RTO: 0 %
PH UR STRIP.AUTO: ABNORMAL [PH]
PLATELET # BLD AUTO: 509 K/UL (ref 150–369)
PMV BLD AUTO: 9.8 FL (ref 9.4–12.3)
POTASSIUM SERPL-SCNC: 5.3 MEQ/L (ref 3.5–5.1)
PROT SERPL-MCNC: 7 G/DL (ref 6–8.2)
PROT UR QL STRIP.AUTO: ABNORMAL
RBC # BLD AUTO: 5.27 M/UL (ref 3.93–5.22)
RBC #/AREA URNS HPF: ABNORMAL /HPF
SODIUM SERPL-SCNC: 138 MEQ/L (ref 136–145)
SP GR UR REFRACT.AUTO: 1.02
SQUAMOUS URNS QL MICRO: ABNORMAL /HPF
UROBILINOGEN UR STRIP-ACNC: ABNORMAL EU/DL
WBC # BLD AUTO: 9.87 K/UL (ref 3.8–10.5)
WBC #/AREA URNS HPF: ABNORMAL /HPF

## 2025-07-17 PROCEDURE — 81003 URINALYSIS AUTO W/O SCOPE: CPT | Performed by: HOSPITALIST

## 2025-07-17 PROCEDURE — 80053 COMPREHEN METABOLIC PANEL: CPT | Performed by: HOSPITALIST

## 2025-07-17 PROCEDURE — 85025 COMPLETE CBC W/AUTO DIFF WBC: CPT | Performed by: HOSPITALIST

## 2025-07-17 PROCEDURE — 87086 URINE CULTURE/COLONY COUNT: CPT | Performed by: HOSPITALIST

## 2025-07-17 PROCEDURE — 83690 ASSAY OF LIPASE: CPT | Performed by: HOSPITALIST

## 2025-07-17 PROCEDURE — 74018 RADEX ABDOMEN 1 VIEW: CPT

## 2025-07-17 NOTE — NURSING NOTE
Patient arrived for stress echo today, but had sudden onset of UTI symptoms while in waiting room.  Patient notified her PCP and decided to cancel SE today. Patient will call cardiologist office, when feeling better, to reschedule.

## 2025-07-18 LAB
BACTERIA UR CULT: NORMAL
BACTERIA UR CULT: NORMAL

## 2025-07-21 ENCOUNTER — LAB REQUISITION (OUTPATIENT)
Dept: LAB | Facility: HOSPITAL | Age: 70
End: 2025-07-21
Attending: HOSPITALIST
Payer: MEDICARE

## 2025-07-21 DIAGNOSIS — R30.0 DYSURIA: ICD-10-CM

## 2025-07-21 DIAGNOSIS — I10 ESSENTIAL (PRIMARY) HYPERTENSION: ICD-10-CM

## 2025-07-21 DIAGNOSIS — D64.9 ANEMIA, UNSPECIFIED: ICD-10-CM

## 2025-07-21 LAB
ALBUMIN SERPL-MCNC: 4.3 G/DL (ref 3.5–5.7)
ALP SERPL-CCNC: 50 IU/L (ref 34–125)
ALT SERPL-CCNC: 19 IU/L (ref 7–52)
ANION GAP SERPL CALC-SCNC: 8 MEQ/L (ref 3–15)
AST SERPL-CCNC: 15 IU/L (ref 13–39)
BACTERIA URNS QL MICRO: ABNORMAL /HPF
BASOPHILS # BLD: 0.04 K/UL (ref 0.01–0.1)
BASOPHILS NFR BLD: 0.5 %
BILIRUB SERPL-MCNC: 0.3 MG/DL (ref 0.3–1.2)
BILIRUB UR QL STRIP.AUTO: NEGATIVE MG/DL
BUN SERPL-MCNC: 18 MG/DL (ref 7–25)
CALCIUM SERPL-MCNC: 9.4 MG/DL (ref 8.6–10.3)
CAOX CRY URNS QL MICRO: 3 /HPF
CHLORIDE SERPL-SCNC: 105 MEQ/L (ref 98–107)
CLARITY UR REFRACT.AUTO: CLEAR
CO2 SERPL-SCNC: 25 MEQ/L (ref 21–31)
COLOR UR AUTO: YELLOW
CREAT SERPL-MCNC: 0.6 MG/DL (ref 0.6–1.2)
DIFFERENTIAL METHOD BLD: ABNORMAL
EGFRCR SERPLBLD CKD-EPI 2021: >60 ML/MIN/1.73M*2
EOSINOPHIL # BLD: 0.51 K/UL (ref 0.04–0.36)
EOSINOPHIL NFR BLD: 6.4 %
ERYTHROCYTE [DISTWIDTH] IN BLOOD BY AUTOMATED COUNT: 14.2 % (ref 11.7–14.4)
GLUCOSE SERPL-MCNC: 97 MG/DL (ref 70–99)
GLUCOSE UR STRIP.AUTO-MCNC: NEGATIVE MG/DL
HCT VFR BLD AUTO: 37.9 % (ref 35–45)
HGB BLD-MCNC: 11.2 G/DL (ref 11.8–15.7)
HGB UR QL STRIP.AUTO: ABNORMAL
HYALINE CASTS #/AREA URNS LPF: ABNORMAL /LPF
IMM GRANULOCYTES # BLD AUTO: 0.04 K/UL (ref 0–0.08)
IMM GRANULOCYTES NFR BLD AUTO: 0.5 %
KETONES UR STRIP.AUTO-MCNC: NEGATIVE MG/DL
LEUKOCYTE ESTERASE UR QL STRIP.AUTO: 2
LYMPHOCYTES # BLD: 1.11 K/UL (ref 1.2–3.5)
LYMPHOCYTES NFR BLD: 13.9 %
MAGNESIUM SERPL-MCNC: 1.7 MG/DL (ref 1.8–2.5)
MCH RBC QN AUTO: 23 PG (ref 28–33.2)
MCHC RBC AUTO-ENTMCNC: 29.6 G/DL (ref 32.2–35.5)
MCV RBC AUTO: 77.8 FL (ref 83–98)
MONOCYTES # BLD: 0.75 K/UL (ref 0.28–0.8)
MONOCYTES NFR BLD: 9.4 %
MUCOUS THREADS URNS QL MICRO: ABNORMAL /LPF
NEUTROPHILS # BLD: 5.54 K/UL (ref 1.7–7)
NEUTS SEG NFR BLD: 69.3 %
NITRITE UR QL STRIP.AUTO: NEGATIVE
NRBC BLD-RTO: 0 %
PH UR STRIP.AUTO: 5.5 [PH]
PLATELET # BLD AUTO: 395 K/UL (ref 150–369)
PMV BLD AUTO: 9.7 FL (ref 9.4–12.3)
POTASSIUM SERPL-SCNC: 3.7 MEQ/L (ref 3.5–5.1)
PROT SERPL-MCNC: 6 G/DL (ref 6–8.2)
PROT UR QL STRIP.AUTO: NEGATIVE
RBC # BLD AUTO: 4.87 M/UL (ref 3.93–5.22)
RBC #/AREA URNS HPF: ABNORMAL /HPF
SODIUM SERPL-SCNC: 138 MEQ/L (ref 136–145)
SP GR UR REFRACT.AUTO: 1.03
SQUAMOUS URNS QL MICRO: ABNORMAL /HPF
UROBILINOGEN UR STRIP-ACNC: 0.2 EU/DL
WBC # BLD AUTO: 7.99 K/UL (ref 3.8–10.5)
WBC #/AREA URNS HPF: ABNORMAL /HPF

## 2025-07-21 PROCEDURE — 87086 URINE CULTURE/COLONY COUNT: CPT | Performed by: HOSPITALIST

## 2025-07-21 PROCEDURE — 81003 URINALYSIS AUTO W/O SCOPE: CPT | Performed by: HOSPITALIST

## 2025-07-21 PROCEDURE — 80053 COMPREHEN METABOLIC PANEL: CPT | Performed by: HOSPITALIST

## 2025-07-21 PROCEDURE — 85025 COMPLETE CBC W/AUTO DIFF WBC: CPT | Performed by: HOSPITALIST

## 2025-07-21 PROCEDURE — 83735 ASSAY OF MAGNESIUM: CPT | Performed by: HOSPITALIST

## 2025-07-22 LAB — BACTERIA UR CULT: NORMAL

## 2025-07-29 ENCOUNTER — TELEPHONE (OUTPATIENT)
Dept: CARDIOLOGY | Facility: CLINIC | Age: 70
End: 2025-07-29

## 2025-08-06 ENCOUNTER — TELEPHONE (OUTPATIENT)
Dept: CARDIOLOGY | Facility: HOSPITAL | Age: 70
End: 2025-08-06
Payer: MEDICARE

## 2025-08-08 ENCOUNTER — RESULTS FOLLOW-UP (OUTPATIENT)
Dept: CARDIOLOGY | Facility: CLINIC | Age: 70
End: 2025-08-08
Payer: MEDICARE

## 2025-08-08 ENCOUNTER — HOSPITAL ENCOUNTER (OUTPATIENT)
Dept: CARDIOLOGY | Facility: HOSPITAL | Age: 70
Discharge: HOME | End: 2025-08-08
Attending: INTERNAL MEDICINE
Payer: MEDICARE

## 2025-08-08 VITALS
RESPIRATION RATE: 16 BRPM | HEART RATE: 56 BPM | BODY MASS INDEX: 22.28 KG/M2 | WEIGHT: 118 LBS | HEIGHT: 61 IN | DIASTOLIC BLOOD PRESSURE: 78 MMHG | SYSTOLIC BLOOD PRESSURE: 128 MMHG

## 2025-08-08 LAB
ASCENDING AORTA: 3.2 CM
AV PEAK GRADIENT: 9 MMHG
AV PEAK VELOCITY-S: 1.54 M/S
BSA FOR ECHO PROCEDURE: 1.52 M2
DUMMY LRR: 16.4 %
DUMMY LRR: 19.8 %
DUMMY LRR: 24.6 %
E WAVE DECELERATION TIME: 214 MS
E/A RATIO: 0.7
E/E' RATIO: 10.3
E/LAT E' RATIO: 11.5
EDV (BP): 73.9 CM3
EF (A4C): 65 %
EF A2C: 60 %
EJECTION FRACTION: 63.9 %
ESV (BP): 26.7 CM3
GLOBAL LONGITUDINAL STRAIN: -20.3 %
LA ESV (BP): 28.2 CM3
LA ESV INDEX (A2C): 17.3 CM3/M2
LA ESV INDEX (BP): 18.55 CM3/M2
LAAS-AP2: 11.8 CM2
LAAS-AP4: 11.4 CM2
LAL MED-LAT (A4C): 3.72 CM
LAV-S: 26.3 CM3
LEFT ATRIAL LENGTH SUPERIOR-INFERIOR (APICAL 2-CHAMBER VIEW): 4.11 CM
LEFT ATRIUM VOLUME INDEX: 18.16 CM3/M2
LEFT ATRIUM VOLUME: 27.6 CM3
LEFT VENTRICLE DIASTOLIC VOLUME INDEX: 53.68 CM3/M2
LEFT VENTRICLE DIASTOLIC VOLUME: 81.6 CM3
LEFT VENTRICLE SYSTOLIC VOLUME INDEX: 18.82 CM3/M2
LEFT VENTRICLE SYSTOLIC VOLUME: 28.6 CM3
LV DIASTOLIC VOLUME: 61.7 CM3
LV ESV (APICAL 2 CHAMBER): 24.7 CM3
LVAD-AP2: 22.5 CM2
LVAD-AP4: 27.1 CM2
LVAS-AP2: 14 CM2
LVAS-AP4: 15.2 CM2
LVEDVI(A2C): 40.59 CM3/M2
LVEDVI(BP): 48.62 CM3/M2
LVESVI(A2C): 16.25 CM3/M2
LVESVI(BP): 17.57 CM3/M2
LVLD-AP2: 6.91 CM
LVLD-AP4: 7.53 CM
LVLS-AP2: 6.77 CM
LVLS-AP4: 6.88 CM
LVOT MG: 2 MMHG
LVOT MV: 0.63 M/S
LVOT PEAK VELOCITY: 1.22 M/S
LVOT PG: 6 MMHG
LVOT VTI: 21.6 CM
MV E'TISSUE VEL-LAT: 0.06 M/S
MV E'TISSUE VEL-MED: 0.06 M/S
MV PEAK A VEL: 0.94 M/S
MV PEAK E VEL: 0.66 M/S
MV STENOSIS PRESSURE HALF TIME: 63 MS
MV VALVE AREA P 1/2 METHOD: 3.49 CM2
SEPTAL TISSUE DOPPLER FREE WALL LATE DIA VELOCITY (APICAL 4 CHAMBER VIEW): 0.11 M/S
SEPTAL TISSUE DOPPLER FREE WALL LATE DIA VELOCITY (APICAL 4 CHAMBER VIEW): 0.11 M/S
STRESS BASELINE BP: NORMAL MMHG
STRESS BASELINE HR: 56 BPM
STRESS PERCENT HR: 94 %
STRESS POST ESTIMATED WORKLOAD: 13.4 METS
STRESS POST EXERCISE DUR MIN: 11 MIN
STRESS POST EXERCISE DUR SEC: 38 SEC
STRESS POST PEAK BP: NORMAL MMHG
STRESS POST PEAK HR: 142 BPM
STRESS TARGET HR: 128 BPM
TAPSE: 1.65 CM

## 2025-08-08 PROCEDURE — 93351 STRESS TTE COMPLETE: CPT | Mod: 26 | Performed by: INTERNAL MEDICINE

## 2025-08-08 PROCEDURE — 93320 DOPPLER ECHO COMPLETE: CPT | Mod: 26 | Performed by: INTERNAL MEDICINE

## 2025-08-08 PROCEDURE — 93351 STRESS TTE COMPLETE: CPT

## 2025-08-08 PROCEDURE — 93325 DOPPLER ECHO COLOR FLOW MAPG: CPT | Mod: 26 | Performed by: INTERNAL MEDICINE

## 2025-08-08 NOTE — TELEPHONE ENCOUNTER
----- Message from Bright Selby sent at 8/8/2025  3:59 PM EDT -----  Stress test reviewed.  Her heart is strong.  There is no evidence of any blocked up blood vessels.  ----- Message -----  From: Marycruz Marie MD  Sent: 8/8/2025   3:53 PM EDT  To: Bright Selby MD

## 2025-08-22 ENCOUNTER — APPOINTMENT (EMERGENCY)
Dept: RADIOLOGY | Facility: HOSPITAL | Age: 70
End: 2025-08-22
Payer: MEDICARE

## 2025-08-22 ENCOUNTER — HOSPITAL ENCOUNTER (EMERGENCY)
Facility: HOSPITAL | Age: 70
Discharge: HOME | End: 2025-08-22
Attending: STUDENT IN AN ORGANIZED HEALTH CARE EDUCATION/TRAINING PROGRAM
Payer: MEDICARE

## 2025-08-22 VITALS
TEMPERATURE: 97 F | RESPIRATION RATE: 18 BRPM | OXYGEN SATURATION: 96 % | SYSTOLIC BLOOD PRESSURE: 121 MMHG | HEART RATE: 88 BPM | DIASTOLIC BLOOD PRESSURE: 90 MMHG

## 2025-08-22 DIAGNOSIS — S80.861A INSECT BITE OF RIGHT LOWER LEG, INITIAL ENCOUNTER: Primary | ICD-10-CM
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
## 2025-08-22 PROCEDURE — 73630 X-RAY EXAM OF FOOT: CPT | Mod: RT

## 2025-08-22 PROCEDURE — 63700000 HC SELF-ADMINISTRABLE DRUG

## 2025-08-22 PROCEDURE — 99283 EMERGENCY DEPT VISIT LOW MDM: CPT

## 2025-08-22 RX ORDER — OXYCODONE HYDROCHLORIDE 5 MG/1
5 TABLET ORAL ONCE
Refills: 0 | Status: COMPLETED | OUTPATIENT
Start: 2025-08-22 | End: 2025-08-22

## 2025-08-22 RX ORDER — OXYCODONE HYDROCHLORIDE 5 MG/1
5 TABLET ORAL EVERY 6 HOURS PRN
Qty: 3 TABLET | Refills: 0 | Status: SHIPPED | OUTPATIENT
Start: 2025-08-22

## 2025-08-22 RX ORDER — DIAZEPAM 5 MG/1
5 TABLET ORAL EVERY 6 HOURS PRN
Qty: 3 TABLET | Refills: 0 | Status: SHIPPED | OUTPATIENT
Start: 2025-08-22 | End: 2025-08-22

## 2025-08-22 RX ADMIN — OXYCODONE HYDROCHLORIDE 5 MG: 5 TABLET ORAL at 02:00

## 2025-08-22 ASSESSMENT — ENCOUNTER SYMPTOMS
WOUND: 1
COLOR CHANGE: 0
JOINT SWELLING: 0
ARTHRALGIAS: 0
NUMBNESS: 0
MYALGIAS: 1
CHILLS: 0
FEVER: 0
WEAKNESS: 0

## 2025-09-04 ENCOUNTER — HOSPITAL ENCOUNTER (OUTPATIENT)
Dept: RADIOLOGY | Age: 70
Discharge: HOME | End: 2025-09-04
Attending: GENERAL ACUTE CARE HOSPITAL
Payer: MEDICARE

## 2025-09-04 DIAGNOSIS — Z12.31 OTHER SCREENING MAMMOGRAM: ICD-10-CM

## 2025-09-04 PROCEDURE — 77063 BREAST TOMOSYNTHESIS BI: CPT

## (undated) DEVICE — DRILL BIT CANNULATED 2.7MM

## (undated) DEVICE — GLOVE SZ 8 LINER PROTEXIS PI BL

## (undated) DEVICE — ADHESIVE SKIN LIQUIBAND EXCEED .8GM

## (undated) DEVICE — DRAPE C-ARM X-RAY EQUIPMENT IMAGE

## (undated) DEVICE — SPONGE LAP 4X18 SAFE-T RFID ENHANCED XRAY

## (undated) DEVICE — PACK RFID ORTHO MINOR

## (undated) DEVICE — COVERS LIGHT HANDLE DISPOSABLE

## (undated) DEVICE — SUTURE SURGICAL STEEL  #7  DS18

## (undated) DEVICE — TUBING SMOKE EVAC PENCIL COATED

## (undated) DEVICE — GUIDE WIRE 1.25MM THREADED
Type: IMPLANTABLE DEVICE | Site: PATELLA | Status: NON-FUNCTIONAL
Removed: 2021-05-08

## (undated) DEVICE — GLOVE SZ 8.5 PROTEXIS PI

## (undated) DEVICE — DRAPE C-ARMOR

## (undated) DEVICE — DRESSING MEPILEX 4X8 BORDER